# Patient Record
Sex: MALE | Race: WHITE | NOT HISPANIC OR LATINO | Employment: OTHER | URBAN - METROPOLITAN AREA
[De-identification: names, ages, dates, MRNs, and addresses within clinical notes are randomized per-mention and may not be internally consistent; named-entity substitution may affect disease eponyms.]

---

## 2017-01-09 ENCOUNTER — ALLSCRIPTS OFFICE VISIT (OUTPATIENT)
Dept: OTHER | Facility: OTHER | Age: 67
End: 2017-01-09

## 2017-01-12 DIAGNOSIS — J18.9 PNEUMONIA: ICD-10-CM

## 2017-01-12 DIAGNOSIS — R05.9 COUGH: ICD-10-CM

## 2017-01-25 ENCOUNTER — ALLSCRIPTS OFFICE VISIT (OUTPATIENT)
Dept: OTHER | Facility: OTHER | Age: 67
End: 2017-01-25

## 2017-02-07 ENCOUNTER — HOSPITAL ENCOUNTER (OUTPATIENT)
Dept: RADIOLOGY | Facility: HOSPITAL | Age: 67
Discharge: HOME | End: 2017-02-07
Attending: INTERNAL MEDICINE
Payer: MEDICARE

## 2017-02-07 ENCOUNTER — TRANSCRIBE ORDERS (OUTPATIENT)
Dept: ADMINISTRATIVE | Facility: HOSPITAL | Age: 67
End: 2017-02-07

## 2017-02-07 DIAGNOSIS — R05.9 COUGH: ICD-10-CM

## 2017-02-07 PROCEDURE — 71020 HB CHEST X-RAY 2VW FRONTAL&LATL: CPT

## 2017-02-13 ENCOUNTER — ALLSCRIPTS OFFICE VISIT (OUTPATIENT)
Dept: OTHER | Facility: OTHER | Age: 67
End: 2017-02-13

## 2017-02-14 ENCOUNTER — TRANSCRIBE ORDERS (OUTPATIENT)
Dept: ADMINISTRATIVE | Facility: HOSPITAL | Age: 67
End: 2017-02-14

## 2017-02-14 DIAGNOSIS — R05.9 COUGH: Primary | ICD-10-CM

## 2017-03-30 ENCOUNTER — ALLSCRIPTS OFFICE VISIT (OUTPATIENT)
Dept: OTHER | Facility: OTHER | Age: 67
End: 2017-03-30

## 2017-03-31 ENCOUNTER — GENERIC CONVERSION - ENCOUNTER (OUTPATIENT)
Dept: OTHER | Facility: OTHER | Age: 67
End: 2017-03-31

## 2017-05-29 ENCOUNTER — GENERIC CONVERSION - ENCOUNTER (OUTPATIENT)
Dept: OTHER | Facility: OTHER | Age: 67
End: 2017-05-29

## 2017-05-31 LAB
ALBUMIN SERPL BCP-MCNC: 4.2 G/DL
ALP SERPL-CCNC: 62 IU/L
ALT SERPL W P-5'-P-CCNC: 21 IU/L
AST SERPL W P-5'-P-CCNC: 23 IU/L (ref 0–40)
BASOPHILS # BLD AUTO: 0 %
BASOPHILS # BLD AUTO: 0 X10E3/UL
BILIRUB SERPL-MCNC: 0.8 MG/DL (ref 0–1.2)
BILIRUBIN DIRECT (HISTORICAL): 0.17 MG/DL (ref 0–0.4)
BUN SERPL-MCNC: 20 MG/DL
BUN/CREA RATIO (HISTORICAL): 18
CALCIUM SERPL-MCNC: 9.4 MG/DL
CHLORIDE SERPL-SCNC: 103 MMOL/L (ref 96–106)
CHOLEST SERPL-MCNC: 153 MG/DL
CO2 SERPL-SCNC: 23 MMOL/L (ref 18–29)
COMMENT (HISTORICAL): NORMAL
CREAT SERPL-MCNC: 1.1 MG/DL
DEPRECATED RDW RBC AUTO: 13.3 %
EGFR AFRICAN AMERICAN (HISTORICAL): ABNORMAL ML/MIN/1.73
EGFR-AMERICAN CALC (HISTORICAL): ABNORMAL ML/MIN/1.73
EOSINOPHIL # BLD AUTO: 0.1 X10E3/UL
EOSINOPHIL # BLD AUTO: 1 %
GLUCOSE SERPL-MCNC: 111 MG/DL (ref 65–99)
HBA1C MFR BLD HPLC: 5.6 % (ref 4.8–5.6)
HCT VFR BLD AUTO: 45 %
HDLC SERPL-MCNC: 44 MG/DL
HGB BLD-MCNC: 15.9 G/DL
IMM.GRANULOCYTES (CD4/8) (HISTORICAL): 0 %
IMM.GRANULOCYTES (CD4/8) (HISTORICAL): 0 X10E3/UL
LDLC SERPL CALC-MCNC: 88 MG/DL
LYMPHOCYTES # BLD AUTO: 1.6 X10E3/UL
LYMPHOCYTES # BLD AUTO: 25 %
MCH RBC QN AUTO: 30.8 PG
MCHC RBC AUTO-ENTMCNC: 35.3 G/DL
MCV RBC AUTO: 87 FL
MONOCYTES # BLD AUTO: 0.5 X10E3/UL
MONOCYTES (HISTORICAL): 8 %
NEUTROPHILS # BLD AUTO: 4.2 X10E3/UL
NEUTROPHILS # BLD AUTO: 66 %
PLATELET # BLD AUTO: 168 X10E3/UL (ref 150–379)
POTASSIUM SERPL-SCNC: 4.3 MMOL/L (ref 3.5–5.2)
PROSTATE SPECIFIC ANTIGEN (HISTORICAL): 0.3 NG/ML
RBC (HISTORICAL): 5.16 X10E6/UL
SODIUM SERPL-SCNC: 144 MMOL/L (ref 134–144)
TESTOSTERONE TOTAL (HISTORICAL): 255 NG/DL
TOTAL PROTEIN (HISTORICAL): 6.3 G/DL (ref 6–8.5)
TRIGL SERPL-MCNC: 106 MG/DL
VLDLC SERPL CALC-MCNC: 21 MG/DL (ref 5–40)
WBC # BLD AUTO: 6.3 X10E3/UL (ref 3.4–10.8)

## 2017-06-02 ENCOUNTER — ALLSCRIPTS OFFICE VISIT (OUTPATIENT)
Dept: OTHER | Facility: OTHER | Age: 67
End: 2017-06-02

## 2017-12-11 DIAGNOSIS — E78.5 HYPERLIPIDEMIA: ICD-10-CM

## 2017-12-11 DIAGNOSIS — I10 ESSENTIAL (PRIMARY) HYPERTENSION: ICD-10-CM

## 2017-12-11 DIAGNOSIS — R73.01 IMPAIRED FASTING GLUCOSE: ICD-10-CM

## 2018-01-03 ENCOUNTER — GENERIC CONVERSION - ENCOUNTER (OUTPATIENT)
Dept: OTHER | Facility: OTHER | Age: 68
End: 2018-01-03

## 2018-01-14 VITALS
OXYGEN SATURATION: 99 % | HEIGHT: 70 IN | HEART RATE: 66 BPM | SYSTOLIC BLOOD PRESSURE: 122 MMHG | DIASTOLIC BLOOD PRESSURE: 80 MMHG | BODY MASS INDEX: 30.35 KG/M2 | WEIGHT: 212 LBS

## 2018-01-14 VITALS
HEART RATE: 72 BPM | HEIGHT: 70 IN | SYSTOLIC BLOOD PRESSURE: 98 MMHG | RESPIRATION RATE: 18 BRPM | DIASTOLIC BLOOD PRESSURE: 82 MMHG | WEIGHT: 209 LBS | BODY MASS INDEX: 29.92 KG/M2 | OXYGEN SATURATION: 98 %

## 2018-01-14 VITALS
HEART RATE: 64 BPM | HEIGHT: 70 IN | SYSTOLIC BLOOD PRESSURE: 102 MMHG | RESPIRATION RATE: 16 BRPM | DIASTOLIC BLOOD PRESSURE: 82 MMHG | BODY MASS INDEX: 31.07 KG/M2 | OXYGEN SATURATION: 98 % | WEIGHT: 217.03 LBS

## 2018-01-15 VITALS
HEART RATE: 73 BPM | DIASTOLIC BLOOD PRESSURE: 70 MMHG | HEIGHT: 70 IN | BODY MASS INDEX: 30.64 KG/M2 | OXYGEN SATURATION: 98 % | WEIGHT: 214 LBS | SYSTOLIC BLOOD PRESSURE: 112 MMHG | TEMPERATURE: 98.1 F

## 2018-01-24 VITALS
DIASTOLIC BLOOD PRESSURE: 72 MMHG | SYSTOLIC BLOOD PRESSURE: 120 MMHG | OXYGEN SATURATION: 100 % | HEART RATE: 65 BPM | HEIGHT: 70 IN | WEIGHT: 206.13 LBS | BODY MASS INDEX: 29.51 KG/M2

## 2018-02-23 ENCOUNTER — TELEPHONE (OUTPATIENT)
Dept: INTERNAL MEDICINE CLINIC | Facility: CLINIC | Age: 68
End: 2018-02-23

## 2018-02-23 NOTE — TELEPHONE ENCOUNTER
Pt  Leaving a msg for Daria Li asking if she can re-write Viagra Rx for Revatio instead? It's less expensive than Viagra  Also, pls print the Rx instead of Escripting it to the Pharm  He'd like to shop around for the lowest cost  Pls call him when ready for p/u at [phone]

## 2018-04-04 DIAGNOSIS — I10 ESSENTIAL HYPERTENSION: Primary | ICD-10-CM

## 2018-04-04 DIAGNOSIS — E78.49 OTHER HYPERLIPIDEMIA: ICD-10-CM

## 2018-04-05 RX ORDER — LOVASTATIN 40 MG/1
TABLET ORAL
Qty: 30 TABLET | Refills: 3 | Status: SHIPPED | OUTPATIENT
Start: 2018-04-05 | End: 2018-08-06 | Stop reason: SDUPTHER

## 2018-04-05 RX ORDER — HYDROCHLOROTHIAZIDE 25 MG/1
TABLET ORAL
Qty: 30 TABLET | Refills: 4 | Status: SHIPPED | OUTPATIENT
Start: 2018-04-05 | End: 2018-09-08 | Stop reason: SDUPTHER

## 2018-06-03 DIAGNOSIS — I10 ESSENTIAL HYPERTENSION: Primary | ICD-10-CM

## 2018-06-03 RX ORDER — ATENOLOL 50 MG/1
TABLET ORAL
Qty: 30 TABLET | Refills: 4 | Status: SHIPPED | OUTPATIENT
Start: 2018-06-03 | End: 2018-11-11 | Stop reason: SDUPTHER

## 2018-06-12 ENCOUNTER — APPOINTMENT (OUTPATIENT)
Dept: LAB | Facility: CLINIC | Age: 68
End: 2018-06-12
Payer: MEDICARE

## 2018-06-12 DIAGNOSIS — E78.5 HYPERLIPIDEMIA: ICD-10-CM

## 2018-06-12 DIAGNOSIS — I10 ESSENTIAL (PRIMARY) HYPERTENSION: ICD-10-CM

## 2018-06-12 DIAGNOSIS — R73.01 IMPAIRED FASTING GLUCOSE: ICD-10-CM

## 2018-06-12 LAB
ALBUMIN SERPL BCP-MCNC: 3.8 G/DL (ref 3.5–5)
ALP SERPL-CCNC: 70 U/L (ref 46–116)
ALT SERPL W P-5'-P-CCNC: 29 U/L (ref 12–78)
ANION GAP SERPL CALCULATED.3IONS-SCNC: 8 MMOL/L (ref 4–13)
AST SERPL W P-5'-P-CCNC: 20 U/L (ref 5–45)
BILIRUB SERPL-MCNC: 0.93 MG/DL (ref 0.2–1)
BUN SERPL-MCNC: 19 MG/DL (ref 5–25)
CALCIUM SERPL-MCNC: 9.1 MG/DL (ref 8.3–10.1)
CHLORIDE SERPL-SCNC: 104 MMOL/L (ref 100–108)
CHOLEST SERPL-MCNC: 128 MG/DL (ref 50–200)
CO2 SERPL-SCNC: 29 MMOL/L (ref 21–32)
CREAT SERPL-MCNC: 1 MG/DL (ref 0.6–1.3)
ERYTHROCYTE [DISTWIDTH] IN BLOOD BY AUTOMATED COUNT: 12.7 % (ref 11.6–15.1)
EST. AVERAGE GLUCOSE BLD GHB EST-MCNC: 108 MG/DL
GFR SERPL CREATININE-BSD FRML MDRD: 78 ML/MIN/1.73SQ M
GLUCOSE P FAST SERPL-MCNC: 102 MG/DL (ref 65–99)
HBA1C MFR BLD: 5.4 % (ref 4.2–6.3)
HCT VFR BLD AUTO: 47.4 % (ref 36.5–49.3)
HDLC SERPL-MCNC: 40 MG/DL (ref 40–60)
HGB BLD-MCNC: 15.8 G/DL (ref 12–17)
LDLC SERPL CALC-MCNC: 70 MG/DL (ref 0–100)
MCH RBC QN AUTO: 30.4 PG (ref 26.8–34.3)
MCHC RBC AUTO-ENTMCNC: 33.3 G/DL (ref 31.4–37.4)
MCV RBC AUTO: 91 FL (ref 82–98)
NONHDLC SERPL-MCNC: 88 MG/DL
PLATELET # BLD AUTO: 199 THOUSANDS/UL (ref 149–390)
PMV BLD AUTO: 11.1 FL (ref 8.9–12.7)
POTASSIUM SERPL-SCNC: 3.9 MMOL/L (ref 3.5–5.3)
PROT SERPL-MCNC: 7.2 G/DL (ref 6.4–8.2)
RBC # BLD AUTO: 5.19 MILLION/UL (ref 3.88–5.62)
SODIUM SERPL-SCNC: 141 MMOL/L (ref 136–145)
TRIGL SERPL-MCNC: 89 MG/DL
TSH SERPL DL<=0.05 MIU/L-ACNC: 0.71 UIU/ML (ref 0.36–3.74)
WBC # BLD AUTO: 6.83 THOUSAND/UL (ref 4.31–10.16)

## 2018-06-12 PROCEDURE — 80053 COMPREHEN METABOLIC PANEL: CPT

## 2018-06-12 PROCEDURE — 83036 HEMOGLOBIN GLYCOSYLATED A1C: CPT

## 2018-06-12 PROCEDURE — 85027 COMPLETE CBC AUTOMATED: CPT

## 2018-06-12 PROCEDURE — 80061 LIPID PANEL: CPT

## 2018-06-12 PROCEDURE — 84443 ASSAY THYROID STIM HORMONE: CPT

## 2018-06-12 PROCEDURE — 36415 COLL VENOUS BLD VENIPUNCTURE: CPT

## 2018-06-14 RX ORDER — TERBINAFINE HYDROCHLORIDE 250 MG/1
1 TABLET ORAL DAILY
COMMUNITY
Start: 2015-04-24 | End: 2018-06-15

## 2018-06-14 RX ORDER — DIPHENOXYLATE HYDROCHLORIDE AND ATROPINE SULFATE 2.5; .025 MG/1; MG/1
1 TABLET ORAL
COMMUNITY

## 2018-06-14 RX ORDER — SILDENAFIL 100 MG/1
TABLET, FILM COATED ORAL
COMMUNITY
Start: 2017-06-27 | End: 2019-03-12 | Stop reason: SDUPTHER

## 2018-06-14 RX ORDER — CLOBETASOL PROPIONATE 0.5 MG/G
CREAM TOPICAL
COMMUNITY
Start: 2015-04-24 | End: 2018-06-15

## 2018-06-15 ENCOUNTER — TELEPHONE (OUTPATIENT)
Dept: INTERNAL MEDICINE CLINIC | Facility: CLINIC | Age: 68
End: 2018-06-15

## 2018-06-15 ENCOUNTER — OFFICE VISIT (OUTPATIENT)
Dept: INTERNAL MEDICINE CLINIC | Facility: CLINIC | Age: 68
End: 2018-06-15
Payer: MEDICARE

## 2018-06-15 VITALS
WEIGHT: 216 LBS | HEART RATE: 80 BPM | OXYGEN SATURATION: 97 % | HEIGHT: 70 IN | SYSTOLIC BLOOD PRESSURE: 116 MMHG | BODY MASS INDEX: 30.92 KG/M2 | DIASTOLIC BLOOD PRESSURE: 80 MMHG

## 2018-06-15 DIAGNOSIS — K21.9 GERD WITHOUT ESOPHAGITIS: Primary | ICD-10-CM

## 2018-06-15 DIAGNOSIS — Z12.5 SCREENING FOR PROSTATE CANCER: ICD-10-CM

## 2018-06-15 DIAGNOSIS — R06.02 SHORTNESS OF BREATH: ICD-10-CM

## 2018-06-15 DIAGNOSIS — R73.01 IMPAIRED FASTING GLUCOSE: ICD-10-CM

## 2018-06-15 DIAGNOSIS — E78.49 OTHER HYPERLIPIDEMIA: ICD-10-CM

## 2018-06-15 DIAGNOSIS — Z11.59 NEED FOR HEPATITIS C SCREENING TEST: ICD-10-CM

## 2018-06-15 DIAGNOSIS — I10 BENIGN ESSENTIAL HYPERTENSION: ICD-10-CM

## 2018-06-15 PROBLEM — L57.0 ACTINIC KERATOSIS: Status: ACTIVE | Noted: 2017-03-30

## 2018-06-15 PROBLEM — N52.9 ERECTILE DYSFUNCTION: Status: ACTIVE | Noted: 2017-06-27

## 2018-06-15 PROCEDURE — 99214 OFFICE O/P EST MOD 30 MIN: CPT | Performed by: NURSE PRACTITIONER

## 2018-06-15 PROCEDURE — 93000 ELECTROCARDIOGRAM COMPLETE: CPT | Performed by: NURSE PRACTITIONER

## 2018-06-15 NOTE — PATIENT INSTRUCTIONS
Hypertension- stable on current medication    Hyperlipidemia- stable on statin did recommend a routine exercise regimen    Health maintenance- colonscopy up to date, psa w/ next set of labs, hep c screening w/ next set of labs  Pneumonia vaccine at next visit    ED- stable with PRN viagra    Impaired fasting glucose- no evidence of diabetes- again exercise stressed    Shortness of breath with exertion- check EKG stable, but in light of comorbidies will proceed with Echo and stress   I did recommend hold off on viagra until workup is complete

## 2018-06-15 NOTE — PROGRESS NOTES
Assessment/Plan:    Patient Instructions   Hypertension- stable on current medication    Hyperlipidemia- stable on statin did recommend a routine exercise regimen    Health maintenance- colonscopy up to date, psa w/ next set of labs, hep c screening w/ next set of labs  Pneumonia vaccine at next visit    ED- stable with PRN viagra    Impaired fasting glucose- no evidence of diabetes- again exercise stressed    Shortness of breath with exertion- check EKG stable, but in light of comorbidies will proceed with Echo and stress  I did recommend hold off on viagra until workup is complete         Diagnoses and all orders for this visit:    GERD without esophagitis    Impaired fasting glucose  -     Hemoglobin A1C; Future    Benign essential hypertension  -     CBC and differential; Future  -     Comprehensive metabolic panel; Future    Other hyperlipidemia  -     Lipid panel; Future  -     TSH, 3rd generation; Future    Shortness of breath  -     POCT ECG  -     Echo complete with contrast if indicated; Future  -     Echo stress test w contrast if indicated; Future    Need for hepatitis C screening test  -     Hepatitis C antibody; Future    Screening for prostate cancer  -     PSA, Total Screen; Future    Other orders  -     aspirin 81 MG tablet; Take by mouth  -     Discontinue: clobetasol (TEMOVATE) 0 05 % cream; Apply topically  -     multivitamin (THERAGRAN) TABS; Take by mouth  -     Discontinue: terbinafine (LamISIL) 250 mg tablet; Take 1 tablet by mouth daily  -     sildenafil (VIAGRA) 100 mg tablet; Take by mouth         Subjective:      Patient ID: Aisha Brito is a 79 y o  male    Active no formal exercise  + sob w/ exertion w/out cp or cough   No leg swelling  Rare palpitations  No home bps  Tolerating chol meds  Taking viagra as  needed          Current Outpatient Prescriptions:     aspirin 81 MG tablet, Take by mouth, Disp: , Rfl:     atenolol (TENORMIN) 50 mg tablet, TAKE 1 TABLET DAILY, Disp: 30 tablet, Rfl: 4    hydrochlorothiazide (HYDRODIURIL) 25 mg tablet, TAKE 1 TABLET DAILY  , Disp: 30 tablet, Rfl: 4    lovastatin (MEVACOR) 40 MG tablet, TAKE 1 TABLET AT BEDTIME, Disp: 30 tablet, Rfl: 3    multivitamin (THERAGRAN) TABS, Take by mouth, Disp: , Rfl:     sildenafil (VIAGRA) 100 mg tablet, Take by mouth, Disp: , Rfl:     Recent Results (from the past 1008 hour(s))   CBC    Collection Time: 06/12/18 10:09 AM   Result Value Ref Range    WBC 6 83 4 31 - 10 16 Thousand/uL    RBC 5 19 3 88 - 5 62 Million/uL    Hemoglobin 15 8 12 0 - 17 0 g/dL    Hematocrit 47 4 36 5 - 49 3 %    MCV 91 82 - 98 fL    MCH 30 4 26 8 - 34 3 pg    MCHC 33 3 31 4 - 37 4 g/dL    RDW 12 7 11 6 - 15 1 %    Platelets 805 579 - 639 Thousands/uL    MPV 11 1 8 9 - 12 7 fL   Comprehensive metabolic panel    Collection Time: 06/12/18 10:09 AM   Result Value Ref Range    Sodium 141 136 - 145 mmol/L    Potassium 3 9 3 5 - 5 3 mmol/L    Chloride 104 100 - 108 mmol/L    CO2 29 21 - 32 mmol/L    Anion Gap 8 4 - 13 mmol/L    BUN 19 5 - 25 mg/dL    Creatinine 1 00 0 60 - 1 30 mg/dL    Glucose, Fasting 102 (H) 65 - 99 mg/dL    Calcium 9 1 8 3 - 10 1 mg/dL    AST 20 5 - 45 U/L    ALT 29 12 - 78 U/L    Alkaline Phosphatase 70 46 - 116 U/L    Total Protein 7 2 6 4 - 8 2 g/dL    Albumin 3 8 3 5 - 5 0 g/dL    Total Bilirubin 0 93 0 20 - 1 00 mg/dL    eGFR 78 ml/min/1 73sq m   Hemoglobin A1c    Collection Time: 06/12/18 10:09 AM   Result Value Ref Range    Hemoglobin A1C 5 4 4 2 - 6 3 %     mg/dl   Lipid panel    Collection Time: 06/12/18 10:09 AM   Result Value Ref Range    Cholesterol 128 50 - 200 mg/dL    Triglycerides 89 <=150 mg/dL    HDL, Direct 40 40 - 60 mg/dL    LDL Calculated 70 0 - 100 mg/dL    Non-HDL-Chol (CHOL-HDL) 88 mg/dl   TSH, 3rd generation    Collection Time: 06/12/18 10:09 AM   Result Value Ref Range    TSH 3RD GENERATON 0 714 0 358 - 3 740 uIU/mL       The following portions of the patient's history were reviewed and updated as appropriate: allergies, current medications, past family history, past medical history, past social history, past surgical history and problem list      Review of Systems   Constitutional: Negative for appetite change, chills, diaphoresis, fatigue, fever and unexpected weight change  HENT: Negative for postnasal drip and sneezing  Eyes: Negative for visual disturbance  Respiratory: Positive for shortness of breath  Negative for chest tightness  Cardiovascular: Negative for chest pain, palpitations and leg swelling  Gastrointestinal: Negative for abdominal pain and blood in stool  Endocrine: Negative for cold intolerance, heat intolerance, polydipsia, polyphagia and polyuria  Genitourinary: Negative for difficulty urinating, dysuria, frequency and urgency  Musculoskeletal: Negative for arthralgias and myalgias  Skin: Negative for rash and wound  Neurological: Negative for dizziness, weakness, light-headedness and headaches  Hematological: Negative for adenopathy  Psychiatric/Behavioral: Negative for confusion, dysphoric mood and sleep disturbance  The patient is not nervous/anxious  Objective:      Vitals:    06/15/18 0955   BP: 116/80   Pulse: 80   SpO2: 97%          Physical Exam   Constitutional: He is oriented to person, place, and time  He appears well-developed  No distress  HENT:   Head: Normocephalic and atraumatic  Nose: Nose normal    Mouth/Throat: Oropharynx is clear and moist    Eyes: Conjunctivae and EOM are normal  Pupils are equal, round, and reactive to light  Neck: Normal range of motion  Neck supple  No JVD present  No tracheal deviation present  No thyromegaly present  Cardiovascular: Normal rate, regular rhythm, normal heart sounds and intact distal pulses  Exam reveals no gallop and no friction rub  No murmur heard  Pulmonary/Chest: Effort normal and breath sounds normal  No respiratory distress  He has no wheezes  He has no rales     Abdominal: Soft  Bowel sounds are normal  He exhibits no distension  There is no tenderness  Musculoskeletal: Normal range of motion  He exhibits no edema  Lymphadenopathy:     He has no cervical adenopathy  Neurological: He is alert and oriented to person, place, and time  No cranial nerve deficit  Skin: Skin is warm and dry  No rash noted  He is not diaphoretic  Psychiatric: He has a normal mood and affect   His behavior is normal  Judgment and thought content normal

## 2018-08-06 DIAGNOSIS — E78.49 OTHER HYPERLIPIDEMIA: ICD-10-CM

## 2018-08-06 RX ORDER — LOVASTATIN 40 MG/1
TABLET ORAL
Qty: 30 TABLET | Refills: 3 | Status: SHIPPED | OUTPATIENT
Start: 2018-08-06 | End: 2019-01-14 | Stop reason: SDUPTHER

## 2018-08-14 ENCOUNTER — HOSPITAL ENCOUNTER (OUTPATIENT)
Dept: NON INVASIVE DIAGNOSTICS | Facility: CLINIC | Age: 68
Discharge: HOME | End: 2018-08-14
Payer: MEDICARE

## 2018-08-14 ENCOUNTER — HOSPITAL ENCOUNTER (OUTPATIENT)
Dept: NON INVASIVE DIAGNOSTICS | Facility: CLINIC | Age: 68
End: 2018-08-14
Payer: MEDICARE

## 2018-08-14 DIAGNOSIS — R06.02 SHORTNESS OF BREATH: ICD-10-CM

## 2018-08-14 PROCEDURE — 93306 TTE W/DOPPLER COMPLETE: CPT

## 2018-08-14 PROCEDURE — 93306 TTE W/DOPPLER COMPLETE: CPT | Performed by: INTERNAL MEDICINE

## 2018-08-15 ENCOUNTER — TELEPHONE (OUTPATIENT)
Dept: INTERNAL MEDICINE CLINIC | Facility: CLINIC | Age: 68
End: 2018-08-15

## 2018-08-15 NOTE — PROGRESS NOTES
His echo showed a little leaking around the valves this is nothing to worry about but the lower chamber of his heart has a hard time relaxing- this could put him at risk in the future for fluid overload   After his stress test have him make a follow up to discuss

## 2018-08-15 NOTE — TELEPHONE ENCOUNTER
----- Message from Nery Granger, [address]  sent at 8/15/2018 12:52 PM EDT -----  His echo showed a little leaking around the valves this is nothing to worry about but the lower chamber of his heart has a hard time relaxing- this could put him at risk in the future for fluid overload   After his stress test have him make a follow u  p to discuss

## 2018-08-15 NOTE — TELEPHONE ENCOUNTER
Pt called back  Couldn't reach a nurse    He said he is home for the day if anyone wants to call him back    [phone]

## 2018-08-30 ENCOUNTER — HOSPITAL ENCOUNTER (OUTPATIENT)
Dept: NON INVASIVE DIAGNOSTICS | Facility: CLINIC | Age: 68
Discharge: HOME | End: 2018-08-30
Payer: MEDICARE

## 2018-08-30 DIAGNOSIS — R06.02 SHORTNESS OF BREATH: ICD-10-CM

## 2018-08-30 PROCEDURE — 93351 STRESS TTE COMPLETE: CPT | Performed by: INTERNAL MEDICINE

## 2018-08-30 PROCEDURE — 93350 STRESS TTE ONLY: CPT

## 2018-08-31 ENCOUNTER — TELEPHONE (OUTPATIENT)
Dept: INTERNAL MEDICINE CLINIC | Facility: CLINIC | Age: 68
End: 2018-08-31

## 2018-08-31 LAB
ARRHY DURING EX: NORMAL
CHEST PAIN STATEMENT: NORMAL
MAX DIASTOLIC BP: 88 MMHG
MAX HEART RATE: 134 BPM
MAX PREDICTED HEART RATE: 154 BPM
MAX. SYSTOLIC BP: 184 MMHG
PROTOCOL NAME: NORMAL
REASON FOR TERMINATION: NORMAL
TARGET HR FORMULA: NORMAL
TEST INDICATION: NORMAL
TIME IN EXERCISE PHASE: NORMAL

## 2018-09-08 DIAGNOSIS — I10 ESSENTIAL HYPERTENSION: ICD-10-CM

## 2018-09-10 RX ORDER — HYDROCHLOROTHIAZIDE 25 MG/1
TABLET ORAL
Qty: 30 TABLET | Refills: 4 | Status: SHIPPED | OUTPATIENT
Start: 2018-09-10 | End: 2019-02-25 | Stop reason: SDUPTHER

## 2018-11-09 ENCOUNTER — TRANSCRIBE ORDERS (OUTPATIENT)
Dept: ADMINISTRATIVE | Facility: HOSPITAL | Age: 68
End: 2018-11-09

## 2018-11-11 DIAGNOSIS — I10 ESSENTIAL HYPERTENSION: ICD-10-CM

## 2018-11-11 RX ORDER — ATENOLOL 50 MG/1
TABLET ORAL
Qty: 30 TABLET | Refills: 4 | Status: SHIPPED | OUTPATIENT
Start: 2018-11-11 | End: 2019-04-22 | Stop reason: SDUPTHER

## 2018-11-15 ENCOUNTER — OFFICE VISIT (OUTPATIENT)
Dept: SURGICAL ONCOLOGY | Facility: CLINIC | Age: 68
End: 2018-11-15
Payer: MEDICARE

## 2018-11-15 VITALS
DIASTOLIC BLOOD PRESSURE: 82 MMHG | BODY MASS INDEX: 30.28 KG/M2 | SYSTOLIC BLOOD PRESSURE: 130 MMHG | WEIGHT: 211.5 LBS | HEART RATE: 60 BPM | RESPIRATION RATE: 18 BRPM | TEMPERATURE: 97.6 F | HEIGHT: 70 IN

## 2018-11-15 DIAGNOSIS — C43.59 MELANOMA OF ABDOMEN (HCC): Primary | ICD-10-CM

## 2018-11-15 PROCEDURE — 99205 OFFICE O/P NEW HI 60 MIN: CPT | Performed by: SURGERY

## 2018-11-15 NOTE — PROGRESS NOTES
Surgical Oncology Consult       [facility]  [facility] [place]  [address]  1950  [id number]  [address]  [facility] [place] [place]  [facility] [place]  [address] [address]    Diagnoses and all orders for this visit:    Melanoma of abdomen ([address] )  -     Case request operating room: EXCISION WIDE LESION MEDIAL UPPER ABDOMEN, BIOPSY LYMPH NODE SENTINEL; Standing  -     Comprehensive metabolic panel  -     CBC and differential  -     APTT  -     Protime-INR  -     HEMOGLOBIN A1C W/ EAG ESTIMATION  -     XR chest pa & lateral; Future  -     NM lymphatic melanoma; Future  -     Case request operating room: EXCISION WIDE LESION MEDIAL UPPER ABDOMEN, BIOPSY LYMPH NODE SENTINEL    Other orders  -     Incentive spirometry; Standing  -     Insert and maintain IV line; Standing  -     Void On-Call to O R ; Standing  -     Place sequential compression device; Standing  -     ceFAZolin (ANCEF) 2,000 mg in dextrose 5 % 100 mL IVPB; Infuse 2,000 mg into a venous catheter once         Chief Complaint   Patient presents with    Skin Cancer     ref Dr Tiffany Holguin re melanoma abd poss SLN BX       No Follow-up on file  No history exists  History of Present Illness:   61-year-old male who had a mole on his upper mid abdomen for many years  Over the last 6 months he noticed this started to change in size and color  He brought this to the attention of his dermatologist   Biopsy was performed  This revealed a 1 1 mm melanoma  There was no ulceration  He comes in now to discuss further therapy  He denies any new abdominal pain, nausea, vomiting, back pain, bone pain, headaches, or cough  No history of blistering sunburns  No family history of melanoma      Review of Systems  Complete ROS Surg Onc:   Constitutional: The patient denies new or recent history of general fatigue, no recent weight loss, no change in appetite  Eyes: No complaints of visual problems, no scleral icterus  ENT: no complaints of ear pain, no hoarseness, no difficulty swallowing,  no tinnitus and no new masses in head, oral cavity, or neck  Cardiovascular: No complaints of chest pain, no palpitations, no ankle edema  Respiratory: No complaints of shortness of breath, no cough  Gastrointestinal: No complaints of jaundice, no bloody stools, no pale stools  Genitourinary: No complaints of dysuria, no hematuria, no nocturia, no frequent urination, no urethral discharge  Musculoskeletal: No complaints of weakness, paralysis, joint stiffness or arthralgias  Integumentary: No complaints of rash, no new lesions  Neurological: No complaints of convulsions, no seizures, no dizziness  Hematologic/Lymphatic: No complaints of easy bruising  Endocrine:  No hot or cold intolerance  No polydipsia, polyphagia, or polyuria  Allergy/immunology:  No environmental allergies  No food allergies  Not immunocompromised  Skin:  No pallor or rash  No wound            Patient Active Problem List   Diagnosis    Actinic keratosis    Benign essential hypertension    BPH (benign prostatic hyperplasia)    Erectile dysfunction    GERD without esophagitis    Hyperlipidemia    Impaired fasting glucose    Melanoma of abdomen ([address] )     Past Medical History:   Diagnosis Date    Hyperlipidemia     Last Assessed:10/20/14    Hypertension     Last Assessed:10/20/14     Past Surgical History:   Procedure Laterality Date    HERNIA REPAIR      Last Assessed:10/20/14     Family History   Problem Relation Age of Onset    Hypertension Mother     Hypothyroidism Mother     Hypertension Father     Skin cancer Father     Colon cancer Maternal Aunt     Colon cancer Paternal Aunt         62s    Skin cancer Paternal Uncle      Social History     Social History    Marital status: /Civil Union     Spouse name: N/A    Number of children: N/A    Years of education: N/A     Occupational History    Not on file  Social History Main Topics    Smoking status: Never Smoker    Smokeless tobacco: Never Used    Alcohol use 1 2 oz/week     1 Glasses of wine, 1 Cans of beer per week      Comment: rarely    Drug use: No    Sexual activity: Yes     Partners: Female     Other Topics Concern    Not on file     Social History Narrative    No advance directives           Current Outpatient Prescriptions:     aspirin 81 MG tablet, Take by mouth, Disp: , Rfl:     atenolol (TENORMIN) 50 mg tablet, TAKE 1 TABLET DAILY, Disp: 30 tablet, Rfl: 4    hydrochlorothiazide (HYDRODIURIL) 25 mg tablet, TAKE 1 TABLET DAILY  , Disp: 30 tablet, Rfl: 4    lovastatin (MEVACOR) 40 MG tablet, TAKE 1 TABLET AT BEDTIME, Disp: 30 tablet, Rfl: 3    multivitamin (THERAGRAN) TABS, Take by mouth, Disp: , Rfl:     sildenafil (VIAGRA) 100 mg tablet, Take by mouth, Disp: , Rfl:   No Known Allergies  Vitals:    11/15/18 1240   BP: 130/82   Pulse: 60   Resp: 18   Temp: 97 6 °F (36 4 °C)       Physical Exam   Constitutional: General appearance: The Patient is well-developed and well-nourished who appears the stated age in no acute distress  Patient is pleasant and talkative  HEENT:  Normocephalic  Sclerae are anicteric  Mucous membranes are moist  Neck is supple without adenopathy  No JVD  Chest: The lungs are clear to auscultation  Cardiac: Heart is regular rate  Abdomen: Abdomen is soft, non-tender, non-distended and without masses  Extremities: There is no clubbing or cyanosis  There is no edema  Symmetric  Neuro: Grossly nonfocal  Gait is normal      Lymphatic: No evidence of cervical adenopathy bilaterally  No evidence of axillary adenopathy bilaterally  No evidence of inguinal adenopathy bilaterally  Skin: Warm, anicteric    excision site on mid upper abdomen is healing well  No satellite lesions    Psych:  Patient is pleasant and talkative  Breasts:      Pathology:  Malignant melanoma:  1 1 mm  No ulceration    Labs:      Imaging  No results found  I reviewed the above laboratory and imaging data  Discussion/Summary:   74-year-old male with a clinical [id number] melanoma  He gives no signs or symptoms of metastasis  He will be staged with a chest x-ray liver function studies  I have recommended that he undergo wide excision of his abdominal melanoma  I also discussed the reasoning and rationale behind sentinel lymph node biopsy  The risks were explained including bleeding, infection, recurrence, need for further surgery, wound complications, the 2% false-positive, false negative, and inability to find the sentinel lymph node rates as well as neurovascular damage  Informed consent was obtained  We will schedule this at our earliest mutual convenience  He is agreeable to this  All his questions were answered

## 2018-11-15 NOTE — LETTER
November 15, 2018     Denette Severe, [address] [place] [id number]    Patient: Rin Tompkins   YOB: 1950   Date of Visit: 11/15/2018       Dear Dr Brock Jose:    Thank you for referring Rin Tompkins to me for evaluation  Below are my notes for this consultation  If you have questions, please do not hesitate to call me  I look forward to following your patient along with you  Sincerely,        Brayan Alcala MD        CC: MD Brayan Becker MD  11/15/2018  1:19 PM  Sign at close encounter               [address]  [facility] [address]  1950  [id number]  [facility]  [facility] [address]    Diagnoses and all orders for this visit:    Melanoma of abdomen ([address] )  -     Case request operating room: EXCISION WIDE LESION MEDIAL UPPER ABDOMEN, BIOPSY LYMPH NODE SENTINEL; Standing  -     Comprehensive metabolic panel  -     CBC and differential  -     APTT  -     Protime-INR  -     HEMOGLOBIN A1C W/ EAG ESTIMATION  -     XR chest pa & lateral; Future  -     NM lymphatic melanoma; Future  -     Case request operating room: EXCISION WIDE LESION MEDIAL UPPER ABDOMEN, BIOPSY LYMPH NODE SENTINEL    Other orders  -     Incentive spirometry; Standing  -     Insert and maintain IV line; Standing  -     Void On-Call to O R ; Standing  -     Place sequential compression device; Standing  -     ceFAZolin (ANCEF) 2,000 mg in dextrose 5 % 100 mL IVPB; Infuse 2,000 mg into a venous catheter once         Chief Complaint   Patient presents with    Skin Cancer     ref Dr Facundo Ferro re melanoma abd poss SLN BX       No Follow-up on file  No history exists  History of Present Illness:   53-year-old male who had a mole on his upper mid abdomen for many years    Over the last 6 months he noticed this started to change in size and color  He brought this to the attention of his dermatologist   Biopsy was performed  This revealed a 1 1 mm melanoma  There was no ulceration  He comes in now to discuss further therapy  He denies any new abdominal pain, nausea, vomiting, back pain, bone pain, headaches, or cough  No history of blistering sunburns  No family history of melanoma  Review of Systems  Complete ROS Surg Onc:   Constitutional: The patient denies new or recent history of general fatigue, no recent weight loss, no change in appetite  Eyes: No complaints of visual problems, no scleral icterus  ENT: no complaints of ear pain, no hoarseness, no difficulty swallowing,  no tinnitus and no new masses in head, oral cavity, or neck  Cardiovascular: No complaints of chest pain, no palpitations, no ankle edema  Respiratory: No complaints of shortness of breath, no cough  Gastrointestinal: No complaints of jaundice, no bloody stools, no pale stools  Genitourinary: No complaints of dysuria, no hematuria, no nocturia, no frequent urination, no urethral discharge  Musculoskeletal: No complaints of weakness, paralysis, joint stiffness or arthralgias  Integumentary: No complaints of rash, no new lesions  Neurological: No complaints of convulsions, no seizures, no dizziness  Hematologic/Lymphatic: No complaints of easy bruising  Endocrine:  No hot or cold intolerance  No polydipsia, polyphagia, or polyuria  Allergy/immunology:  No environmental allergies  No food allergies  Not immunocompromised  Skin:  No pallor or rash  No wound            Patient Active Problem List   Diagnosis    Actinic keratosis    Benign essential hypertension    BPH (benign prostatic hyperplasia)    Erectile dysfunction    GERD without esophagitis    Hyperlipidemia    Impaired fasting glucose    [address] [address]     Past Medical History:   Diagnosis Date    Hyperlipidemia     Last Assessed:10/20/14    Hypertension     Last Assessed:10/20/14     Past Surgical History:   Procedure Laterality Date    HERNIA REPAIR      Last Assessed:10/20/14     Family History   Problem Relation Age of Onset    Hypertension Mother     Hypothyroidism Mother     Hypertension Father     Skin cancer Father     Colon cancer Maternal Aunt     Colon cancer Paternal Aunt         62s    Skin cancer Paternal Uncle      Social History     Social History    Marital status: /Civil Union     Spouse name: N/A    Number of children: N/A    Years of education: N/A     Occupational History    Not on file  Social History Main Topics    Smoking status: Never Smoker    Smokeless tobacco: Never Used    Alcohol use 1 2 oz/week     1 Glasses of wine, 1 Cans of beer per week      Comment: rarely    Drug use: No    Sexual activity: Yes     Partners: Female     Other Topics Concern    Not on file     Social History Narrative    No advance directives           Current Outpatient Prescriptions:     aspirin 81 MG tablet, Take by mouth, Disp: , Rfl:     atenolol (TENORMIN) 50 mg tablet, TAKE 1 TABLET DAILY, Disp: 30 tablet, Rfl: 4    hydrochlorothiazide (HYDRODIURIL) 25 mg tablet, TAKE 1 TABLET DAILY  , Disp: 30 tablet, Rfl: 4    lovastatin (MEVACOR) 40 MG tablet, TAKE 1 TABLET AT BEDTIME, Disp: 30 tablet, Rfl: 3    multivitamin (THERAGRAN) TABS, Take by mouth, Disp: , Rfl:     sildenafil (VIAGRA) 100 mg tablet, Take by mouth, Disp: , Rfl:   No Known Allergies  Vitals:    11/15/18 1240   BP: 130/82   Pulse: 60   Resp: 18   Temp: 97 6 °F (36 4 °C)       Physical Exam   Constitutional: General appearance: The Patient is well-developed and well-nourished who appears the stated age in no acute distress  Patient is pleasant and talkative  HEENT:  Normocephalic  Sclerae are anicteric  Mucous membranes are moist  Neck is supple without adenopathy  No JVD       Chest: The lungs are clear to auscultation  Cardiac: Heart is regular rate  Abdomen: Abdomen is soft, non-tender, non-distended and without masses  Extremities: There is no clubbing or cyanosis  There is no edema  Symmetric  Neuro: Grossly nonfocal  Gait is normal      Lymphatic: No evidence of cervical adenopathy bilaterally  No evidence of axillary adenopathy bilaterally  No evidence of inguinal adenopathy bilaterally  Skin: Warm, anicteric     excision site on mid upper abdomen is healing well  No satellite lesions  Psych:  Patient is pleasant and talkative  Breasts:      Pathology:  Malignant melanoma:  1 1 mm  No ulceration    Labs:      Imaging  No results found  I reviewed the above laboratory and imaging data  Discussion/Summary:   24-year-old male with a clinical [id number] melanoma  He gives no signs or symptoms of metastasis  He will be staged with a chest x-ray liver function studies  I have recommended that he undergo wide excision of his abdominal melanoma  I also discussed the reasoning and rationale behind sentinel lymph node biopsy  The risks were explained including bleeding, infection, recurrence, need for further surgery, wound complications, the 2% false-positive, false negative, and inability to find the sentinel lymph node rates as well as neurovascular damage  Informed consent was obtained  We will schedule this at our earliest mutual convenience  He is agreeable to this  All his questions were answered

## 2018-11-30 ENCOUNTER — HOSPITAL ENCOUNTER (OUTPATIENT)
Dept: RADIOLOGY | Facility: HOSPITAL | Age: 68
Discharge: HOME | End: 2018-11-30
Attending: SURGERY
Payer: MEDICARE

## 2018-11-30 ENCOUNTER — APPOINTMENT (OUTPATIENT)
Dept: LAB | Facility: HOSPITAL | Age: 68
End: 2018-11-30
Payer: MEDICARE

## 2018-11-30 DIAGNOSIS — Z11.59 NEED FOR HEPATITIS C SCREENING TEST: ICD-10-CM

## 2018-11-30 DIAGNOSIS — C43.59 MELANOMA OF ABDOMEN (HCC): ICD-10-CM

## 2018-11-30 LAB
ALBUMIN SERPL BCP-MCNC: 3.9 G/DL (ref 3.5–5)
ALP SERPL-CCNC: 86 U/L (ref 46–116)
ALT SERPL W P-5'-P-CCNC: 31 U/L (ref 12–78)
ANION GAP SERPL CALCULATED.3IONS-SCNC: 8 MMOL/L (ref 4–13)
APTT PPP: 30 SECONDS (ref 26–38)
AST SERPL W P-5'-P-CCNC: 17 U/L (ref 5–45)
BASOPHILS # BLD AUTO: 0.06 THOUSANDS/ΜL (ref 0–0.1)
BASOPHILS NFR BLD AUTO: 1 % (ref 0–1)
BILIRUB SERPL-MCNC: 0.5 MG/DL (ref 0.2–1)
BUN SERPL-MCNC: 24 MG/DL (ref 5–25)
CALCIUM SERPL-MCNC: 9.3 MG/DL (ref 8.3–10.1)
CHLORIDE SERPL-SCNC: 105 MMOL/L (ref 100–108)
CO2 SERPL-SCNC: 30 MMOL/L (ref 21–32)
CREAT SERPL-MCNC: 1.09 MG/DL (ref 0.6–1.3)
EOSINOPHIL # BLD AUTO: 0.08 THOUSAND/ΜL (ref 0–0.61)
EOSINOPHIL NFR BLD AUTO: 1 % (ref 0–6)
ERYTHROCYTE [DISTWIDTH] IN BLOOD BY AUTOMATED COUNT: 12.7 % (ref 11.6–15.1)
EST. AVERAGE GLUCOSE BLD GHB EST-MCNC: 114 MG/DL
GFR SERPL CREATININE-BSD FRML MDRD: 70 ML/MIN/1.73SQ M
GLUCOSE SERPL-MCNC: 88 MG/DL (ref 65–140)
HBA1C MFR BLD: 5.6 % (ref 4.2–6.3)
HCT VFR BLD AUTO: 48.2 % (ref 36.5–49.3)
HGB BLD-MCNC: 16.3 G/DL (ref 12–17)
IMM GRANULOCYTES # BLD AUTO: 0.03 THOUSAND/UL (ref 0–0.2)
IMM GRANULOCYTES NFR BLD AUTO: 0 % (ref 0–2)
INR PPP: 1 (ref 0.86–1.17)
LYMPHOCYTES # BLD AUTO: 1.9 THOUSANDS/ΜL (ref 0.6–4.47)
LYMPHOCYTES NFR BLD AUTO: 26 % (ref 14–44)
MCH RBC QN AUTO: 30.2 PG (ref 26.8–34.3)
MCHC RBC AUTO-ENTMCNC: 33.8 G/DL (ref 31.4–37.4)
MCV RBC AUTO: 89 FL (ref 82–98)
MONOCYTES # BLD AUTO: 0.66 THOUSAND/ΜL (ref 0.17–1.22)
MONOCYTES NFR BLD AUTO: 9 % (ref 4–12)
NEUTROPHILS # BLD AUTO: 4.49 THOUSANDS/ΜL (ref 1.85–7.62)
NEUTS SEG NFR BLD AUTO: 63 % (ref 43–75)
NRBC BLD AUTO-RTO: 0 /100 WBCS
PLATELET # BLD AUTO: 195 THOUSANDS/UL (ref 149–390)
PMV BLD AUTO: 10.8 FL (ref 8.9–12.7)
POTASSIUM SERPL-SCNC: 3.9 MMOL/L (ref 3.5–5.3)
PROT SERPL-MCNC: 7.4 G/DL (ref 6.4–8.2)
PROTHROMBIN TIME: 13.1 SECONDS (ref 11.8–14.2)
RBC # BLD AUTO: 5.39 MILLION/UL (ref 3.88–5.62)
SODIUM SERPL-SCNC: 143 MMOL/L (ref 136–145)
WBC # BLD AUTO: 7.22 THOUSAND/UL (ref 4.31–10.16)

## 2018-11-30 PROCEDURE — 85025 COMPLETE CBC W/AUTO DIFF WBC: CPT | Performed by: SURGERY

## 2018-11-30 PROCEDURE — 36415 COLL VENOUS BLD VENIPUNCTURE: CPT | Performed by: SURGERY

## 2018-11-30 PROCEDURE — 85610 PROTHROMBIN TIME: CPT | Performed by: SURGERY

## 2018-11-30 PROCEDURE — 83036 HEMOGLOBIN GLYCOSYLATED A1C: CPT | Performed by: SURGERY

## 2018-11-30 PROCEDURE — 80053 COMPREHEN METABOLIC PANEL: CPT | Performed by: SURGERY

## 2018-11-30 PROCEDURE — 71046 X-RAY EXAM CHEST 2 VIEWS: CPT

## 2018-11-30 PROCEDURE — 85730 THROMBOPLASTIN TIME PARTIAL: CPT | Performed by: SURGERY

## 2018-12-11 NOTE — PRE-PROCEDURE INSTRUCTIONS
Pre-Surgery Instructions:   Medication Instructions    aspirin 81 MG tablet Patient was instructed by Physician and understands   atenolol (TENORMIN) 50 mg tablet Instructed patient per Anesthesia Guidelines   hydrochlorothiazide (HYDRODIURIL) 25 mg tablet Instructed patient per Anesthesia Guidelines   lovastatin (MEVACOR) 40 MG tablet Instructed patient per Anesthesia Guidelines   multivitamin [facility]) TABS Patient was instructed by Physician and understands   sildenafil (VIAGRA) 100 mg tablet Instructed patient per Anesthesia Guidelines  Pre op and bathing instructions reviewed   Pt will get hibiclens

## 2018-12-14 ENCOUNTER — HOSPITAL ENCOUNTER (OUTPATIENT)
Dept: NUCLEAR MEDICINE | Facility: HOSPITAL | Age: 68
Discharge: HOME | End: 2018-12-14
Attending: SURGERY
Payer: MEDICARE

## 2018-12-14 ENCOUNTER — ANESTHESIA (OUTPATIENT)
Dept: PERIOP | Facility: HOSPITAL | Age: 68
End: 2018-12-14
Payer: MEDICARE

## 2018-12-14 ENCOUNTER — ANESTHESIA EVENT (OUTPATIENT)
Dept: PERIOP | Facility: HOSPITAL | Age: 68
End: 2018-12-14
Payer: MEDICARE

## 2018-12-14 ENCOUNTER — HOSPITAL ENCOUNTER (OUTPATIENT)
Facility: HOSPITAL | Age: 68
Setting detail: OUTPATIENT SURGERY
Discharge: HOME | End: 2018-12-14
Attending: SURGERY | Admitting: SURGERY
Payer: MEDICARE

## 2018-12-14 VITALS
WEIGHT: 209 LBS | SYSTOLIC BLOOD PRESSURE: 104 MMHG | TEMPERATURE: 97.4 F | DIASTOLIC BLOOD PRESSURE: 64 MMHG | OXYGEN SATURATION: 98 % | BODY MASS INDEX: 29.26 KG/M2 | HEART RATE: 64 BPM | RESPIRATION RATE: 18 BRPM | HEIGHT: 71 IN

## 2018-12-14 DIAGNOSIS — C43.59 MELANOMA OF ABDOMEN (HCC): ICD-10-CM

## 2018-12-14 PROCEDURE — 38525 BIOPSY/REMOVAL LYMPH NODES: CPT | Performed by: PHYSICIAN ASSISTANT

## 2018-12-14 PROCEDURE — A9541 TC99M SULFUR COLLOID: HCPCS

## 2018-12-14 PROCEDURE — 88307 TISSUE EXAM BY PATHOLOGIST: CPT | Performed by: PATHOLOGY

## 2018-12-14 PROCEDURE — 38525 BIOPSY/REMOVAL LYMPH NODES: CPT | Performed by: SURGERY

## 2018-12-14 PROCEDURE — 88305 TISSUE EXAM BY PATHOLOGIST: CPT | Performed by: PATHOLOGY

## 2018-12-14 PROCEDURE — 88342 IMHCHEM/IMCYTCHM 1ST ANTB: CPT | Performed by: PATHOLOGY

## 2018-12-14 PROCEDURE — 88341 IMHCHEM/IMCYTCHM EA ADD ANTB: CPT | Performed by: PATHOLOGY

## 2018-12-14 RX ORDER — PROPOFOL 10 MG/ML
INJECTION, EMULSION INTRAVENOUS AS NEEDED
Status: DISCONTINUED | OUTPATIENT
Start: 2018-12-14 | End: 2018-12-14 | Stop reason: SURG

## 2018-12-14 RX ORDER — SODIUM CHLORIDE 9 MG/ML
INJECTION, SOLUTION INTRAVENOUS CONTINUOUS PRN
Status: DISCONTINUED | OUTPATIENT
Start: 2018-12-14 | End: 2018-12-14 | Stop reason: SURG

## 2018-12-14 RX ORDER — OXYCODONE HYDROCHLORIDE 5 MG/1
5 TABLET ORAL EVERY 4 HOURS PRN
Status: DISCONTINUED | OUTPATIENT
Start: 2018-12-14 | End: 2018-12-14 | Stop reason: HOSPADM

## 2018-12-14 RX ORDER — MIDAZOLAM HYDROCHLORIDE 1 MG/ML
INJECTION INTRAMUSCULAR; INTRAVENOUS AS NEEDED
Status: DISCONTINUED | OUTPATIENT
Start: 2018-12-14 | End: 2018-12-14 | Stop reason: SURG

## 2018-12-14 RX ORDER — ONDANSETRON 2 MG/ML
INJECTION INTRAMUSCULAR; INTRAVENOUS AS NEEDED
Status: DISCONTINUED | OUTPATIENT
Start: 2018-12-14 | End: 2018-12-14 | Stop reason: SURG

## 2018-12-14 RX ORDER — ROCURONIUM BROMIDE 10 MG/ML
INJECTION, SOLUTION INTRAVENOUS AS NEEDED
Status: DISCONTINUED | OUTPATIENT
Start: 2018-12-14 | End: 2018-12-14 | Stop reason: SURG

## 2018-12-14 RX ORDER — BUPIVACAINE HYDROCHLORIDE 2.5 MG/ML
INJECTION, SOLUTION INFILTRATION; PERINEURAL AS NEEDED
Status: DISCONTINUED | OUTPATIENT
Start: 2018-12-14 | End: 2018-12-14 | Stop reason: HOSPADM

## 2018-12-14 RX ORDER — ONDANSETRON 2 MG/ML
4 INJECTION INTRAMUSCULAR; INTRAVENOUS ONCE AS NEEDED
Status: DISCONTINUED | OUTPATIENT
Start: 2018-12-14 | End: 2018-12-14 | Stop reason: HOSPADM

## 2018-12-14 RX ORDER — CEFAZOLIN SODIUM 2 G/50ML
2000 SOLUTION INTRAVENOUS ONCE
Status: COMPLETED | OUTPATIENT
Start: 2018-12-14 | End: 2018-12-14

## 2018-12-14 RX ORDER — SCOLOPAMINE TRANSDERMAL SYSTEM 1 MG/1
1 PATCH, EXTENDED RELEASE TRANSDERMAL
Status: DISCONTINUED | OUTPATIENT
Start: 2018-12-14 | End: 2018-12-14 | Stop reason: HOSPADM

## 2018-12-14 RX ORDER — MAGNESIUM HYDROXIDE 1200 MG/15ML
LIQUID ORAL AS NEEDED
Status: DISCONTINUED | OUTPATIENT
Start: 2018-12-14 | End: 2018-12-14 | Stop reason: HOSPADM

## 2018-12-14 RX ORDER — GLYCOPYRROLATE 0.2 MG/ML
INJECTION INTRAMUSCULAR; INTRAVENOUS AS NEEDED
Status: DISCONTINUED | OUTPATIENT
Start: 2018-12-14 | End: 2018-12-14 | Stop reason: SURG

## 2018-12-14 RX ORDER — CEFAZOLIN SODIUM 2 G/50ML
SOLUTION INTRAVENOUS AS NEEDED
Status: DISCONTINUED | OUTPATIENT
Start: 2018-12-14 | End: 2018-12-14 | Stop reason: SURG

## 2018-12-14 RX ORDER — FENTANYL CITRATE/PF 50 MCG/ML
25 SYRINGE (ML) INJECTION
Status: DISCONTINUED | OUTPATIENT
Start: 2018-12-14 | End: 2018-12-14 | Stop reason: HOSPADM

## 2018-12-14 RX ORDER — FENTANYL CITRATE 50 UG/ML
INJECTION, SOLUTION INTRAMUSCULAR; INTRAVENOUS AS NEEDED
Status: DISCONTINUED | OUTPATIENT
Start: 2018-12-14 | End: 2018-12-14 | Stop reason: SURG

## 2018-12-14 RX ORDER — ISOSULFAN BLUE 50 MG/5ML
INJECTION, SOLUTION SUBCUTANEOUS AS NEEDED
Status: DISCONTINUED | OUTPATIENT
Start: 2018-12-14 | End: 2018-12-14 | Stop reason: HOSPADM

## 2018-12-14 RX ORDER — EPHEDRINE SULFATE 50 MG/ML
INJECTION, SOLUTION INTRAVENOUS AS NEEDED
Status: DISCONTINUED | OUTPATIENT
Start: 2018-12-14 | End: 2018-12-14 | Stop reason: SURG

## 2018-12-14 RX ORDER — LIDOCAINE HYDROCHLORIDE 10 MG/ML
INJECTION, SOLUTION INFILTRATION; PERINEURAL AS NEEDED
Status: DISCONTINUED | OUTPATIENT
Start: 2018-12-14 | End: 2018-12-14 | Stop reason: SURG

## 2018-12-14 RX ORDER — NEOSTIGMINE METHYLSULFATE 1 MG/ML
INJECTION INTRAVENOUS AS NEEDED
Status: DISCONTINUED | OUTPATIENT
Start: 2018-12-14 | End: 2018-12-14 | Stop reason: SURG

## 2018-12-14 RX ORDER — MORPHINE SULFATE 10 MG/ML
2 INJECTION, SOLUTION INTRAMUSCULAR; INTRAVENOUS
Status: DISCONTINUED | OUTPATIENT
Start: 2018-12-14 | End: 2018-12-14 | Stop reason: HOSPADM

## 2018-12-14 RX ORDER — ACETAMINOPHEN 325 MG/1
650 TABLET ORAL EVERY 4 HOURS PRN
Status: DISCONTINUED | OUTPATIENT
Start: 2018-12-14 | End: 2018-12-14 | Stop reason: HOSPADM

## 2018-12-14 RX ORDER — OXYCODONE HYDROCHLORIDE 5 MG/1
10 TABLET ORAL EVERY 4 HOURS PRN
Status: DISCONTINUED | OUTPATIENT
Start: 2018-12-14 | End: 2018-12-14 | Stop reason: HOSPADM

## 2018-12-14 RX ORDER — HYDROMORPHONE HCL/PF 1 MG/ML
SYRINGE (ML) INJECTION AS NEEDED
Status: DISCONTINUED | OUTPATIENT
Start: 2018-12-14 | End: 2018-12-14 | Stop reason: SURG

## 2018-12-14 RX ADMIN — FENTANYL CITRATE 100 MCG: 50 INJECTION INTRAMUSCULAR; INTRAVENOUS at 13:05

## 2018-12-14 RX ADMIN — EPHEDRINE SULFATE 10 MG: 50 INJECTION, SOLUTION INTRAMUSCULAR; INTRAVENOUS; SUBCUTANEOUS at 13:22

## 2018-12-14 RX ADMIN — ROCURONIUM BROMIDE 40 MG: 10 INJECTION INTRAVENOUS at 13:05

## 2018-12-14 RX ADMIN — PROPOFOL 50 MG: 10 INJECTION, EMULSION INTRAVENOUS at 14:00

## 2018-12-14 RX ADMIN — SODIUM CHLORIDE: 0.9 INJECTION, SOLUTION INTRAVENOUS at 14:25

## 2018-12-14 RX ADMIN — PROPOFOL 200 MG: 10 INJECTION, EMULSION INTRAVENOUS at 13:05

## 2018-12-14 RX ADMIN — ONDANSETRON 4 MG: 2 INJECTION INTRAMUSCULAR; INTRAVENOUS at 13:18

## 2018-12-14 RX ADMIN — GLYCOPYRROLATE 0.2 MG: 0.2 INJECTION, SOLUTION INTRAMUSCULAR; INTRAVENOUS at 13:23

## 2018-12-14 RX ADMIN — DEXAMETHASONE SODIUM PHOSPHATE 8 MG: 10 INJECTION INTRAMUSCULAR; INTRAVENOUS at 13:11

## 2018-12-14 RX ADMIN — SCOPALAMINE 1 PATCH: 1 PATCH, EXTENDED RELEASE TRANSDERMAL at 12:40

## 2018-12-14 RX ADMIN — NEOSTIGMINE METHYLSULFATE 3 MG: 1 INJECTION INTRAVENOUS at 14:10

## 2018-12-14 RX ADMIN — HYDROMORPHONE HYDROCHLORIDE 0.5 MG: 1 INJECTION, SOLUTION INTRAMUSCULAR; INTRAVENOUS; SUBCUTANEOUS at 14:00

## 2018-12-14 RX ADMIN — OXYCODONE HYDROCHLORIDE 10 MG: 5 TABLET ORAL at 15:13

## 2018-12-14 RX ADMIN — LIDOCAINE HYDROCHLORIDE ANHYDROUS 50 MG: 10 INJECTION, SOLUTION INFILTRATION at 13:05

## 2018-12-14 RX ADMIN — CEFAZOLIN SODIUM 2000 MG: 2 SOLUTION INTRAVENOUS at 12:59

## 2018-12-14 RX ADMIN — MIDAZOLAM HYDROCHLORIDE 2 MG: 1 INJECTION, SOLUTION INTRAMUSCULAR; INTRAVENOUS at 12:59

## 2018-12-14 RX ADMIN — SODIUM CHLORIDE: 0.9 INJECTION, SOLUTION INTRAVENOUS at 10:31

## 2018-12-14 RX ADMIN — CEFAZOLIN SODIUM 2000 MG: 2 SOLUTION INTRAVENOUS at 12:44

## 2018-12-14 RX ADMIN — GLYCOPYRROLATE 0.4 MG: 0.2 INJECTION, SOLUTION INTRAMUSCULAR; INTRAVENOUS at 14:10

## 2018-12-14 NOTE — OP NOTE
OPERATIVE REPORT  PATIENT NAME: John Wang    :  1950  MRN: [id number]  Pt Location: AN OR ROOM 02    SURGERY DATE: 2018    Surgeon(s) and Role:     * Hayley Saini MD - Primary     * Tahmina Young PA-C - Assisting    Preop Diagnosis:  Melanoma of abdomen ([address] ) [C43 59]    Post-Op Diagnosis Codes:     * Melanoma of abdomen ([address] ) [C43 59]    Procedure(s) (LRB):  MEDIAL UPPER ABDOMEN MELANOMA WIDE EXCISION; BILATERAL AXILLA SLN (N/A)  SENTINEL LYMPH NODE BIOPSY; LYMPHOSCINTIGRAPHY; INTRAOPERATIVE LYMPHATIC MAPPING (INJECT AT 1100) (N/A)    Specimen(s):  ID Type Source Tests Collected by Time Destination   1 : left axillary lymph node #1 Tissue Lymph Node, Maceo TISSUE EXAM Hayley Saini MD 2018 1306    2 : left axillary lymph node #2 Tissue Lymph Node, Maceo TISSUE EXAM Hayley Saini MD 2018 1329    3 : right axillary lymph node #1 Tissue Lymph Node, Maceo TISSUE EXAM Hayley Saini MD 2018 1334    4 : right axillary lymph node #2 Tissue Lymph Node, Maceo TISSUE EXAM Hayley Saini MD 2018 1345    5 : wide excision melanoma abdomen, stitch 12 oclock Tissue Soft Tissue, Other TISSUE EXAM Hayley Saini MD 2018 1404        Estimated Blood Loss:   Minimal    Drains:       Anesthesia Type:   General    Operative Indications:  Melanoma of abdomen ([facility] [address] ) []  A 80-year-old male with recently diagnosed clinical 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  Was recommended that he undergo wide excision with sentinel lymph node biopsy  Risks and benefits were explained  Informed consent was obtained  Patient was brought to the operating room  Operative Findings:  Lesion measured 0 1 x 1 4 cm  Wide excision measured 2 1 x 6 cm  Ex Vivo count on sentinel lymph node 1  In the left axilla was 483  Ex Vivo count on sentinel lymph node 2  In the left axilla was 160  Ex Vivo count on sentinel lymph node 1  In the right axilla was 0  Ex Vivo count on sentinel lymph node 2   In the right axilla was 828    Complications:   None    Procedure and Technique:  The procedure started in nuclear Medicine where Radiology injected technetium sulfur colloid around the melanoma  Lymphoscintigraphy revealed uptake to both the axilla  Patient was then brought to the operating room and identified  General anesthesia was achieved  1 cc of Lymphazurin was injected in 4 aliquots around the melanoma  Patient was prepped and draped in the usual sterile fashion  A time-out was performed  We started in the left axilla  An incision was made and this was taken through the clavipectoral fascia  A blue lymphatic was traced going to a hot, blue lymph node  This lymph node was encircled with 2 0 Vicryl suture  Lymphatics were clamped, divided and ligated with 2 0 Vicryl suture  Just deep to this was a no other blue radioactive lymph node  This was encircled with 2 0 Vicryl suture and the lymphatics were clamped, divided and ligated with 2 0 Vicryl suture  The ex Vivo count on both these lymph nodes was 483 and 160  At this point the axilla was probed  There was no evidence of any palpable adenopathy, no blue nodes and no radio activity greater than 10% of the hottest ex Vivo node  Wound was copiously irrigated  There was excellent hemostasis  Incision was approximated with interrupted 2 0 Vicryl suture subcutaneously  Local anesthesia was infiltrated into the wound  Skin was approximated with a running 4 Monocryl suture in a subcuticular fashion  Histocryl was used on the skin  Turned our attention to the right axilla  An incision was made  Using sharp dissection this was taken through the clavipectoral fascia  A palpable lymph node was identified the lymphatics were clamped, divided and ligated 2 0 Vicryl suture  The ex Vivo count on this lymph node was 0  Deeper in the axilla a blue lymphatic was now identified and traced going to a single, hot, blue lymph node    This lymph node was encircled with 2 0 Vicryl suture  The lymphatics were clamped, divided and ligated with 2 0 Vicryl suture  The ex Vivo count on this lymph node was 446  Wound was now copiously irrigated  There was excellent hemostasis  Local anesthesia was infiltrated into the wound  Incision was approximated with interrupted 2 0 Vicryl suture subcutaneously and a running 4 Monocryl suture in a subcuticular fashion  Histocryl was used on the skin  We now turned our attention to the wide excision  1 cm margins were mapped out circumferentially around the lesion  An elliptical incision measuring 2 1 x 6 cm was made  Using sharp dissection this was taken to the skin down to the underlying abdominal fascia  This was excised off the fascia  Specimen was oriented and handed off the table  Wound was copiously irrigated  Superior and inferior skin flaps were now raised with the Bovie electrocautery and there was excellent hemostasis  Incision was approximated with interrupted 2 0 Vicryl suture subcutaneously and staples for the skin  Sterile dressings were applied  Patient was extubated having tolerated the procedure well and taken to the recovery room in stable condition  I was present and participated in all aspects of this procedure  The PA assisted in exposure, retraction, tissue and lung and to minimize bleeding         I was present for the entire procedure, A qualified resident physician was not available and A physician assistant was required during the procedure for retraction tissue handling,dissection and suturing    Patient Disposition:  PACU     SIGNATURE: Hayley Saini MD  DATE: December 14, 2018  TIME: 2:18 PM

## 2018-12-14 NOTE — DISCHARGE INSTRUCTIONS
POST-OPERATIVE WOUND CARE INSTRUCTIONS    Your wound is closed with:   Dissolvable sutures/glue in both the axilla   Staples on the abdomen-these will need to be removed in the office in 2 weeks    Wound care: You may remove your dressing after 24 hours   You may shower using soap and water to clean your wound  Gently pat it dry  You may redress your wound for comfort as needed  Activity:   Did not perform any heavy lifting or strenuous physical activity for 7 days  Your activity restrictions will be reevaluated at your postoperative visit  Medications: You may resume all your preoperative medications and diet  Pain medication as directed on the prescription given in the office  Other:   May use ice on the wound for 24-48 hours as needed for comfort  May place warm compresses to the axilla after 48 hours for comfort  Call the office at [phone] if you have any of the followin  Redness, swelling, heat, unusual drainage or heavy bleeding from the wound     2  Fever greater than 101°F    3  Pain not relieved by the prescribed pain medication

## 2018-12-14 NOTE — ANESTHESIA PREPROCEDURE EVALUATION
Review of Systems/Medical History  Patient summary reviewed  Chart reviewed  History of anesthetic complications     Cardiovascular  EKG reviewed, Exercise tolerance (METS): >4,  Hyperlipidemia, Hypertension ,    Pulmonary  Negative pulmonary ROS        GI/Hepatic    GERD poorly controlled,        Negative  ROS        Endo/Other  Negative endo/other ROS      GYN       Hematology  Negative hematology ROS      Musculoskeletal  Negative musculoskeletal ROS        Neurology  Negative neurology ROS      Psychology   Negative psychology ROS              Physical Exam    Airway    Mallampati score: II  TM Distance: >3 FB  Neck ROM: full     Dental       Cardiovascular  Cardiovascular exam normal    Pulmonary  Pulmonary exam normal     Other Findings      Lab Results   Component Value Date    WBC 7 22 11/30/2018    HGB 16 3 11/30/2018    HCT 48 2 11/30/2018    MCV 89 11/30/2018     11/30/2018     Lab Results   Component Value Date     05/29/2017    K 3 9 11/30/2018    CO2 30 11/30/2018     11/30/2018    BUN 24 11/30/2018    CREATININE 1 09 11/30/2018     Lab Results   Component Value Date    INR 1 00 11/30/2018    PROTIME 13 1 11/30/2018     Lab Results   Component Value Date    PTT 30 11/30/2018       Lab Results   Component Value Date    GLUCOSE 111 (H) 05/29/2017    GLUCOSE 109 (H) 11/11/2016    GLUCOSE 112 (H) 04/28/2016       Lab Results   Component Value Date    HGBA1C 5 6 11/30/2018       Anesthesia Plan  ASA Score- 2     Anesthesia Type- general with ASA Monitors  Additional Monitors:   Airway Plan: ETT  Plan Factors-    Induction- intravenous  Postoperative Plan- Plan for postoperative opioid use  Informed Consent- Anesthetic plan and risks discussed with patient and spouse  I personally reviewed this patient with the CRNA  Discussed and agreed on the Anesthesia Plan with the CRNA  [place] Wylie

## 2018-12-14 NOTE — H&P (VIEW-ONLY)
Surgical Oncology Consult       [facility]  [facility] [place]  [address]  1950 [id number]  [address]  [facility]  [facility] [place]  [address] [address]    Diagnoses and all orders for this visit:    Melanoma of abdomen ([address] )  -     Case request operating room: EXCISION WIDE LESION MEDIAL UPPER ABDOMEN, BIOPSY LYMPH NODE SENTINEL; Standing  -     Comprehensive metabolic panel  -     CBC and differential  -     APTT  -     Protime-INR  -     HEMOGLOBIN A1C W/ EAG ESTIMATION  -     XR chest pa & lateral; Future  -     NM lymphatic melanoma; Future  -     Case request operating room: EXCISION WIDE LESION MEDIAL UPPER ABDOMEN, BIOPSY LYMPH NODE SENTINEL    Other orders  -     Incentive spirometry; Standing  -     Insert and maintain IV line; Standing  -     Void On-Call to O R ; Standing  -     Place sequential compression device; Standing  -     ceFAZolin (ANCEF) 2,000 mg in dextrose 5 % 100 mL IVPB; Infuse 2,000 mg into a venous catheter once         Chief Complaint   Patient presents with    Skin Cancer     ref Dr Aakash Khalil re melanoma abd poss SLN BX       No Follow-up on file  No history exists  History of Present Illness:   80-year-old male who had a mole on his upper mid abdomen for many years  Over the last 6 months he noticed this started to change in size and color  He brought this to the attention of his dermatologist   Biopsy was performed  This revealed a 1 1 mm melanoma  There was no ulceration  He comes in now to discuss further therapy  He denies any new abdominal pain, nausea, vomiting, back pain, bone pain, headaches, or cough  No history of blistering sunburns  No family history of melanoma      Review of Systems  Complete ROS Surg Onc:   Constitutional: The patient denies new or recent history of general fatigue, no recent weight loss, no change in appetite  Eyes: No complaints of visual problems, no scleral icterus  ENT: no complaints of ear pain, no hoarseness, no difficulty swallowing,  no tinnitus and no new masses in head, oral cavity, or neck  Cardiovascular: No complaints of chest pain, no palpitations, no ankle edema  Respiratory: No complaints of shortness of breath, no cough  Gastrointestinal: No complaints of jaundice, no bloody stools, no pale stools  Genitourinary: No complaints of dysuria, no hematuria, no nocturia, no frequent urination, no urethral discharge  Musculoskeletal: No complaints of weakness, paralysis, joint stiffness or arthralgias  Integumentary: No complaints of rash, no new lesions  Neurological: No complaints of convulsions, no seizures, no dizziness  Hematologic/Lymphatic: No complaints of easy bruising  Endocrine:  No hot or cold intolerance  No polydipsia, polyphagia, or polyuria  Allergy/immunology:  No environmental allergies  No food allergies  Not immunocompromised  Skin:  No pallor or rash  No wound            Patient Active Problem List   Diagnosis    Actinic keratosis    Benign essential hypertension    BPH (benign prostatic hyperplasia)    Erectile dysfunction    GERD without esophagitis    Hyperlipidemia    Impaired fasting glucose    Melanoma of abdomen ([address] )     Past Medical History:   Diagnosis Date    Hyperlipidemia     Last Assessed:10/20/14    Hypertension     Last Assessed:10/20/14     Past Surgical History:   Procedure Laterality Date    HERNIA REPAIR      Last Assessed:10/20/14     Family History   Problem Relation Age of Onset    Hypertension Mother     Hypothyroidism Mother     Hypertension Father     Skin cancer Father     Colon cancer Maternal Aunt     Colon cancer Paternal Aunt         62s    Skin cancer Paternal Uncle      Social History     Social History    Marital status: /Civil Union     Spouse name: N/A    Number of children: N/A    Years of education: N/A     Occupational History    Not on file  Social History Main Topics    Smoking status: Never Smoker    Smokeless tobacco: Never Used    Alcohol use 1 2 oz/week     1 Glasses of wine, 1 Cans of beer per week      Comment: rarely    Drug use: No    Sexual activity: Yes     Partners: Female     Other Topics Concern    Not on file     Social History Narrative    No advance directives           Current Outpatient Prescriptions:     aspirin 81 MG tablet, Take by mouth, Disp: , Rfl:     atenolol (TENORMIN) 50 mg tablet, TAKE 1 TABLET DAILY, Disp: 30 tablet, Rfl: 4    hydrochlorothiazide (HYDRODIURIL) 25 mg tablet, TAKE 1 TABLET DAILY  , Disp: 30 tablet, Rfl: 4    lovastatin (MEVACOR) 40 MG tablet, TAKE 1 TABLET AT BEDTIME, Disp: 30 tablet, Rfl: 3    multivitamin (THERAGRAN) TABS, Take by mouth, Disp: , Rfl:     sildenafil (VIAGRA) 100 mg tablet, Take by mouth, Disp: , Rfl:   No Known Allergies  Vitals:    11/15/18 1240   BP: 130/82   Pulse: 60   Resp: 18   Temp: 97 6 °F (36 4 °C)       Physical Exam   Constitutional: General appearance: The Patient is well-developed and well-nourished who appears the stated age in no acute distress  Patient is pleasant and talkative  HEENT:  Normocephalic  Sclerae are anicteric  Mucous membranes are moist  Neck is supple without adenopathy  No JVD  Chest: The lungs are clear to auscultation  Cardiac: Heart is regular rate  Abdomen: Abdomen is soft, non-tender, non-distended and without masses  Extremities: There is no clubbing or cyanosis  There is no edema  Symmetric  Neuro: Grossly nonfocal  Gait is normal      Lymphatic: No evidence of cervical adenopathy bilaterally  No evidence of axillary adenopathy bilaterally  No evidence of inguinal adenopathy bilaterally  Skin: Warm, anicteric    excision site on mid upper abdomen is healing well  No satellite lesions    Psych:  Patient is pleasant and talkative  Breasts:      Pathology:  Malignant melanoma:  1 1 mm  No ulceration    Labs:      Imaging  No results found  I reviewed the above laboratory and imaging data  Discussion/Summary:   61-year-old male with a clinical [id number] melanoma  He gives no signs or symptoms of metastasis  He will be staged with a chest x-ray liver function studies  I have recommended that he undergo wide excision of his abdominal melanoma  I also discussed the reasoning and rationale behind sentinel lymph node biopsy  The risks were explained including bleeding, infection, recurrence, need for further surgery, wound complications, the 2% false-positive, false negative, and inability to find the sentinel lymph node rates as well as neurovascular damage  Informed consent was obtained  We will schedule this at our earliest mutual convenience  He is agreeable to this  All his questions were answered

## 2018-12-20 ENCOUNTER — APPOINTMENT (OUTPATIENT)
Dept: LAB | Facility: CLINIC | Age: 68
End: 2018-12-20
Payer: MEDICARE

## 2018-12-20 DIAGNOSIS — Z12.5 SCREENING FOR PROSTATE CANCER: ICD-10-CM

## 2018-12-20 DIAGNOSIS — E78.49 OTHER HYPERLIPIDEMIA: ICD-10-CM

## 2018-12-20 DIAGNOSIS — I10 BENIGN ESSENTIAL HYPERTENSION: ICD-10-CM

## 2018-12-20 DIAGNOSIS — R73.01 IMPAIRED FASTING GLUCOSE: ICD-10-CM

## 2018-12-20 LAB
ALBUMIN SERPL BCP-MCNC: 3.7 G/DL (ref 3.5–5)
ALP SERPL-CCNC: 69 U/L (ref 46–116)
ALT SERPL W P-5'-P-CCNC: 24 U/L (ref 12–78)
ANION GAP SERPL CALCULATED.3IONS-SCNC: 6 MMOL/L (ref 4–13)
AST SERPL W P-5'-P-CCNC: 12 U/L (ref 5–45)
BASOPHILS # BLD AUTO: 0.05 THOUSANDS/ΜL (ref 0–0.1)
BASOPHILS NFR BLD AUTO: 1 % (ref 0–1)
BILIRUB SERPL-MCNC: 0.77 MG/DL (ref 0.2–1)
BUN SERPL-MCNC: 22 MG/DL (ref 5–25)
CALCIUM SERPL-MCNC: 9.1 MG/DL (ref 8.3–10.1)
CHLORIDE SERPL-SCNC: 100 MMOL/L (ref 100–108)
CHOLEST SERPL-MCNC: 156 MG/DL (ref 50–200)
CO2 SERPL-SCNC: 32 MMOL/L (ref 21–32)
CREAT SERPL-MCNC: 1.22 MG/DL (ref 0.6–1.3)
EOSINOPHIL # BLD AUTO: 0.12 THOUSAND/ΜL (ref 0–0.61)
EOSINOPHIL NFR BLD AUTO: 2 % (ref 0–6)
ERYTHROCYTE [DISTWIDTH] IN BLOOD BY AUTOMATED COUNT: 12.6 % (ref 11.6–15.1)
EST. AVERAGE GLUCOSE BLD GHB EST-MCNC: 123 MG/DL
GFR SERPL CREATININE-BSD FRML MDRD: 61 ML/MIN/1.73SQ M
GLUCOSE P FAST SERPL-MCNC: 104 MG/DL (ref 65–99)
HBA1C MFR BLD: 5.9 % (ref 4.2–6.3)
HCT VFR BLD AUTO: 50.2 % (ref 36.5–49.3)
HCV AB SER QL: ABNORMAL
HDLC SERPL-MCNC: 37 MG/DL (ref 40–60)
HGB BLD-MCNC: 17 G/DL (ref 12–17)
IMM GRANULOCYTES # BLD AUTO: 0.03 THOUSAND/UL (ref 0–0.2)
IMM GRANULOCYTES NFR BLD AUTO: 0 % (ref 0–2)
LDLC SERPL CALC-MCNC: 94 MG/DL (ref 0–100)
LYMPHOCYTES # BLD AUTO: 1.9 THOUSANDS/ΜL (ref 0.6–4.47)
LYMPHOCYTES NFR BLD AUTO: 23 % (ref 14–44)
MCH RBC QN AUTO: 30.7 PG (ref 26.8–34.3)
MCHC RBC AUTO-ENTMCNC: 33.9 G/DL (ref 31.4–37.4)
MCV RBC AUTO: 91 FL (ref 82–98)
MONOCYTES # BLD AUTO: 0.75 THOUSAND/ΜL (ref 0.17–1.22)
MONOCYTES NFR BLD AUTO: 9 % (ref 4–12)
NEUTROPHILS # BLD AUTO: 5.31 THOUSANDS/ΜL (ref 1.85–7.62)
NEUTS SEG NFR BLD AUTO: 65 % (ref 43–75)
NONHDLC SERPL-MCNC: 119 MG/DL
NRBC BLD AUTO-RTO: 0 /100 WBCS
PLATELET # BLD AUTO: 201 THOUSANDS/UL (ref 149–390)
PMV BLD AUTO: 10.7 FL (ref 8.9–12.7)
POTASSIUM SERPL-SCNC: 4.2 MMOL/L (ref 3.5–5.3)
PROT SERPL-MCNC: 7.2 G/DL (ref 6.4–8.2)
PSA SERPL-MCNC: 0.4 NG/ML (ref 0–4)
RBC # BLD AUTO: 5.54 MILLION/UL (ref 3.88–5.62)
SODIUM SERPL-SCNC: 138 MMOL/L (ref 136–145)
TRIGL SERPL-MCNC: 127 MG/DL
TSH SERPL DL<=0.05 MIU/L-ACNC: 1.21 UIU/ML (ref 0.36–3.74)
WBC # BLD AUTO: 8.16 THOUSAND/UL (ref 4.31–10.16)

## 2018-12-20 PROCEDURE — 36415 COLL VENOUS BLD VENIPUNCTURE: CPT

## 2018-12-20 PROCEDURE — 80061 LIPID PANEL: CPT

## 2018-12-20 PROCEDURE — G0103 PSA SCREENING: HCPCS

## 2018-12-20 PROCEDURE — 85025 COMPLETE CBC W/AUTO DIFF WBC: CPT

## 2018-12-20 PROCEDURE — 86803 HEPATITIS C AB TEST: CPT

## 2018-12-20 PROCEDURE — 83036 HEMOGLOBIN GLYCOSYLATED A1C: CPT

## 2018-12-20 PROCEDURE — 84443 ASSAY THYROID STIM HORMONE: CPT

## 2018-12-20 PROCEDURE — 80053 COMPREHEN METABOLIC PANEL: CPT

## 2018-12-21 ENCOUNTER — LAB (OUTPATIENT)
Dept: LAB | Facility: CLINIC | Age: 68
End: 2018-12-21
Payer: MEDICARE

## 2018-12-21 ENCOUNTER — TRANSCRIBE ORDERS (OUTPATIENT)
Dept: LAB | Facility: CLINIC | Age: 68
End: 2018-12-21

## 2018-12-21 ENCOUNTER — OFFICE VISIT (OUTPATIENT)
Dept: INTERNAL MEDICINE CLINIC | Facility: CLINIC | Age: 68
End: 2018-12-21
Payer: MEDICARE

## 2018-12-21 VITALS
BODY MASS INDEX: 29.79 KG/M2 | RESPIRATION RATE: 18 BRPM | DIASTOLIC BLOOD PRESSURE: 80 MMHG | SYSTOLIC BLOOD PRESSURE: 102 MMHG | WEIGHT: 212.8 LBS | HEIGHT: 71 IN | HEART RATE: 64 BPM

## 2018-12-21 DIAGNOSIS — R76.8 POSITIVE HEPATITIS C ANTIBODY TEST: ICD-10-CM

## 2018-12-21 DIAGNOSIS — E78.49 OTHER HYPERLIPIDEMIA: ICD-10-CM

## 2018-12-21 DIAGNOSIS — Z23 ENCOUNTER FOR IMMUNIZATION: ICD-10-CM

## 2018-12-21 DIAGNOSIS — I10 BENIGN ESSENTIAL HYPERTENSION: ICD-10-CM

## 2018-12-21 DIAGNOSIS — R76.8 POSITIVE HEPATITIS C ANTIBODY TEST: Primary | ICD-10-CM

## 2018-12-21 DIAGNOSIS — K21.9 GERD WITHOUT ESOPHAGITIS: ICD-10-CM

## 2018-12-21 DIAGNOSIS — R73.01 IMPAIRED FASTING GLUCOSE: ICD-10-CM

## 2018-12-21 DIAGNOSIS — C43.59 MELANOMA OF ABDOMEN (HCC): ICD-10-CM

## 2018-12-21 PROCEDURE — 87522 HEPATITIS C REVRS TRNSCRPJ: CPT

## 2018-12-21 PROCEDURE — 99214 OFFICE O/P EST MOD 30 MIN: CPT

## 2018-12-21 PROCEDURE — G0008 ADMIN INFLUENZA VIRUS VAC: HCPCS

## 2018-12-21 PROCEDURE — 87902 NFCT AGT GNTYP ALYS HEP C: CPT

## 2018-12-21 PROCEDURE — 90662 IIV NO PRSV INCREASED AG IM: CPT

## 2018-12-21 PROCEDURE — 36415 COLL VENOUS BLD VENIPUNCTURE: CPT

## 2018-12-21 NOTE — PATIENT INSTRUCTIONS
Hypertension stable on current medication    Hyperlipidemia LDL at goal on statin    Impaired fasting glucose he is in the prediabetic category discussed dietary modifications including cutting back on starches increasing exercise    Melanoma to abdomen sentinel nodes were biopsy results are pending    Weekly positive hep C antibodies check viral load and genotype    GERD discussed anti GERD diet including no caffeine prior to bed, it not lying down within 2 hours of eating    I recommend Prilosec over-the-counter daily for the next 2 weeks if not improving contact us    Health maintenance up-to-date    Follow back 6 months sooner if needed

## 2018-12-27 DIAGNOSIS — C77.3 MALIGNANT NEOPLASM METASTATIC TO LYMPH NODE OF AXILLA (HCC): ICD-10-CM

## 2018-12-27 DIAGNOSIS — C43.59 MELANOMA OF ABDOMEN (HCC): Primary | ICD-10-CM

## 2018-12-27 LAB
HCV GENTYP SERPL NAA+PROBE: NORMAL
HCV PLEASE NOTE: NORMAL

## 2018-12-28 ENCOUNTER — TELEPHONE (OUTPATIENT)
Dept: SURGICAL ONCOLOGY | Facility: CLINIC | Age: 68
End: 2018-12-28

## 2018-12-31 PROBLEM — C43.59 MALIGNANT MELANOMA OF SKIN OF ABDOMEN (HCC): Status: ACTIVE | Noted: 2018-10-16

## 2019-01-02 ENCOUNTER — HOSPITAL ENCOUNTER (OUTPATIENT)
Dept: RADIOLOGY | Facility: HOSPITAL | Age: 69
Discharge: HOME | End: 2019-01-02
Attending: SURGERY
Payer: MEDICARE

## 2019-01-02 DIAGNOSIS — C77.3 MALIGNANT NEOPLASM METASTATIC TO LYMPH NODE OF AXILLA (HCC): ICD-10-CM

## 2019-01-02 DIAGNOSIS — C43.59 MELANOMA OF ABDOMEN (HCC): ICD-10-CM

## 2019-01-02 PROCEDURE — 71260 CT THORAX DX C+: CPT

## 2019-01-02 PROCEDURE — 76882 US LMTD JT/FCL EVL NVASC XTR: CPT

## 2019-01-02 PROCEDURE — 74177 CT ABD & PELVIS W/CONTRAST: CPT

## 2019-01-02 RX ADMIN — IOHEXOL 100 ML: 350 INJECTION, SOLUTION INTRAVENOUS at 14:40

## 2019-01-03 ENCOUNTER — OFFICE VISIT (OUTPATIENT)
Dept: SURGICAL ONCOLOGY | Facility: CLINIC | Age: 69
End: 2019-01-03
Payer: MEDICARE

## 2019-01-03 ENCOUNTER — TELEPHONE (OUTPATIENT)
Dept: INTERNAL MEDICINE CLINIC | Facility: CLINIC | Age: 69
End: 2019-01-03

## 2019-01-03 ENCOUNTER — APPOINTMENT (OUTPATIENT)
Dept: LAB | Facility: HOSPITAL | Age: 69
End: 2019-01-03
Attending: SURGERY
Payer: MEDICARE

## 2019-01-03 VITALS
WEIGHT: 216 LBS | DIASTOLIC BLOOD PRESSURE: 84 MMHG | SYSTOLIC BLOOD PRESSURE: 122 MMHG | HEIGHT: 71 IN | BODY MASS INDEX: 30.24 KG/M2 | HEART RATE: 60 BPM | TEMPERATURE: 98.3 F | RESPIRATION RATE: 18 BRPM

## 2019-01-03 DIAGNOSIS — C77.3 MALIGNANT NEOPLASM METASTATIC TO LYMPH NODE OF AXILLA (HCC): ICD-10-CM

## 2019-01-03 DIAGNOSIS — R16.0 LIVER MASS: ICD-10-CM

## 2019-01-03 DIAGNOSIS — C43.59 MALIGNANT MELANOMA OF SKIN OF ABDOMEN (HCC): ICD-10-CM

## 2019-01-03 DIAGNOSIS — C43.59 MALIGNANT MELANOMA OF SKIN OF ABDOMEN (HCC): Primary | ICD-10-CM

## 2019-01-03 LAB
BUN SERPL-MCNC: 17 MG/DL (ref 5–25)
CREAT SERPL-MCNC: 1.14 MG/DL (ref 0.6–1.3)
GFR SERPL CREATININE-BSD FRML MDRD: 67 ML/MIN/1.73SQ M
HCV RNA SERPL NAA+PROBE-ACNC: NORMAL IU/ML
TEST INFORMATION: NORMAL

## 2019-01-03 PROCEDURE — 82565 ASSAY OF CREATININE: CPT

## 2019-01-03 PROCEDURE — 36415 COLL VENOUS BLD VENIPUNCTURE: CPT

## 2019-01-03 PROCEDURE — 84520 ASSAY OF UREA NITROGEN: CPT

## 2019-01-03 PROCEDURE — 99213 OFFICE O/P EST LOW 20 MIN: CPT | Performed by: SURGERY

## 2019-01-03 NOTE — TELEPHONE ENCOUNTER
----- Message from JAMES Chu sent at 1/3/2019  4:27 PM EST -----  There is NO evidence of hepatitis c !!!!

## 2019-01-03 NOTE — PROGRESS NOTES
Surgical Oncology Follow Up       [facility]  [facility] [place] [address]  1950  [id number]  [facility]  [facility] [place]  [address] [address]    Diagnoses and all orders for this visit:    Malignant melanoma of skin of abdomen ([facility] [address] )    Malignant neoplasm metastatic to lymph node of axilla [facility])    Liver mass        Chief Complaint   Patient presents with    Post-op     Melamona abdomen         No Follow-up on file  Malignant melanoma of skin of abdomen ([facility] [address] )    10/16/2018 Initial Diagnosis     Malignant melanoma of skin of abdomen ([facility] [address] )       12/14/2018 Surgery     Wide excision of abdomen with sentinel lymph node biopsies (right and left axillae)            Staging: [id number] melanoma of the abdomen  December 2018  Positive sentinel node in the right axilla  Treatment history: Wide excision with sentinel lymph node biopsy December 2018  Current treatment:   Immunotherapy pending  Disease status:   Possible liver lesion    History of Present Illness:   Patient returns in follow-up of his melanoma  He is doing well at this time with no complaints  No fevers or chills  His final pathology did reveal a positive sentinel node in the right axilla  This deposit was approximately 2 mm in size  Follow-up imaging was performed  Axillary ultrasound revealed a 1 4 x 0 7 x 1 4 cm right axillary lymph node  There was normal morphology  Follow-up CT revealed incidental thyroid nodules as well as 3 small liver lesions  The liver lesion of most concern was an 8 mm arterially enhancing lesion in the right lobe  MRI was recommended  I personally reviewed the films  Denies any dysphagia or hoarseness  No history of radiation to his head or neck    No family history of thyroid cancer although there are thyroid problems in the family  Review of Systems  Complete ROS Surg Onc:   Complete ROS Surg Onc:   Constitutional: The patient denies new or recent history of general fatigue, no recent weight loss, no change in appetite  Eyes: No complaints of visual problems, no scleral icterus  ENT: no complaints of ear pain, no hoarseness, no difficulty swallowing,  no tinnitus and no new masses in head, oral cavity, or neck  Cardiovascular: No complaints of chest pain, no palpitations, no ankle edema  Respiratory: No complaints of shortness of breath, no cough  Gastrointestinal: No complaints of jaundice, no bloody stools, no pale stools  Genitourinary: No complaints of dysuria, no hematuria, no nocturia, no frequent urination, no urethral discharge  Musculoskeletal: No complaints of weakness, paralysis, joint stiffness or arthralgias  Integumentary: No complaints of rash, no new lesions  Neurological: No complaints of convulsions, no seizures, no dizziness  Hematologic/Lymphatic: No complaints of easy bruising  Endocrine:  No hot or cold intolerance  No polydipsia, polyphagia, or polyuria  Allergy/immunology:  No environmental allergies  No food allergies  Not immunocompromised  Skin:  No pallor or rash  Surgical wound          Patient Active Problem List   Diagnosis    Actinic keratosis    Benign essential hypertension    BPH (benign prostatic hyperplasia)    Erectile dysfunction    GERD without esophagitis    Hyperlipidemia    Impaired fasting glucose    Malignant melanoma of skin of abdomen ([address] )    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)    Liver mass     Past Medical History:   Diagnosis Date    Arthritis     Hyperlipidemia     Last Assessed:10/20/14    PONV (postoperative nausea and vomiting)      Past Surgical History:   Procedure Laterality Date    COLONOSCOPY      HERNIA REPAIR Right     Last Assessed:10/20/14 inguinal     LYMPH NODE BIOPSY N/A 12/14/2018    Procedure: SENTINEL LYMPH NODE BIOPSY; LYMPHOSCINTIGRAPHY; INTRAOPERATIVE LYMPHATIC MAPPING (INJECT AT 1100); Surgeon: Sueellen Habermann, MD;  Location: [facility];  Service: Surgical Oncology    SKIN LESION EXCISION N/A 12/14/2018    Procedure: MEDIAL UPPER ABDOMEN MELANOMA WIDE EXCISION; BILATERAL AXILLA SLN;  Surgeon: Sueellen Habermann, MD;  Location: [facility];  Service: Surgical Oncology    TONSILLECTOMY      WISDOM TOOTH EXTRACTION       Family History   Problem Relation Age of Onset    Hypertension Mother     Hypothyroidism Mother     Hypertension Father     Skin cancer Father     Colon cancer Maternal Aunt     Colon cancer Paternal Aunt         62s    Skin cancer Paternal Uncle      Social History     Social History    Marital status: /Civil Union     Spouse name: N/A    Number of children: N/A    Years of education: N/A     Occupational History    Not on file  Social History Main Topics    Smoking status: Never Smoker    Smokeless tobacco: Never Used    Alcohol use 1 2 oz/week     1 Glasses of wine, 1 Cans of beer per week      Comment: rarely    Drug use: No    Sexual activity: Yes     Partners: Female     Other Topics Concern    Not on file     Social History Narrative    No advance directives           Current Outpatient Prescriptions:     aspirin 81 MG tablet, Take by mouth, Disp: , Rfl:     atenolol (TENORMIN) 50 mg tablet, TAKE 1 TABLET DAILY, Disp: 30 tablet, Rfl: 4    hydrochlorothiazide (HYDRODIURIL) 25 mg tablet, TAKE 1 TABLET DAILY  , Disp: 30 tablet, Rfl: 4    lovastatin (MEVACOR) 40 MG tablet, TAKE 1 TABLET AT BEDTIME, Disp: 30 tablet, Rfl: 3    multivitamin (THERAGRAN) TABS, Take by mouth, Disp: , Rfl:     sildenafil (VIAGRA) 100 mg tablet, Take by mouth, Disp: , Rfl:   No current facility-administered medications for this visit     No Known Allergies  Vitals:    01/03/19 0945   BP: 122/84   Pulse: 60   Resp: 18   Temp: 98 3 °F (36 8 °C)       Physical Exam  Constitutional: General appearance: The Patient is well-developed and well-nourished who appears the stated age in no acute distress  Patient is pleasant and talkative  HEENT:  Normocephalic  Sclerae are anicteric  Mucous membranes are moist      Abdomen: Abdomen is soft, non-tender, non-distended and without masses  Extremities: There is no clubbing or cyanosis  There is no edema  Symmetric  Neuro: Grossly nonfocal  Gait is normal      Lymphatic: No evidence of cervical adenopathy bilaterally  No evidence of axillary adenopathy bilaterally  Skin: Warm, anicteric  incision is C/D/I  No seromas  Psych:  Patient is pleasant and talkative  Breasts:        Pathology:  Final Diagnosis   A  [place], left axillary lymph node #1, biopsy:  - No tumor seen in one lymph node (0/1); see note      Note:  Immunostains for S100, HMB45 and Melan A do not reveal metastatic melanoma       B  [place], left axillary lymph node #2, biopsy:  - No tumor seen in one lymph node (0/1); see note      Note:  Immunostains for S100, HMB45 and Melan A do not reveal metastatic melanoma       C  [place], right axillary lymph node #1, biopsy:  - No tumor seen in one lymph node (0/1); see note      Note:  Immunostains for S100, HMB45 and Melan A do not reveal metastatic melanoma       D  [place], right axillary lymph node #2, biopsy:  - Metastatic melanoma in one of one lymph node (1/1, seen as single and small cluster of tumor cells in subcapsular mya parenchyma, two foci, larger involved area approximately 2 mm), no extranodal extension seen      Note:  The tumor cells are positive for S100 and HMB45, faintly stained for Mart-1      E  Skin, wide excision melanoma abdomen:  - Prior procedure site reaction, no residual melanoma seen    - Inked margins with no tumor seen       In conjunction with [id number], the tumor is [id number]         Interpretation performed at [facility], [address], [place], [address] [place] [address]          Electronically signed by Hayley Douglas MD on 12/21/2018 at  9:49 AM         Labs:      Imaging  Ct Chest Abdomen Pelvis W Contrast    Result Date: 1/3/2019  Narrative: CT CHEST, ABDOMEN AND PELVIS WITH IV CONTRAST INDICATION:   C43 59: Malignant melanoma of other part of trunk C77 3: Secondary and unspecified malignant neoplasm of axilla and upper limb lymph nodes  Dr Nataliia Chaudhry op note from 12/14/2018 was reviewed, which states patient had a medial upper abdomen melanoma treated with wide excision and bilateral axillary sentinel lymph node biopsy  Review of pathology demonstrated no tumor in left axillary sentinel lymph nodes, but metastatic melanoma in one of the right axillary sentinel lymph nodes  COMPARISON:  None  TECHNIQUE: CT examination of the chest, abdomen and pelvis was performed  Scanning through the abdomen was performed in arterial, venous and delayed phases according a protocol spefically designed to evaluate upper abdominal viscera  Axial, sagittal, and coronal 2D reformatted images were created from the source data and submitted for interpretation  Radiation dose length product (DLP) for this visit:  2414 mGy-cm   This examination, like all CT scans performed in the [facility], was performed utilizing techniques to minimize radiation dose exposure, including the use of iterative reconstruction and automated exposure control  IV Contrast:  100 mL of iohexol (OMNIPAQUE) Enteric Contrast: Enteric contrast was administered  FINDINGS: CHEST LUNGS:  Lungs are clear  There is no tracheal or endobronchial lesion  PLEURA:  Unremarkable  HEART/GREAT VESSELS:  Unremarkable for patient's age  MEDIASTINUM AND JG:  Unremarkable   CHEST WALL AND LOWER NECK: There is a 2 6 cm diameter thyroid isthmic nodule, and another 2 9 cm diameter left thyroid nodule (series 2 image 123 )  Incidental discovery of one or more thyroid nodule(s) measuring more than 1 5 cm and without suspicious features is noted in this patient who is above 28years old; according to guidelines published in the February 2015 white paper on incidental thyroid nodules in the Journal of the [employer] of Radiology [facility]), further characterization with thyroid ultrasound is recommended  Postop changes are seen in both axilla  There are no pathologic enlarged axillary lymph nodes  ABDOMEN LIVER/BILIARY TREE:  There is a 0 8 x 0 8 cm arterial phase hyperenhancing lesion in the right hepatic lobe segment 8 (series 2 image 23 )  On the portal venous phase, this becomes inconspicuous relative to liver parenchyma, which is not typical of hemangiomas  There is a too small to characterize but probably benign hypodensity in the left hepatic lobe segment 4 (series 2 image 24 ) There is a too small to characterize probably benign hypodensity in the left hepatic lobe segment 2/3 (series 3 image 32 )  GALLBLADDER:  No calcified gallstones  No pericholecystic inflammatory change  SPLEEN:  Unremarkable  PANCREAS:  Unremarkable  ADRENAL GLANDS:  Unremarkable  KIDNEYS/URETERS:  Unremarkable  No hydronephrosis  STOMACH AND BOWEL:  Unremarkable  APPENDIX:  No findings to suggest appendicitis  ABDOMINOPELVIC CAVITY:  No ascites or free intraperitoneal air  No lymphadenopathy  VESSELS:  Unremarkable for patient's age  PELVIS REPRODUCTIVE ORGANS:  Unremarkable for patient's age  URINARY BLADDER:  Unremarkable  ABDOMINAL WALL/INGUINAL REGIONS:  Small umbilical hernia containing fat only  There are skin staples and subcutaneous edema in the upper midline anterior abdominal wall in keeping with patient's history of recent wide excision for melanoma (series 2 image 174 ) OSSEOUS STRUCTURES:  No acute fracture or destructive osseous lesion  There is a small bone island in the right ischium       Impression: There are 3 small liver lesions as above, of most concern the 0 8 x 0 8 cm arterial phase hyperenhancing lesion in the right hepatic lobe segment 8 (series 2 image 23 )  It does not have typical delayed imaging characteristics of hemangioma, and is potentially a melanoma metastasis  MRI of the liver with and without Gadavist IV contrast will be helpful to further characterize  Otherwise no other potential metastatic lesions in the chest, abdomen, pelvis  There is a 2 6 cm diameter thyroid isthmic nodule, and another 2 9 cm diameter left thyroid nodule (series 2 image 123  )Nonemergent thyroid ultrasound is recommended  The study was marked in EPIC for significant notification  Workstation performed: [id number]     Nm Lymphatic Melanoma    Result Date: 12/14/2018  Narrative: SENTINEL NODE LYMPHOSCINTIGRAPHY INDICATION:   Malignant melanoma of the trunk  Localize sentinel node FINDINGS: 0 48 mCi Tc-99m sulfur colloid (0 6 cc volume) was administered intradermally in divided doses by myself intradermally surrounding the patient's anterior chest wall melanoma site  Scintigraphic images were obtained over the trunk in multiple projections  Bilateral axillary sentinel nodes were identified  Using scintigraphic guidance, the corresponding skin sites were marked with an indelible marker  The patient was transferred to the operating room in satisfactory condition  Impression: [place] lymph nodes localized to both axilla  Workstation performed: [id number]     Us Extremity Soft Tissue    Result Date: 1/2/2019  Narrative: EXTREMITY SOFT TISSUE ULTRASOUND INDICATION:   C43 59: Malignant melanoma of other part of trunk C77 3: Secondary and unspecified malignant neoplasm of axilla and upper limb lymph nodes  MSLT-2 protocol COMPARISON:  None TECHNIQUE:   Real-time ultrasound of the right axilla was performed with a linear transducer with both volumetric sweeps and still imaging techniques   FINDINGS:  A solitary right axillary lymph node was identified approximately 4 cm superior to the incision site and approximately 1 5 cm deep to the skin surface measuring 1 4 x 0 7 x 1 4 cm  Morphologic appearance suggests a benign lymph node  No suspicious solid lesions are seen  There are no cystic fluid collections        Impression: Solitary right axillary lymph node measuring 1 4 x 0 7 x 1 4 cm in size 4 cm superior to the incision site with benign-appearing morphologic characteristics Workstation performed: [id number]     I reviewed the above laboratory and imaging data  Discussion/Summary:   72-year-old male status post wide excision bilateral axillary sentinel lymph node biopsies for a [id number] melanoma  His right axillary lymph node was positive  Follow-up staging was negative except for incidental thyroid nodules for which we will obtain a thyroid ultrasound  The liver lesion, which I suspect is benign we will obtain a follow-up MRI as suggested by radiology  I will call him with those results  I did discuss that these may require biopsy based on the this imaging  I also discussed with him that he should consider adjuvant immunotherapy  I will set him up to see Dr Darryl Johnson to discuss that  I have not recommended any further axillary surgery based on the results of MSLT-2  I will repeat his right axillary ultrasound in 3 months and see him again at that time for another clinical exam   I will call him with the results of the MRI and thyroid ultrasound  He is agreeable to this plan  All his questions were answered  This office visit took 15 min of face-to-face time with the patient greater than 50% the time was spent counseling and coordinating care

## 2019-01-03 NOTE — LETTER
January 3, 2019     Meredith Christopher MD  [address], [address] [address]    Patient: Hari Farris   YOB: 1950   Date of Visit: 1/3/2019       Dear Dr Nghia Guerra: Thank you for referring Hari Farris to me for evaluation  Below are my notes for this consultation  If you have questions, please do not hesitate to call me  I look forward to following your patient along with you  Sincerely,        Mickey Nunez MD        CC: MD Valencia Gallagher MD Bonna Lango, MD  1/3/2019 10:10 AM  Sign at close encounter               Surgical Oncology Follow Up       [place] [address]  1950  [id number]  [facility]  [facility] [place]  [address] [address]    Diagnoses and all orders for this visit:    Malignant melanoma of skin of abdomen ([facility] )    Malignant neoplasm metastatic to lymph node of axilla [facility])    Liver mass        Chief Complaint   Patient presents with    Post-op     Melamona abdomen         No Follow-up on file  Malignant melanoma of skin of abdomen ([facility] [address] )    10/16/2018 Initial Diagnosis     Malignant melanoma of skin of abdomen ([facility] [address] [facility] )       12/14/2018 Surgery     Wide excision of abdomen with sentinel lymph node biopsies (right and left axillae)            Staging: [id number] melanoma of the abdomen  December 2018  Positive sentinel node in the right axilla  Treatment history: Wide excision with sentinel lymph node biopsy December 2018  Current treatment:   Immunotherapy pending  Disease status:   Possible liver lesion    History of Present Illness:   Patient returns in follow-up of his melanoma  He is doing well at this time with no complaints  No fevers or chills  His final pathology did reveal a positive sentinel node in the right axilla  This deposit was approximately 2 mm in size  Follow-up imaging was performed  Axillary ultrasound revealed a 1 4 x 0 7 x 1 4 cm right axillary lymph node  There was normal morphology  Follow-up CT revealed incidental thyroid nodules as well as 3 small liver lesions  The liver lesion of most concern was an 8 mm arterially enhancing lesion in the right lobe  MRI was recommended  I personally reviewed the films  Denies any dysphagia or hoarseness  No history of radiation to his head or neck  No family history of thyroid cancer although there are thyroid problems in the family  Review of Systems  Complete ROS Surg Onc:   Complete ROS Surg Onc:   Constitutional: The patient denies new or recent history of general fatigue, no recent weight loss, no change in appetite  Eyes: No complaints of visual problems, no scleral icterus  ENT: no complaints of ear pain, no hoarseness, no difficulty swallowing,  no tinnitus and no new masses in head, oral cavity, or neck  Cardiovascular: No complaints of chest pain, no palpitations, no ankle edema  Respiratory: No complaints of shortness of breath, no cough  Gastrointestinal: No complaints of jaundice, no bloody stools, no pale stools  Genitourinary: No complaints of dysuria, no hematuria, no nocturia, no frequent urination, no urethral discharge  Musculoskeletal: No complaints of weakness, paralysis, joint stiffness or arthralgias  Integumentary: No complaints of rash, no new lesions  Neurological: No complaints of convulsions, no seizures, no dizziness  Hematologic/Lymphatic: No complaints of easy bruising  Endocrine:  No hot or cold intolerance  No polydipsia, polyphagia, or polyuria  Allergy/immunology:  No environmental allergies  No food allergies  Not immunocompromised  Skin:  No pallor or rash  Surgical wound          Patient Active Problem List   Diagnosis    Actinic keratosis    Benign essential hypertension    BPH (benign prostatic hyperplasia)    Erectile dysfunction    GERD without esophagitis    Hyperlipidemia    Impaired fasting glucose    Malignant melanoma of skin of abdomen (HCC)    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)    Liver mass     Past Medical History:   Diagnosis Date    Arthritis     Hyperlipidemia     Last Assessed:10/20/14    PONV (postoperative nausea and vomiting)      Past Surgical History:   Procedure Laterality Date    COLONOSCOPY      HERNIA REPAIR Right     Last Assessed:10/20/14 inguinal     LYMPH NODE BIOPSY N/A 12/14/2018    Procedure: SENTINEL LYMPH NODE BIOPSY; LYMPHOSCINTIGRAPHY; INTRAOPERATIVE LYMPHATIC MAPPING (INJECT AT 1100); Surgeon: Oscar Barakat MD;  [facility];  Service: Surgical Oncology    SKIN LESION EXCISION N/A 12/14/2018    Procedure: MEDIAL UPPER ABDOMEN MELANOMA WIDE EXCISION; BILATERAL AXILLA SLN;  Surgeon: Oscar Barakat MD;  [facility];  Service: Surgical Oncology    TONSILLECTOMY      WISDOM TOOTH EXTRACTION       Family History   Problem Relation Age of Onset    Hypertension Mother     Hypothyroidism Mother     Hypertension Father     Skin cancer Father     Colon cancer Maternal Aunt     Colon cancer Paternal Aunt         62s    Skin cancer Paternal Uncle      Social History     Social History    Marital status: /Civil Union     Spouse name: N/A    Number of children: N/A    Years of education: N/A     Occupational History    Not on file       Social History Main Topics    Smoking status: Never Smoker    Smokeless tobacco: Never Used    Alcohol use 1 2 oz/week     1 Glasses of wine, 1 Cans of beer per week      Comment: rarely    Drug use: No    Sexual activity: Yes     Partners: Female     Other Topics Concern    Not on file     Social History Narrative    No advance directives           Current Outpatient Prescriptions:     aspirin 81 MG tablet, Take by mouth, Disp: , Rfl:     atenolol (TENORMIN) 50 mg tablet, TAKE 1 TABLET DAILY, Disp: 30 tablet, Rfl: 4    hydrochlorothiazide (HYDRODIURIL) 25 mg tablet, TAKE 1 TABLET DAILY  , Disp: 30 tablet, Rfl: 4    lovastatin (MEVACOR) 40 MG tablet, TAKE 1 TABLET AT BEDTIME, Disp: 30 tablet, Rfl: 3    multivitamin (THERAGRAN) TABS, Take by mouth, Disp: , Rfl:     sildenafil (VIAGRA) 100 mg tablet, Take by mouth, Disp: , Rfl:   No current facility-administered medications for this visit  No Known Allergies  Vitals:    01/03/19 0945   BP: 122/84   Pulse: 60   Resp: 18   Temp: 98 3 °F (36 8 °C)       Physical Exam  Constitutional: General appearance: The Patient is well-developed and well-nourished who appears the stated age in no acute distress  Patient is pleasant and talkative  HEENT:  Normocephalic  Sclerae are anicteric  Mucous membranes are moist      Abdomen: Abdomen is soft, non-tender, non-distended and without masses  Extremities: There is no clubbing or cyanosis  There is no edema  Symmetric  Neuro: Grossly nonfocal  Gait is normal      Lymphatic: No evidence of cervical adenopathy bilaterally  No evidence of axillary adenopathy bilaterally  Skin: Warm, anicteric  incision is C/D/I  No seromas  Psych:  Patient is pleasant and talkative  Breasts:        Pathology:  Final Diagnosis   A  [place], left axillary lymph node #1, biopsy:  - No tumor seen in one lymph node (0/1); see note      Note:  Immunostains for S100, HMB45 and Melan A do not reveal metastatic melanoma       B  [place], left axillary lymph node #2, biopsy:  - No tumor seen in one lymph node (0/1); see note      Note:  Immunostains for S100, HMB45 and Melan A do not reveal metastatic melanoma       C  [place], right axillary lymph node #1, biopsy:  - No tumor seen in one lymph node (0/1); see note      Note:  Immunostains for S100, HMB45 and Melan A do not reveal metastatic melanoma       D   [place], right axillary lymph node #2, biopsy:  - Metastatic melanoma in one of one lymph node (1/1, seen as single and small cluster of tumor cells in subcapsular mya parenchyma, two foci, larger involved area approximately 2 mm), no extranodal extension seen      Note:  The tumor cells are positive for S100 and HMB45, faintly stained for Mart-1      E  S[place], wide excision melanoma abdomen:  - Prior procedure site reaction, no residual melanoma seen  - Inked margins with no tumor seen       In conjunction with [id number], the tumor is [id number]         Interpretation performed at [facility], [facility], [address] [place] [address], [address]            Electronically signed by Rico Goldberg MD on 12/21/2018 at  9:49 AM         Labs:      Imaging  Ct Chest Abdomen Pelvis W Contrast    Result Date: 1/3/2019  Narrative: CT CHEST, ABDOMEN AND PELVIS WITH IV CONTRAST INDICATION:   C43 59: Malignant melanoma of other part of trunk C77 3: Secondary and unspecified malignant neoplasm of axilla and upper limb lymph nodes  Dr Meme Andres op note from 12/14/2018 was reviewed, which states patient had a medial upper abdomen melanoma treated with wide excision and bilateral axillary sentinel lymph node biopsy  Review of pathology demonstrated no tumor in left axillary sentinel lymph nodes, but metastatic melanoma in one of the right axillary sentinel lymph nodes  COMPARISON:  None  TECHNIQUE: CT examination of the chest, abdomen and pelvis was performed  Scanning through the abdomen was performed in arterial, venous and delayed phases according a protocol spefically designed to evaluate upper abdominal viscera  Axial, sagittal, and coronal 2D reformatted images were created from the source data and submitted for interpretation  Radiation dose length product (DLP) for this visit:  2414 mGy-cm     This examination, like all CT scans performed in the [facility], was performed utilizing techniques to minimize radiation dose exposure, including the use of iterative reconstruction and automated exposure control  IV Contrast:  100 mL of iohexol (OMNIPAQUE) Enteric Contrast: Enteric contrast was administered  FINDINGS: CHEST LUNGS:  Lungs are clear  There is no tracheal or endobronchial lesion  PLEURA:  Unremarkable  HEART/GREAT VESSELS:  Unremarkable for patient's age  MEDIASTINUM AND JG:  Unremarkable  CHEST WALL AND LOWER NECK: There is a 2 6 cm diameter thyroid isthmic nodule, and another 2 9 cm diameter left thyroid nodule (series 2 image 123 )  Incidental discovery of one or more thyroid nodule(s) measuring more than 1 5 cm and without suspicious features is noted in this patient who is above 28years old; according to guidelines published in the February 2015 white paper on incidental thyroid nodules in the Journal of the [employer] of Radiology [facility]), further characterization with thyroid ultrasound is recommended  Postop changes are seen in both axilla  There are no pathologic enlarged axillary lymph nodes  ABDOMEN LIVER/BILIARY TREE:  There is a 0 8 x 0 8 cm arterial phase hyperenhancing lesion in the right hepatic lobe segment 8 (series 2 image 23 )  On the portal venous phase, this becomes inconspicuous relative to liver parenchyma, which is not typical of hemangiomas  There is a too small to characterize but probably benign hypodensity in the left hepatic lobe segment 4 (series 2 image 24 ) There is a too small to characterize probably benign hypodensity in the left hepatic lobe segment 2/3 (series 3 image 32 )  GALLBLADDER:  No calcified gallstones  No pericholecystic inflammatory change  SPLEEN:  Unremarkable  PANCREAS:  Unremarkable  ADRENAL GLANDS:  Unremarkable  KIDNEYS/URETERS:  Unremarkable  No hydronephrosis  STOMACH AND BOWEL:  Unremarkable  APPENDIX:  No findings to suggest appendicitis  ABDOMINOPELVIC CAVITY:  No ascites or free intraperitoneal air  No lymphadenopathy  VESSELS:  Unremarkable for patient's age  PELVIS REPRODUCTIVE ORGANS:  Unremarkable for patient's age  URINARY BLADDER:  Unremarkable  ABDOMINAL WALL/INGUINAL REGIONS:  Small umbilical hernia containing fat only  There are skin staples and subcutaneous edema in the upper midline anterior abdominal wall in keeping with patient's history of recent wide excision for melanoma (series 2 image 174 ) OSSEOUS STRUCTURES:  No acute fracture or destructive osseous lesion  There is a small bone island in the right ischium  Impression: There are 3 small liver lesions as above, of most concern the 0 8 x 0 8 cm arterial phase hyperenhancing lesion in the right hepatic lobe segment 8 (series 2 image 23 )  It does not have typical delayed imaging characteristics of hemangioma, and is potentially a melanoma metastasis  MRI of the liver with and without Gadavist IV contrast will be helpful to further characterize  Otherwise no other potential metastatic lesions in the chest, abdomen, pelvis  There is a 2 6 cm diameter thyroid isthmic nodule, and another 2 9 cm diameter left thyroid nodule (series 2 image 123  )Nonemergent thyroid ultrasound is recommended  The study was marked in EPIC for significant notification  Workstation performed: [id number]     Nm Lymphatic Melanoma    Result Date: 12/14/2018  Narrative: SENTINEL NODE LYMPHOSCINTIGRAPHY INDICATION:   Malignant melanoma of the trunk  Localize sentinel node FINDINGS: 0 48 mCi Tc-99m sulfur colloid (0 6 cc volume) was administered intradermally in divided doses by myself intradermally surrounding the patient's anterior chest wall melanoma site  Scintigraphic images were obtained over the trunk in multiple projections  Bilateral axillary sentinel nodes were identified  Using scintigraphic guidance, the corresponding skin sites were marked with an indelible marker  The patient was transferred to the operating room in satisfactory condition  Impression: [place] lymph nodes localized to both axilla   Workstation performed: [id number]     Us Extremity Soft Tissue    Result Date: 1/2/2019  Narrative: EXTREMITY SOFT TISSUE ULTRASOUND INDICATION:   C43 59: Malignant melanoma of other part of trunk C77 3: Secondary and unspecified malignant neoplasm of axilla and upper limb lymph nodes  MSLT-2 protocol COMPARISON:  None TECHNIQUE:   Real-time ultrasound of the right axilla was performed with a linear transducer with both volumetric sweeps and still imaging techniques  FINDINGS:  A solitary right axillary lymph node was identified approximately 4 cm superior to the incision site and approximately 1 5 cm deep to the skin surface measuring 1 4 x 0 7 x 1 4 cm  Morphologic appearance suggests a benign lymph node  No suspicious solid lesions are seen  There are no cystic fluid collections        Impression: Solitary right axillary lymph node measuring 1 4 x 0 7 x 1 4 cm in size 4 cm superior to the incision site with benign-appearing morphologic characteristics Workstation performed: [id number]     I reviewed the above laboratory and imaging data  Discussion/Summary:   51-year-old male status post wide excision bilateral axillary sentinel lymph node biopsies for a [id number] melanoma  His right axillary lymph node was positive  Follow-up staging was negative except for incidental thyroid nodules for which we will obtain a thyroid ultrasound  The liver lesion, which I suspect is benign we will obtain a follow-up MRI as suggested by radiology  I will call him with those results  I did discuss that these may require biopsy based on the this imaging  I also discussed with him that he should consider adjuvant immunotherapy  I will set him up to see Dr April Rodas to discuss that  I have not recommended any further axillary surgery based on the results of MSLT-2  I will repeat his right axillary ultrasound in 3 months and see him again at that time for another clinical exam   I will call him with the results of the MRI and thyroid ultrasound    He is agreeable to this plan   All his questions were answered  This office visit took 15 min of face-to-face time with the patient greater than 50% the time was spent counseling and coordinating care

## 2019-01-07 NOTE — PROGRESS NOTES
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  The patient noticed a lesion on his abdomen  He presented to his dermatologist who performed at biopsy on 10/16/18, the pathology was consistent with malignant melanoma, it was 1 1 mm, was not ulcerated, and had a mitotic rate of 1/mm2  He was referred to Dr Cherelle Cabrera and he had a wide excision and SLNB on 12/14/18, he did not have any residual melanoma but did have 1/4 lymph nodes positive with 2 mm of focal invasion into the lymph node, there was not any extranodal extension seen  He has a stage IIIA, [id number] melanoma of the abdominal wall  He did have a CT scan of the chest, abdomen, and pelvis on 1/2/19, which did show 3 small liver lesions, the concerning lesion measured 0 8 x 0 8 cm  The radiologist has recommended an MRI of the abdomen, which I will order today  I will order BRAF mutational analysis on his tissue  At this time I recommend we wait for the MRI results as he may have metastatic disease to the liver, which has been scheduled for 1/23/19  I plan to bring him back to the clinic within 2-3 weeks after his MRI  WE will then decide what treatment options would be the best for him  Generally, with a stage IIIA melanoma, with one lymph node positive I would generally not recommend adjuvant immunotherapy  CC:  Consultation regarding abdominal wall melanoma      HPI:  Sherman Bolivar is a 79 y o  seen for initial consultation 1/8/2019 at the referral of Fernando Garcia MD regarding a newly diagnosed malignant melanoma of the abdominal wall  The patient noticed a lesion on his abdomen  He presented to his dermatologist who performed at biopsy on 10/16/18, the pathology was consistent with malignant melanoma, it was 1 1 mm, was not ulcerated, and had a mitotic rate of 1/mm2   He was referred to Dr Meredith Cramer and he had a wide excision and SLNB on 12/14/18, he did not have any residual melanoma but did have 1/4 lymph nodes positive with 2 mm of focal invasion into the lymph node, there was not any extranodal extension seen  He has a stage IIIA, [id number] melanoma of the abdominal wall  He did have a CT scan of the chest, abdomen, and pelvis on 1/2/19, which did show 3 small liver lesions, the concerning lesion measured 0 8 x 0 8 cm  The radiologist has recommended an MRI of the abdomen, which has been scheduled  Overall the patient is doing well  He has chronic fatigue, which has been stable  He has an ECOG performance status of one  He has a good appetite and his weight has been stable  He denies fevers, chills, CP, SOB, N/V, diarrhea, all other ROS are unremarkable  PFSH was reviewed  He has had some sunburns as a young kid, however no blistering burns that he can remember  His fathers brother had melanoma but is 80 and doing well  Test Results:      Labs:   Lab Results   Component Value Date    HGB 17 0 12/20/2018    HCT 50 2 (H) 12/20/2018    MCV 91 12/20/2018     12/20/2018    WBC 8 16 12/20/2018    NRBC 0 12/20/2018     Lab Results   Component Value Date     05/29/2017    K 4 2 12/20/2018     12/20/2018    CO2 32 12/20/2018    BUN 17 01/03/2019    CREATININE 1 14 01/03/2019    GLUCOSE 111 (H) 05/29/2017    GLUF 104 (H) 12/20/2018    CALCIUM 9 1 12/20/2018    AST 12 12/20/2018    ALT 24 12/20/2018    ALKPHOS 69 12/20/2018    PROT 6 3 05/29/2017    BILITOT 0 8 05/29/2017    EGFR 67 01/03/2019           Imaging:   Ct Chest Abdomen Pelvis W Contrast    Result Date: 1/3/2019  Narrative: CT CHEST, ABDOMEN AND PELVIS WITH IV CONTRAST INDICATION:   C43 59: Malignant melanoma of other part of trunk C77 3: Secondary and unspecified malignant neoplasm of axilla and upper limb lymph nodes    Dr Dumas Economy op note from 12/14/2018 was reviewed, which states patient had a medial upper abdomen melanoma treated with wide excision and bilateral axillary sentinel lymph node biopsy  Review of pathology demonstrated no tumor in left axillary sentinel lymph nodes, but metastatic melanoma in one of the right axillary sentinel lymph nodes  COMPARISON:  None  TECHNIQUE: CT examination of the chest, abdomen and pelvis was performed  Scanning through the abdomen was performed in arterial, venous and delayed phases according a protocol spefically designed to evaluate upper abdominal viscera  Axial, sagittal, and coronal 2D reformatted images were created from the source data and submitted for interpretation  Radiation dose length product (DLP) for this visit:  2414 mGy-cm   This examination, like all CT scans performed in the [facility], was performed utilizing techniques to minimize radiation dose exposure, including the use of iterative reconstruction and automated exposure control  IV Contrast:  100 mL of iohexol (OMNIPAQUE) Enteric Contrast: Enteric contrast was administered  FINDINGS: CHEST LUNGS:  Lungs are clear  There is no tracheal or endobronchial lesion  PLEURA:  Unremarkable  HEART/GREAT VESSELS:  Unremarkable for patient's age  MEDIASTINUM AND JG:  Unremarkable  CHEST WALL AND LOWER NECK: There is a 2 6 cm diameter thyroid isthmic nodule, and another 2 9 cm diameter left thyroid nodule (series 2 image 123 )  Incidental discovery of one or more thyroid nodule(s) measuring more than 1 5 cm and without suspicious features is noted in this patient who is above 28years old; according to guidelines published in the February 2015 white paper on incidental thyroid nodules in the Journal of the [employer] [facility]), further characterization with thyroid ultrasound is recommended  Postop changes are seen in both axilla  There are no pathologic enlarged axillary lymph nodes   ABDOMEN LIVER/BILIARY TREE:  There is a 0 8 x 0 8 cm arterial phase hyperenhancing lesion in the right hepatic lobe segment 8 (series 2 image 23 )  On the portal venous phase, this becomes inconspicuous relative to liver parenchyma, which is not typical of hemangiomas  There is a too small to characterize but probably benign hypodensity in the left hepatic lobe segment 4 (series 2 image 24 ) There is a too small to characterize probably benign hypodensity in the left hepatic lobe segment 2/3 (series 3 image 32 )  GALLBLADDER:  No calcified gallstones  No pericholecystic inflammatory change  SPLEEN:  Unremarkable  PANCREAS:  Unremarkable  ADRENAL GLANDS:  Unremarkable  KIDNEYS/URETERS:  Unremarkable  No hydronephrosis  STOMACH AND BOWEL:  Unremarkable  APPENDIX:  No findings to suggest appendicitis  ABDOMINOPELVIC CAVITY:  No ascites or free intraperitoneal air  No lymphadenopathy  VESSELS:  Unremarkable for patient's age  PELVIS REPRODUCTIVE ORGANS:  Unremarkable for patient's age  URINARY BLADDER:  Unremarkable  ABDOMINAL WALL/INGUINAL REGIONS:  Small umbilical hernia containing fat only  There are skin staples and subcutaneous edema in the upper midline anterior abdominal wall in keeping with patient's history of recent wide excision for melanoma (series 2 image 174 ) OSSEOUS STRUCTURES:  No acute fracture or destructive osseous lesion  There is a small bone island in the right ischium  Impression: There are 3 small liver lesions as above, of most concern the 0 8 x 0 8 cm arterial phase hyperenhancing lesion in the right hepatic lobe segment 8 (series 2 image 23 )  It does not have typical delayed imaging characteristics of hemangioma, and is potentially a melanoma metastasis  MRI of the liver with and without Gadavist IV contrast will be helpful to further characterize  Otherwise no other potential metastatic lesions in the chest, abdomen, pelvis  There is a 2 6 cm diameter thyroid isthmic nodule, and another 2 9 cm diameter left thyroid nodule (series 2 image 123  )Nonemergent thyroid ultrasound is recommended  The study was marked in EPIC for significant notification  Workstation performed: [id number]     Nm Lymphatic Melanoma    Result Date: 12/14/2018  Narrative: SENTINEL NODE LYMPHOSCINTIGRAPHY INDICATION:   Malignant melanoma of the trunk  Localize sentinel node FINDINGS: 0 48 mCi Tc-99m sulfur colloid (0 6 cc volume) was administered intradermally in divided doses by myself intradermally surrounding the patient's anterior chest wall melanoma site  Scintigraphic images were obtained over the trunk in multiple projections  Bilateral axillary sentinel nodes were identified  Using scintigraphic guidance, the corresponding skin sites were marked with an indelible marker  The patient was transferred to the operating room in satisfactory condition  Impression: Moro lymph nodes localized to both axilla  Workstation performed: [id number]     Us Extremity Soft Tissue    Result Date: 1/2/2019  Narrative: EXTREMITY SOFT TISSUE ULTRASOUND INDICATION:   C43 59: Malignant melanoma of other part of trunk C77 3: Secondary and unspecified malignant neoplasm of axilla and upper limb lymph nodes  MSLT-2 protocol COMPARISON:  None TECHNIQUE:   Real-time ultrasound of the right axilla was performed with a linear transducer with both volumetric sweeps and still imaging techniques  FINDINGS:  A solitary right axillary lymph node was identified approximately 4 cm superior to the incision site and approximately 1 5 cm deep to the skin surface measuring 1 4 x 0 7 x 1 4 cm  Morphologic appearance suggests a benign lymph node  No suspicious solid lesions are seen  There are no cystic fluid collections        Impression: Solitary right axillary lymph node measuring 1 4 x 0 7 x 1 4 cm in size 4 cm superior to the incision site with benign-appearing morphologic characteristics Workstation performed: [id number]           ROS: As mentioned in HPI & Interval History otherwise 14 point ROS negative        Active Problems: Patient Active Problem List   Diagnosis    Actinic keratosis    Benign essential hypertension    BPH (benign prostatic hyperplasia)    Erectile dysfunction    GERD without esophagitis    Hyperlipidemia    Impaired fasting glucose    Malignant melanoma of skin of abdomen ([address] )    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)    Liver mass       Past Medical History:   Past Medical History:   Diagnosis Date    Arthritis     Hyperlipidemia     Last Assessed:10/20/14    PONV (postoperative nausea and vomiting)        Surgical History:   Past Surgical History:   Procedure Laterality Date    COLONOSCOPY      HERNIA REPAIR Right     Last Assessed:10/20/14 inguinal     LYMPH NODE BIOPSY N/A 12/14/2018    Procedure: SENTINEL LYMPH NODE BIOPSY; LYMPHOSCINTIGRAPHY; INTRAOPERATIVE LYMPHATIC MAPPING (INJECT AT 1100); Surgeon: Siria Coleman MD;  [facility];  Service: Surgical Oncology    SKIN LESION EXCISION N/A 12/14/2018    Procedure: MEDIAL UPPER ABDOMEN MELANOMA WIDE EXCISION; BILATERAL AXILLA SLN;  Surgeon: Siria Coleman MD;  [facility];  Service: Surgical Oncology    TONSILLECTOMY      WISDOM TOOTH EXTRACTION         Family History:    Family History   Problem Relation Age of Onset   Yoo Hypertension Mother     Hypothyroidism Mother     Hypertension Father     Skin cancer Father     Colon cancer Maternal Aunt     Colon cancer Paternal Aunt         62s    Skin cancer Paternal Uncle        Cancer-related family history includes Colon cancer in his maternal aunt and paternal aunt; Skin cancer in his father and paternal uncle  Social History:   Social History     Social History    Marital status: /Civil Union     Spouse name: N/A    Number of children: N/A    Years of education: N/A     Occupational History    Not on file       Social History Main Topics    Smoking status: Never Smoker    Smokeless tobacco: Never Used    Alcohol use 1 2 oz/week     1 Glasses of wine, 1 Cans of beer per week      Comment: rarely    Drug use: No    Sexual activity: Yes     Partners: Female     Other Topics Concern    Not on file     Social History Narrative    No advance directives           Current Medications:   Current Outpatient Prescriptions   Medication Sig Dispense Refill    aspirin 81 MG tablet Take by mouth      atenolol (TENORMIN) 50 mg tablet TAKE 1 TABLET DAILY 30 tablet 4    hydrochlorothiazide (HYDRODIURIL) 25 mg tablet TAKE 1 TABLET DAILY  30 tablet 4    lovastatin (MEVACOR) 40 MG tablet TAKE 1 TABLET AT BEDTIME 30 tablet 3    multivitamin (THERAGRAN) TABS Take by mouth      sildenafil (VIAGRA) 100 mg tablet Take by mouth       No current facility-administered medications for this visit  Allergies: No Known Allergies      Physical Exam:    There is no height or weight on file to calculate BSA  Ht Readings from Last 3 Encounters:   01/03/19 5' 11" (1 803 m)   12/21/18 5' 11" (1 803 m)   12/14/18 5' 11" (1 803 m)       Wt Readings from Last 3 Encounters:   01/03/19 98 kg (216 lb)   12/21/18 96 5 kg (212 lb 12 8 oz)   12/14/18 94 8 kg (209 lb)        Temp Readings from Last 3 Encounters:   01/03/19 98 3 °F (36 8 °C)   12/14/18 (!) 97 4 °F (36 3 °C)   11/15/18 97 6 °F (36 4 °C)        BP Readings from Last 3 Encounters:   01/03/19 122/84   12/21/18 102/80   12/14/18 104/64         Pulse Readings from Last 3 Encounters:   01/03/19 60   12/21/18 64   12/14/18 64         Physical Exam    Physical Exam   Constitutional: He is oriented to person, place, and time  He appears well-developed and well-nourished  HENT:   Head: Normocephalic and atraumatic  Mouth/Throat: Oropharynx is clear and moist    Eyes: Pupils are equal, round, and reactive to light  Conjunctivae and EOM are normal    Neck: Normal range of motion  Neck supple  No thyromegaly present  Cardiovascular: Normal rate, regular rhythm and normal heart sounds      Pulmonary/Chest: Effort normal and breath sounds normal    Abdominal: Soft  Bowel sounds are normal  He exhibits no distension and no mass  There is no tenderness  Musculoskeletal: Normal range of motion  He exhibits no edema  Lymphadenopathy:     He has no cervical adenopathy  Neurological: He is alert and oriented to person, place, and time  He has normal reflexes  Skin: Skin is warm and dry  No erythema  Psychiatric: He has a normal mood and affect  Vitals reviewed            Emergency Contacts:    Steffanie Barbour [phone],

## 2019-01-08 ENCOUNTER — CONSULT (OUTPATIENT)
Dept: HEMATOLOGY ONCOLOGY | Facility: CLINIC | Age: 69
End: 2019-01-08
Payer: MEDICARE

## 2019-01-08 VITALS
RESPIRATION RATE: 14 BRPM | TEMPERATURE: 98.7 F | HEART RATE: 64 BPM | OXYGEN SATURATION: 98 % | DIASTOLIC BLOOD PRESSURE: 80 MMHG | HEIGHT: 71 IN | SYSTOLIC BLOOD PRESSURE: 118 MMHG | WEIGHT: 214 LBS | BODY MASS INDEX: 29.96 KG/M2

## 2019-01-08 DIAGNOSIS — C43.59 MALIGNANT MELANOMA OF SKIN OF ABDOMEN (HCC): Primary | ICD-10-CM

## 2019-01-08 DIAGNOSIS — C77.3 MALIGNANT NEOPLASM METASTATIC TO LYMPH NODE OF AXILLA (HCC): ICD-10-CM

## 2019-01-08 PROCEDURE — 99205 OFFICE O/P NEW HI 60 MIN: CPT | Performed by: SPECIALIST

## 2019-01-08 NOTE — LETTER
January 8, 2019     MD Rowena Gilbert [id number] [facility]    Patient: Kamari Ruiz   YOB: 1950   Date of Visit: 1/8/2019       Dear Dr Hannah Lr:    Thank you for referring Kamari Ruiz to me for evaluation  Below are my notes for this consultation  If you have questions, please do not hesitate to call me  I look forward to following your patient along with you  Sincerely,        Pura Hernandez MD        CC: MD Shameka Lewis MD Norah Repine, MD  1/8/2019 10:25 AM  Sign at close encounter     Oncology Outpatient Consult Note  Kamari Ruiz 79 y o  male MRN: @ Encounter: [id number]        Date:  1/8/2019      Assessment/ Plan:    1  Stage IIIA, 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  The patient noticed a lesion on his abdomen  He presented to his dermatologist who performed at biopsy on 10/16/18, the pathology was consistent with malignant melanoma, it was 1 1 mm, was not ulcerated, and had a mitotic rate of 1/mm2  He was referred to Dr Hannah Lr and he had a wide excision and SLNB on 12/14/18, he did not have any residual melanoma but did have 1/4 lymph nodes positive with 2 mm of focal invasion into the lymph node, there was not any extranodal extension seen  He has a stage IIIA, [id number] melanoma of the abdominal wall  He did have a CT scan of the chest, abdomen, and pelvis on 1/2/19, which did show 3 small liver lesions, the concerning lesion measured 0 8 x 0 8 cm  The radiologist has recommended an MRI of the abdomen, which I will order today  I will order BRAF mutational analysis on his tissue  At this time I recommend we wait for the MRI results as he may have metastatic disease to the liver, which has been scheduled for 1/23/19  I plan to bring him back to the clinic within 2-3 weeks after his MRI  WE will then decide what treatment options would be the best for him    Generally, with a stage IIIA melanoma, with one lymph node positive I would generally not recommend adjuvant immunotherapy  CC:  Consultation regarding abdominal wall melanoma      HPI:  Kal Junior is a 79 y o  seen for initial consultation 1/8/2019 at the referral of Akash Lozano MD regarding a newly diagnosed malignant melanoma of the abdominal wall  The patient noticed a lesion on his abdomen  He presented to his dermatologist who performed at biopsy on 10/16/18, the pathology was consistent with malignant melanoma, it was 1 1 mm, was not ulcerated, and had a mitotic rate of 1/mm2  He was referred to Dr Adama Sterling and he had a wide excision and SLNB on 12/14/18, he did not have any residual melanoma but did have 1/4 lymph nodes positive with 2 mm of focal invasion into the lymph node, there was not any extranodal extension seen  He has a stage IIIA, [id number] melanoma of the abdominal wall  He did have a CT scan of the chest, abdomen, and pelvis on 1/2/19, which did show 3 small liver lesions, the concerning lesion measured 0 8 x 0 8 cm  The radiologist has recommended an MRI of the abdomen, which has been scheduled  Overall the patient is doing well  He has chronic fatigue, which has been stable  He has an ECOG performance status of one  He has a good appetite and his weight has been stable  He denies fevers, chills, CP, SOB, N/V, diarrhea, all other ROS are unremarkable  PFSH was reviewed  He has had some sunburns as a young kid, however no blistering burns that he can remember  His fathers brother had melanoma but is 80 and doing well      Test Results:      Labs:   Lab Results   Component Value Date    HGB 17 0 12/20/2018    HCT 50 2 (H) 12/20/2018    MCV 91 12/20/2018     12/20/2018    WBC 8 16 12/20/2018    NRBC 0 12/20/2018     Lab Results   Component Value Date     05/29/2017    K 4 2 12/20/2018     12/20/2018    CO2 32 12/20/2018    BUN 17 01/03/2019    CREATININE 1 14 01/03/2019    GLUCOSE 111 (H) 05/29/2017    GLUF 104 (H) 12/20/2018    CALCIUM 9 1 12/20/2018    AST 12 12/20/2018    ALT 24 12/20/2018    ALKPHOS 69 12/20/2018    PROT 6 3 05/29/2017    BILITOT 0 8 05/29/2017    EGFR 67 01/03/2019           Imaging:   Ct Chest Abdomen Pelvis W Contrast    Result Date: 1/3/2019  Narrative: CT CHEST, ABDOMEN AND PELVIS WITH IV CONTRAST INDICATION:   C43 59: Malignant melanoma of other part of trunk C77 3: Secondary and unspecified malignant neoplasm of axilla and upper limb lymph nodes  Dr Stacia Mcfarland op note from 12/14/2018 was reviewed, which states patient had a medial upper abdomen melanoma treated with wide excision and bilateral axillary sentinel lymph node biopsy  Review of pathology demonstrated no tumor in left axillary sentinel lymph nodes, but metastatic melanoma in one of the right axillary sentinel lymph nodes  COMPARISON:  None  TECHNIQUE: CT examination of the chest, abdomen and pelvis was performed  Scanning through the abdomen was performed in arterial, venous and delayed phases according a protocol spefically designed to evaluate upper abdominal viscera  Axial, sagittal, and coronal 2D reformatted images were created from the source data and submitted for interpretation  Radiation dose length product (DLP) for this visit:  2414 mGy-cm   This examination, like all CT scans performed in the [facility], was performed utilizing techniques to minimize radiation dose exposure, including the use of iterative reconstruction and automated exposure control  IV Contrast:  100 mL of iohexol (OMNIPAQUE) Enteric Contrast: Enteric contrast was administered  FINDINGS: CHEST LUNGS:  Lungs are clear  There is no tracheal or endobronchial lesion  PLEURA:  Unremarkable  HEART/GREAT VESSELS:  Unremarkable for patient's age  MEDIASTINUM AND JG:  Unremarkable   CHEST WALL AND LOWER NECK: There is a 2 6 cm diameter thyroid isthmic nodule, and another 2 9 cm diameter left thyroid nodule (series 2 image 123 )  Incidental discovery of one or more thyroid nodule(s) measuring more than 1 5 cm and without suspicious features is noted in this patient who is above 28years old; according to guidelines published in the February 2015 white paper on incidental thyroid nodules in the Journal of the [employer] [place]), further characterization with thyroid ultrasound is recommended  Postop changes are seen in both axilla  There are no pathologic enlarged axillary lymph nodes  ABDOMEN LIVER/BILIARY TREE:  There is a 0 8 x 0 8 cm arterial phase hyperenhancing lesion in the right hepatic lobe segment 8 (series 2 image 23 )  On the portal venous phase, this becomes inconspicuous relative to liver parenchyma, which is not typical of hemangiomas  There is a too small to characterize but probably benign hypodensity in the left hepatic lobe segment 4 (series 2 image 24 ) There is a too small to characterize probably benign hypodensity in the left hepatic lobe segment 2/3 (series 3 image 32 )  GALLBLADDER:  No calcified gallstones  No pericholecystic inflammatory change  SPLEEN:  Unremarkable  PANCREAS:  Unremarkable  ADRENAL GLANDS:  Unremarkable  KIDNEYS/URETERS:  Unremarkable  No hydronephrosis  STOMACH AND BOWEL:  Unremarkable  APPENDIX:  No findings to suggest appendicitis  ABDOMINOPELVIC CAVITY:  No ascites or free intraperitoneal air  No lymphadenopathy  VESSELS:  Unremarkable for patient's age  PELVIS REPRODUCTIVE ORGANS:  Unremarkable for patient's age  URINARY BLADDER:  Unremarkable  ABDOMINAL WALL/INGUINAL REGIONS:  Small umbilical hernia containing fat only  There are skin staples and subcutaneous edema in the upper midline anterior abdominal wall in keeping with patient's history of recent wide excision for melanoma (series 2 image 174 ) OSSEOUS STRUCTURES:  No acute fracture or destructive osseous lesion  There is a small bone island in the right ischium       Impression: There are 3 small liver lesions as above, of most concern the 0 8 x 0 8 cm arterial phase hyperenhancing lesion in the right hepatic lobe segment 8 (series 2 image 23 )  It does not have typical delayed imaging characteristics of hemangioma, and is potentially a melanoma metastasis  MRI of the liver with and without Gadavist IV contrast will be helpful to further characterize  Otherwise no other potential metastatic lesions in the chest, abdomen, pelvis  There is a 2 6 cm diameter thyroid isthmic nodule, and another 2 9 cm diameter left thyroid nodule (series 2 image 123  )Nonemergent thyroid ultrasound is recommended  The study was marked in EPIC for significant notification  Workstation performed: [id number]     Nm Lymphatic Melanoma    Result Date: 12/14/2018  Narrative: SENTINEL NODE LYMPHOSCINTIGRAPHY INDICATION:   Malignant melanoma of the trunk  Localize sentinel node FINDINGS: 0 48 mCi Tc-99m sulfur colloid (0 6 cc volume) was administered intradermally in divided doses by myself intradermally surrounding the patient's anterior chest wall melanoma site  Scintigraphic images were obtained over the trunk in multiple projections  Bilateral axillary sentinel nodes were identified  Using scintigraphic guidance, the corresponding skin sites were marked with an indelible marker  The patient was transferred to the operating room in satisfactory condition  Impression: [place] lymph nodes localized to both axilla  Workstation performed: [id number]      Extremity Soft Tissue    Result Date: 1/2/2019  Narrative: EXTREMITY SOFT TISSUE ULTRASOUND INDICATION:   C43 59: Malignant melanoma of other part of trunk C77 3: Secondary and unspecified malignant neoplasm of axilla and upper limb lymph nodes  MSLT-2 protocol COMPARISON:  None TECHNIQUE:   Real-time ultrasound of the right axilla was performed with a linear transducer with both volumetric sweeps and still imaging techniques   FINDINGS:  A solitary right axillary lymph node was identified approximately 4 cm superior to the incision site and approximately 1 5 cm deep to the skin surface measuring 1 4 x 0 7 x 1 4 cm  Morphologic appearance suggests a benign lymph node  No suspicious solid lesions are seen  There are no cystic fluid collections        Impression: Solitary right axillary lymph node measuring 1 4 x 0 7 x 1 4 cm in size 4 cm superior to the incision site with benign-appearing morphologic characteristics Workstation performed: [id number]           ROS: As mentioned in HPI & Interval History otherwise 14 point ROS negative  Active Problems:   Patient Active Problem List   Diagnosis    Actinic keratosis    Benign essential hypertension    BPH (benign prostatic hyperplasia)    Erectile dysfunction    GERD without esophagitis    Hyperlipidemia    Impaired fasting glucose    Malignant melanoma of skin of abdomen (HCC)    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)    Liver mass       Past Medical History:   Past Medical History:   Diagnosis Date    Arthritis     Hyperlipidemia     Last Assessed:10/20/14    PONV (postoperative nausea and vomiting)        Surgical History:   Past Surgical History:   Procedure Laterality Date    COLONOSCOPY      HERNIA REPAIR Right     Last Assessed:10/20/14 inguinal     LYMPH NODE BIOPSY N/A 12/14/2018    Procedure: SENTINEL LYMPH NODE BIOPSY; LYMPHOSCINTIGRAPHY; INTRAOPERATIVE LYMPHATIC MAPPING (INJECT AT 1100);   Surgeon: Daryl Harding MD;  [facility]: [facility];  Service: Surgical Oncology    SKIN LESION EXCISION N/A 12/14/2018    Procedure: MEDIAL UPPER ABDOMEN MELANOMA WIDE EXCISION; BILATERAL AXILLA SLN;  Surgeon: Daryl Harding MD;  [facility];  Service: Surgical Oncology    TONSILLECTOMY      WISDOM TOOTH EXTRACTION         Family History:    Family History   Problem Relation Age of Onset    Hypertension Mother     Hypothyroidism Mother     Hypertension Father     Skin cancer Father     Colon cancer Maternal Aunt     Colon cancer Paternal Aunt         62s    Skin cancer Paternal Uncle        Cancer-related family history includes Colon cancer in his maternal aunt and paternal aunt; Skin cancer in his father and paternal uncle  Social History:   Social History     Social History    Marital status: /Civil Union     Spouse name: N/A    Number of children: N/A    Years of education: N/A     Occupational History    Not on file  Social History Main Topics    Smoking status: Never Smoker    Smokeless tobacco: Never Used    Alcohol use 1 2 oz/week     1 Glasses of wine, 1 Cans of beer per week      Comment: rarely    Drug use: No    Sexual activity: Yes     Partners: Female     Other Topics Concern    Not on file     Social History Narrative    No advance directives           Current Medications:   Current Outpatient Prescriptions   Medication Sig Dispense Refill    aspirin 81 MG tablet Take by mouth      atenolol (TENORMIN) 50 mg tablet TAKE 1 TABLET DAILY 30 tablet 4    hydrochlorothiazide (HYDRODIURIL) 25 mg tablet TAKE 1 TABLET DAILY  30 tablet 4    lovastatin (MEVACOR) 40 MG tablet TAKE 1 TABLET AT BEDTIME 30 tablet 3    multivitamin (THERAGRAN) TABS Take by mouth      sildenafil (VIAGRA) 100 mg tablet Take by mouth       No current facility-administered medications for this visit  Allergies: No Known Allergies      Physical Exam:    There is no height or weight on file to calculate BSA     Ht Readings from Last 3 Encounters:   01/03/19 5' 11" (1 803 m)   12/21/18 5' 11" (1 803 m)   12/14/18 5' 11" (1 803 m)       Wt Readings from Last 3 Encounters:   01/03/19 98 kg (216 lb)   12/21/18 96 5 kg (212 lb 12 8 oz)   12/14/18 94 8 kg (209 lb)        Temp Readings from Last 3 Encounters:   01/03/19 98 3 °F (36 8 °C)   12/14/18 (!) 97 4 °F (36 3 °C)   11/15/18 97 6 °F (36 4 °C)        BP Readings from Last 3 Encounters:   01/03/19 122/84   12/21/18 102/80   12/14/18 104/64 Pulse Readings from Last 3 Encounters:   01/03/19 60   12/21/18 64   12/14/18 64         Physical Exam    Physical Exam   Constitutional: He is oriented to person, place, and time  He appears well-developed and well-nourished  HENT:   Head: Normocephalic and atraumatic  Mouth/Throat: Oropharynx is clear and moist    Eyes: Pupils are equal, round, and reactive to light  Conjunctivae and EOM are normal    Neck: Normal range of motion  Neck supple  No thyromegaly present  Cardiovascular: Normal rate, regular rhythm and normal heart sounds  Pulmonary/Chest: Effort normal and breath sounds normal    Abdominal: Soft  Bowel sounds are normal  He exhibits no distension and no mass  There is no tenderness  Musculoskeletal: Normal range of motion  He exhibits no edema  Lymphadenopathy:     He has no cervical adenopathy  Neurological: He is alert and oriented to person, place, and time  He has normal reflexes  Skin: Skin is warm and dry  No erythema  Psychiatric: He has a normal mood and affect  Vitals reviewed            Emergency Contacts:    Lester Garcia [phone],

## 2019-01-14 DIAGNOSIS — E78.49 OTHER HYPERLIPIDEMIA: ICD-10-CM

## 2019-01-14 RX ORDER — LOVASTATIN 40 MG/1
TABLET ORAL
Qty: 30 TABLET | Refills: 3 | Status: SHIPPED | OUTPATIENT
Start: 2019-01-14 | End: 2019-05-09 | Stop reason: SDUPTHER

## 2019-01-23 ENCOUNTER — HOSPITAL ENCOUNTER (OUTPATIENT)
Dept: ULTRASOUND IMAGING | Facility: HOSPITAL | Age: 69
Discharge: HOME | End: 2019-01-23
Attending: SURGERY
Payer: MEDICARE

## 2019-01-23 ENCOUNTER — HOSPITAL ENCOUNTER (OUTPATIENT)
Dept: MRI IMAGING | Facility: HOSPITAL | Age: 69
Discharge: HOME | End: 2019-01-23
Attending: SURGERY
Payer: MEDICARE

## 2019-01-23 DIAGNOSIS — C43.59 MALIGNANT MELANOMA OF SKIN OF ABDOMEN (HCC): ICD-10-CM

## 2019-01-23 DIAGNOSIS — R16.0 LIVER MASS: ICD-10-CM

## 2019-01-23 PROCEDURE — A9585 GADOBUTROL INJECTION: HCPCS | Performed by: SURGERY

## 2019-01-23 PROCEDURE — 74183 MRI ABD W/O CNTR FLWD CNTR: CPT

## 2019-01-23 PROCEDURE — 76536 US EXAM OF HEAD AND NECK: CPT

## 2019-01-23 RX ADMIN — GADOBUTROL 9 ML: 604.72 INJECTION INTRAVENOUS at 09:42

## 2019-01-25 ENCOUNTER — TELEPHONE (OUTPATIENT)
Dept: SURGICAL ONCOLOGY | Facility: CLINIC | Age: 69
End: 2019-01-25

## 2019-01-25 DIAGNOSIS — E04.2 MULTIPLE THYROID NODULES: Primary | ICD-10-CM

## 2019-01-25 NOTE — TELEPHONE ENCOUNTER
Discussed MRI and thyroid US results with pt  No further f/u is needed with regard to liver imaging  Pt informed that we will order biopsy of 2 of 4 thyroid nodules, and will f/u with pt after we have those results

## 2019-02-07 ENCOUNTER — HOSPITAL ENCOUNTER (OUTPATIENT)
Dept: ULTRASOUND IMAGING | Facility: HOSPITAL | Age: 69
Discharge: HOME | End: 2019-02-07
Attending: SURGERY
Payer: MEDICARE

## 2019-02-07 DIAGNOSIS — E04.2 MULTIPLE THYROID NODULES: ICD-10-CM

## 2019-02-07 PROCEDURE — 88173 CYTOPATH EVAL FNA REPORT: CPT | Performed by: PATHOLOGY

## 2019-02-07 PROCEDURE — 88172 CYTP DX EVAL FNA 1ST EA SITE: CPT | Performed by: PATHOLOGY

## 2019-02-07 PROCEDURE — 10005 FNA BX W/US GDN 1ST LES: CPT

## 2019-02-07 PROCEDURE — 10006 FNA BX W/US GDN EA ADDL: CPT

## 2019-02-07 RX ORDER — LIDOCAINE HYDROCHLORIDE 10 MG/ML
4 INJECTION, SOLUTION EPIDURAL; INFILTRATION; INTRACAUDAL; PERINEURAL ONCE
Status: DISCONTINUED | OUTPATIENT
Start: 2019-02-07 | End: 2019-02-11 | Stop reason: HOSPADM

## 2019-02-07 NOTE — PROGRESS NOTES
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  The patient noticed a lesion on his abdomen  Please see the HPI for the full history  On staging imaging he had a questionable liver lesion  An MRI of the abdomen was obtained on 1/23, no evidence of metastatic disease was noted on this scan  The patient is BRAF  Given the fact that he has a stage IIIA melanoma, we would not recommend adjuvant therapy as his changes of being cured are quite high  He is agreeable with this plan  I recommend that he comes back for a follow up visit in three months with a CT of the chest, abdomen, and pelvis along with a CBC, CMP, and LDH  He will continue to follow up with Dr Salinas Major and Dr Macario Pino  Subjective:   CC: follow up regarding malignant melanoma of the abdomen      HPI:    Mr Usman Hawk noticed a lesion on his abdomen  He presented to his dermatologist who performed at biopsy on 10/16/18, the pathology was consistent with malignant melanoma, it was 1 1 mm, was not ulcerated, and had a mitotic rate of 1/mm2  He was referred to Dr Salinas Major and he had a wide excision and SLNB on 12/14/18, he did not have any residual melanoma but did have 1/4 lymph nodes positive with 2 mm of focal invasion into the lymph node, there was not any extranodal extension seen  He has a stage IIIA, [id number] melanoma of the abdominal wall  He did have a CT scan of the chest, abdomen, and pelvis on 1/2/19, which did show 3 small liver lesions, the concerning lesion measured 0 8 x 0 8 cm  The radiologist has recommended an MRI of the abdomen, which he had on 1/23/19, the MRI was unremarkable for evidence of metastatic disease to the liver  Interval History:    Overall the patient is doing well  He has a good energy level with an ECOg performance status of zero   He has a good appetite and his weight has been stable  He has no complaints today  He denies fevers, chills, CP, SOB, N/V, diarrhea, all other ROS are unremarkable  PFSH was reviewed  Cancer Staging:    Malignant melanoma of skin of abdomen ([facility] )  Staging form: Melanoma of the Skin, AJCC 8th Edition  - Pathologic stage from 12/14/2018: Stage IIIA (pT2a, pN1a, cM0) - Unsigned      BRAF: pending    Previous Hematologic/ Oncologic History:       Malign[facility] )    10/16/2018 Initial Diagnosis     Malignant melanoma of skin of abdomen ([facility] )       12/14/2018 Surgery     Wide excision of abdomen with sentinel lymph node biopsies (right and left axillae)            Current Hematologic/ Oncologic Treatment:    observation          Test Results:    Imaging: Mri Abdomen W Wo Contrast    Result Date: 1/24/2019  Narrative: MRI - ABDOMEN - WITH AND WITHOUT CONTRAST INDICATION:  42-year-old male with history of melanoma of the medial upper abdomen, status post wide excision and bilateral axillary sentinel lymph node biopsy  Pathology revealed metastatic melanoma in 1 of the right axillary lymph nodes  Prior CT chest abdomen and pelvis revealed indeterminate hepatic lesions  Follow-up for further characterization  COMPARISON: CT chest abdomen and pelvis dated January 2, 2019  TECHNIQUE:  The following pulse sequences were obtained on a 1 5 T scanner prior to and following the administration of intravenous contrast:     Coronal and axial T2 with TE of 90 and 180 respectively, axial T2 with fat saturation, axial FIESTA fat-sat,  axial T1-weighted in-and-out-of phase, axial DWI/ADC, precontrast axial T1 with fat saturation, post-contrast dynamic axial T1 with fat saturation at 20, 70, and 180 seconds, coronal T1  with fat saturation and 7 minute delayed axial T1 with fat saturation  IV Contrast:  9 mL of gadobutrol injection (MULTI-DOSE) FINDINGS: LOWER THORAX: Unremarkable   LIVER: Mild diffuse hepatic steatosis with geographic areas of sparing  No suspicious mass with signal or enhancement characteristics of melanoma metastasis  No corresponding signal abnormality nor abnormal enhancement is seen to correspond with the apparent 8 mm focus of arterial phase hyperenhancement seen on the prior CT study  As such, this likely represented a transient vascular phenomenon  There is a 1 7 x 1 7 cm lesion in the posterior aspect of left hepatic lobe near the intrahepatic IVC (series 14 image 36) which demonstrates early, peripheral, nodular and discontinuous enhancement with centripetal fill in, in keeping with a benign hemangioma  There are 3 subcentimeter simple cysts, some of which correspond to the hypoattenuating lesion seen on the prior CT study  The hepatic veins and portal veins are patent  BILE DUCTS: No intrahepatic or extrahepatic bile duct dilation  GALLBLADDER: Normal  PANCREAS: Normal  No main pancreatic duct dilation  SPLEEN: Normal  ADRENAL GLANDS: Normal  KIDNEYS/PROXIMAL URETERS: No hydroureteronephrosis  No suspicious renal mass  There is a 9 mm nonenhancing lesion in the right interpolar region which demonstrates intrinsic T1 shortening (series 9 image 94), in keeping with a hemorrhagic/proteinaceous cyst   Subcentimeter simple cysts in the right mid kidney  VISUALIZED BOWEL: Nondilated  PERITONEUM/RETROPERITONEUM: No ascites  LYMPH NODES: No abdominal lymphadenopathy  VESSELS: No abdominal aortic aneurysm  ABDOMINAL WALL: Small fat-containing epigastric and supraumbilical ventral abdominal wall hernias  BONES: No suspicious osseous lesion  Small hemangioma in the spine  Impression: 1  No findings of metastatic melanoma in the abdomen  2  No corresponding signal abnormality nor abnormal enhancement is seen to correspond to the apparent 8 mm focus of arterial phase hyperenhancement described on the prior CT study  As such, this likely represented a transient vascular phenomenon   3   1 7 cm benign hemangioma in the left hepatic lobe near the intrahepatic IVC  There are few additional subcentimeter simple liver cysts, some of which correspond to the hypoattenuating lesions seen on the prior CT study  4   9 mm proteinaceous/hemorrhagic cyst in the right mid kidney  Workstation performed: [id number]     Us Thyroid    Result Date: 1/25/2019  Narrative: THYROID ULTRASOUND INDICATION:    C43 59: Malignant melanoma of other part of trunk  Thyroid nodule seen on CT   COMPARISON:  1/2/2019 CT TECHNIQUE:   Ultrasound of the thyroid was performed with a high frequency linear transducer in transverse and sagittal planes including volumetric imaging sweeps as well as traditional still imaging technique  FINDINGS:  Normal homogeneous smooth echotexture  Right lobe:  3 0 x 1 4 x 1 7 cm  Left lobe:  6 2 x 3 0 x 3 5 cm  Isthmus:  0 2 cm  Nodule #1  Image [id number]  RIGHT upper pole nodule measuring 1 1 x 0 7 x 1 0 cm  COMPOSITION:  0 points, spongiform  ECHOGENICITY:  Not applicable when spongiform composition  SHAPE:  Not applicable when spongiform composition  MARGIN: Not applicable when spongiform composition  ECHOGENIC FOCI:  Not applicable when spongiform composition  TI-RADS Score: TR 1 (0 points), Benign  No FNA  Nodule #2  Image [id number]  LEFT upper pole nodule measuring 1 8 x 1 7 x 1 6 cm  COMPOSITION:  2 points, solid or almost completely solid   ECHOGENICITY:  1 point, hyperechoic or isoechoic  SHAPE:  0 points, wider-than-tall  MARGIN: 0 points, smooth  ECHOGENIC FOCI:  0 points, none or large comet-tail artifacts  TI-RADS Classification: TR 3 (3 points), Mildly suspicious  FNA if >2 5 cm  Follow if >1 5 cm  Nodule #3  Image 11,008  LEFT lower pole nodule measuring 2 9 x 2 9 x 2 8 cm  COMPOSITION:  2 points, solid or almost completely solid   ECHOGENICITY:  2 points, hypoechoic  SHAPE:  0 points, wider-than-tall  MARGIN: 0 points, smooth  ECHOGENIC FOCI:  1 point, macrocalcifications   TI-RADS Classification: TR 4 (4-6 points), Moderately suspicious  FNA if > 1 5 cm  Follow if > 1cm  Nodule #4  Image 12,544  Left isthmic nodule measuring 2 6 x 2 1 x 2 5 cm  COMPOSITION:  2 points, solid or almost completely solid   ECHOGENICITY:  1 point, hyperechoic or isoechoic  SHAPE:  0 points, wider-than-tall  MARGIN: 0 points, smooth  ECHOGENIC FOCI:  3 points, punctate echogenic foci  TI-RADS Classification: TR 4 (4-6 points), Moderately suspicious  FNA if > 1 5 cm  Follow if > 1cm  Impression: The following meet current ACR criteria for recommending ultrasound guided biopsy: Nodules #3 and 4  Meet Ti-RAD criteria for ultrasound-guided thyroid biopsy  Reference: ACR Thyroid Imaging, Reporting and Data System (TI-RADS): White Paper of the [employer]  J AM Nikki Radiol 5311;42:[id number]  (additional recommendations based on American Thyroid Association 2015 guidelines ) Workstation performed: [id number]     Us Guided Thyroid Biopsy With Afirma    Result Date: 2/7/2019  Narrative: ULTRASOUND-GUIDED THYROID BIOPSY HISTORY: 81year-old with history of recent ultrasound demonstrating thyroid nodules  Patient presents with prescription for biopsy of left isthmus thyroid nodule and left lower pole thyroid nodule with Afrima testing  COMPARISON: Ultrasound thyroid 1/23/2019  FINDINGS: Prior ultrasound images were reviewed  The left isthmus and left lower pole thyroid nodules were targeted for today's biopsy  The procedure was explained to the patient, including risks of hemorrhage, infection and local injury  Informed consent was freely obtained  Final standard "time-out" procedure performed  PROCEDURE: The neck was prepped and draped in normal sterile fashion  Under real-time ultrasound guidance and local anesthesia 5 passes with a 25 gauge needle were made through the left isthmus nodule measuring today 2 5 x 1 7 x 2 6 cm  Cytopathology was present and deemed the specimens adequate for evaluation  Under real-time ultrasound guidance and local anesthesia 5 passes with a 25 gauge needle were made through the left lower pole thyroid nodule measuring today 3 2 x 2 5 x 2 6 cm  Cytopathology was present and deemed the specimens adequate for evaluation  Of note during the 2nd pass of the left isthmus nodule biopsy a sliver hematoma was noted  The biopsy was completed and direct pressure was placed for approximately 5 minutes  Patient denied pain  Post interval imaging did not reveal expansion of this  hematoma  Patient was educated of the hematoma and given strict return precautions  Patient admitted to understanding and agreement  The patient tolerated the procedure well  Postprocedure instructions were provided for the patient  Impression: Status post successful ultrasound-guided thyroid biopsy  Procedure was performed by BETTY Cam PA-C under the direct supervision of Dr Dustin Farrar  Workstation performed: [id number]       Labs:   Lab Results   Component Value Date    WBC 8 16 12/20/2018    HGB 17 0 12/20/2018    HCT 50 2 (H) 12/20/2018    MCV 91 12/20/2018     12/20/2018     Lab Results   Component Value Date     05/29/2017    K 4 2 12/20/2018     12/20/2018    CO2 32 12/20/2018    BUN 17 01/03/2019    CREATININE 1 14 01/03/2019    GLUCOSE 111 (H) 05/29/2017    GLUF 104 (H) 12/20/2018    CALCIUM 9 1 12/20/2018    AST 12 12/20/2018    ALT 24 12/20/2018    ALKPHOS 69 12/20/2018    PROT 6 3 05/29/2017    BILITOT 0 8 05/29/2017    EGFR 67 01/03/2019         No results found for: SPEP, UPEP    Lab Results   Component Value Date    PSA 0 4 12/20/2018    PSA 0 3 05/29/2017       No results found for: CEA    No results found for:     No results found for: AFP    No results found for: IRON, TIBC, FERRITIN    Lab Results   Component Value Date    DIDURNXO90 252 11/11/2016         ROS: Review of Systems   Constitutional: Negative  HENT: Negative  Eyes: Negative  Respiratory: Negative  Cardiovascular: Negative  Gastrointestinal: Negative  Endocrine: Negative  Genitourinary: Negative  Musculoskeletal: Negative  Skin: Negative  Allergic/Immunologic: Negative  Neurological: Negative  Hematological: Negative  Psychiatric/Behavioral: Negative  Current Medications: Reviewed  Allergies: Reviewed  PMH/FH/SH:  Reviewed      Physical Exam:    There is no height or weight on file to calculate BSA  Wt Readings from Last 3 Encounters:   01/08/19 97 1 kg (214 lb)   01/23/19 94 8 kg (209 lb)   01/03/19 98 kg (216 lb)        Temp Readings from Last 3 Encounters:   01/08/19 98 7 °F (37 1 °C)   01/03/19 98 3 °F (36 8 °C)   12/14/18 (!) 97 4 °F (36 3 °C)        BP Readings from Last 3 Encounters:   01/08/19 118/80   01/03/19 122/84   12/21/18 102/80         Pulse Readings from Last 3 Encounters:   01/08/19 64   01/03/19 60   12/21/18 64     @LASTSAO2(3)@      Physical Exam   Constitutional: He is oriented to person, place, and time  He appears well-developed and well-nourished  HENT:   Head: Normocephalic and atraumatic  Nose: Nose normal    Mouth/Throat: Oropharynx is clear and moist    Eyes: Pupils are equal, round, and reactive to light  Conjunctivae and EOM are normal    Neck: Normal range of motion  Neck supple  Cardiovascular: Normal rate, regular rhythm and normal heart sounds  Pulmonary/Chest: Effort normal and breath sounds normal    Abdominal: Soft  Bowel sounds are normal    Musculoskeletal: Normal range of motion  Neurological: He is alert and oriented to person, place, and time  He has normal reflexes  Skin: Skin is warm and dry  Sun related skin changes   Psychiatric: He has a normal mood and affect  Judgment normal    Vitals reviewed  Goals and Barriers:  Current Goal: Prolong Survival from Cancer  Barriers: None  Patient's Capacity to Self Care:  Patient fully able to self care      Code Status: [unfilled]  Advance Directive and Living Will:      Power of :

## 2019-02-08 ENCOUNTER — OFFICE VISIT (OUTPATIENT)
Dept: HEMATOLOGY ONCOLOGY | Facility: CLINIC | Age: 69
End: 2019-02-08
Payer: MEDICARE

## 2019-02-08 VITALS
HEART RATE: 67 BPM | SYSTOLIC BLOOD PRESSURE: 116 MMHG | HEIGHT: 71 IN | OXYGEN SATURATION: 98 % | TEMPERATURE: 98 F | BODY MASS INDEX: 30.38 KG/M2 | RESPIRATION RATE: 16 BRPM | DIASTOLIC BLOOD PRESSURE: 82 MMHG | WEIGHT: 217 LBS

## 2019-02-08 DIAGNOSIS — C77.3 MALIGNANT NEOPLASM METASTATIC TO LYMPH NODE OF AXILLA (HCC): ICD-10-CM

## 2019-02-08 DIAGNOSIS — C43.59 MALIGNANT MELANOMA OF SKIN OF ABDOMEN (HCC): Primary | ICD-10-CM

## 2019-02-08 PROCEDURE — 99214 OFFICE O/P EST MOD 30 MIN: CPT | Performed by: PHYSICIAN ASSISTANT

## 2019-02-08 NOTE — LETTER
February 8, 2019     Naldo Cárdenas MD  [address], [address] [place] [address]    Patient: Michael Hubbard   YOB: 1950   Date of Visit: 2/8/2019       Dear Dr Paul Vásquez: Thank you for referring Michael Hubbard to me for evaluation  Below are my notes for this consultation  If you have questions, please do not hesitate to call me  I look forward to following your patient along with you  Sincerely,        Wojciech Pack MD        CC: MD Radha Da Silva MD Amy Schippers, PA-C  2/8/2019 10:27 AM  Sign at close encounter  Hematology/Oncology Outpatient Follow- up Note  Michael Hubbard 81 y o  male MRN: @ Encounter: [id number]        Date:  2/7/2019        Assessment / Plan:    1  Stage IIIA, 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  The patient noticed a lesion on his abdomen  Please see the HPI for the full history  On staging imaging he had a questionable liver lesion  An MRI of the abdomen was obtained on 1/23, no evidence of metastatic disease was noted on this scan  The patient is BRAF  Given the fact that he has a stage IIIA melanoma, we would not recommend adjuvant therapy as his changes of being cured are quite high  He is agreeable with this plan  I recommend that he comes back for a follow up visit in three months with a CT of the chest, abdomen, and pelvis along with a CBC, CMP, and LDH  He will continue to follow up with Dr Estuardo Mirza and Dr Mortimer Pines  Subjective:   CC: follow up regarding malignant melanoma of the abdomen      HPI:    Mr Velma Leiva noticed a lesion on his abdomen  He presented to his dermatologist who performed at biopsy on 10/16/18, the pathology was consistent with malignant melanoma, it was 1 1 mm, was not ulcerated, and had a mitotic rate of 1/mm2   He was referred to Dr Estuardo Mirza and he had a wide excision and SLNB on 12/14/18, he did not have any residual melanoma but did have 1/4 lymph nodes positive with 2 mm of focal invasion into the lymph node, there was not any extranodal extension seen  He has a stage IIIA, [id number] melanoma of the abdominal wall  He did have a CT scan of the chest, abdomen, and pelvis on 1/2/19, which did show 3 small liver lesions, the concerning lesion measured 0 8 x 0 8 cm  The radiologist has recommended an MRI of the abdomen, which he had on 1/23/19, the MRI was unremarkable for evidence of metastatic disease to the liver  Interval History:    Overall the patient is doing well  He has a good energy level with an ECOg performance status of zero  He has a good appetite and his weight has been stable  He has no complaints today  He denies fevers, chills, CP, SOB, N/V, diarrhea, all other ROS are unremarkable  PFSH was reviewed  Cancer Staging:    Malignant melanoma of skin of abdomen ([facility] [address] )  Staging form: Melanoma of the Skin, AJCC 8th Edition  - Pathologic stage from 12/14/2018: Stage IIIA (pT2a, pN1a, cM0) - Unsigned      BRAF: pending    Previous Hematologic/ Oncologic History:       Malignant melanoma of skin of abdomen ([facility] [address] )    10/16/2018 Initial Diagnosis     Malignant melanoma of skin of abdomen ([facility] [address] )       12/14/2018 Surgery     Wide excision of abdomen with sentinel lymph node biopsies (right and left axillae)            Current Hematologic/ Oncologic Treatment:    observation          Test Results:    Imaging: Mri Abdomen W Wo Contrast    Result Date: 1/24/2019  Narrative: MRI - ABDOMEN - WITH AND WITHOUT CONTRAST INDICATION:  71-year-old male with history of melanoma of the medial upper abdomen, status post wide excision and bilateral axillary sentinel lymph node biopsy  Pathology revealed metastatic melanoma in 1 of the right axillary lymph nodes  Prior CT chest abdomen and pelvis revealed indeterminate hepatic lesions  Follow-up for further characterization  COMPARISON: CT chest abdomen and pelvis dated January 2, 2019   TECHNIQUE:  The following pulse sequences were obtained on a 1 5 T scanner prior to and following the administration of intravenous contrast:     Coronal and axial T2 with TE of 90 and 180 respectively, axial T2 with fat saturation, axial FIESTA fat-sat,  axial T1-weighted in-and-out-of phase, axial DWI/ADC, precontrast axial T1 with fat saturation, post-contrast dynamic axial T1 with fat saturation at 20, 70, and 180 seconds, coronal T1  with fat saturation and 7 minute delayed axial T1 with fat saturation  IV Contrast:  9 mL of gadobutrol injection (MULTI-DOSE) FINDINGS: LOWER THORAX: Unremarkable  LIVER: Mild diffuse hepatic steatosis with geographic areas of sparing  No suspicious mass with signal or enhancement characteristics of melanoma metastasis  No corresponding signal abnormality nor abnormal enhancement is seen to correspond with the apparent 8 mm focus of arterial phase hyperenhancement seen on the prior CT study  As such, this likely represented a transient vascular phenomenon  There is a 1 7 x 1 7 cm lesion in the posterior aspect of left hepatic lobe near the intrahepatic IVC (series 14 image 36) which demonstrates early, peripheral, nodular and discontinuous enhancement with centripetal fill in, in keeping with a benign hemangioma  There are 3 subcentimeter simple cysts, some of which correspond to the hypoattenuating lesion seen on the prior CT study  The hepatic veins and portal veins are patent  BILE DUCTS: No intrahepatic or extrahepatic bile duct dilation  GALLBLADDER: Normal  PANCREAS: Normal  No main pancreatic duct dilation  SPLEEN: Normal  ADRENAL GLANDS: Normal  KIDNEYS/PROXIMAL URETERS: No hydroureteronephrosis  No suspicious renal mass  There is a 9 mm nonenhancing lesion in the right interpolar region which demonstrates intrinsic T1 shortening (series 9 image 94), in keeping with a hemorrhagic/proteinaceous cyst   Subcentimeter simple cysts in the right mid kidney  VISUALIZED BOWEL: Nondilated  PERITONEUM/RETROPERITONEUM: No ascites  LYMPH NODES: No abdominal lymphadenopathy  VESSELS: No abdominal aortic aneurysm  ABDOMINAL WALL: Small fat-containing epigastric and supraumbilical ventral abdominal wall hernias  BONES: No suspicious osseous lesion  Small hemangioma in the spine  Impression: 1  No findings of metastatic melanoma in the abdomen  2  No corresponding signal abnormality nor abnormal enhancement is seen to correspond to the apparent 8 mm focus of arterial phase hyperenhancement described on the prior CT study  As such, this likely represented a transient vascular phenomenon  3   1 7 cm benign hemangioma in the left hepatic lobe near the intrahepatic IVC  There are few additional subcentimeter simple liver cysts, some of which correspond to the hypoattenuating lesions seen on the prior CT study  4   9 mm proteinaceous/hemorrhagic cyst in the right mid kidney  Workstation performed: [id number]     Us Thyroid    Result Date: 1/25/2019  Narrative: THYROID ULTRASOUND INDICATION:    C43 59: Malignant melanoma of other part of trunk  Thyroid nodule seen on CT   COMPARISON:  1/2/2019 CT TECHNIQUE:   Ultrasound of the thyroid was performed with a high frequency linear transducer in transverse and sagittal planes including volumetric imaging sweeps as well as traditional still imaging technique  FINDINGS:  Normal homogeneous smooth echotexture  Right lobe:  3 0 x 1 4 x 1 7 cm  Left lobe:  6 2 x 3 0 x 3 5 cm  Isthmus:  0 2 cm  Nodule #1  Image [id number]  RIGHT upper pole nodule measuring 1 1 x 0 7 x 1 0 cm  COMPOSITION:  0 points, spongiform  ECHOGENICITY:  Not applicable when spongiform composition  SHAPE:  Not applicable when spongiform composition  MARGIN: Not applicable when spongiform composition  ECHOGENIC FOCI:  Not applicable when spongiform composition  TI-RADS Score: TR 1 (0 points), Benign  No FNA  Nodule #2  Image [id number]   LEFT upper pole nodule measuring 1 8 x 1 7 x 1 6 cm  COMPOSITION: 2 points, solid or almost completely solid   ECHOGENICITY:  1 point, hyperechoic or isoechoic  SHAPE:  0 points, wider-than-tall  MARGIN: 0 points, smooth  ECHOGENIC FOCI:  0 points, none or large comet-tail artifacts  TI-RADS Classification: TR 3 (3 points), Mildly suspicious  FNA if >2 5 cm  Follow if >1 5 cm  Nodule #3  Image 11,008  LEFT lower pole nodule measuring 2 9 x 2 9 x 2 8 cm  COMPOSITION:  2 points, solid or almost completely solid   ECHOGENICITY:  2 points, hypoechoic  SHAPE:  0 points, wider-than-tall  MARGIN: 0 points, smooth  ECHOGENIC FOCI:  1 point, macrocalcifications  TI-RADS Classification: TR 4 (4-6 points), Moderately suspicious  FNA if > 1 5 cm  Follow if > 1cm  Nodule #4  Image 12,544  Left isthmic nodule measuring 2 6 x 2 1 x 2 5 cm  COMPOSITION:  2 points, solid or almost completely solid   ECHOGENICITY:  1 point, hyperechoic or isoechoic  SHAPE:  0 points, wider-than-tall  MARGIN: 0 points, smooth  ECHOGENIC FOCI:  3 points, punctate echogenic foci  TI-RADS Classification: TR 4 (4-6 points), Moderately suspicious  FNA if > 1 5 cm  Follow if > 1cm  Impression: The following meet current ACR criteria for recommending ultrasound guided biopsy: Nodules #3 and 4  Meet Ti-RAD criteria for ultrasound-guided thyroid biopsy  Reference: ACR Thyroid Imaging, Reporting and Data System (TI-RADS): White Paper of the [place]  J AM Nikki Radiol 6184;[id number]  (additional recommendations based on American Thyroid Association 2015 guidelines ) Workstation performed: [id number]     Us Guided Thyroid Biopsy With Afirma    Result Date: 2/7/2019  Narrative: ULTRASOUND-GUIDED THYROID BIOPSY HISTORY: 81year-old with history of recent ultrasound demonstrating thyroid nodules  Patient presents with prescription for biopsy of left isthmus thyroid nodule and left lower pole thyroid nodule with Afrima testing  COMPARISON: Ultrasound thyroid 1/23/2019   FINDINGS: Prior ultrasound images were reviewed  The left isthmus and left lower pole thyroid nodules were targeted for today's biopsy  The procedure was explained to the patient, including risks of hemorrhage, infection and local injury  Informed consent was freely obtained  Final standard "time-out" procedure performed  PROCEDURE: The neck was prepped and draped in normal sterile fashion  Under real-time ultrasound guidance and local anesthesia 5 passes with a 25 gauge needle were made through the left isthmus nodule measuring today 2 5 x 1 7 x 2 6 cm  Cytopathology was present and deemed the specimens adequate for evaluation  Under real-time ultrasound guidance and local anesthesia 5 passes with a 25 gauge needle were made through the left lower pole thyroid nodule measuring today 3 2 x 2 5 x 2 6 cm  Cytopathology was present and deemed the specimens adequate for evaluation  Of note during the 2nd pass of the left isthmus nodule biopsy a sliver hematoma was noted  The biopsy was completed and direct pressure was placed for approximately 5 minutes  Patient denied pain  Post interval imaging did not reveal expansion of this  hematoma  Patient was educated of the hematoma and given strict return precautions  Patient admitted to understanding and agreement  The patient tolerated the procedure well  Postprocedure instructions were provided for the patient  Impression: Status post successful ultrasound-guided thyroid biopsy  Procedure was performed by BETTY Magaña PA-C under the direct supervision of Dr Donita Gambino   Workstation performed: [id number]       Labs:   Lab Results   Component Value Date    WBC 8 16 12/20/2018    HGB 17 0 12/20/2018    HCT 50 2 (H) 12/20/2018    MCV 91 12/20/2018     12/20/2018     Lab Results   Component Value Date     05/29/2017    K 4 2 12/20/2018     12/20/2018    CO2 32 12/20/2018    BUN 17 01/03/2019    CREATININE 1 14 01/03/2019    GLUCOSE 111 (H) 05/29/2017    GLUF 104 (H) 12/20/2018    CALCIUM 9 1 12/20/2018    AST 12 12/20/2018    ALT 24 12/20/2018    ALKPHOS 69 12/20/2018    PROT 6 3 05/29/2017    BILITOT 0 8 05/29/2017    EGFR 67 01/03/2019         No results found for: SPEP, UPEP    Lab Results   Component Value Date    PSA 0 4 12/20/2018    PSA 0 3 05/29/2017       No results found for: CEA    No results found for:     No results found for: AFP    No results found for: IRON, TIBC, FERRITIN    Lab Results   Component Value Date    VVVCLQVN25 252 11/11/2016         ROS: Review of Systems   Constitutional: Negative  HENT: Negative  Eyes: Negative  Respiratory: Negative  Cardiovascular: Negative  Gastrointestinal: Negative  Endocrine: Negative  Genitourinary: Negative  Musculoskeletal: Negative  Skin: Negative  Allergic/Immunologic: Negative  Neurological: Negative  Hematological: Negative  Psychiatric/Behavioral: Negative  Current Medications: Reviewed  Allergies: Reviewed  PMH/FH/SH:  Reviewed      Physical Exam:    There is no height or weight on file to calculate BSA  Wt Readings from Last 3 Encounters:   01/08/19 97 1 kg (214 lb)   01/23/19 94 8 kg (209 lb)   01/03/19 98 kg (216 lb)        Temp Readings from Last 3 Encounters:   01/08/19 98 7 °F (37 1 °C)   01/03/19 98 3 °F (36 8 °C)   12/14/18 (!) 97 4 °F (36 3 °C)        BP Readings from Last 3 Encounters:   01/08/19 118/80   01/03/19 122/84   12/21/18 102/80         Pulse Readings from Last 3 Encounters:   01/08/19 64   01/03/19 60   12/21/18 64     @LASTSAO2(3)@      Physical Exam   Constitutional: He is oriented to person, place, and time  He appears well-developed and well-nourished  HENT:   Head: Normocephalic and atraumatic  Nose: Nose normal    Mouth/Throat: Oropharynx is clear and moist    Eyes: Pupils are equal, round, and reactive to light  Conjunctivae and EOM are normal    Neck: Normal range of motion  Neck supple     Cardiovascular: Normal rate, regular rhythm and normal heart sounds  Pulmonary/Chest: Effort normal and breath sounds normal    Abdominal: Soft  Bowel sounds are normal    Musculoskeletal: Normal range of motion  Neurological: He is alert and oriented to person, place, and time  He has normal reflexes  Skin: Skin is warm and dry  Sun related skin changes   Psychiatric: He has a normal mood and affect  Judgment normal    Vitals reviewed  Goals and Barriers:  Current Goal: Prolong Survival from Cancer  Barriers: None  Patient's Capacity to Self Care:  Patient fully able to self care      Code Status: [unfilled]  Advance Directive and Living Will:      Power of :

## 2019-02-22 PROBLEM — M79.89 OTHER SPECIFIED SOFT TISSUE DISORDERS: Status: ACTIVE | Noted: 2019-02-22

## 2019-02-25 DIAGNOSIS — I10 ESSENTIAL HYPERTENSION: ICD-10-CM

## 2019-02-25 RX ORDER — HYDROCHLOROTHIAZIDE 25 MG/1
TABLET ORAL
Qty: 30 TABLET | Refills: 4 | Status: SHIPPED | OUTPATIENT
Start: 2019-02-25 | End: 2019-05-29 | Stop reason: SDUPTHER

## 2019-03-06 PROBLEM — E04.2 MULTIPLE THYROID NODULES: Status: ACTIVE | Noted: 2019-03-06

## 2019-03-07 ENCOUNTER — OFFICE VISIT (OUTPATIENT)
Dept: SURGICAL ONCOLOGY | Facility: CLINIC | Age: 69
End: 2019-03-07
Payer: MEDICARE

## 2019-03-07 ENCOUNTER — OFFICE VISIT (OUTPATIENT)
Dept: LAB | Facility: HOSPITAL | Age: 69
End: 2019-03-07
Attending: SURGERY
Payer: MEDICARE

## 2019-03-07 ENCOUNTER — APPOINTMENT (OUTPATIENT)
Dept: LAB | Facility: HOSPITAL | Age: 69
End: 2019-03-07
Attending: SURGERY
Payer: MEDICARE

## 2019-03-07 VITALS
HEART RATE: 60 BPM | HEIGHT: 71 IN | WEIGHT: 215 LBS | SYSTOLIC BLOOD PRESSURE: 110 MMHG | RESPIRATION RATE: 18 BRPM | BODY MASS INDEX: 30.1 KG/M2 | DIASTOLIC BLOOD PRESSURE: 90 MMHG

## 2019-03-07 DIAGNOSIS — E04.2 MULTIPLE THYROID NODULES: Primary | ICD-10-CM

## 2019-03-07 DIAGNOSIS — E04.2 MULTIPLE THYROID NODULES: ICD-10-CM

## 2019-03-07 LAB
ALBUMIN SERPL BCP-MCNC: 3.9 G/DL (ref 3.5–5)
ALP SERPL-CCNC: 73 U/L (ref 46–116)
ALT SERPL W P-5'-P-CCNC: 30 U/L (ref 12–78)
ANION GAP SERPL CALCULATED.3IONS-SCNC: 7 MMOL/L (ref 4–13)
AST SERPL W P-5'-P-CCNC: 18 U/L (ref 5–45)
ATRIAL RATE: 0 BPM
ATRIAL RATE: 0 BPM
ATRIAL RATE: 61 BPM
BASOPHILS # BLD AUTO: 0.05 THOUSANDS/ΜL (ref 0–0.1)
BASOPHILS NFR BLD AUTO: 1 % (ref 0–1)
BILIRUB SERPL-MCNC: 0.8 MG/DL (ref 0.2–1)
BUN SERPL-MCNC: 17 MG/DL (ref 5–25)
CALCIUM SERPL-MCNC: 9.4 MG/DL (ref 8.3–10.1)
CHLORIDE SERPL-SCNC: 102 MMOL/L (ref 100–108)
CO2 SERPL-SCNC: 33 MMOL/L (ref 21–32)
CREAT SERPL-MCNC: 1.12 MG/DL (ref 0.6–1.3)
EOSINOPHIL # BLD AUTO: 0.1 THOUSAND/ΜL (ref 0–0.61)
EOSINOPHIL NFR BLD AUTO: 1 % (ref 0–6)
ERYTHROCYTE [DISTWIDTH] IN BLOOD BY AUTOMATED COUNT: 12.9 % (ref 11.6–15.1)
GFR SERPL CREATININE-BSD FRML MDRD: 68 ML/MIN/1.73SQ M
GLUCOSE SERPL-MCNC: 76 MG/DL (ref 65–140)
HCT VFR BLD AUTO: 50 % (ref 36.5–49.3)
HGB BLD-MCNC: 16.9 G/DL (ref 12–17)
IMM GRANULOCYTES # BLD AUTO: 0.03 THOUSAND/UL (ref 0–0.2)
IMM GRANULOCYTES NFR BLD AUTO: 0 % (ref 0–2)
LYMPHOCYTES # BLD AUTO: 2.16 THOUSANDS/ΜL (ref 0.6–4.47)
LYMPHOCYTES NFR BLD AUTO: 28 % (ref 14–44)
MCH RBC QN AUTO: 30.1 PG (ref 26.8–34.3)
MCHC RBC AUTO-ENTMCNC: 33.8 G/DL (ref 31.4–37.4)
MCV RBC AUTO: 89 FL (ref 82–98)
MONOCYTES # BLD AUTO: 0.87 THOUSAND/ΜL (ref 0.17–1.22)
MONOCYTES NFR BLD AUTO: 11 % (ref 4–12)
NEUTROPHILS # BLD AUTO: 4.64 THOUSANDS/ΜL (ref 1.85–7.62)
NEUTS SEG NFR BLD AUTO: 59 % (ref 43–75)
NRBC BLD AUTO-RTO: 0 /100 WBCS
P AXIS: 68 DEGREES
PLATELET # BLD AUTO: 209 THOUSANDS/UL (ref 149–390)
PMV BLD AUTO: 11 FL (ref 8.9–12.7)
POTASSIUM SERPL-SCNC: 4 MMOL/L (ref 3.5–5.3)
PR INTERVAL: 158 MS
PROT SERPL-MCNC: 7.6 G/DL (ref 6.4–8.2)
QRS AXIS: 0 DEGREES
QRS AXIS: 0 DEGREES
QRS AXIS: 3 DEGREES
QRSD INTERVAL: 0 MS
QRSD INTERVAL: 0 MS
QRSD INTERVAL: 88 MS
QT INTERVAL: 0 MS
QT INTERVAL: 0 MS
QT INTERVAL: 432 MS
QTC INTERVAL: 0 MS
QTC INTERVAL: 0 MS
QTC INTERVAL: 434 MS
RBC # BLD AUTO: 5.62 MILLION/UL (ref 3.88–5.62)
SODIUM SERPL-SCNC: 142 MMOL/L (ref 136–145)
T WAVE AXIS: 0 DEGREES
T WAVE AXIS: 0 DEGREES
T WAVE AXIS: 15 DEGREES
VENTRICULAR RATE: 0 BPM
VENTRICULAR RATE: 0 BPM
VENTRICULAR RATE: 61 BPM
WBC # BLD AUTO: 7.85 THOUSAND/UL (ref 4.31–10.16)

## 2019-03-07 PROCEDURE — 93010 ELECTROCARDIOGRAM REPORT: CPT | Performed by: INTERNAL MEDICINE

## 2019-03-07 PROCEDURE — 99214 OFFICE O/P EST MOD 30 MIN: CPT | Performed by: SURGERY

## 2019-03-07 PROCEDURE — 93005 ELECTROCARDIOGRAM TRACING: CPT

## 2019-03-07 PROCEDURE — 80053 COMPREHEN METABOLIC PANEL: CPT | Performed by: SURGERY

## 2019-03-07 PROCEDURE — 85025 COMPLETE CBC W/AUTO DIFF WBC: CPT | Performed by: SURGERY

## 2019-03-07 PROCEDURE — 36415 COLL VENOUS BLD VENIPUNCTURE: CPT | Performed by: SURGERY

## 2019-03-07 NOTE — PROGRESS NOTES
Surgical Oncology Follow Up       [facility] SURGICAL ONCOLOGY [facility] [address] 35  1950  [id number]  [facility]  CANCER [facility] SURGICAL ONCOLOGY [facility] [address]    Diagnoses and all orders for this visit:    Multiple thyroid nodules        Chief Complaint   Patient presents with    Thyroid Cancer     Discuss surgery        No follow-ups on file  Malignant melanoma of skin of abdomen ([facility] [address] )    10/16/2018 Initial Diagnosis     Malignant melanoma of skin of abdomen ([facility] [address] )         12/14/2018 Surgery     Wide excision of abdomen with sentinel lymph node biopsies (right and left axillae)            Staging: [id number] melanoma of the abdomen  December 2018  Positive sentinel node in the right axilla  Treatment history: Wide excision with sentinel lymph node biopsy December 2018  Current treatment:   Immunotherapy pending  Disease status:   Possible liver lesion      History of Present Illness:  Patient returns in follow-up his left thyroid nodule biopsy  The regional pathology was atypia  Affirma was sent and this was suspicious  He comes in now to discuss further therapy  No history of hypothyroidism  No history of radiation to his head or neck  No family history of thyroid cancer  Review of Systems  Complete ROS Surg Onc:   Complete ROS Surg Onc:   Constitutional: The patient denies new or recent history of general fatigue, no recent weight loss, no change in appetite  Eyes: No complaints of visual problems, no scleral icterus  ENT: no complaints of ear pain, no hoarseness, no difficulty swallowing,  no tinnitus and no new masses in head, oral cavity, or neck  Cardiovascular: No complaints of chest pain, no palpitations, no ankle edema  Respiratory: No complaints of shortness of breath, no cough     Gastrointestinal: No complaints of jaundice, no bloody stools, no pale stools  Genitourinary: No complaints of dysuria, no hematuria, no nocturia, no frequent urination, no urethral discharge  Musculoskeletal: No complaints of weakness, paralysis, joint stiffness or arthralgias  Integumentary: No complaints of rash, no new lesions  Neurological: No complaints of convulsions, no seizures, no dizziness  Hematologic/Lymphatic: No complaints of easy bruising  Endocrine:  No hot or cold intolerance  No polydipsia, polyphagia, or polyuria  Allergy/immunology:  No environmental allergies  No food allergies  Not immunocompromised  Skin:  No pallor or rash  No wound  Patient Active Problem List   Diagnosis    Actinic keratosis    Benign essential hypertension    BPH (benign prostatic hyperplasia)    Erectile dysfunction    GERD without esophagitis    Hyperlipidemia    Impaired fasting glucose    Malignant melanoma of skin of abdomen (HCC)    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)    Liver mass    Other specified soft tissue disorders    Multiple thyroid nodules     Past Medical History:   Diagnosis Date    Arthritis     Hyperlipidemia     Last Assessed:10/20/14    PONV (postoperative nausea and vomiting)      Past Surgical History:   Procedure Laterality Date    COLONOSCOPY      HERNIA REPAIR Right     Last Assessed:10/20/14 inguinal     LYMPH NODE BIOPSY N/A 12/14/2018    Procedure: SENTINEL LYMPH NODE BIOPSY; LYMPHOSCINTIGRAPHY; INTRAOPERATIVE LYMPHATIC MAPPING (INJECT AT 1100);   Surgeon: Brit Owens MD;  [facility];  Service: Surgical Oncology    SKIN LESION EXCISION N/A 12/14/2018    Procedure: MEDIAL UPPER ABDOMEN MELANOMA WIDE EXCISION; BILATERAL AXILLA SLN;  Surgeon: Brit Owens MD;  [facility];  Service: Surgical Oncology    TONSILLECTOMY      US GUIDED THYROID BIOPSY  2/7/2019    WISDOM TOOTH EXTRACTION       Family History   Problem Relation Age of Onset    Hypertension Mother     Hypothyroidism Mother     Hypertension Father     Skin cancer Father     Colon cancer Maternal Aunt     Colon cancer Paternal Aunt         62s    Skin cancer Paternal Uncle      Social History     Socioeconomic History    Marital status: /Civil Union     Spouse name: Not on file    Number of children: Not on file    Years of education: Not on file    Highest education level: Not on file   Occupational History    Not on file   Social Needs    Financial resource strain: Not on file    Food insecurity:     Worry: Not on file     Inability: Not on file    Transportation needs:     Medical: Not on file     Non-medical: Not on file   Tobacco Use    Smoking status: Never Smoker    Smokeless tobacco: Never Used   Substance and Sexual Activity    Alcohol use: Yes     Alcohol/week: 1 2 oz     Types: 1 Glasses of wine, 1 Cans of beer per week     Comment: rarely    Drug use: No    Sexual activity: Yes     Partners: Female   Lifestyle    Physical activity:     Days per week: Not on file     Minutes per session: Not on file    Stress: Not on file   Relationships    Social connections:     Talks on phone: Not on file     Gets together: Not on file     Attends Rastafarian service: Not on file     Active member of club or organization: Not on file     Attends meetings of clubs or organizations: Not on file     Relationship status: Not on file    Intimate partner violence:     Fear of current or ex partner: Not on file     Emotionally abused: Not on file     Physically abused: Not on file     Forced sexual activity: Not on file   Other Topics Concern    Not on file   Social History Narrative    No advance directives       Current Outpatient Medications:     aspirin 81 MG tablet, Take by mouth, Disp: , Rfl:     atenolol (TENORMIN) 50 mg tablet, TAKE 1 TABLET DAILY, Disp: 30 tablet, Rfl: 4    hydrochlorothiazide (HYDRODIURIL) 25 mg tablet, TAKE 1 TABLET DAILY  , Disp: 30 tablet, Rfl: 4    lovastatin (MEVACOR) 40 MG tablet, TAKE 1 TABLET AT BEDTIME, Disp: 30 tablet, Rfl: 3    multivitamin (THERAGRAN) TABS, Take by mouth, Disp: , Rfl:     sildenafil (VIAGRA) 100 mg tablet, Take by mouth, Disp: , Rfl:   No Known Allergies  Vitals:    03/07/19 1219   BP: 110/90   Pulse: 60   Resp: 18       Physical Exam  Constitutional: General appearance: The Patient is well-developed and well-nourished who appears the stated age in no acute distress  Patient is pleasant and talkative  HEENT:  Normocephalic  Sclerae are anicteric  Mucous membranes are moist  Neck is supple without adenopathy  No JVD  did not appreciate any obvious thyroid nodules  Chest: The lungs are clear to auscultation  Cardiac: Heart is regular rate  Abdomen: Abdomen is soft, non-tender, non-distended and without masses  Extremities: There is no clubbing or cyanosis  There is no edema  Symmetric  Neuro: Grossly nonfocal  Gait is normal      Lymphatic: No evidence of cervical adenopathy bilaterally  Skin: Warm, anicteric  Psych:  Patient is pleasant and talkative  Breasts:        Pathology:  [unfilled]    Labs:      Imaging  Us Guided Thyroid Biopsy With Afirma    Result Date: 2/7/2019  Narrative: ULTRASOUND-GUIDED THYROID BIOPSY HISTORY: 81year-old with history of recent ultrasound demonstrating thyroid nodules  Patient presents with prescription for biopsy of left isthmus thyroid nodule and left lower pole thyroid nodule with Afrima testing  COMPARISON: Ultrasound thyroid 1/23/2019  FINDINGS: Prior ultrasound images were reviewed  The left isthmus and left lower pole thyroid nodules were targeted for today's biopsy  The procedure was explained to the patient, including risks of hemorrhage, infection and local injury  Informed consent was freely obtained  Final standard "time-out" procedure performed  PROCEDURE: The neck was prepped and draped in normal sterile fashion   Under real-time ultrasound guidance and local anesthesia 5 passes with a 25 gauge needle were made through the left isthmus nodule measuring today 2 5 x 1 7 x 2 6 cm  Cytopathology was present and deemed the specimens adequate for evaluation  Under real-time ultrasound guidance and local anesthesia 5 passes with a 25 gauge needle were made through the left lower pole thyroid nodule measuring today 3 2 x 2 5 x 2 6 cm  Cytopathology was present and deemed the specimens adequate for evaluation  Of note during the 2nd pass of the left isthmus nodule biopsy a sliver hematoma was noted  The biopsy was completed and direct pressure was placed for approximately 5 minutes  Patient denied pain  Post interval imaging did not reveal expansion of this  hematoma  Patient was educated of the hematoma and given strict return precautions  Patient admitted to understanding and agreement  The patient tolerated the procedure well  Postprocedure instructions were provided for the patient  Impression: Status post successful ultrasound-guided thyroid biopsy  Procedure was performed by BETTY Higgins PA-C under the direct supervision of Dr Peg Reed  Workstation performed: [id number]     I reviewed the above laboratory and imaging data  Discussion/Summary: 16-year-old male status post wide excision bilateral axillary sentinel lymph node biopsies for a [id number] melanoma  His right axillary lymph node was positive  Follow-up staging was negative except for incidental thyroid nodules for which we will obtain a thyroid ultrasound  The liver lesion was not seen by MRI  He continues to follow with Medical Oncology  The thyroid ultrasound revealed a suspicious nodule and was suspicious by Affirma  I recommended that he undergo left thyroid lobectomy  He is euthyroid  Risks hemithyroidectomy versus total thyroidectomy were explained and included a 2nd surgery if this was malignant and hemithyroidectomy was performed    There is a 25-30% risk of needing thyroid hormone in his lifetime if hemithyroidectomy is performed  If total thyroidectomy is performed and this is benign, lifelong thyroid hormone replacement would be required  We have elected on left hemithyroidectomy  Risks were explained including bleeding, infection, need for further surgery, wound complications, hypocalcemia and recurrent laryngeal nerve injury  Informed consent was obtained  We will schedule this at our earliest mutual convenience once he returns from vacation in April  All of his questions were answered

## 2019-03-07 NOTE — LETTER
March 7, 2019     Taryn Albrecht MD  [address], [address] [address]    Patient: Aurelia Cuello   YOB: 1950   Date of Visit: 3/7/2019       Dear Dr Juvenal Vazquez: Thank you for referring Aurelia Cuello to me for evaluation  Below are my notes for this consultation  If you have questions, please do not hesitate to call me  I look forward to following your patient along with you  Sincerely,        Yasmin Hernandez MD        CC: No Recipients  Yasmin Hernandez MD  3/7/2019  1:01 PM  Sign at close encounter               Surgical Oncology Follow Up       [facility] [address]  1950  [id number]  [facility]  CANCER [facility] SURGICAL ONCOLOGY [facility] [address]    Diagnoses and all orders for this visit:    Multiple thyroid nodules        Chief Complaint   Patient presents with    Thyroid Cancer     Discuss surgery        No follow-ups on file  Malignant melanoma of skin of abdomen ([facility] [address] )    10/16/2018 Initial Diagnosis     Malignant melanoma of skin of abdomen ([facility] [address] )         12/14/2018 Surgery     Wide excision of abdomen with sentinel lymph node biopsies (right and left axillae)            Staging: [id number] melanoma of the abdomen  December 2018  Positive sentinel node in the right axilla  Treatment history: Wide excision with sentinel lymph node biopsy December 2018  Current treatment:   Immunotherapy pending  Disease status:   Possible liver lesion      History of Present Illness:  Patient returns in follow-up his left thyroid nodule biopsy  The regional pathology was atypia  Affirma was sent and this was suspicious  He comes in now to discuss further therapy  No history of hypothyroidism  No history of radiation to his head or neck  No family history of thyroid cancer      Review of Systems  Complete ROS Surg Onc:   Complete ROS Surg Onc:   Constitutional: The patient denies new or recent history of general fatigue, no recent weight loss, no change in appetite  Eyes: No complaints of visual problems, no scleral icterus  ENT: no complaints of ear pain, no hoarseness, no difficulty swallowing,  no tinnitus and no new masses in head, oral cavity, or neck  Cardiovascular: No complaints of chest pain, no palpitations, no ankle edema  Respiratory: No complaints of shortness of breath, no cough  Gastrointestinal: No complaints of jaundice, no bloody stools, no pale stools  Genitourinary: No complaints of dysuria, no hematuria, no nocturia, no frequent urination, no urethral discharge  Musculoskeletal: No complaints of weakness, paralysis, joint stiffness or arthralgias  Integumentary: No complaints of rash, no new lesions  Neurological: No complaints of convulsions, no seizures, no dizziness  Hematologic/Lymphatic: No complaints of easy bruising  Endocrine:  No hot or cold intolerance  No polydipsia, polyphagia, or polyuria  Allergy/immunology:  No environmental allergies  No food allergies  Not immunocompromised  Skin:  No pallor or rash  No wound          Patient Active Problem List   Diagnosis    Actinic keratosis    Benign essential hypertension    BPH (benign prostatic hyperplasia)    Erectile dysfunction    GERD without esophagitis    Hyperlipidemia    Impaired fasting glucose    Malignant melanoma of skin of abdomen ([address] )    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)    Liver mass    Other specified soft tissue disorders    Multiple thyroid nodules     Past Medical History:   Diagnosis Date    Arthritis     Hyperlipidemia     Last Assessed:10/20/14    PONV (postoperative nausea and vomiting)      Past Surgical History:   Procedure Laterality Date    COLONOSCOPY      HERNIA REPAIR Right     Last Assessed:10/20/14 inguinal     LYMPH NODE BIOPSY N/A 12/14/2018    Procedure: SENTINEL LYMPH NODE BIOPSY; LYMPHOSCINTIGRAPHY; INTRAOPERATIVE LYMPHATIC MAPPING (INJECT AT 1100); Surgeon: Sudheer Goldberg MD;  [facility];  Service: Surgical Oncology    SKIN LESION EXCISION N/A 12/14/2018    Procedure: MEDIAL UPPER ABDOMEN MELANOMA WIDE EXCISION; BILATERAL AXILLA SLN;  Surgeon: Sudheer Goldberg MD;  [facility];  Service: Surgical Oncology    TONSILLECTOMY      US GUIDED THYROID BIOPSY  2/7/2019    WISDOM TOOTH EXTRACTION       Family History   Problem Relation Age of Onset    Hypertension Mother     Hypothyroidism Mother     Hypertension Father     Skin cancer Father     Colon cancer Maternal Aunt     Colon cancer Paternal Aunt         62s    Skin cancer Paternal Uncle      Social History     Socioeconomic History    Marital status: /Civil Union     Spouse name: Not on file    Number of children: Not on file    Years of education: Not on file    Highest education level: Not on file   Occupational History    Not on file   Social Needs    Financial resource strain: Not on file    Food insecurity:     Worry: Not on file     Inability: Not on file    Transportation needs:     Medical: Not on file     Non-medical: Not on file   Tobacco Use    Smoking status: Never Smoker    Smokeless tobacco: Never Used   Substance and Sexual Activity    Alcohol use:  Yes     Alcohol/week: 1 2 oz     Types: 1 Glasses of wine, 1 Cans of beer per week     Comment: rarely    Drug use: No    Sexual activity: Yes     Partners: Female   Lifestyle    Physical activity:     Days per week: Not on file     Minutes per session: Not on file    Stress: Not on file   Relationships    Social connections:     Talks on phone: Not on file     Gets together: Not on file     Attends Church service: Not on file     Active member of club or organization: Not on file     Attends meetings of clubs or organizations: Not on file     Relationship status: Not on file    Intimate partner violence:     Fear of current or ex partner: Not on file     Emotionally abused: Not on file     Physically abused: Not on file     Forced sexual activity: Not on file   Other Topics Concern    Not on file   Social History Narrative    No advance directives       Current Outpatient Medications:     aspirin 81 MG tablet, Take by mouth, Disp: , Rfl:     atenolol (TENORMIN) 50 mg tablet, TAKE 1 TABLET DAILY, Disp: 30 tablet, Rfl: 4    hydrochlorothiazide (HYDRODIURIL) 25 mg tablet, TAKE 1 TABLET DAILY  , Disp: 30 tablet, Rfl: 4    lovastatin (MEVACOR) 40 MG tablet, TAKE 1 TABLET AT BEDTIME, Disp: 30 tablet, Rfl: 3    multivitamin (THERAGRAN) TABS, Take by mouth, Disp: , Rfl:     sildenafil (VIAGRA) 100 mg tablet, Take by mouth, Disp: , Rfl:   No Known Allergies  Vitals:    03/07/19 1219   BP: 110/90   Pulse: 60   Resp: 18       Physical Exam  Constitutional: General appearance: The Patient is well-developed and well-nourished who appears the stated age in no acute distress  Patient is pleasant and talkative  HEENT:  Normocephalic  Sclerae are anicteric  Mucous membranes are moist  Neck is supple without adenopathy  No JVD  did not appreciate any obvious thyroid nodules  Chest: The lungs are clear to auscultation  Cardiac: Heart is regular rate  Abdomen: Abdomen is soft, non-tender, non-distended and without masses  Extremities: There is no clubbing or cyanosis  There is no edema  Symmetric  Neuro: Grossly nonfocal  Gait is normal      Lymphatic: No evidence of cervical adenopathy bilaterally  Skin: Warm, anicteric  Psych:  Patient is pleasant and talkative  Breasts:        Pathology:  [unfilled]    Labs:      Imaging  Us Guided Thyroid Biopsy With Afirma    Result Date: 2/7/2019  Narrative: ULTRASOUND-GUIDED THYROID BIOPSY HISTORY: 81year-old with history of recent ultrasound demonstrating thyroid nodules    Patient presents with prescription for biopsy of left isthmus thyroid nodule and left lower pole thyroid nodule with Afrima testing  COMPARISON: Ultrasound thyroid 1/23/2019  FINDINGS: Prior ultrasound images were reviewed  The left isthmus and left lower pole thyroid nodules were targeted for today's biopsy  The procedure was explained to the patient, including risks of hemorrhage, infection and local injury  Informed consent was freely obtained  Final standard "time-out" procedure performed  PROCEDURE: The neck was prepped and draped in normal sterile fashion  Under real-time ultrasound guidance and local anesthesia 5 passes with a 25 gauge needle were made through the left isthmus nodule measuring today 2 5 x 1 7 x 2 6 cm  Cytopathology was present and deemed the specimens adequate for evaluation  Under real-time ultrasound guidance and local anesthesia 5 passes with a 25 gauge needle were made through the left lower pole thyroid nodule measuring today 3 2 x 2 5 x 2 6 cm  Cytopathology was present and deemed the specimens adequate for evaluation  Of note during the 2nd pass of the left isthmus nodule biopsy a sliver hematoma was noted  The biopsy was completed and direct pressure was placed for approximately 5 minutes  Patient denied pain  Post interval imaging did not reveal expansion of this  hematoma  Patient was educated of the hematoma and given strict return precautions  Patient admitted to understanding and agreement  The patient tolerated the procedure well  Postprocedure instructions were provided for the patient  Impression: Status post successful ultrasound-guided thyroid biopsy  Procedure was performed by BETTY Parra PA-C under the direct supervision of Dr Rebeca Cross  Workstation performed: [id number]     I reviewed the above laboratory and imaging data  Discussion/Summary: 72-year-old male status post wide excision bilateral axillary sentinel lymph node biopsies for a [id number] melanoma    His right axillary lymph node was positive  Follow-up staging was negative except for incidental thyroid nodules for which we will obtain a thyroid ultrasound  The liver lesion was not seen by MRI  He continues to follow with Medical Oncology  The thyroid ultrasound revealed a suspicious nodule and was suspicious by Affirma  I recommended that he undergo left thyroid lobectomy  He is euthyroid  Risks hemithyroidectomy versus total thyroidectomy were explained and included a 2nd surgery if this was malignant and hemithyroidectomy was performed  There is a 25-30% risk of needing thyroid hormone in his lifetime if hemithyroidectomy is performed  If total thyroidectomy is performed and this is benign, lifelong thyroid hormone replacement would be required  We have elected on left hemithyroidectomy  Risks were explained including bleeding, infection, need for further surgery, wound complications, hypocalcemia and recurrent laryngeal nerve injury  Informed consent was obtained  We will schedule this at our earliest mutual convenience once he returns from vacation in April  All of his questions were answered

## 2019-03-08 ENCOUNTER — TELEPHONE (OUTPATIENT)
Dept: INTERNAL MEDICINE CLINIC | Facility: CLINIC | Age: 69
End: 2019-03-08

## 2019-03-08 DIAGNOSIS — N52.9 ERECTILE DYSFUNCTION, UNSPECIFIED ERECTILE DYSFUNCTION TYPE: Primary | ICD-10-CM

## 2019-03-08 PROCEDURE — 1124F ACP DISCUSS-NO DSCNMKR DOCD: CPT | Performed by: PATHOLOGY

## 2019-03-08 RX ORDER — SILDENAFIL 100 MG/1
TABLET, FILM COATED ORAL
Qty: 10 TABLET | Status: CANCELLED | OUTPATIENT
Start: 2019-03-08

## 2019-03-08 NOTE — TELEPHONE ENCOUNTER
NEEDS  SILDENAFIL 50MG  #30  PATIENT WANTS TO  THE SCRIPT HERE   PLEASE CALL WHEN READY  HIS #  [phone]

## 2019-03-08 NOTE — TELEPHONE ENCOUNTER
So I thought he was getting the generic viagra from [facility]  Christine Simons He can get 30 pills for $119     I thought that is what we did before   If im wrong I will send a script for viagra 50mg into the pharm for him

## 2019-03-08 NOTE — TELEPHONE ENCOUNTER
PATIENT CALLED AND SAID THE MEDICATION IS SUPPOSE TO BE 50MG TABLETS  PLEASE CALL HIM AT [address]   [phone]

## 2019-03-11 ENCOUNTER — TELEPHONE (OUTPATIENT)
Dept: INTERNAL MEDICINE CLINIC | Facility: CLINIC | Age: 69
End: 2019-03-11

## 2019-03-11 NOTE — TELEPHONE ENCOUNTER
PATIENT CALLED AND WANTS TO KNOW WHAT HAPPENED TO THE SILDENAFIL TABLETS THAT YOU WERE TO CALL INTO PHARMACY  [facility]    [phone]

## 2019-03-11 NOTE — TELEPHONE ENCOUNTER
Please speak to the pt  We give generic viagra from [facility]    That dose is 30mg   Brady Manifold The computer does not let us enter that dose therefore we just have to enter viagra- we likely just put in the dose of 100mg  To my knowledge I never gave him that dose  Please call him and find out where he got his last script from   Again I belive it was the generic from [facility]

## 2019-03-12 RX ORDER — SILDENAFIL 100 MG/1
50 TABLET, FILM COATED ORAL AS NEEDED
Qty: 10 TABLET | Refills: 3 | Status: SHIPPED | OUTPATIENT
Start: 2019-03-12 | End: 2019-03-14 | Stop reason: SDUPTHER

## 2019-03-13 ENCOUNTER — TELEPHONE (OUTPATIENT)
Dept: INTERNAL MEDICINE CLINIC | Facility: CLINIC | Age: 69
End: 2019-03-13

## 2019-03-13 NOTE — TELEPHONE ENCOUNTER
[facility] CALLED AND SAID PATIENT WANTS THE SILDENAFIL 50MG INSTEAD OF THE 100MG  [facility]   [phone]

## 2019-03-13 NOTE — TELEPHONE ENCOUNTER
Please advise, pharmacy needs a new script or we can call in a verbal to change to the 50mg tablets since pharmacy said they are not easy to cut in half, thank you

## 2019-03-14 DIAGNOSIS — N52.9 ERECTILE DYSFUNCTION, UNSPECIFIED ERECTILE DYSFUNCTION TYPE: ICD-10-CM

## 2019-03-14 RX ORDER — SILDENAFIL 50 MG/1
50 TABLET, FILM COATED ORAL AS NEEDED
Qty: 10 TABLET | Refills: 1 | Status: SHIPPED | OUTPATIENT
Start: 2019-03-14 | End: 2019-03-21

## 2019-03-21 ENCOUNTER — TELEPHONE (OUTPATIENT)
Dept: INTERNAL MEDICINE CLINIC | Facility: CLINIC | Age: 69
End: 2019-03-21

## 2019-03-21 DIAGNOSIS — N52.9 ERECTILE DYSFUNCTION, UNSPECIFIED ERECTILE DYSFUNCTION TYPE: Primary | ICD-10-CM

## 2019-03-21 RX ORDER — SILDENAFIL 50 MG/1
50 TABLET, FILM COATED ORAL DAILY PRN
Qty: 30 TABLET | Refills: 3 | Status: SHIPPED | OUTPATIENT
Start: 2019-03-21 | End: 2020-09-04 | Stop reason: SDUPTHER

## 2019-03-21 NOTE — TELEPHONE ENCOUNTER
Patient is asking to speak with Smiley Hampton Re the RX for sildenafil  Simone Hampton He said he's been going back and forth to get this right    But still is a problem  Simone Hampton He just wants to speak w/Swati, said it will only take a minute

## 2019-04-16 ENCOUNTER — TRANSCRIBE ORDERS (OUTPATIENT)
Dept: RADIOLOGY | Facility: HOSPITAL | Age: 69
End: 2019-04-16

## 2019-04-16 ENCOUNTER — HOSPITAL ENCOUNTER (OUTPATIENT)
Dept: RADIOLOGY | Facility: HOSPITAL | Age: 69
Discharge: HOME | End: 2019-04-16
Attending: SURGERY
Payer: MEDICARE

## 2019-04-16 DIAGNOSIS — C43.59 MALIGNANT MELANOMA OF SKIN OF ABDOMEN (HCC): ICD-10-CM

## 2019-04-16 PROCEDURE — 76882 US LMTD JT/FCL EVL NVASC XTR: CPT

## 2019-04-22 DIAGNOSIS — I10 ESSENTIAL HYPERTENSION: ICD-10-CM

## 2019-04-22 RX ORDER — ATENOLOL 50 MG/1
TABLET ORAL
Qty: 30 TABLET | Refills: 4 | Status: SHIPPED | OUTPATIENT
Start: 2019-04-22 | End: 2019-08-21 | Stop reason: SDUPTHER

## 2019-04-23 ENCOUNTER — ANESTHESIA EVENT (OUTPATIENT)
Dept: PERIOP | Facility: HOSPITAL | Age: 69
End: 2019-04-23
Payer: MEDICARE

## 2019-04-24 ENCOUNTER — ANESTHESIA (OUTPATIENT)
Dept: PERIOP | Facility: HOSPITAL | Age: 69
End: 2019-04-24
Payer: MEDICARE

## 2019-04-24 ENCOUNTER — HOSPITAL ENCOUNTER (OUTPATIENT)
Facility: HOSPITAL | Age: 69
Setting detail: OUTPATIENT SURGERY
Discharge: HOME | End: 2019-04-24
Attending: SURGERY | Admitting: SURGERY
Payer: MEDICARE

## 2019-04-24 VITALS
RESPIRATION RATE: 18 BRPM | DIASTOLIC BLOOD PRESSURE: 64 MMHG | HEART RATE: 74 BPM | SYSTOLIC BLOOD PRESSURE: 122 MMHG | WEIGHT: 215 LBS | BODY MASS INDEX: 30.1 KG/M2 | TEMPERATURE: 97.2 F | HEIGHT: 71 IN | OXYGEN SATURATION: 98 %

## 2019-04-24 DIAGNOSIS — E04.2 MULTIPLE THYROID NODULES: ICD-10-CM

## 2019-04-24 PROCEDURE — 88341 IMHCHEM/IMCYTCHM EA ADD ANTB: CPT | Performed by: PATHOLOGY

## 2019-04-24 PROCEDURE — 88307 TISSUE EXAM BY PATHOLOGIST: CPT | Performed by: PATHOLOGY

## 2019-04-24 PROCEDURE — 60220 PARTIAL REMOVAL OF THYROID: CPT | Performed by: SURGERY

## 2019-04-24 PROCEDURE — 88342 IMHCHEM/IMCYTCHM 1ST ANTB: CPT | Performed by: PATHOLOGY

## 2019-04-24 RX ORDER — SUCCINYLCHOLINE/SOD CL,ISO/PF 100 MG/5ML
SYRINGE (ML) INTRAVENOUS AS NEEDED
Status: DISCONTINUED | OUTPATIENT
Start: 2019-04-24 | End: 2019-04-24 | Stop reason: SURG

## 2019-04-24 RX ORDER — HYDROMORPHONE HCL/PF 1 MG/ML
0.5 SYRINGE (ML) INJECTION
Status: DISCONTINUED | OUTPATIENT
Start: 2019-04-24 | End: 2019-04-24 | Stop reason: HOSPADM

## 2019-04-24 RX ORDER — ONDANSETRON 2 MG/ML
4 INJECTION INTRAMUSCULAR; INTRAVENOUS ONCE AS NEEDED
Status: DISCONTINUED | OUTPATIENT
Start: 2019-04-24 | End: 2019-04-24 | Stop reason: HOSPADM

## 2019-04-24 RX ORDER — ROCURONIUM BROMIDE 10 MG/ML
INJECTION, SOLUTION INTRAVENOUS AS NEEDED
Status: DISCONTINUED | OUTPATIENT
Start: 2019-04-24 | End: 2019-04-24 | Stop reason: SURG

## 2019-04-24 RX ORDER — FENTANYL CITRATE 50 UG/ML
INJECTION, SOLUTION INTRAMUSCULAR; INTRAVENOUS AS NEEDED
Status: DISCONTINUED | OUTPATIENT
Start: 2019-04-24 | End: 2019-04-24 | Stop reason: SURG

## 2019-04-24 RX ORDER — METOCLOPRAMIDE HYDROCHLORIDE 5 MG/ML
INJECTION INTRAMUSCULAR; INTRAVENOUS AS NEEDED
Status: DISCONTINUED | OUTPATIENT
Start: 2019-04-24 | End: 2019-04-24 | Stop reason: SURG

## 2019-04-24 RX ORDER — PROMETHAZINE HYDROCHLORIDE 25 MG/ML
6.25 INJECTION, SOLUTION INTRAMUSCULAR; INTRAVENOUS ONCE AS NEEDED
Status: DISCONTINUED | OUTPATIENT
Start: 2019-04-24 | End: 2019-04-24 | Stop reason: HOSPADM

## 2019-04-24 RX ORDER — SODIUM CHLORIDE 9 MG/ML
125 INJECTION, SOLUTION INTRAVENOUS CONTINUOUS
Status: DISCONTINUED | OUTPATIENT
Start: 2019-04-24 | End: 2019-04-24 | Stop reason: HOSPADM

## 2019-04-24 RX ORDER — MAGNESIUM HYDROXIDE 1200 MG/15ML
LIQUID ORAL AS NEEDED
Status: DISCONTINUED | OUTPATIENT
Start: 2019-04-24 | End: 2019-04-24 | Stop reason: HOSPADM

## 2019-04-24 RX ORDER — EPHEDRINE SULFATE 50 MG/ML
INJECTION INTRAVENOUS AS NEEDED
Status: DISCONTINUED | OUTPATIENT
Start: 2019-04-24 | End: 2019-04-24 | Stop reason: SURG

## 2019-04-24 RX ORDER — OXYCODONE HYDROCHLORIDE 5 MG/1
5 TABLET ORAL EVERY 4 HOURS PRN
Status: DISCONTINUED | OUTPATIENT
Start: 2019-04-24 | End: 2019-04-24 | Stop reason: HOSPADM

## 2019-04-24 RX ORDER — ACETAMINOPHEN 325 MG/1
650 TABLET ORAL EVERY 6 HOURS PRN
Status: DISCONTINUED | OUTPATIENT
Start: 2019-04-24 | End: 2019-04-24 | Stop reason: HOSPADM

## 2019-04-24 RX ORDER — ONDANSETRON 2 MG/ML
4 INJECTION INTRAMUSCULAR; INTRAVENOUS EVERY 4 HOURS PRN
Status: DISCONTINUED | OUTPATIENT
Start: 2019-04-24 | End: 2019-04-24 | Stop reason: HOSPADM

## 2019-04-24 RX ORDER — MIDAZOLAM HYDROCHLORIDE 1 MG/ML
INJECTION INTRAMUSCULAR; INTRAVENOUS AS NEEDED
Status: DISCONTINUED | OUTPATIENT
Start: 2019-04-24 | End: 2019-04-24 | Stop reason: SURG

## 2019-04-24 RX ORDER — DEXAMETHASONE SODIUM PHOSPHATE 10 MG/ML
INJECTION, SOLUTION INTRAMUSCULAR; INTRAVENOUS AS NEEDED
Status: DISCONTINUED | OUTPATIENT
Start: 2019-04-24 | End: 2019-04-24 | Stop reason: SURG

## 2019-04-24 RX ORDER — GLYCOPYRROLATE 0.2 MG/ML
INJECTION INTRAMUSCULAR; INTRAVENOUS AS NEEDED
Status: DISCONTINUED | OUTPATIENT
Start: 2019-04-24 | End: 2019-04-24 | Stop reason: SURG

## 2019-04-24 RX ORDER — FENTANYL CITRATE/PF 50 MCG/ML
25 SYRINGE (ML) INJECTION
Status: DISCONTINUED | OUTPATIENT
Start: 2019-04-24 | End: 2019-04-24 | Stop reason: HOSPADM

## 2019-04-24 RX ORDER — ONDANSETRON 2 MG/ML
INJECTION INTRAMUSCULAR; INTRAVENOUS AS NEEDED
Status: DISCONTINUED | OUTPATIENT
Start: 2019-04-24 | End: 2019-04-24 | Stop reason: SURG

## 2019-04-24 RX ORDER — SCOLOPAMINE TRANSDERMAL SYSTEM 1 MG/1
1 PATCH, EXTENDED RELEASE TRANSDERMAL
Status: DISCONTINUED | OUTPATIENT
Start: 2019-04-24 | End: 2019-04-24 | Stop reason: HOSPADM

## 2019-04-24 RX ORDER — LIDOCAINE HYDROCHLORIDE 10 MG/ML
INJECTION, SOLUTION INFILTRATION; PERINEURAL AS NEEDED
Status: DISCONTINUED | OUTPATIENT
Start: 2019-04-24 | End: 2019-04-24 | Stop reason: SURG

## 2019-04-24 RX ORDER — SODIUM CHLORIDE 9 MG/ML
INJECTION, SOLUTION INTRAVENOUS CONTINUOUS PRN
Status: DISCONTINUED | OUTPATIENT
Start: 2019-04-24 | End: 2019-04-24 | Stop reason: SURG

## 2019-04-24 RX ORDER — LIDOCAINE HYDROCHLORIDE 40 MG/ML
SOLUTION TOPICAL AS NEEDED
Status: DISCONTINUED | OUTPATIENT
Start: 2019-04-24 | End: 2019-04-24 | Stop reason: SURG

## 2019-04-24 RX ORDER — BUPIVACAINE HYDROCHLORIDE 2.5 MG/ML
INJECTION, SOLUTION INFILTRATION; PERINEURAL AS NEEDED
Status: DISCONTINUED | OUTPATIENT
Start: 2019-04-24 | End: 2019-04-24 | Stop reason: HOSPADM

## 2019-04-24 RX ORDER — CEFAZOLIN SODIUM 2 G/50ML
2000 SOLUTION INTRAVENOUS ONCE
Status: COMPLETED | OUTPATIENT
Start: 2019-04-24 | End: 2019-04-24

## 2019-04-24 RX ORDER — OXYCODONE HYDROCHLORIDE 5 MG/1
10 TABLET ORAL EVERY 4 HOURS PRN
Status: DISCONTINUED | OUTPATIENT
Start: 2019-04-24 | End: 2019-04-24 | Stop reason: HOSPADM

## 2019-04-24 RX ORDER — NEOSTIGMINE METHYLSULFATE 1 MG/ML
INJECTION INTRAVENOUS AS NEEDED
Status: DISCONTINUED | OUTPATIENT
Start: 2019-04-24 | End: 2019-04-24 | Stop reason: SURG

## 2019-04-24 RX ORDER — PROPOFOL 10 MG/ML
INJECTION, EMULSION INTRAVENOUS AS NEEDED
Status: DISCONTINUED | OUTPATIENT
Start: 2019-04-24 | End: 2019-04-24 | Stop reason: SURG

## 2019-04-24 RX ADMIN — DEXAMETHASONE SODIUM PHOSPHATE 8 MG: 10 INJECTION, SOLUTION INTRAMUSCULAR; INTRAVENOUS at 10:08

## 2019-04-24 RX ADMIN — ROCURONIUM BROMIDE 5 MG: 10 INJECTION, SOLUTION INTRAVENOUS at 09:56

## 2019-04-24 RX ADMIN — FENTANYL CITRATE 100 MCG: 50 INJECTION INTRAMUSCULAR; INTRAVENOUS at 09:56

## 2019-04-24 RX ADMIN — Medication 100 MG: at 09:56

## 2019-04-24 RX ADMIN — EPHEDRINE SULFATE 5 MG: 50 INJECTION, SOLUTION INTRAVENOUS at 10:53

## 2019-04-24 RX ADMIN — LIDOCAINE HYDROCHLORIDE ANHYDROUS 50 MG: 10 INJECTION, SOLUTION INFILTRATION at 09:56

## 2019-04-24 RX ADMIN — METOCLOPRAMIDE HYDROCHLORIDE 10 MG: 5 INJECTION INTRAMUSCULAR; INTRAVENOUS at 10:12

## 2019-04-24 RX ADMIN — CEFAZOLIN SODIUM 2000 MG: 2 SOLUTION INTRAVENOUS at 09:47

## 2019-04-24 RX ADMIN — PROPOFOL 200 MG: 10 INJECTION, EMULSION INTRAVENOUS at 09:56

## 2019-04-24 RX ADMIN — EPHEDRINE SULFATE 5 MG: 50 INJECTION, SOLUTION INTRAVENOUS at 10:17

## 2019-04-24 RX ADMIN — EPHEDRINE SULFATE 10 MG: 50 INJECTION, SOLUTION INTRAVENOUS at 10:57

## 2019-04-24 RX ADMIN — OXYCODONE HYDROCHLORIDE 10 MG: 5 TABLET ORAL at 14:06

## 2019-04-24 RX ADMIN — PROPOFOL 50 MG: 10 INJECTION, EMULSION INTRAVENOUS at 10:09

## 2019-04-24 RX ADMIN — EPHEDRINE SULFATE 5 MG: 50 INJECTION, SOLUTION INTRAVENOUS at 10:20

## 2019-04-24 RX ADMIN — MIDAZOLAM HYDROCHLORIDE 2 MG: 1 INJECTION, SOLUTION INTRAMUSCULAR; INTRAVENOUS at 09:47

## 2019-04-24 RX ADMIN — FENTANYL CITRATE 50 MCG: 50 INJECTION INTRAMUSCULAR; INTRAVENOUS at 10:26

## 2019-04-24 RX ADMIN — NEOSTIGMINE METHYLSULFATE 2 MG: 1 INJECTION INTRAVENOUS at 10:47

## 2019-04-24 RX ADMIN — LIDOCAINE HYDROCHLORIDE 1 APPLICATION: 40 SOLUTION TOPICAL at 09:58

## 2019-04-24 RX ADMIN — FENTANYL CITRATE 50 MCG: 50 INJECTION INTRAMUSCULAR; INTRAVENOUS at 10:10

## 2019-04-24 RX ADMIN — ROCURONIUM BROMIDE 20 MG: 10 INJECTION, SOLUTION INTRAVENOUS at 10:26

## 2019-04-24 RX ADMIN — GLYCOPYRROLATE 0.4 MG: 0.2 INJECTION, SOLUTION INTRAMUSCULAR; INTRAVENOUS at 10:47

## 2019-04-24 RX ADMIN — ONDANSETRON 4 MG: 2 INJECTION INTRAMUSCULAR; INTRAVENOUS at 11:00

## 2019-04-24 RX ADMIN — GLYCOPYRROLATE 0.2 MG: 0.2 INJECTION, SOLUTION INTRAMUSCULAR; INTRAVENOUS at 11:07

## 2019-04-24 RX ADMIN — SODIUM CHLORIDE: 0.9 INJECTION, SOLUTION INTRAVENOUS at 09:47

## 2019-05-01 ENCOUNTER — TELEPHONE (OUTPATIENT)
Dept: SURGICAL ONCOLOGY | Facility: CLINIC | Age: 69
End: 2019-05-01

## 2019-05-07 PROBLEM — Z08 ENCOUNTER FOR FOLLOW-UP EXAMINATION AFTER COMPLETED TREATMENT FOR MALIGNANT NEOPLASM: Status: ACTIVE | Noted: 2019-05-07

## 2019-05-07 PROBLEM — C80.1 CANCER (HCC): Status: RESOLVED | Noted: 2019-05-07 | Resolved: 2019-05-07

## 2019-05-07 PROBLEM — C73 THYROID CANCER (HCC): Status: ACTIVE | Noted: 2019-05-07

## 2019-05-07 PROBLEM — Z85.820 PERSONAL HISTORY OF MALIGNANT MELANOMA: Status: ACTIVE | Noted: 2018-10-16

## 2019-05-07 PROBLEM — C77.3 MALIGNANT NEOPLASM METASTATIC TO LYMPH NODE OF AXILLA (HCC): Status: RESOLVED | Noted: 2018-12-27 | Resolved: 2019-05-07

## 2019-05-09 ENCOUNTER — HOSPITAL ENCOUNTER (OUTPATIENT)
Dept: CT IMAGING | Facility: HOSPITAL | Age: 69
Discharge: HOME | End: 2019-05-09
Payer: MEDICARE

## 2019-05-09 DIAGNOSIS — C43.59 MALIGNANT MELANOMA OF SKIN OF ABDOMEN (HCC): ICD-10-CM

## 2019-05-09 DIAGNOSIS — E78.49 OTHER HYPERLIPIDEMIA: ICD-10-CM

## 2019-05-09 PROCEDURE — 74177 CT ABD & PELVIS W/CONTRAST: CPT

## 2019-05-09 PROCEDURE — 71260 CT THORAX DX C+: CPT

## 2019-05-09 RX ORDER — LOVASTATIN 40 MG/1
TABLET ORAL
Qty: 30 TABLET | Refills: 2 | Status: SHIPPED | OUTPATIENT
Start: 2019-05-09 | End: 2019-06-12 | Stop reason: SDUPTHER

## 2019-05-09 RX ADMIN — IOHEXOL 100 ML: 350 INJECTION, SOLUTION INTRAVENOUS at 09:44

## 2019-05-10 ENCOUNTER — OFFICE VISIT (OUTPATIENT)
Dept: SURGICAL ONCOLOGY | Facility: CLINIC | Age: 69
End: 2019-05-10
Payer: MEDICARE

## 2019-05-10 VITALS
HEART RATE: 73 BPM | SYSTOLIC BLOOD PRESSURE: 122 MMHG | BODY MASS INDEX: 29.82 KG/M2 | HEIGHT: 71 IN | TEMPERATURE: 97.9 F | RESPIRATION RATE: 15 BRPM | WEIGHT: 213 LBS | DIASTOLIC BLOOD PRESSURE: 80 MMHG

## 2019-05-10 DIAGNOSIS — C73 THYROID CANCER (HCC): Primary | ICD-10-CM

## 2019-05-10 DIAGNOSIS — Z85.820 PERSONAL HISTORY OF MALIGNANT MELANOMA: ICD-10-CM

## 2019-05-10 DIAGNOSIS — Z08 ENCOUNTER FOR FOLLOW-UP EXAMINATION AFTER COMPLETED TREATMENT FOR MALIGNANT NEOPLASM: ICD-10-CM

## 2019-05-10 DIAGNOSIS — C77.3 MALIGNANT NEOPLASM METASTATIC TO LYMPH NODE OF AXILLA (HCC): ICD-10-CM

## 2019-05-10 PROCEDURE — 99024 POSTOP FOLLOW-UP VISIT: CPT | Performed by: SURGERY

## 2019-05-15 ENCOUNTER — OFFICE VISIT (OUTPATIENT)
Dept: HEMATOLOGY ONCOLOGY | Facility: CLINIC | Age: 69
End: 2019-05-15
Payer: MEDICARE

## 2019-05-15 VITALS
DIASTOLIC BLOOD PRESSURE: 80 MMHG | HEIGHT: 71 IN | HEART RATE: 74 BPM | BODY MASS INDEX: 30.8 KG/M2 | SYSTOLIC BLOOD PRESSURE: 124 MMHG | WEIGHT: 220 LBS | RESPIRATION RATE: 14 BRPM | TEMPERATURE: 98.7 F

## 2019-05-15 DIAGNOSIS — Z85.820 PERSONAL HISTORY OF MALIGNANT MELANOMA: Primary | ICD-10-CM

## 2019-05-15 PROCEDURE — 99214 OFFICE O/P EST MOD 30 MIN: CPT | Performed by: SPECIALIST

## 2019-05-20 PROBLEM — R49.0 HOARSENESS OF VOICE: Status: ACTIVE | Noted: 2019-05-20

## 2019-05-21 ENCOUNTER — HOSPITAL ENCOUNTER (OUTPATIENT)
Dept: ULTRASOUND IMAGING | Facility: HOSPITAL | Age: 69
Discharge: HOME | End: 2019-05-21
Attending: SURGERY
Payer: MEDICARE

## 2019-05-21 DIAGNOSIS — C73 THYROID CANCER (HCC): ICD-10-CM

## 2019-05-21 PROCEDURE — 76536 US EXAM OF HEAD AND NECK: CPT

## 2019-05-22 ENCOUNTER — PROCEDURE VISIT (OUTPATIENT)
Dept: SURGICAL ONCOLOGY | Facility: CLINIC | Age: 69
End: 2019-05-22

## 2019-05-22 VITALS
WEIGHT: 223 LBS | TEMPERATURE: 98.8 F | HEART RATE: 54 BPM | SYSTOLIC BLOOD PRESSURE: 110 MMHG | HEIGHT: 71 IN | RESPIRATION RATE: 16 BRPM | DIASTOLIC BLOOD PRESSURE: 60 MMHG | BODY MASS INDEX: 31.22 KG/M2

## 2019-05-22 DIAGNOSIS — R49.0 HOARSENESS OF VOICE: ICD-10-CM

## 2019-05-22 DIAGNOSIS — C73 THYROID CANCER (HCC): Primary | ICD-10-CM

## 2019-05-23 DIAGNOSIS — J38.3 VOCAL CORD DYSFUNCTION: Primary | ICD-10-CM

## 2019-05-24 ENCOUNTER — TELEPHONE (OUTPATIENT)
Dept: SURGICAL ONCOLOGY | Facility: CLINIC | Age: 69
End: 2019-05-24

## 2019-05-28 ENCOUNTER — TELEPHONE (OUTPATIENT)
Dept: SURGICAL ONCOLOGY | Facility: CLINIC | Age: 69
End: 2019-05-28

## 2019-05-29 DIAGNOSIS — I10 ESSENTIAL HYPERTENSION: ICD-10-CM

## 2019-05-30 RX ORDER — HYDROCHLOROTHIAZIDE 25 MG/1
TABLET ORAL
Qty: 30 TABLET | Refills: 4 | Status: SHIPPED | OUTPATIENT
Start: 2019-05-30 | End: 2019-11-19 | Stop reason: SDUPTHER

## 2019-06-07 ENCOUNTER — CONSULT (OUTPATIENT)
Dept: ENDOCRINOLOGY | Facility: CLINIC | Age: 69
End: 2019-06-07
Payer: MEDICARE

## 2019-06-07 VITALS
DIASTOLIC BLOOD PRESSURE: 70 MMHG | BODY MASS INDEX: 30.44 KG/M2 | WEIGHT: 217.4 LBS | HEART RATE: 53 BPM | SYSTOLIC BLOOD PRESSURE: 110 MMHG | HEIGHT: 71 IN

## 2019-06-07 DIAGNOSIS — C73 THYROID CANCER (HCC): ICD-10-CM

## 2019-06-07 DIAGNOSIS — E04.2 MULTIPLE THYROID NODULES: ICD-10-CM

## 2019-06-07 DIAGNOSIS — E78.2 MIXED HYPERLIPIDEMIA: ICD-10-CM

## 2019-06-07 DIAGNOSIS — I10 BENIGN ESSENTIAL HYPERTENSION: ICD-10-CM

## 2019-06-07 DIAGNOSIS — R73.01 IMPAIRED FASTING GLUCOSE: Primary | ICD-10-CM

## 2019-06-07 PROCEDURE — 99205 OFFICE O/P NEW HI 60 MIN: CPT | Performed by: INTERNAL MEDICINE

## 2019-06-10 PROBLEM — J38.00 VOCAL CORD PARALYSIS: Status: ACTIVE | Noted: 2019-06-10

## 2019-06-10 PROBLEM — Z85.820 ENCOUNTER FOR FOLLOW-UP SURVEILLANCE OF MELANOMA: Status: ACTIVE | Noted: 2019-05-07

## 2019-06-11 ENCOUNTER — OFFICE VISIT (OUTPATIENT)
Dept: SURGICAL ONCOLOGY | Facility: CLINIC | Age: 69
End: 2019-06-11
Payer: MEDICARE

## 2019-06-11 VITALS
BODY MASS INDEX: 30.38 KG/M2 | SYSTOLIC BLOOD PRESSURE: 118 MMHG | RESPIRATION RATE: 16 BRPM | DIASTOLIC BLOOD PRESSURE: 84 MMHG | HEART RATE: 62 BPM | HEIGHT: 71 IN | WEIGHT: 217 LBS | TEMPERATURE: 97.8 F

## 2019-06-11 DIAGNOSIS — Z85.820 ENCOUNTER FOR FOLLOW-UP SURVEILLANCE OF MELANOMA: ICD-10-CM

## 2019-06-11 DIAGNOSIS — C73 THYROID CANCER (HCC): Primary | ICD-10-CM

## 2019-06-11 DIAGNOSIS — Z85.820 PERSONAL HISTORY OF MALIGNANT MELANOMA: ICD-10-CM

## 2019-06-11 DIAGNOSIS — Z08 ENCOUNTER FOR FOLLOW-UP SURVEILLANCE OF MELANOMA: ICD-10-CM

## 2019-06-11 PROCEDURE — 99024 POSTOP FOLLOW-UP VISIT: CPT | Performed by: SURGERY

## 2019-06-12 DIAGNOSIS — E78.49 OTHER HYPERLIPIDEMIA: ICD-10-CM

## 2019-06-13 ENCOUNTER — APPOINTMENT (OUTPATIENT)
Dept: LAB | Facility: HOSPITAL | Age: 69
End: 2019-06-13
Payer: MEDICARE

## 2019-06-13 DIAGNOSIS — E78.49 OTHER HYPERLIPIDEMIA: ICD-10-CM

## 2019-06-13 DIAGNOSIS — R73.01 IMPAIRED FASTING GLUCOSE: ICD-10-CM

## 2019-06-13 DIAGNOSIS — C73 THYROID CANCER (HCC): ICD-10-CM

## 2019-06-13 LAB
ALBUMIN SERPL BCP-MCNC: 3.9 G/DL (ref 3.5–5)
ALP SERPL-CCNC: 75 U/L (ref 46–116)
ALT SERPL W P-5'-P-CCNC: 33 U/L (ref 12–78)
ANION GAP SERPL CALCULATED.3IONS-SCNC: 5 MMOL/L (ref 4–13)
AST SERPL W P-5'-P-CCNC: 19 U/L (ref 5–45)
BASOPHILS # BLD AUTO: 0.05 THOUSANDS/ΜL (ref 0–0.1)
BASOPHILS NFR BLD AUTO: 1 % (ref 0–1)
BILIRUB SERPL-MCNC: 0.7 MG/DL (ref 0.2–1)
BUN SERPL-MCNC: 22 MG/DL (ref 5–25)
CALCIUM SERPL-MCNC: 9.6 MG/DL (ref 8.3–10.1)
CHLORIDE SERPL-SCNC: 104 MMOL/L (ref 100–108)
CHOLEST SERPL-MCNC: 173 MG/DL (ref 50–200)
CO2 SERPL-SCNC: 33 MMOL/L (ref 21–32)
CREAT SERPL-MCNC: 1.15 MG/DL (ref 0.6–1.3)
EOSINOPHIL # BLD AUTO: 0.06 THOUSAND/ΜL (ref 0–0.61)
EOSINOPHIL NFR BLD AUTO: 1 % (ref 0–6)
ERYTHROCYTE [DISTWIDTH] IN BLOOD BY AUTOMATED COUNT: 12.8 % (ref 11.6–15.1)
EST. AVERAGE GLUCOSE BLD GHB EST-MCNC: 105 MG/DL
GFR SERPL CREATININE-BSD FRML MDRD: 65 ML/MIN/1.73SQ M
GLUCOSE P FAST SERPL-MCNC: 100 MG/DL (ref 65–99)
HBA1C MFR BLD: 5.3 % (ref 4.2–6.3)
HCT VFR BLD AUTO: 49.4 % (ref 36.5–49.3)
HDLC SERPL-MCNC: 43 MG/DL (ref 40–60)
HGB BLD-MCNC: 16.7 G/DL (ref 12–17)
IMM GRANULOCYTES # BLD AUTO: 0.01 THOUSAND/UL (ref 0–0.2)
IMM GRANULOCYTES NFR BLD AUTO: 0 % (ref 0–2)
LDLC SERPL CALC-MCNC: 114 MG/DL (ref 0–100)
LYMPHOCYTES # BLD AUTO: 1.47 THOUSANDS/ΜL (ref 0.6–4.47)
LYMPHOCYTES NFR BLD AUTO: 24 % (ref 14–44)
MCH RBC QN AUTO: 30.5 PG (ref 26.8–34.3)
MCHC RBC AUTO-ENTMCNC: 33.8 G/DL (ref 31.4–37.4)
MCV RBC AUTO: 90 FL (ref 82–98)
MONOCYTES # BLD AUTO: 0.57 THOUSAND/ΜL (ref 0.17–1.22)
MONOCYTES NFR BLD AUTO: 10 % (ref 4–12)
NEUTROPHILS # BLD AUTO: 3.87 THOUSANDS/ΜL (ref 1.85–7.62)
NEUTS SEG NFR BLD AUTO: 64 % (ref 43–75)
NONHDLC SERPL-MCNC: 130 MG/DL
NRBC BLD AUTO-RTO: 0 /100 WBCS
PLATELET # BLD AUTO: 183 THOUSANDS/UL (ref 149–390)
PMV BLD AUTO: 10.9 FL (ref 8.9–12.7)
POTASSIUM SERPL-SCNC: 3.9 MMOL/L (ref 3.5–5.3)
PROT SERPL-MCNC: 7.5 G/DL (ref 6.4–8.2)
RBC # BLD AUTO: 5.47 MILLION/UL (ref 3.88–5.62)
SODIUM SERPL-SCNC: 142 MMOL/L (ref 136–145)
T4 FREE SERPL-MCNC: 0.75 NG/DL (ref 0.76–1.46)
TRIGL SERPL-MCNC: 79 MG/DL
TSH SERPL DL<=0.05 MIU/L-ACNC: 5.45 UIU/ML (ref 0.36–3.74)
WBC # BLD AUTO: 6.03 THOUSAND/UL (ref 4.31–10.16)

## 2019-06-13 PROCEDURE — 86800 THYROGLOBULIN ANTIBODY: CPT

## 2019-06-13 PROCEDURE — 80061 LIPID PANEL: CPT

## 2019-06-13 PROCEDURE — 36415 COLL VENOUS BLD VENIPUNCTURE: CPT

## 2019-06-13 PROCEDURE — 80053 COMPREHEN METABOLIC PANEL: CPT

## 2019-06-13 PROCEDURE — 83036 HEMOGLOBIN GLYCOSYLATED A1C: CPT

## 2019-06-13 PROCEDURE — 85025 COMPLETE CBC W/AUTO DIFF WBC: CPT

## 2019-06-13 PROCEDURE — 84432 ASSAY OF THYROGLOBULIN: CPT

## 2019-06-13 PROCEDURE — 84443 ASSAY THYROID STIM HORMONE: CPT

## 2019-06-13 PROCEDURE — 84439 ASSAY OF FREE THYROXINE: CPT

## 2019-06-13 RX ORDER — LOVASTATIN 40 MG/1
TABLET ORAL
Qty: 30 TABLET | Refills: 0 | Status: SHIPPED | OUTPATIENT
Start: 2019-06-13 | End: 2019-07-09 | Stop reason: SDUPTHER

## 2019-06-14 LAB
THYROGLOB AB SERPL-ACNC: <1 IU/ML (ref 0–0.9)
THYROGLOB SERPL-MCNC: 6.7 NG/ML (ref 1.4–29.2)

## 2019-06-17 ENCOUNTER — TELEPHONE (OUTPATIENT)
Dept: ENDOCRINOLOGY | Facility: CLINIC | Age: 69
End: 2019-06-17

## 2019-06-17 DIAGNOSIS — E04.2 MULTIPLE THYROID NODULES: ICD-10-CM

## 2019-06-17 DIAGNOSIS — E04.2 MULTIPLE THYROID NODULES: Primary | ICD-10-CM

## 2019-06-17 DIAGNOSIS — C73 THYROID CANCER (HCC): Primary | ICD-10-CM

## 2019-06-17 DIAGNOSIS — C73 THYROID CANCER (HCC): ICD-10-CM

## 2019-06-17 RX ORDER — LEVOTHYROXINE SODIUM 0.05 MG/1
50 TABLET ORAL DAILY
Qty: 30 TABLET | Refills: 3 | Status: SHIPPED | OUTPATIENT
Start: 2019-06-17 | End: 2019-09-09

## 2019-06-19 LAB — MISCELLANEOUS LAB TEST RESULT: NORMAL

## 2019-06-24 ENCOUNTER — OFFICE VISIT (OUTPATIENT)
Dept: INTERNAL MEDICINE CLINIC | Facility: CLINIC | Age: 69
End: 2019-06-24
Payer: MEDICARE

## 2019-06-24 VITALS
DIASTOLIC BLOOD PRESSURE: 80 MMHG | HEIGHT: 71 IN | SYSTOLIC BLOOD PRESSURE: 116 MMHG | BODY MASS INDEX: 30.72 KG/M2 | RESPIRATION RATE: 18 BRPM | WEIGHT: 219.4 LBS | HEART RATE: 68 BPM

## 2019-06-24 DIAGNOSIS — Z12.5 SCREENING FOR PROSTATE CANCER: ICD-10-CM

## 2019-06-24 DIAGNOSIS — Z23 ENCOUNTER FOR IMMUNIZATION: ICD-10-CM

## 2019-06-24 DIAGNOSIS — R73.01 IMPAIRED FASTING GLUCOSE: Primary | ICD-10-CM

## 2019-06-24 DIAGNOSIS — N52.9 ERECTILE DYSFUNCTION, UNSPECIFIED ERECTILE DYSFUNCTION TYPE: ICD-10-CM

## 2019-06-24 DIAGNOSIS — N40.0 BENIGN PROSTATIC HYPERPLASIA WITHOUT LOWER URINARY TRACT SYMPTOMS: ICD-10-CM

## 2019-06-24 DIAGNOSIS — C73 THYROID CANCER (HCC): ICD-10-CM

## 2019-06-24 DIAGNOSIS — Z85.820 PERSONAL HISTORY OF MALIGNANT MELANOMA: ICD-10-CM

## 2019-06-24 DIAGNOSIS — I10 BENIGN ESSENTIAL HYPERTENSION: ICD-10-CM

## 2019-06-24 DIAGNOSIS — E78.2 MIXED HYPERLIPIDEMIA: ICD-10-CM

## 2019-06-24 DIAGNOSIS — J38.00 VOCAL CORD PARALYSIS: ICD-10-CM

## 2019-06-24 DIAGNOSIS — M25.519 SHOULDER PAIN, UNSPECIFIED CHRONICITY, UNSPECIFIED LATERALITY: ICD-10-CM

## 2019-06-24 PROCEDURE — G0438 PPPS, INITIAL VISIT: HCPCS | Performed by: NURSE PRACTITIONER

## 2019-06-24 PROCEDURE — G0009 ADMIN PNEUMOCOCCAL VACCINE: HCPCS | Performed by: NURSE PRACTITIONER

## 2019-06-24 PROCEDURE — 99214 OFFICE O/P EST MOD 30 MIN: CPT | Performed by: NURSE PRACTITIONER

## 2019-06-24 PROCEDURE — 90732 PPSV23 VACC 2 YRS+ SUBQ/IM: CPT | Performed by: NURSE PRACTITIONER

## 2019-07-09 DIAGNOSIS — E78.49 OTHER HYPERLIPIDEMIA: ICD-10-CM

## 2019-07-09 RX ORDER — LOVASTATIN 40 MG/1
40 TABLET ORAL
Qty: 90 TABLET | Refills: 2 | Status: SHIPPED | OUTPATIENT
Start: 2019-07-09 | End: 2020-02-11 | Stop reason: SDUPTHER

## 2019-08-21 DIAGNOSIS — I10 ESSENTIAL HYPERTENSION: ICD-10-CM

## 2019-08-21 RX ORDER — ATENOLOL 50 MG/1
TABLET ORAL
Qty: 90 TABLET | Refills: 1 | Status: SHIPPED | OUTPATIENT
Start: 2019-08-21 | End: 2019-11-22 | Stop reason: SDUPTHER

## 2019-09-05 ENCOUNTER — TELEPHONE (OUTPATIENT)
Dept: ENDOCRINOLOGY | Facility: CLINIC | Age: 69
End: 2019-09-05

## 2019-09-05 NOTE — TELEPHONE ENCOUNTER
Patient called, he has an appointment on Monday the 9th  Wants to know what blood work if any he should complete

## 2019-09-06 ENCOUNTER — APPOINTMENT (OUTPATIENT)
Dept: LAB | Facility: HOSPITAL | Age: 69
End: 2019-09-06
Attending: INTERNAL MEDICINE
Payer: MEDICARE

## 2019-09-06 DIAGNOSIS — E04.2 MULTIPLE THYROID NODULES: ICD-10-CM

## 2019-09-06 DIAGNOSIS — C73 THYROID CANCER (HCC): ICD-10-CM

## 2019-09-06 LAB
T4 FREE SERPL-MCNC: 0.89 NG/DL (ref 0.76–1.46)
TSH SERPL DL<=0.05 MIU/L-ACNC: 5.39 UIU/ML (ref 0.36–3.74)

## 2019-09-06 PROCEDURE — 84443 ASSAY THYROID STIM HORMONE: CPT

## 2019-09-06 PROCEDURE — 84439 ASSAY OF FREE THYROXINE: CPT

## 2019-09-06 PROCEDURE — 36415 COLL VENOUS BLD VENIPUNCTURE: CPT

## 2019-09-09 ENCOUNTER — OFFICE VISIT (OUTPATIENT)
Dept: ENDOCRINOLOGY | Facility: CLINIC | Age: 69
End: 2019-09-09
Payer: MEDICARE

## 2019-09-09 VITALS
HEART RATE: 62 BPM | BODY MASS INDEX: 30.76 KG/M2 | WEIGHT: 219.7 LBS | SYSTOLIC BLOOD PRESSURE: 118 MMHG | HEIGHT: 71 IN | DIASTOLIC BLOOD PRESSURE: 78 MMHG

## 2019-09-09 DIAGNOSIS — C73 PAPILLARY THYROID CARCINOMA (HCC): ICD-10-CM

## 2019-09-09 DIAGNOSIS — C73 THYROID CANCER (HCC): Primary | ICD-10-CM

## 2019-09-09 DIAGNOSIS — C73 FOLLICULAR THYROID CANCER (HCC): ICD-10-CM

## 2019-09-09 DIAGNOSIS — R53.83 FATIGUE, UNSPECIFIED TYPE: ICD-10-CM

## 2019-09-09 DIAGNOSIS — E55.9 VITAMIN D DEFICIENCY: ICD-10-CM

## 2019-09-09 DIAGNOSIS — E04.2 MULTIPLE THYROID NODULES: ICD-10-CM

## 2019-09-09 DIAGNOSIS — E89.0 POSTOPERATIVE HYPOTHYROIDISM: ICD-10-CM

## 2019-09-09 PROCEDURE — 99215 OFFICE O/P EST HI 40 MIN: CPT | Performed by: INTERNAL MEDICINE

## 2019-09-09 PROCEDURE — 1124F ACP DISCUSS-NO DSCNMKR DOCD: CPT | Performed by: INTERNAL MEDICINE

## 2019-09-09 RX ORDER — LEVOTHYROXINE SODIUM 0.1 MG/1
100 TABLET ORAL DAILY
Qty: 980 TABLET | Refills: 3 | Status: SHIPPED | OUTPATIENT
Start: 2019-09-09 | End: 2019-09-10 | Stop reason: SDUPTHER

## 2019-09-09 NOTE — PROGRESS NOTES
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, infiltrative, 4 2 cm follicular carcinoma    Retrospectively, given size of follicular carcinoma, final pathology the recommendation would be for total thyroidectomy followed by radioactive iodine    Completion thyroidectomy was held off given complications of vocal cord paralysis and concerns of the risk of contralateral nerve being damaged if he were to go for total thyroidectomy  Since the vocal cord paralysis appears to have resolved, in the future patient may consider undergoing total thyroidectomy however he would like to hold off on it for now  TG 6 7, thyroglobulin antibodies negative  Repeat TG levels once TSH is suppressed  Plan for surveillance ultrasound in 1 year    2  Postoperative hypothyroidism  TSH above goal of <0 1  Increase levothyroxine to 100 mcg orally once a day  Repeat TSH, free T4 in 6-8 weeks      3  Thyroid nodules  Needs follow-up of right-sided thyroid nodules  Ultrasound thyroid in 04/2020    4  Malignant melanoma-following up with Oncology    5  Fatigue-management of hypothyroidism as above  History of vitamin-D deficiency-check vitamin-D  If patient is symptoms do not improve once euthyroid, will check a m  Testosterone levels  (discussed with patient)     CC: Thyroid cancer    History of Present Illness     HPI:  Kaela Pearson is a 81 y o  male who is here for a follow-up of thyroid carcinoma  From my prior note dated 6/7/2019  " Patient was noted to incidentally have thyroid nodules during workup and management of malignant melanoma      FNA left thyroid nodule- AUS, affirma suspicious  BRAF, V600E - negative; RET/PTC - not detected     Patient's is now status post left hemithyroidectomy on 04/24/2019     Final pathology  1)1 8 cm papillary carcinoma, follicular variant, infiltrative  2) 4 2 cm follicular carcinoma, minimally invasive  No lymph nodes submitted or found  Multinodular hyperplasia  Immuno stains positive for HBME-1 and CK19"     Currently on levothyroxine 50 mcg orally, empty stomach     voice is much better - did not need to have vocal cord injection   C/o feeling tired   Gained 6-7 lbs in the last week - was vacation  Occasional "hot flashes" though rare  No shakes/ tremor/ palpitations  No diarrhea constipation  Sleeping well  No mood changes    Takes multivitamin  No calcium supplementation    Has a family history of hypothyroidism    All other systems were reviewed and are negative  Review of Systems    Historical Information   Past Medical History:   Diagnosis Date    Arthritis     Cancer ([facility] [address] )     melanoma abdomen    GERD (gastroesophageal reflux disease)     diet controlled    Hyperlipidemia     Last Assessed:10/20/14    Hypertension     PONV (postoperative nausea and vomiting)      Past Surgical History:   Procedure Laterality Date    COLONOSCOPY      HERNIA REPAIR Right     Last Assessed:10/20/14 inguinal     LYMPH NODE BIOPSY N/A 12/14/2018    Procedure: SENTINEL LYMPH NODE BIOPSY; LYMPHOSCINTIGRAPHY; INTRAOPERATIVE LYMPHATIC MAPPING (INJECT AT 1100);   Surgeon: Olamide Desai MD;  [facility];  Service: Surgical Oncology    SKIN LESION EXCISION N/A 12/14/2018    Procedure: MEDIAL UPPER ABDOMEN MELANOMA WIDE EXCISION; BILATERAL AXILLA SLN;  Surgeon: Olamide Desai MD;  [facility];  Service: Surgical Oncology    THYROID LOBECTOMY Left 4/24/2019    Procedure: HEMITHYROIDECTOMY ISTHMUSECTOMY;  Surgeon: Olamide Desai MD;  [facility];  Service: Surgical Oncology    TONSILLECTOMY      US GUIDED THYROID BIOPSY  2/7/2019    WISDOM TOOTH EXTRACTION       Social History Social History     Substance and Sexual Activity   Alcohol Use Yes    Alcohol/week: 2 0 standard drinks    Types: 1 Glasses of wine, 1 Cans of beer per week    Frequency: Monthly or less    Drinks per session: 1 or 2    Binge frequency: Never    Comment: rarely     Social History     Substance and Sexual Activity   Drug Use No     Social History     Tobacco Use   Smoking Status Never Smoker   Smokeless Tobacco Never Used     Family History:   Family History   Problem Relation Age of Onset    Hypertension Mother     Hypothyroidism Mother     Hypertension Father     Skin cancer Father     Colon cancer Maternal Aunt     Colon cancer Paternal Aunt         62s    Skin cancer Paternal Uncle        Meds/Allergies   Current Outpatient Medications   Medication Sig Dispense Refill    aspirin 81 MG tablet Take by mouth      atenolol (TENORMIN) 50 mg tablet TAKE 1 TABLET DAILY 90 tablet 1    hydrochlorothiazide (HYDRODIURIL) 25 mg tablet TAKE 1 TABLET DAILY  30 tablet 4    levothyroxine 50 mcg tablet Take 1 tablet (50 mcg total) by mouth daily 30 tablet 3    lovastatin (MEVACOR) 40 MG tablet Take 1 tablet (40 mg total) by mouth daily at bedtime 90 tablet 2    multivitamin (THERAGRAN) TABS Take 1 tablet by mouth daily       sildenafil (VIAGRA) 50 MG tablet Take 1 tablet (50 mg total) by mouth daily as needed for erectile dysfunction 30 tablet 3     No current facility-administered medications for this visit  No Known Allergies    Objective   Vitals: Blood pressure 118/78, pulse 62, height 5' 11" (1 803 m), weight 99 7 kg (219 lb 11 2 oz)  Physical Exam   Constitutional: He is oriented to person, place, and time  He appears well-developed and well-nourished  No distress  HENT:   Head: Normocephalic and atraumatic  Eyes: Pupils are equal, round, and reactive to light  Conjunctivae are normal    Neck: Normal range of motion  Neck supple     Well-healed anterior scar  Nontender  No thyromegaly/lymphadenopathy   Cardiovascular: Normal rate, regular rhythm and normal heart sounds  No murmur heard  Pulmonary/Chest: Effort normal and breath sounds normal  No respiratory distress  He has no wheezes  Abdominal: Soft  He exhibits no distension  There is no tenderness  There is no guarding  Musculoskeletal: He exhibits no edema  Neurological: He is alert and oriented to person, place, and time  No tremors on the outstretched arms   Skin: Skin is warm and dry  No rash noted  He is not diaphoretic  No erythema  Psychiatric: He has a normal mood and affect  His behavior is normal  Thought content normal    Vitals reviewed  The history was obtained from the review of the chart, patient  Lab Results:   Lab Results   Component Value Date/Time    TSH 3RD GENERATON 5 393 (H) 09/06/2019 09:56 AM    TSH 3RD GENERATON 5 449 (H) 06/13/2019 10:29 AM    TSH 3RD GENERATON 1 210 12/20/2018 08:49 AM    Free T4 0 89 09/06/2019 09:56 AM    Free T4 0 75 (L) 06/13/2019 10:29 AM     Lab Results   Component Value Date    WBC 6 03 06/13/2019    HGB 16 7 06/13/2019    HCT 49 4 (H) 06/13/2019    MCV 90 06/13/2019     06/13/2019     Lab Results   Component Value Date    CREATININE 1 15 06/13/2019    BUN 22 06/13/2019     05/29/2017    K 3 9 06/13/2019     06/13/2019    CO2 33 (H) 06/13/2019     Lab Results   Component Value Date    HGBA1C 5 3 06/13/2019         Imaging Studies:   Results for orders placed during the hospital encounter of 01/23/19   US thyroid    Impression The following meet current ACR criteria for recommending ultrasound guided biopsy:     Nodules #3 and 4  Meet Ti-RAD criteria for ultrasound-guided thyroid biopsy  Reference: ACR Thyroid Imaging, Reporting and Data System (TI-RADS): White Paper of the [place] [employer]   J AM Nikki Radiol 4420;[id number]  (additional recommendations based on American Thyroid Association 2015 guidelines )      Workstation performed: [id number]       Component      Latest Ref Rng & Units 6/12/2018 12/20/2018 6/13/2019 9/6/2019   TSH 3RD GENERATON      0 358 - 3 740 uIU/mL 0 714 1 210 5 449 (H) 5 393 (H)   Free T4      0 76 - 1 46 ng/dL   0 75 (L) 0 89   THYROGLOBULIN AB      0 0 - 0 9 IU/mL   <1 0    Thyroglobulin-BURAK      1 4 - 29 2 ng/mL   6 7        I have personally reviewed pertinent reports  Portions of the record may have been created with voice recognition software  Occasional wrong word or "sound a like" substitutions may have occurred due to the inherent limitations of voice recognition software  Read the chart carefully and recognize, using context, where substitutions have occurred

## 2019-09-09 NOTE — PATIENT INSTRUCTIONS
Increase levothyroxine to 100 mcg orally once a day   Repeat labs in 6-8 weeks    Follow up in 3 months

## 2019-09-10 DIAGNOSIS — E04.2 MULTIPLE THYROID NODULES: ICD-10-CM

## 2019-09-10 DIAGNOSIS — C73 THYROID CANCER (HCC): ICD-10-CM

## 2019-09-10 RX ORDER — LEVOTHYROXINE SODIUM 0.1 MG/1
100 TABLET ORAL DAILY
Qty: 90 TABLET | Refills: 3 | Status: SHIPPED | OUTPATIENT
Start: 2019-09-10 | End: 2019-10-08

## 2019-10-02 ENCOUNTER — APPOINTMENT (OUTPATIENT)
Dept: LAB | Facility: HOSPITAL | Age: 69
End: 2019-10-02
Attending: INTERNAL MEDICINE
Payer: MEDICARE

## 2019-10-02 DIAGNOSIS — E04.2 MULTIPLE THYROID NODULES: ICD-10-CM

## 2019-10-02 DIAGNOSIS — E55.9 VITAMIN D DEFICIENCY: ICD-10-CM

## 2019-10-02 DIAGNOSIS — R53.83 FATIGUE, UNSPECIFIED TYPE: ICD-10-CM

## 2019-10-02 DIAGNOSIS — C73 THYROID CANCER (HCC): ICD-10-CM

## 2019-10-02 LAB
25(OH)D3 SERPL-MCNC: 20 NG/ML (ref 30–100)
T4 FREE SERPL-MCNC: 1.09 NG/DL (ref 0.76–1.46)
TSH SERPL DL<=0.05 MIU/L-ACNC: 1.75 UIU/ML (ref 0.36–3.74)

## 2019-10-02 PROCEDURE — 84439 ASSAY OF FREE THYROXINE: CPT

## 2019-10-02 PROCEDURE — 36415 COLL VENOUS BLD VENIPUNCTURE: CPT

## 2019-10-02 PROCEDURE — 82306 VITAMIN D 25 HYDROXY: CPT

## 2019-10-02 PROCEDURE — 84443 ASSAY THYROID STIM HORMONE: CPT

## 2019-10-07 ENCOUNTER — APPOINTMENT (OUTPATIENT)
Dept: RADIOLOGY | Facility: CLINIC | Age: 69
End: 2019-10-07
Payer: MEDICARE

## 2019-10-07 ENCOUNTER — CONSULT (OUTPATIENT)
Dept: OBGYN CLINIC | Facility: CLINIC | Age: 69
End: 2019-10-07
Payer: MEDICARE

## 2019-10-07 VITALS
HEART RATE: 65 BPM | BODY MASS INDEX: 30.66 KG/M2 | SYSTOLIC BLOOD PRESSURE: 108 MMHG | DIASTOLIC BLOOD PRESSURE: 74 MMHG | WEIGHT: 219 LBS | HEIGHT: 71 IN

## 2019-10-07 DIAGNOSIS — M25.512 CHRONIC LEFT SHOULDER PAIN: ICD-10-CM

## 2019-10-07 DIAGNOSIS — M25.519 SHOULDER PAIN, UNSPECIFIED CHRONICITY, UNSPECIFIED LATERALITY: ICD-10-CM

## 2019-10-07 DIAGNOSIS — M25.512 CHRONIC LEFT SHOULDER PAIN: Primary | ICD-10-CM

## 2019-10-07 DIAGNOSIS — G89.29 CHRONIC LEFT SHOULDER PAIN: ICD-10-CM

## 2019-10-07 DIAGNOSIS — G89.29 CHRONIC LEFT SHOULDER PAIN: Primary | ICD-10-CM

## 2019-10-07 PROCEDURE — 73030 X-RAY EXAM OF SHOULDER: CPT

## 2019-10-07 PROCEDURE — 99203 OFFICE O/P NEW LOW 30 MIN: CPT | Performed by: FAMILY MEDICINE

## 2019-10-07 RX ORDER — NAPROXEN 500 MG/1
500 TABLET ORAL 2 TIMES DAILY WITH MEALS
Qty: 30 TABLET | Refills: 1 | Status: SHIPPED | OUTPATIENT
Start: 2019-10-07 | End: 2021-02-08 | Stop reason: CLARIF

## 2019-10-07 NOTE — LETTER
October 7, 2019     [facility], [address] [place] [address]    Patient: Kell Beaulieu   YOB: 1950   Date of Visit: 10/7/2019       Dear Dr Aruna Zuleta: Thank you for referring Kell Beaulieu to me for evaluation  Below are my notes for this consultation  If you have questions, please do not hesitate to call me  I look forward to following your patient along with you  Sincerely,        [employer], DO        CC: No Recipients  [employer], DO  10/7/2019  5:28 PM  Sign at close encounter  Assessment/Plan:  Assessment/Plan   Diagnoses and all orders for this visit:    Chronic left shoulder pain  -     Ambulatory referral to Sports Medicine  -     XR shoulder 2+ vw left; Future  -     naproxen (NAPROSYN) 500 mg tablet; Take 1 tablet (500 mg total) by mouth 2 (two) times a day with meals  -     MRI shoulder left wo contrast; Future      82-year-old right-hand-dominant male with left shoulder pain more than 6 months duration  Discussed with patient physical exam, radiographs, impression, and plan  X-rays of the left shoulder are unremarkable for gross osseous abnormality or appreciation of calcification  Physical exam noted for tenderness of coracoid process and lateral subacromial aspect of the shoulder  He has range of motion limited to forward flexion of 160°, abduction 90°, and internal rotation to the left buttock  He has 4+/5 strength external rotation and supraspinatus and there is positive Rojas maneuver  Clinical impression is that he is symptomatic from soft tissue abnormality such as rotator cuff tear, and also from impingement  At this time I will refer him for MRI of the shoulder to evaluate for occult osseous and soft tissue abnormality, as more invasive management may be warranted  He will follow up with me after getting MRI done  Subjective:   Patient ID: Kell Beaulieu is a 81 y o  male    Chief Complaint   Patient presents with    Left Shoulder - Pain       29-year-old right-hand-dominant male presents for evaluation of left shoulder pain more than 6 months duration  He reports onset of pain when attempting to reach behind his back when showering  He had pain that was described as sudden onset, localized to the anterior lateral aspect shoulder, sharp, severe in intensity, radiating distally along the lateral aspect the arm, worse with twisting the arm and elevating the arm, associated with limited range of motion, and improve with return to neutral position  Pain resolves after less than 1 min of resting  He has been modifying his daily activities with use of his right upper extremity to do tasks he would normally use his left upper extremity  He is now having difficulty with tasks such as getting dressed due to the pain  Shoulder Pain   This is a chronic problem  The current episode started more than 1 month ago  The problem occurs intermittently  The problem has been unchanged  Associated symptoms include arthralgias  Pertinent negatives include no abdominal pain, chest pain, chills, fever, joint swelling, numbness, rash, sore throat or weakness  Exacerbated by: Arm use  He has tried rest for the symptoms  The treatment provided mild relief  The following portions of the patient's history were reviewed and updated as appropriate: He  has a past medical history of Arthritis, Cancer ([address] ), GERD (gastroesophageal reflux disease), Hyperlipidemia, Hypertension, and PONV (postoperative nausea and vomiting)  He  has a past surgical history that includes Colonoscopy; Hernia repair (Right); Tonsillectomy; [place] tooth extraction; Skin lesion excision (N/A, 12/14/2018); Lymph node biopsy (N/A, 12/14/2018); US guided thyroid biopsy (2/7/2019); and Thyroid lobectomy (Left, 4/24/2019)  His family history includes Colon cancer in his maternal aunt and paternal aunt;  Hypertension in his father and mother; Hypothyroidism in his mother; Skin cancer in his father and paternal uncle  He  reports that he has never smoked  He has never used smokeless tobacco  He reports that he drinks about 2 0 standard drinks of alcohol per week  He reports that he does not use drugs  He has No Known Allergies       Review of Systems   Constitutional: Negative for chills and fever  HENT: Negative for sore throat  Eyes: Negative for visual disturbance  Respiratory: Negative for shortness of breath  Cardiovascular: Negative for chest pain  Gastrointestinal: Negative for abdominal pain  Genitourinary: Negative for flank pain  Musculoskeletal: Positive for arthralgias  Negative for joint swelling  Skin: Negative for rash and wound  Neurological: Negative for weakness and numbness  Hematological: Does not bruise/bleed easily  Psychiatric/Behavioral: Negative for self-injury  Objective:  Vitals:    10/07/19 1537   BP: 108/74   Pulse: 65   Weight: 99 3 kg (219 lb)   Height: 5' 11" (1 803 m)     Back Exam     Comments:      Cervical spine  -no tenderness  -limited range of motion with left and right side bending      Right Hand Exam     Muscle Strength   The patient has normal right wrist strength  Other   Sensation: normal  Pulse: present      Left Hand Exam     Muscle Strength   The patient has normal left wrist strength  Other   Sensation: normal  Pulse: present      Right Elbow Exam     Other   Sensation: normal    Comments:  5/5 strength flexion and extension      Left Elbow Exam     Other   Sensation: normal    Comments:  5/5 strength flexion and extension      Right Shoulder Exam     Muscle Strength   Abduction: 5/5       Left Shoulder Exam     Tenderness   Left shoulder tenderness location: Trapezius, lateral subacromial, coracoid process  Range of Motion   Active abduction: 90   Forward flexion: 160   Left shoulder internal rotation 0 degrees: Left buttock       Muscle Strength   Abduction: 5/5 Internal rotation: 5/5   Subscapularis: 5/5   Biceps: 5/5     Tests   Rojas test: positive    Comments:  4+/5 strength external rotation and supraspinatus  Negative belly press          Strength/Myotome Testing     Left Wrist/Hand   Normal wrist strength    Right Wrist/Hand   Normal wrist strength      Physical Exam   Constitutional: He is oriented to person, place, and time  He appears well-developed  No distress  HENT:   Head: Normocephalic and atraumatic  Eyes: Conjunctivae are normal    Neck: No tracheal deviation present  Cardiovascular: Normal rate  Pulmonary/Chest: Effort normal  No respiratory distress  Abdominal: He exhibits no distension  Neurological: He is alert and oriented to person, place, and time  Skin: Skin is warm and dry  Psychiatric: He has a normal mood and affect  His behavior is normal    Nursing note and vitals reviewed  I have personally reviewed pertinent films in PACS and my interpretation is No osseous abnormality of the left shoulder

## 2019-10-07 NOTE — PROGRESS NOTES
Assessment/Plan:  Assessment/Plan   Diagnoses and all orders for this visit:    Chronic left shoulder pain  -     Ambulatory referral to Sports Medicine  -     XR shoulder 2+ vw left; Future  -     naproxen (NAPROSYN) 500 mg tablet; Take 1 tablet (500 mg total) by mouth 2 (two) times a day with meals  -     MRI shoulder left wo contrast; Future      73-year-old right-hand-dominant male with left shoulder pain more than 6 months duration  Discussed with patient physical exam, radiographs, impression, and plan  X-rays of the left shoulder are unremarkable for gross osseous abnormality or appreciation of calcification  Physical exam noted for tenderness of coracoid process and lateral subacromial aspect of the shoulder  He has range of motion limited to forward flexion of 160°, abduction 90°, and internal rotation to the left buttock  He has 4+/5 strength external rotation and supraspinatus and there is positive Rojas maneuver  Clinical impression is that he is symptomatic from soft tissue abnormality such as rotator cuff tear, and also from impingement  At this time I will refer him for MRI of the shoulder to evaluate for occult osseous and soft tissue abnormality, as more invasive management may be warranted  He will follow up with me after getting MRI done  Subjective:   Patient ID: Matias Raymundo is a 81 y o  male  Chief Complaint   Patient presents with    Left Shoulder - Pain       73-year-old right-hand-dominant male presents for evaluation of left shoulder pain more than 6 months duration  He reports onset of pain when attempting to reach behind his back when showering  He had pain that was described as sudden onset, localized to the anterior lateral aspect shoulder, sharp, severe in intensity, radiating distally along the lateral aspect the arm, worse with twisting the arm and elevating the arm, associated with limited range of motion, and improve with return to neutral position    Pain resolves after less than 1 min of resting  He has been modifying his daily activities with use of his right upper extremity to do tasks he would normally use his left upper extremity  He is now having difficulty with tasks such as getting dressed due to the pain  Shoulder Pain   This is a chronic problem  The current episode started more than 1 month ago  The problem occurs intermittently  The problem has been unchanged  Associated symptoms include arthralgias  Pertinent negatives include no abdominal pain, chest pain, chills, fever, joint swelling, numbness, rash, sore throat or weakness  Exacerbated by: Arm use  He has tried rest for the symptoms  The treatment provided mild relief  The following portions of the patient's history were reviewed and updated as appropriate: He  has a past medical history of Arthritis, Cancer ([facility] [address] ), GERD (gastroesophageal reflux disease), Hyperlipidemia, Hypertension, and PONV (postoperative nausea and vomiting)  He  has a past surgical history that includes Colonoscopy; Hernia repair (Right); Tonsillectomy; [place] tooth extraction; Skin lesion excision (N/A, 12/14/2018); Lymph node biopsy (N/A, 12/14/2018); US guided thyroid biopsy (2/7/2019); and Thyroid lobectomy (Left, 4/24/2019)  His family history includes Colon cancer in his maternal aunt and paternal aunt; Hypertension in his father and mother; Hypothyroidism in his mother; Skin cancer in his father and paternal uncle  He  reports that he has never smoked  He has never used smokeless tobacco  He reports that he drinks about 2 0 standard drinks of alcohol per week  He reports that he does not use drugs  He has No Known Allergies       Review of Systems   Constitutional: Negative for chills and fever  HENT: Negative for sore throat  Eyes: Negative for visual disturbance  Respiratory: Negative for shortness of breath  Cardiovascular: Negative for chest pain     Gastrointestinal: Negative for abdominal pain    Genitourinary: Negative for flank pain  Musculoskeletal: Positive for arthralgias  Negative for joint swelling  Skin: Negative for rash and wound  Neurological: Negative for weakness and numbness  Hematological: Does not bruise/bleed easily  Psychiatric/Behavioral: Negative for self-injury  Objective:  Vitals:    10/07/19 1537   BP: 108/74   Pulse: 65   Weight: 99 3 kg (219 lb)   Height: 5' 11" (1 803 m)     Back Exam     Comments:      Cervical spine  -no tenderness  -limited range of motion with left and right side bending      Right Hand Exam     Muscle Strength   The patient has normal right wrist strength  Other   Sensation: normal  Pulse: present      Left Hand Exam     Muscle Strength   The patient has normal left wrist strength  Other   Sensation: normal  Pulse: present      Right Elbow Exam     Other   Sensation: normal    Comments:  5/5 strength flexion and extension      Left Elbow Exam     Other   Sensation: normal    Comments:  5/5 strength flexion and extension      Right Shoulder Exam     Muscle Strength   Abduction: 5/5       Left Shoulder Exam     Tenderness   Left shoulder tenderness location: Trapezius, lateral subacromial, coracoid process  Range of Motion   Active abduction: 90   Forward flexion: 160   Left shoulder internal rotation 0 degrees: Left buttock  Muscle Strength   Abduction: 5/5   Internal rotation: 5/5   Subscapularis: 5/5   Biceps: 5/5     Tests   Rojas test: positive    Comments:  4+/5 strength external rotation and supraspinatus  Negative belly press          Strength/Myotome Testing     Left Wrist/Hand   Normal wrist strength    Right Wrist/Hand   Normal wrist strength      Physical Exam   Constitutional: He is oriented to person, place, and time  He appears well-developed  No distress  HENT:   Head: Normocephalic and atraumatic  Eyes: Conjunctivae are normal    Neck: No tracheal deviation present  Cardiovascular: Normal rate  Pulmonary/Chest: Effort normal  No respiratory distress  Abdominal: He exhibits no distension  Neurological: He is alert and oriented to person, place, and time  Skin: Skin is warm and dry  Psychiatric: He has a normal mood and affect  His behavior is normal    Nursing note and vitals reviewed  I have personally reviewed pertinent films in PACS and my interpretation is No osseous abnormality of the left shoulder

## 2019-10-08 ENCOUNTER — TELEPHONE (OUTPATIENT)
Dept: OBGYN CLINIC | Facility: CLINIC | Age: 69
End: 2019-10-08

## 2019-10-08 ENCOUNTER — OFFICE VISIT (OUTPATIENT)
Dept: ENDOCRINOLOGY | Facility: CLINIC | Age: 69
End: 2019-10-08
Payer: MEDICARE

## 2019-10-08 VITALS
HEIGHT: 71 IN | HEART RATE: 52 BPM | BODY MASS INDEX: 30.77 KG/M2 | SYSTOLIC BLOOD PRESSURE: 120 MMHG | DIASTOLIC BLOOD PRESSURE: 60 MMHG | WEIGHT: 219.8 LBS

## 2019-10-08 DIAGNOSIS — C73 THYROID CANCER (HCC): Primary | ICD-10-CM

## 2019-10-08 DIAGNOSIS — C73 PAPILLARY THYROID CARCINOMA (HCC): ICD-10-CM

## 2019-10-08 DIAGNOSIS — R53.83 FATIGUE, UNSPECIFIED TYPE: ICD-10-CM

## 2019-10-08 DIAGNOSIS — E55.9 VITAMIN D DEFICIENCY: ICD-10-CM

## 2019-10-08 DIAGNOSIS — E89.0 POSTOPERATIVE HYPOTHYROIDISM: ICD-10-CM

## 2019-10-08 PROCEDURE — 99215 OFFICE O/P EST HI 40 MIN: CPT | Performed by: INTERNAL MEDICINE

## 2019-10-08 RX ORDER — LEVOTHYROXINE SODIUM 175 UG/1
175 TABLET ORAL DAILY
Qty: 90 TABLET | Refills: 3 | Status: SHIPPED | OUTPATIENT
Start: 2019-10-08 | End: 2019-12-31 | Stop reason: SDUPTHER

## 2019-10-08 NOTE — PATIENT INSTRUCTIONS
increase levothyroxine to 175 mcg orally daily  Repeat thyroid function tests in 6-8 weeks   Start vitamin-D 3 supplementation 2000 IU orally daily -you can get this over-the-counter  Labs done as ordered for testosterone- to be done in the morning before 10:00 a m  Instructions on taking levothyroxine (thyroid medication)     Please take the medication on an empty stomach, at least 30 min before breakfast  If you are taking it at night, please take it a minimum of 4 hr after your last meal    Separate your thyroid medication form any calcium or iron products by at least 4 hours      Certain medications can interfere with the absorption and metabolism of thyroid hormone, this can alter your thyroid levels, make sure you check with your pharmacy about any drug interactions,  before starting any new medications       If you miss a dose of thyroid hormone therapy, take it immediately upon noticing or take 2 pills the next day to make up the missed dose

## 2019-10-08 NOTE — TELEPHONE ENCOUNTER
----- Message from [employer], DO sent at 10/7/2019  5:29 PM EDT -----  Ciera Corporal,    Order for the MRI was able to be placed (finally)  Please reach out to patient to assist with scheduling for MRI      Thanks

## 2019-10-08 NOTE — PROGRESS NOTES
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, infiltrative, 4 2 cm follicular carcinoma  Retrospectively, given size of follicular carcinoma, final pathology, the recommendation would be for total thyroidectomy followed by radioactive iodine  Thyroid function tests have improved however TSH is still above goal of <0 1    increase levothyroxine to 175 mcg orally daily; advised to take on an empty stomach, not with other pills and separate multivitamin by at least 4 hours  Repeat thyroid function tests in 6-8 weeks   Repeat TG levels once TSH is suppressed and at goal  Surveillance ultrasound in 1 year and to follow-up thyroid nodules    For vitamin-D deficiency-Start vitamin-D 3 supplementation 2000 IU orally daily  Repeat vitamin-D level in 3 months    For fatigue in addition to the above, will check a m  Total, free testosterone    Follow-up in 2 months      CC: thyroid cancer    History of Present Illness      HPI:  Emile Boyer is a 17-year-old male who is here for follow-up of thyroid carcinoma    From my prior notes  " Incidentally found thyroid nodules during workup and management of malignant melanoma     FNA left thyroid nodule- AUS, affirma suspicious  BRAF, V600E - negative; RET/PTC - not detected  S/p left hemithyroidectomy on 04/24/2019    Final pathology  1)1 8 cm papillary carcinoma, follicular variant, infiltrative  0) 6 6 cm follicular carcinoma, minimally invasive  No lymph nodes submitted or found   Multinodular hyperplasia    Immuno stains positive for HBME-1 and CK19    Completion thyroidectomy was held off given complications of vocal cord paralysis and concerns of the risk of contralateral nerve being damaged if he were to go for total thyroidectomy"     Patient is currently on levothyroxine 100 mcg orally daily  B/w 9/10-9/23 - accidentally took 200 mcg orally daily and when he realized that he was taking a higher dose went back to using the lower dose   Compliant, takes on an empty stomach however with all his other medications including multivitamin        Voice has improved further  Energy levels are slightly better but still fatigued; much better when he was taking 200 mcg   Weight stable   No heat/ cold intolerance   No shakes/ tremors/ palpitations   Occasional loose stools  Sleeping ok   Occasional hot flashes  shoulder pain, met with ortho yesterday - starting naproxen     All other systems were reviewed and are negative  Review of Systems    Historical Information   Past Medical History:   Diagnosis Date    Arthritis     Cancer ([address] )     melanoma abdomen    GERD (gastroesophageal reflux disease)     diet controlled    Hyperlipidemia     Last Assessed:10/20/14    Hypertension     PONV (postoperative nausea and vomiting)      Past Surgical History:   Procedure Laterality Date    COLONOSCOPY      HERNIA REPAIR Right     Last Assessed:10/20/14 inguinal     LYMPH NODE BIOPSY N/A 12/14/2018    Procedure: SENTINEL LYMPH NODE BIOPSY; LYMPHOSCINTIGRAPHY; INTRAOPERATIVE LYMPHATIC MAPPING (INJECT AT 1100);   Surgeon: Patricia Bill MD;  [facility];  Service: Surgical Oncology    SKIN LESION EXCISION N/A 12/14/2018    Procedure: MEDIAL UPPER ABDOMEN MELANOMA WIDE EXCISION; BILATERAL AXILLA SLN;  Surgeon: Patricia Bill MD;  [facility];  Service: Surgical Oncology    THYROID LOBECTOMY Left 4/24/2019    Procedure: HEMITHYROIDECTOMY ISTHMUSECTOMY;  Surgeon: Patricia Bill MD;  [facility];  Service: Surgical Oncology    TONSILLECTOMY      US GUIDED THYROID BIOPSY  2/7/2019    WISDOM TOOTH EXTRACTION       Social History   Social History     Substance and Sexual Activity   Alcohol Use Yes    Alcohol/week: 2 0 standard drinks    Types: 1 Glasses of wine, 1 Cans of beer per week    Frequency: Monthly or less    Drinks per session: 1 or 2    Binge frequency: Never    Comment: rarely     Social History     Substance and Sexual Activity   Drug Use No     Social History     Tobacco Use   Smoking Status Never Smoker   Smokeless Tobacco Never Used     Family History:   Family History   Problem Relation Age of Onset    Hypertension Mother     Hypothyroidism Mother     Hypertension Father     Skin cancer Father     Colon cancer Maternal Aunt     Colon cancer Paternal Aunt         62s    Skin cancer Paternal Uncle        Meds/Allergies   Current Outpatient Medications   Medication Sig Dispense Refill    aspirin 81 MG tablet Take by mouth      atenolol (TENORMIN) 50 mg tablet TAKE 1 TABLET DAILY 90 tablet 1    hydrochlorothiazide (HYDRODIURIL) 25 mg tablet TAKE 1 TABLET DAILY  30 tablet 4    levothyroxine 100 mcg tablet Take 1 tablet (100 mcg total) by mouth daily 90 tablet 3    lovastatin (MEVACOR) 40 MG tablet Take 1 tablet (40 mg total) by mouth daily at bedtime 90 tablet 2    multivitamin (THERAGRAN) TABS Take 1 tablet by mouth daily       naproxen (NAPROSYN) 500 mg tablet Take 1 tablet (500 mg total) by mouth 2 (two) times a day with meals 30 tablet 1    sildenafil (VIAGRA) 50 MG tablet Take 1 tablet (50 mg total) by mouth daily as needed for erectile dysfunction 30 tablet 3     No current facility-administered medications for this visit  No Known Allergies    Objective   Vitals: Blood pressure 120/60, pulse (!) 52, height 5' 11" (1 803 m), weight 99 7 kg (219 lb 12 8 oz)  Physical Exam   Constitutional: He is oriented to person, place, and time  He appears well-developed and well-nourished  No distress  HENT:   Head: Normocephalic and atraumatic  Eyes: Pupils are equal, round, and reactive to light  Conjunctivae are normal    Neck: Normal range of motion  Neck supple  Well-healed anterior scar   Cardiovascular: Normal rate, regular rhythm and normal heart sounds  No murmur heard  Pulmonary/Chest: Effort normal and breath sounds normal  No respiratory distress  He has no wheezes  Abdominal: Soft  He exhibits no distension  There is no tenderness  There is no guarding  Musculoskeletal: He exhibits no edema  Neurological: He is alert and oriented to person, place, and time  Skin: Skin is warm and dry  No rash noted  He is not diaphoretic  No erythema  Psychiatric: He has a normal mood and affect  His behavior is normal  Thought content normal    Vitals reviewed  The history was obtained from the review of the chart, patient  Lab Results:   Lab Results   Component Value Date/Time    TSH 3RD TERESO 1 747 10/02/2019 03:05 PM    TSH 3RD Cassie Kub 5 393 (H) 09/06/2019 09:56 AM    TSH 3RD TERESO 5 449 (H) 06/13/2019 10:29 AM    Free T4 1 09 10/02/2019 03:05 PM    Free T4 0 89 09/06/2019 09:56 AM    Free T4 0 75 (L) 06/13/2019 10:29 AM    Thyroglobulin Ab <1 0 06/13/2019 10:29 AM     Imaging Studies:  Results for orders placed during the hospital encounter of 05/21/19   US head neck lymph node mapping    Impression No evidence of recurrent or metastatic disease  Workstation performed: [id number]            I have personally reviewed pertinent reports  Portions of the record may have been created with voice recognition software  Occasional wrong word or "sound a like" substitutions may have occurred due to the inherent limitations of voice recognition software  Read the chart carefully and recognize, using context, where substitutions have occurred

## 2019-10-08 NOTE — TELEPHONE ENCOUNTER
Called patient to set up mri and he stated that he was not in front of calendar at the moment and will call back later to schedule

## 2019-10-22 ENCOUNTER — HOSPITAL ENCOUNTER (OUTPATIENT)
Dept: MRI IMAGING | Facility: HOSPITAL | Age: 69
Discharge: HOME | End: 2019-10-22
Attending: FAMILY MEDICINE
Payer: MEDICARE

## 2019-10-22 DIAGNOSIS — M25.512 CHRONIC LEFT SHOULDER PAIN: ICD-10-CM

## 2019-10-22 DIAGNOSIS — G89.29 CHRONIC LEFT SHOULDER PAIN: ICD-10-CM

## 2019-10-22 PROCEDURE — 73221 MRI JOINT UPR EXTREM W/O DYE: CPT

## 2019-10-25 ENCOUNTER — OFFICE VISIT (OUTPATIENT)
Dept: OBGYN CLINIC | Facility: CLINIC | Age: 69
End: 2019-10-25
Payer: MEDICARE

## 2019-10-25 VITALS
DIASTOLIC BLOOD PRESSURE: 76 MMHG | SYSTOLIC BLOOD PRESSURE: 122 MMHG | RESPIRATION RATE: 18 BRPM | HEIGHT: 71 IN | BODY MASS INDEX: 30.1 KG/M2 | WEIGHT: 215 LBS | HEART RATE: 70 BPM

## 2019-10-25 DIAGNOSIS — M67.814 TENDINOSIS OF LEFT SHOULDER: ICD-10-CM

## 2019-10-25 DIAGNOSIS — M67.814 BICEPS TENDINOSIS OF LEFT SHOULDER: ICD-10-CM

## 2019-10-25 DIAGNOSIS — M75.42 SUBACROMIAL IMPINGEMENT OF LEFT SHOULDER: ICD-10-CM

## 2019-10-25 DIAGNOSIS — S46.012D ROTATOR CUFF STRAIN, LEFT, SUBSEQUENT ENCOUNTER: Primary | ICD-10-CM

## 2019-10-25 PROCEDURE — 99213 OFFICE O/P EST LOW 20 MIN: CPT | Performed by: FAMILY MEDICINE

## 2019-10-25 NOTE — PROGRESS NOTES
Assessment/Plan:  Assessment/Plan   Diagnoses and all orders for this visit:    Rotator cuff strain, left, subsequent encounter  -     Ambulatory referral to Physical Therapy; Future    Biceps tendinosis of left shoulder  -     Ambulatory referral to Physical Therapy; Future    Subacromial impingement of left shoulder  -     Ambulatory referral to Physical Therapy; Future    Tendinosis of left shoulder  -     Ambulatory referral to Physical Therapy; Future        43-year-old right-hand-dominant male with left shoulder pain more than 6 months duration  Discussed with patient MRI results, impression and plan  MRI of the left shoulder is noted for tendinosis supraspinatus with small low-grade partial articular surface tear, tendinosis intra-articular portion of long head biceps, and tendinosis subscapularis and chronic degenerative tearing of the superior labrum  He has range of motion limited to forward flexion 160°, abduction 90° and internal rotation to the lateral hip  Clinical impression that he is experiencing subacromial impingement  I discussed treatment regimen of physical therapy to which he agreed  He is to start physical therapy as soon as possible and do home exercises as directed  He may take ibuprofen as needed  He will follow up in 2 months at which point he will be re-evaluated  Subjective:   Patient ID: Emile Boyer is a 81 y o  male  Chief Complaint   Patient presents with    Left Shoulder - Pain, Follow-up       43-year-old right-hand-dominant male following up for left shoulder pain more than 6 months duration  He was last seen 3 weeks ago at which point he was referred for MRI of the left shoulder, and prescribed naproxen 500 mg twice daily  He reports mild improvement since his last visit with taking naproxen    He reports having pain described as localized to lateral aspect the shoulder, intermittent, achy and sometimes sharp, radiating distally along the lateral aspect the upper arm, worse with elevating the arm above shoulder level, and improved return to neutral position  He denies any pain while at rest     Shoulder Pain   This is a chronic problem  The current episode started more than 1 month ago  The problem occurs intermittently  The problem has been unchanged  Associated symptoms include arthralgias  Pertinent negatives include no joint swelling, numbness or weakness  Exacerbated by: Arm elevation  He has tried rest and NSAIDs for the symptoms  The treatment provided mild relief  Review of Systems   Musculoskeletal: Positive for arthralgias  Negative for joint swelling  Neurological: Negative for weakness and numbness  Objective:  Vitals:    10/25/19 1110   BP: 122/76   BP Location: Right arm   Patient Position: Sitting   Cuff Size: Large   Pulse: 70   Resp: 18   Weight: 97 5 kg (215 lb)   Height: 5' 11" (1 803 m)     Left Shoulder Exam     Range of Motion   Active abduction: 90   Forward flexion: 160   Left shoulder internal rotation 0 degrees: Lateral hip  Muscle Strength   Abduction: 5/5   Internal rotation: 5/5   Supraspinatus: 5/5     Comments:  4+/5 strength external rotation            Physical Exam   Constitutional: He is oriented to person, place, and time  He appears well-developed  No distress  HENT:   Head: Normocephalic and atraumatic  Eyes: Conjunctivae are normal    Neck: No tracheal deviation present  Cardiovascular: Normal rate  Pulmonary/Chest: Effort normal  No respiratory distress  Abdominal: He exhibits no distension  Neurological: He is alert and oriented to person, place, and time  Skin: Skin is warm and dry  Psychiatric: He has a normal mood and affect  His behavior is normal    Nursing note and vitals reviewed  I have personally reviewed pertinent films in PACS and my interpretation is Small partial supraspinatus tear

## 2019-11-07 ENCOUNTER — TELEPHONE (OUTPATIENT)
Dept: HEMATOLOGY ONCOLOGY | Facility: CLINIC | Age: 69
End: 2019-11-07

## 2019-11-07 NOTE — TELEPHONE ENCOUNTER
LM for [facility] to call our office to R/S his appt  He was scheduled with Dr Rama Dennis for Monday Novmeber 11

## 2019-11-11 ENCOUNTER — TELEPHONE (OUTPATIENT)
Dept: HEMATOLOGY ONCOLOGY | Facility: CLINIC | Age: 69
End: 2019-11-11

## 2019-11-11 DIAGNOSIS — Z85.820 PERSONAL HISTORY OF MALIGNANT MELANOMA: Primary | ICD-10-CM

## 2019-11-11 NOTE — TELEPHONE ENCOUNTER
Called stating he needs to call our office regarding his appt being canceled and needing to be reschedule

## 2019-11-12 ENCOUNTER — OFFICE VISIT (OUTPATIENT)
Dept: INTERNAL MEDICINE CLINIC | Facility: CLINIC | Age: 69
End: 2019-11-12
Payer: MEDICARE

## 2019-11-12 VITALS
SYSTOLIC BLOOD PRESSURE: 128 MMHG | OXYGEN SATURATION: 97 % | DIASTOLIC BLOOD PRESSURE: 82 MMHG | WEIGHT: 216.4 LBS | BODY MASS INDEX: 30.3 KG/M2 | HEIGHT: 71 IN | HEART RATE: 69 BPM

## 2019-11-12 DIAGNOSIS — R19.7 DIARRHEA, UNSPECIFIED TYPE: Primary | ICD-10-CM

## 2019-11-12 DIAGNOSIS — J02.9 SORE THROAT: ICD-10-CM

## 2019-11-12 PROCEDURE — 99214 OFFICE O/P EST MOD 30 MIN: CPT | Performed by: NURSE PRACTITIONER

## 2019-11-12 RX ORDER — AZITHROMYCIN 500 MG/1
TABLET, FILM COATED ORAL
Qty: 3 TABLET | Refills: 0 | Status: SHIPPED | OUTPATIENT
Start: 2019-11-12 | End: 2019-11-15

## 2019-11-12 RX ORDER — MELATONIN
1000 DAILY
COMMUNITY

## 2019-11-12 NOTE — PROGRESS NOTES
Assessment/Plan:    Patient Instructions     Diarrhea after returning home from [place] likely travels diarrhea it has been going on for over a week  Will treat with azithromycin 500 milligrams for 3 doses or until diarrhea resolves  Upper respiratory viral syndrome cannot say for certain if this is viral or allergy in nature monitor symptoms humidification in the home recommended         Diagnoses and all orders for this visit:    Diarrhea, unspecified type  -     azithromycin (ZITHROMAX) 500 MG tablet; Take 1 tablet daily x 3 days or until diarrhea resolves    Sore throat    Other orders  -     cholecalciferol (VITAMIN D3) 1,000 units tablet; Take 1,000 Units by mouth daily         Subjective:      Patient ID: Monica Shin is a 81 y o  male     Patient was in [place] for about a week, on his return trip he started with just a little bit of a dry throat  And once he returned he started with diarrhea no abdominal distress he would just wake up in the morning in every morning have some degree of diarrhea  Maybe once or twice a day  It has been going on for more than a week and now he is concerned  Appetite is poor and he is fatigued  Current Outpatient Medications:     aspirin 81 MG tablet, Take by mouth, Disp: , Rfl:     atenolol (TENORMIN) 50 mg tablet, TAKE 1 TABLET DAILY, Disp: 90 tablet, Rfl: 1    cholecalciferol (VITAMIN D3) 1,000 units tablet, Take 1,000 Units by mouth daily, Disp: , Rfl:     hydrochlorothiazide (HYDRODIURIL) 25 mg tablet, TAKE 1 TABLET DAILY  , Disp: 30 tablet, Rfl: 4    levothyroxine 175 mcg tablet, Take 1 tablet (175 mcg total) by mouth daily, Disp: 90 tablet, Rfl: 3    lovastatin (MEVACOR) 40 MG tablet, Take 1 tablet (40 mg total) by mouth daily at bedtime, Disp: 90 tablet, Rfl: 2    multivitamin (THERAGRAN) TABS, Take 1 tablet by mouth daily , Disp: , Rfl:     sildenafil (VIAGRA) 50 MG tablet, Take 1 tablet (50 mg total) by mouth daily as needed for erectile dysfunction, Disp: 30 tablet, Rfl: 3    azithromycin (ZITHROMAX) 500 MG tablet, Take 1 tablet daily x 3 days or until diarrhea resolves, Disp: 3 tablet, Rfl: 0    naproxen (NAPROSYN) 500 mg tablet, Take 1 tablet (500 mg total) by mouth 2 (two) times a day with meals (Patient not taking: Reported on 11/12/2019), Disp: 30 tablet, Rfl: 1    Recent Results (from the past 1008 hour(s))   T4, free    Collection Time: 10/02/19  3:05 PM   Result Value Ref Range    Free T4 1 09 0 76 - 1 46 ng/dL   TSH, 3rd generation    Collection Time: 10/02/19  3:05 PM   Result Value Ref Range    TSH 3RD GENERATON 1 747 0 358 - 3 740 uIU/mL   Vitamin D 25 hydroxy    Collection Time: 10/02/19  3:05 PM   Result Value Ref Range    Vit D, 25-Hydroxy 20 0 (L) 30 0 - 100 0 ng/mL       The following portions of the patient's history were reviewed and updated as appropriate: allergies, current medications, past family history, past medical history, past social history, past surgical history and problem list      Review of Systems   Constitutional: Negative for appetite change, chills, diaphoresis, fatigue, fever and unexpected weight change  HENT: Positive for sore throat  Negative for postnasal drip and sneezing  Eyes: Negative for visual disturbance  Respiratory: Negative for chest tightness and shortness of breath  Cardiovascular: Negative for chest pain, palpitations and leg swelling  Gastrointestinal: Positive for diarrhea  Negative for abdominal pain and blood in stool  Endocrine: Negative for cold intolerance, heat intolerance, polydipsia, polyphagia and polyuria  Genitourinary: Negative for difficulty urinating, dysuria, frequency and urgency  Musculoskeletal: Negative for arthralgias and myalgias  Skin: Negative for rash and wound  Neurological: Negative for dizziness, weakness, light-headedness and headaches  Hematological: Negative for adenopathy     Psychiatric/Behavioral: Negative for confusion, dysphoric mood and sleep disturbance  The patient is not nervous/anxious  Objective:      /82 (BP Location: Left arm, Patient Position: Sitting, Cuff Size: Standard)   Pulse 69   Ht 5' 11" (1 803 m)   Wt 98 2 kg (216 lb 6 4 oz)   SpO2 97%   BMI 30 18 kg/m²        Physical Exam   Constitutional: He is oriented to person, place, and time  He appears well-developed  No distress  HENT:   Head: Normocephalic and atraumatic  Nose: Nose normal    Mouth/Throat: Oropharynx is clear and moist    Eyes: Pupils are equal, round, and reactive to light  Conjunctivae and EOM are normal    Neck: Normal range of motion  Neck supple  No JVD present  No tracheal deviation present  No thyromegaly present  Cardiovascular: Normal rate, regular rhythm, normal heart sounds and intact distal pulses  Exam reveals no gallop and no friction rub  No murmur heard  Pulmonary/Chest: Effort normal and breath sounds normal  No respiratory distress  He has no wheezes  He has no rales  Abdominal: Soft  Bowel sounds are normal  He exhibits no distension  There is no tenderness  Musculoskeletal: Normal range of motion  He exhibits no edema  Lymphadenopathy:     He has no cervical adenopathy  Neurological: He is alert and oriented to person, place, and time  No cranial nerve deficit  Skin: Skin is warm and dry  No rash noted  He is not diaphoretic  Psychiatric: He has a normal mood and affect   His behavior is normal  Judgment and thought content normal

## 2019-11-12 NOTE — PATIENT INSTRUCTIONS
Diarrhea after returning home from [place] likely travels diarrhea it has been going on for over a week  Will treat with azithromycin 500 milligrams for 3 doses or until diarrhea resolves        Upper respiratory viral syndrome cannot say for certain if this is viral or allergy in nature monitor symptoms humidification in the home recommended

## 2019-11-13 ENCOUNTER — HOSPITAL ENCOUNTER (OUTPATIENT)
Dept: RADIOLOGY | Facility: HOSPITAL | Age: 69
Discharge: HOME | End: 2019-11-13
Attending: INTERNAL MEDICINE
Payer: MEDICARE

## 2019-11-13 ENCOUNTER — APPOINTMENT (OUTPATIENT)
Dept: LAB | Facility: HOSPITAL | Age: 69
End: 2019-11-13
Attending: INTERNAL MEDICINE
Payer: MEDICARE

## 2019-11-13 DIAGNOSIS — Z85.820 PERSONAL HISTORY OF MALIGNANT MELANOMA: ICD-10-CM

## 2019-11-13 DIAGNOSIS — C43.59 MALIGNANT MELANOMA OF SKIN OF ABDOMEN (HCC): ICD-10-CM

## 2019-11-13 LAB
ALBUMIN SERPL BCP-MCNC: 3.8 G/DL (ref 3.5–5)
ALP SERPL-CCNC: 95 U/L (ref 46–116)
ALT SERPL W P-5'-P-CCNC: 18 U/L (ref 12–78)
ANION GAP SERPL CALCULATED.3IONS-SCNC: 8 MMOL/L (ref 4–13)
AST SERPL W P-5'-P-CCNC: 11 U/L (ref 5–45)
BASOPHILS # BLD AUTO: 0.06 THOUSANDS/ΜL (ref 0–0.1)
BASOPHILS NFR BLD AUTO: 1 % (ref 0–1)
BILIRUB SERPL-MCNC: 0.6 MG/DL (ref 0.2–1)
BUN SERPL-MCNC: 20 MG/DL (ref 5–25)
CALCIUM SERPL-MCNC: 9.5 MG/DL (ref 8.3–10.1)
CHLORIDE SERPL-SCNC: 101 MMOL/L (ref 100–108)
CO2 SERPL-SCNC: 32 MMOL/L (ref 21–32)
CREAT SERPL-MCNC: 1.11 MG/DL (ref 0.6–1.3)
EOSINOPHIL # BLD AUTO: 0.16 THOUSAND/ΜL (ref 0–0.61)
EOSINOPHIL NFR BLD AUTO: 1 % (ref 0–6)
ERYTHROCYTE [DISTWIDTH] IN BLOOD BY AUTOMATED COUNT: 12.4 % (ref 11.6–15.1)
GFR SERPL CREATININE-BSD FRML MDRD: 68 ML/MIN/1.73SQ M
GLUCOSE SERPL-MCNC: 84 MG/DL (ref 65–140)
HCT VFR BLD AUTO: 48.1 % (ref 36.5–49.3)
HGB BLD-MCNC: 15.8 G/DL (ref 12–17)
IMM GRANULOCYTES # BLD AUTO: 0.05 THOUSAND/UL (ref 0–0.2)
IMM GRANULOCYTES NFR BLD AUTO: 0 % (ref 0–2)
LYMPHOCYTES # BLD AUTO: 2.52 THOUSANDS/ΜL (ref 0.6–4.47)
LYMPHOCYTES NFR BLD AUTO: 22 % (ref 14–44)
MCH RBC QN AUTO: 29.8 PG (ref 26.8–34.3)
MCHC RBC AUTO-ENTMCNC: 32.8 G/DL (ref 31.4–37.4)
MCV RBC AUTO: 91 FL (ref 82–98)
MONOCYTES # BLD AUTO: 1.33 THOUSAND/ΜL (ref 0.17–1.22)
MONOCYTES NFR BLD AUTO: 11 % (ref 4–12)
NEUTROPHILS # BLD AUTO: 7.56 THOUSANDS/ΜL (ref 1.85–7.62)
NEUTS SEG NFR BLD AUTO: 65 % (ref 43–75)
NRBC BLD AUTO-RTO: 0 /100 WBCS
PLATELET # BLD AUTO: 234 THOUSANDS/UL (ref 149–390)
PMV BLD AUTO: 10.7 FL (ref 8.9–12.7)
POTASSIUM SERPL-SCNC: 3.4 MMOL/L (ref 3.5–5.3)
PROT SERPL-MCNC: 7.6 G/DL (ref 6.4–8.2)
RBC # BLD AUTO: 5.31 MILLION/UL (ref 3.88–5.62)
SODIUM SERPL-SCNC: 141 MMOL/L (ref 136–145)
WBC # BLD AUTO: 11.68 THOUSAND/UL (ref 4.31–10.16)

## 2019-11-13 PROCEDURE — 85025 COMPLETE CBC W/AUTO DIFF WBC: CPT

## 2019-11-13 PROCEDURE — 71046 X-RAY EXAM CHEST 2 VIEWS: CPT

## 2019-11-13 PROCEDURE — 36415 COLL VENOUS BLD VENIPUNCTURE: CPT

## 2019-11-13 PROCEDURE — 80053 COMPREHEN METABOLIC PANEL: CPT

## 2019-11-15 ENCOUNTER — TELEPHONE (OUTPATIENT)
Dept: HEMATOLOGY ONCOLOGY | Facility: CLINIC | Age: 69
End: 2019-11-15

## 2019-11-15 NOTE — TELEPHONE ENCOUNTER
Called and left pt VM on getting the rest of labwork done before Monday appt 11/18 with Yolanda Mcgovern PA-C

## 2019-11-16 NOTE — PROGRESS NOTES
HEMATOLOGY / ONCOLOGY CLINIC NOTE    Primary Care Provider: Laurita Stein MD  Referring Provider:    MRN: [id number]  : 1950    DATE: 2019  Reason for Encounter:  Follow up Stage IIIA Malignant Melanoma of Abdominal Wall      Hematology / Oncology History:     [facility] is a 81 y o  male who came in for follow up  1   Stage IIIA ([id number]) Malignant Melanoma of Abdomen wall  -  Dermatologist - Biopsy on 10/16/18, Consistent with malignant melanoma, it was 1 1 mm, was not ulcerated, and had a mitotic rate of 1/mm2   -  Dr Joyce Landis - wide excision and SLNB on 18, no residual melanoma, did have 1/4 lymph nodes positive with 2 mm of focal invasion into the lymph node,                       there was not any extranodal extension seen  -  CT scan of the chest, abdomen, and pelvis on 19, with 3 small liver lesions    -  MRI of the abdomen on 19 was unremarkable for evidence of metastatic disease to the liver  -  Not recommend Adjuvant Treatment, as chance of cure high  2   S/P left hemithyroidectomy for thyroid cancer  - Papillary, Follicular variant  Previous Hematologic/ Oncologic History:    Cancer Staging  Personal history of malignant melanoma  Staging form: Melanoma of the Skin, AJCC 8th Edition  - Pathologic stage from 2018: Stage IIIA (pT2a, pN1a, cM0) - Unsigned     BRAF V600E (+)     Personal history of malignant melanoma    10/16/2018 Initial Diagnosis     Malignant melanoma of skin of abdomen ([facility] [address] )      2018 Surgery     Wide excision of abdomen with sentinel lymph node biopsies (right and left axillae)        Thyroid cancer ([address] )    2019 Initial Diagnosis     Thyroid cancer ([address] ) - isthmus/left hemithyroidectomy - invasive papillary and minimally invasive follicular carcinomas       Current Hematologic/Oncologic Treatment:    Observation    Interval History:   19:  Needs visit with Dermatology - Dr Greg Guerra  Run down  Tires easy  Still trying to get thyroid regulated, and has Vitamin D deficiency  Getting a little better  Was ill recently (WBC elevated)  LDH not obtained with recent lab work  Otherwise no lumps or bumps or skin abnormality  Assessment / Plan:   1  Malignant melanoma of skin of abdomen (HCC)  Stage IIIA (T2 a N1 M0) malignant melanoma of the abdominal wall  Patient had initial excision, followed by wide excision surgery by Dr Vaughn Pacheco in 12/18  He is BRAF 600E (+) mutation  He had CBC & CMP, which are stable  He will obtain LDH at this time, as it was obtained from lab with recent blood work  We will plan on follow-up visit with CBC, CMP, LDH, and chest x-ray  In the interim he will follow with Dr Herlene Hamman Dermatology for body check  He will make this appt at this time  He is aware to call with any concerns, or abnormal skin findings     - LD,Blood; Future  - CBC and differential; Future  - Comprehensive metabolic panel; Future  - LD,Blood; Future  - XR chest pa & lateral; Future    2  Malignant neoplasm metastatic to lymph node of axilla ([facility] [address] )  As above  3  Thyroid cancer ([address] )  Status post left hemithyroidectomy for papillary, follicular variant thyroid cancer  On levothyroxine  4  Fatigue, unspecified type  Fatigue attributable to vitamin D deficiency in addition to hypothyroidism  On levothyroxine 175 mcg daily  Also on vitamin D at this time        ECOG PS 0    Problem list:       Patient Active Problem List   Diagnosis    Actinic keratosis    Benign essential hypertension    BPH (benign prostatic hyperplasia)    Erectile dysfunction    GERD without esophagitis    Hyperlipidemia    Impaired fasting glucose    Personal history of malignant melanoma    Liver mass    Other specified soft tissue disorders    Multiple thyroid nodules    Thyroid cancer ([address] )    Encounter for follow-up surveillance of melanoma    Hoarseness of voice    Vocal cord paralysis    Fatigue    Vitamin D deficiency    Postoperative hypothyroidism    Papillary thyroid carcinoma (HCC)    Follicular thyroid cancer (HCC)     PHYSICIAL EXAMINATION:   Vital Signs:   /80 (BP Location: Right arm)   Pulse 87   Temp 98 7 °F (37 1 °C) (Oral)   Resp 16   Ht 5' 11" (1 803 m)   Wt 98 kg (216 lb)   SpO2 95%   BMI 30 13 kg/m²    Body mass index is 30 13 kg/m²  Body surface area is 2 18 meters squared  No significant change compared to previous visit  Stable  Physical Exam   Constitutional: He is oriented to person, place, and time  He appears well-developed and well-nourished  No distress  HENT:   Head: Normocephalic and atraumatic  Mouth/Throat: Oropharynx is clear and moist  No oropharyngeal exudate  Eyes: Pupils are equal, round, and reactive to light  Conjunctivae and EOM are normal  Right eye exhibits no discharge  Left eye exhibits no discharge  No scleral icterus  Neck: Normal range of motion  Neck supple  No tracheal deviation present  No thyromegaly present  Cardiovascular: Normal rate, regular rhythm and normal heart sounds  Exam reveals no gallop and no friction rub  No murmur heard  Pulmonary/Chest: Effort normal and breath sounds normal  No stridor  No respiratory distress  He has no wheezes  He has no rales  Abdominal: Soft  Bowel sounds are normal  He exhibits no distension and no mass  There is no tenderness  There is no guarding  A hernia (Superior to umbilicus, Protrusion noted, is reducible   ) is present  Genitourinary:   Genitourinary Comments: Deferred   Musculoskeletal: Normal range of motion  He exhibits no edema  Lymphadenopathy:     He has no cervical adenopathy  Neurological: He is alert and oriented to person, place, and time  No sensory deficit  Skin: Skin is warm and dry  No rash noted  He is not diaphoretic  No erythema  Positive scar, horizontal scar in midline upper abdomen, well healed  Psychiatric: He has a normal mood and affect   His behavior is normal      HISTORY/MEDS/ALLERGIES/ROS/Results   PAST MEDICAL HISTORY:   has a past medical history of Arthritis, Cancer ([address] ), GERD (gastroesophageal reflux disease), Hyperlipidemia, Hypertension, and PONV (postoperative nausea and vomiting)  PAST SURGICAL HISTORY:   has a past surgical history that includes Colonoscopy; Hernia repair (Right); Tonsillectomy; [place] tooth extraction; Skin lesion excision (N/A, 12/14/2018); Lymph node biopsy (N/A, 12/14/2018); US guided thyroid biopsy (2/7/2019); and Thyroid lobectomy (Left, 4/24/2019)  CURRENT MEDICATIONS:     Current Outpatient Medications   Medication Sig Dispense Refill    aspirin 81 MG tablet Take by mouth      atenolol (TENORMIN) 50 mg tablet TAKE 1 TABLET DAILY 90 tablet 1    cholecalciferol (VITAMIN D3) 1,000 units tablet Take 1,000 Units by mouth daily      hydrochlorothiazide (HYDRODIURIL) 25 mg tablet TAKE 1 TABLET DAILY  30 tablet 4    levothyroxine 175 mcg tablet Take 1 tablet (175 mcg total) by mouth daily 90 tablet 3    lovastatin (MEVACOR) 40 MG tablet Take 1 tablet (40 mg total) by mouth daily at bedtime 90 tablet 2    multivitamin (THERAGRAN) TABS Take 1 tablet by mouth daily       sildenafil (VIAGRA) 50 MG tablet Take 1 tablet (50 mg total) by mouth daily as needed for erectile dysfunction 30 tablet 3    naproxen (NAPROSYN) 500 mg tablet Take 1 tablet (500 mg total) by mouth 2 (two) times a day with meals (Patient not taking: Reported on 11/18/2019) 30 tablet 1     No current facility-administered medications for this visit  SOCIAL HISTORY:   reports that he has never smoked  He has never used smokeless tobacco  He reports that he drinks about 2 0 standard drinks of alcohol per week  He reports that he does not use drugs  FAMILY HISTORY:  family history includes Colon cancer in his maternal aunt and paternal aunt;  Hypertension in his father and mother; Hypothyroidism in his mother; Skin cancer in his father and paternal uncle      ALLERGIES:  has No Known Allergies  REVIEW OF SYSTEMS:  Review of Systems   Constitutional: Positive for fatigue  Negative for activity change, appetite change and fever  HENT: Negative for congestion, nosebleeds, sore throat and trouble swallowing  Eyes: Negative for visual disturbance  Respiratory: Negative for cough, chest tightness, shortness of breath and wheezing  Cardiovascular: Negative for chest pain, palpitations and leg swelling  Gastrointestinal: Negative for abdominal distention, abdominal pain, blood in stool, constipation, diarrhea, nausea and vomiting  Hernia above umbilicus  Genitourinary: Negative for difficulty urinating, dysuria, hematuria and urgency  Musculoskeletal: Negative for arthralgias, back pain and myalgias  Left shoulder recent MRI  Told maybe PT will be done, and occurs in certain positions  Skin: Negative for pallor and rash  No skin changes  Neurological: Negative for dizziness, weakness, numbness and headaches  Hematological: Negative for adenopathy  Does not bruise/bleed easily  Psychiatric/Behavioral: Negative for confusion and sleep disturbance  The patient is not nervous/anxious  Labs:   Lab Results   Component Value Date    WBC 11 68 (H) 11/13/2019    HGB 15 8 11/13/2019    HCT 48 1 11/13/2019    MCV 91 11/13/2019     11/13/2019   Mild increase in WBC count  Otherwise stable  Lab Results   Component Value Date     05/29/2017    K 3 4 (L) 11/13/2019     11/13/2019    CO2 32 11/13/2019    BUN 20 11/13/2019    CREATININE 1 11 11/13/2019    GLUCOSE 111 (H) 05/29/2017    GLUF 100 (H) 06/13/2019    CALCIUM 9 5 11/13/2019    AST 11 11/13/2019    ALT 18 11/13/2019    ALKPHOS 95 11/13/2019    PROT 6 3 05/29/2017    BILITOT 0 8 05/29/2017    EGFR 68 11/13/2019   Sodium 141, potassium slightly low at 3 4, albumin 3 8      Lab Results   Component Value Date    PSA 0 4 12/20/2018    PSA 0 3 05/29/2017 Stable    IMAGING:  Xr Chest Pa & Lateral  Result Date: 11/15/2019  Narrative: CHEST INDICATION:   Z85 820: Personal history of malignant melanoma of skin  COMPARISON:  Chest radiograph from 11/30/2018  Chest CT from 5/9/2019  EXAM PERFORMED/VIEWS:  XR CHEST PA & LATERAL  DUAL ENERGY SUBTRACTION TECHNIQUE FINDINGS: Cardiomediastinal silhouette appears unremarkable  The lungs are clear  No pneumothorax or pleural effusion  Mild degenerative disease in the spine  Impression: No acute cardiopulmonary disease  No metastases  Workstation performed: [id number]     Mri Shoulder Left Wo Contrast  Result Date: 10/23/2019  Narrative: MRI LEFT SHOULDER INDICATION:   M25 512: Pain in left shoulder G89 29: Other chronic pain  COMPARISON:  None  TECHNIQUE:   The following MR sequences were obtained of the left shoulder: Localizer, axial GRE/PD fat sat, oblique coronal T2 fat sat, oblique sagittal T1/T2 fat sat  Gadolinium was not used  FINDINGS: SUBCUTANEOUS TISSUES: Normal JOINT EFFUSION: None  ACROMION PROCESS: Type I acromion ROTATOR CUFF: Infraspinatus is intact  There is tendinosis of the supraspinatus with mild partial articular surface tear, measuring 2 mm Mild tendinosis of the subscapularis The long head of the biceps tendon appears unremarkable SUBACROMIAL/SUBDELTOID BURSA: Normal  LONG HEAD OF BICEPS TENDON: The long head of the biceps is intact  Tendinosis of the intra-articular portion of the long head of the biceps tendon seen GLENOID LABRUM: Chronic degenerative changes anteriorly noted within the superior labrum GLENOHUMERAL JOINT: No full-thickness articular cartilage tear ACROMIOCLAVICULAR JOINT:  Mild degenerative changes in the [facility] joint BONES: No bone contusion seen     Impression: No full-thickness rotator cuff tear seen Tendinosis supraspinatus with the small 2 mm low-grade partial articular surface tear  Tendinosis of the intra-articular portion of the long head of the biceps tendon   Tendinosis subscapularis Chronic degenerative tearing superior labrum Workstation performed: [id number]

## 2019-11-18 ENCOUNTER — OFFICE VISIT (OUTPATIENT)
Dept: HEMATOLOGY ONCOLOGY | Facility: CLINIC | Age: 69
End: 2019-11-18
Payer: MEDICARE

## 2019-11-18 ENCOUNTER — APPOINTMENT (OUTPATIENT)
Dept: LAB | Facility: HOSPITAL | Age: 69
End: 2019-11-18
Payer: MEDICARE

## 2019-11-18 VITALS
SYSTOLIC BLOOD PRESSURE: 110 MMHG | BODY MASS INDEX: 30.24 KG/M2 | HEART RATE: 87 BPM | OXYGEN SATURATION: 95 % | TEMPERATURE: 98.7 F | DIASTOLIC BLOOD PRESSURE: 80 MMHG | WEIGHT: 216 LBS | RESPIRATION RATE: 16 BRPM | HEIGHT: 71 IN

## 2019-11-18 DIAGNOSIS — Z85.820 PERSONAL HISTORY OF MALIGNANT MELANOMA: ICD-10-CM

## 2019-11-18 DIAGNOSIS — C73 THYROID CANCER (HCC): ICD-10-CM

## 2019-11-18 DIAGNOSIS — C43.59 MALIGNANT MELANOMA OF SKIN OF ABDOMEN (HCC): ICD-10-CM

## 2019-11-18 DIAGNOSIS — C77.3 MALIGNANT NEOPLASM METASTATIC TO LYMPH NODE OF AXILLA (HCC): ICD-10-CM

## 2019-11-18 DIAGNOSIS — C43.59 MALIGNANT MELANOMA OF SKIN OF ABDOMEN (HCC): Primary | ICD-10-CM

## 2019-11-18 DIAGNOSIS — R53.83 FATIGUE, UNSPECIFIED TYPE: ICD-10-CM

## 2019-11-18 LAB — LDH SERPL-CCNC: 139 U/L (ref 81–234)

## 2019-11-18 PROCEDURE — 36415 COLL VENOUS BLD VENIPUNCTURE: CPT

## 2019-11-18 PROCEDURE — 83615 LACTATE (LD) (LDH) ENZYME: CPT

## 2019-11-18 PROCEDURE — 99213 OFFICE O/P EST LOW 20 MIN: CPT | Performed by: PHYSICIAN ASSISTANT

## 2019-11-19 DIAGNOSIS — I10 ESSENTIAL HYPERTENSION: ICD-10-CM

## 2019-11-19 RX ORDER — HYDROCHLOROTHIAZIDE 25 MG/1
TABLET ORAL
Qty: 90 TABLET | Refills: 1 | Status: SHIPPED | OUTPATIENT
Start: 2019-11-19 | End: 2020-01-13 | Stop reason: SDUPTHER

## 2019-11-22 DIAGNOSIS — I10 ESSENTIAL HYPERTENSION: ICD-10-CM

## 2019-11-22 RX ORDER — ATENOLOL 50 MG/1
TABLET ORAL
Qty: 90 TABLET | Refills: 1 | Status: SHIPPED | OUTPATIENT
Start: 2019-11-22 | End: 2020-01-13 | Stop reason: SDUPTHER

## 2019-12-05 ENCOUNTER — APPOINTMENT (OUTPATIENT)
Dept: LAB | Facility: HOSPITAL | Age: 69
End: 2019-12-05
Attending: INTERNAL MEDICINE
Payer: MEDICARE

## 2019-12-05 DIAGNOSIS — C73 PAPILLARY THYROID CARCINOMA (HCC): ICD-10-CM

## 2019-12-05 DIAGNOSIS — E89.0 POSTOPERATIVE HYPOTHYROIDISM: ICD-10-CM

## 2019-12-05 DIAGNOSIS — C73 THYROID CANCER (HCC): Primary | ICD-10-CM

## 2019-12-05 DIAGNOSIS — R53.83 FATIGUE, UNSPECIFIED TYPE: ICD-10-CM

## 2019-12-06 ENCOUNTER — APPOINTMENT (OUTPATIENT)
Dept: LAB | Facility: HOSPITAL | Age: 69
End: 2019-12-06
Attending: INTERNAL MEDICINE
Payer: MEDICARE

## 2019-12-06 LAB
BASOPHILS # BLD AUTO: 0.04 THOUSANDS/ΜL (ref 0–0.1)
BASOPHILS NFR BLD AUTO: 1 % (ref 0–1)
EOSINOPHIL # BLD AUTO: 0.09 THOUSAND/ΜL (ref 0–0.61)
EOSINOPHIL NFR BLD AUTO: 1 % (ref 0–6)
ERYTHROCYTE [DISTWIDTH] IN BLOOD BY AUTOMATED COUNT: 12.4 % (ref 11.6–15.1)
HCT VFR BLD AUTO: 48 % (ref 36.5–49.3)
HGB BLD-MCNC: 16.1 G/DL (ref 12–17)
IMM GRANULOCYTES # BLD AUTO: 0.02 THOUSAND/UL (ref 0–0.2)
IMM GRANULOCYTES NFR BLD AUTO: 0 % (ref 0–2)
LYMPHOCYTES # BLD AUTO: 1.64 THOUSANDS/ΜL (ref 0.6–4.47)
LYMPHOCYTES NFR BLD AUTO: 24 % (ref 14–44)
MCH RBC QN AUTO: 30.1 PG (ref 26.8–34.3)
MCHC RBC AUTO-ENTMCNC: 33.5 G/DL (ref 31.4–37.4)
MCV RBC AUTO: 90 FL (ref 82–98)
MONOCYTES # BLD AUTO: 0.77 THOUSAND/ΜL (ref 0.17–1.22)
MONOCYTES NFR BLD AUTO: 11 % (ref 4–12)
NEUTROPHILS # BLD AUTO: 4.37 THOUSANDS/ΜL (ref 1.85–7.62)
NEUTS SEG NFR BLD AUTO: 63 % (ref 43–75)
NRBC BLD AUTO-RTO: 0 /100 WBCS
PLATELET # BLD AUTO: 184 THOUSANDS/UL (ref 149–390)
PMV BLD AUTO: 10.7 FL (ref 8.9–12.7)
RBC # BLD AUTO: 5.34 MILLION/UL (ref 3.88–5.62)
T4 FREE SERPL-MCNC: 1.65 NG/DL (ref 0.76–1.46)
TSH SERPL DL<=0.05 MIU/L-ACNC: 0.02 UIU/ML (ref 0.36–3.74)
WBC # BLD AUTO: 6.93 THOUSAND/UL (ref 4.31–10.16)

## 2019-12-06 PROCEDURE — 86800 THYROGLOBULIN ANTIBODY: CPT

## 2019-12-06 PROCEDURE — 84443 ASSAY THYROID STIM HORMONE: CPT

## 2019-12-06 PROCEDURE — 84402 ASSAY OF FREE TESTOSTERONE: CPT

## 2019-12-06 PROCEDURE — 84439 ASSAY OF FREE THYROXINE: CPT

## 2019-12-06 PROCEDURE — 84403 ASSAY OF TOTAL TESTOSTERONE: CPT

## 2019-12-06 PROCEDURE — 36415 COLL VENOUS BLD VENIPUNCTURE: CPT

## 2019-12-06 PROCEDURE — 85025 COMPLETE CBC W/AUTO DIFF WBC: CPT

## 2019-12-07 LAB
TESTOST FREE SERPL-MCNC: 10.6 PG/ML (ref 6.6–18.1)
TESTOST SERPL-MCNC: 378 NG/DL (ref 264–916)

## 2019-12-09 ENCOUNTER — OFFICE VISIT (OUTPATIENT)
Dept: ENDOCRINOLOGY | Facility: CLINIC | Age: 69
End: 2019-12-09
Payer: MEDICARE

## 2019-12-09 VITALS
HEIGHT: 71 IN | HEART RATE: 60 BPM | DIASTOLIC BLOOD PRESSURE: 82 MMHG | WEIGHT: 215.7 LBS | SYSTOLIC BLOOD PRESSURE: 112 MMHG | BODY MASS INDEX: 30.2 KG/M2

## 2019-12-09 DIAGNOSIS — E89.0 POSTOPERATIVE HYPOTHYROIDISM: ICD-10-CM

## 2019-12-09 DIAGNOSIS — R53.83 FATIGUE, UNSPECIFIED TYPE: ICD-10-CM

## 2019-12-09 DIAGNOSIS — C73 FOLLICULAR THYROID CANCER (HCC): ICD-10-CM

## 2019-12-09 DIAGNOSIS — E55.9 VITAMIN D DEFICIENCY: ICD-10-CM

## 2019-12-09 DIAGNOSIS — E04.2 MULTIPLE THYROID NODULES: ICD-10-CM

## 2019-12-09 DIAGNOSIS — C73 PAPILLARY THYROID CARCINOMA (HCC): Primary | ICD-10-CM

## 2019-12-09 PROCEDURE — 99213 OFFICE O/P EST LOW 20 MIN: CPT | Performed by: INTERNAL MEDICINE

## 2019-12-09 NOTE — PROGRESS NOTES
Carlos Rivas 81 y o  male MRN: [id number]    Encounter: [id number]      Assessment/Plan     Assessment: This is a 81y o -year-old male with history of malignant melanoma, thyroid carcinoma, hypertension, hyperlipidemia, GERD, vitamin-D deficiency, thyroid nodules, fatigue    Plan:  1  Thyroid carcinoma papillary, follicular, infiltrative status post left hemithyroidectomy  2  Postoperative hypothyroidism  3  Thyroid nodules  TSH now at goal of <0 1  - continue levothyroxine 175 mcg orally daily  -add on thyroglobulin, thyroglobulin antibodies  - repeat thyroid function tests in 6-8 weeks  -surveillance ultrasound, follow-up of thyroid nodules around 05/2020    4  History of vitamin-D deficiency  -continue supplementation  -repeat vitamin-D level in 1-2 months    5  Fatigue-improved  Total, free testosterone within normal limits    6  Malignant melanoma    CC:  Thyroid cancer    History of Present Illness     HPI:  Carlos Rivas is a 81 y o  male who is here for a follow-up of thyroid carcinoma     From my prior notes -   Incidentally found thyroid nodules during workup and management of malignant melanoma     FNA left thyroid nodule- AUS, affirma suspicious  BRAF, V600E - negative; RET/PTC - not detected  S/p left hemithyroidectomy on 04/24/2019     Final pathology  1)1 8 cm papillary carcinoma, follicular variant, infiltrative  3) 3 6 cm follicular carcinoma, minimally invasive  No lymph nodes submitted or found   Multinodular hyperplasia    Immuno stains positive for HBME-1 and CK19     Completion thyroidectomy was held off given complications of vocal cord paralysis and concerns of the risk of contralateral nerve being damaged if he were to go for total thyroidectomy"      currently on levothyroxine 175 mcg orally daily     Energy levels are better as compared to before but still less than what he would like   No other s/s of hypo or hyperthyroidism    Taking Vit D3 2000 IU daily   MVI and lovastatin at night   viagra as needed     following up with heme/once for melanoma      All other systems were reviewed and are negative  Review of Systems    Historical Information   Past Medical History:   Diagnosis Date    Arthritis     Cancer ([address] )     melanoma abdomen    GERD (gastroesophageal reflux disease)     diet controlled    Hyperlipidemia     Last Assessed:10/20/14    Hypertension     PONV (postoperative nausea and vomiting)      Past Surgical History:   Procedure Laterality Date    COLONOSCOPY      HERNIA REPAIR Right     Last Assessed:10/20/14 inguinal     LYMPH NODE BIOPSY N/A 12/14/2018    Procedure: SENTINEL LYMPH NODE BIOPSY; LYMPHOSCINTIGRAPHY; INTRAOPERATIVE LYMPHATIC MAPPING (INJECT AT 1100);   Surgeon: Raji Romano MD;  [facility];  Service: Surgical Oncology    SKIN LESION EXCISION N/A 12/14/2018    Procedure: MEDIAL UPPER ABDOMEN MELANOMA WIDE EXCISION; BILATERAL AXILLA SLN;  Surgeon: Raji Romano MD;  [facility];  Service: Surgical Oncology    THYROID LOBECTOMY Left 4/24/2019    Procedure: HEMITHYROIDECTOMY ISTHMUSECTOMY;  Surgeon: Raji Romano MD;  [facility];  Service: Surgical Oncology    TONSILLECTOMY      US GUIDED THYROID BIOPSY  2/7/2019    WISDOM TOOTH EXTRACTION       Social History   Social History     Substance and Sexual Activity   Alcohol Use Yes    Alcohol/week: 2 0 standard drinks    Types: 1 Glasses of wine, 1 Cans of beer per week    Frequency: Monthly or less    Drinks per session: 1 or 2    Binge frequency: Never    Comment: rarely     Social History     Substance and Sexual Activity   Drug Use No     Social History     Tobacco Use   Smoking Status Never Smoker   Smokeless Tobacco Never Used     Family History:   Family History   Problem Relation Age of Onset   Leelee Saleh Hypertension Mother     Hypothyroidism Mother     Hypertension Father     Skin cancer Father     Colon cancer Maternal Aunt     Colon cancer Paternal Aunt 62s    Skin cancer Paternal Uncle        Meds/Allergies   Current Outpatient Medications   Medication Sig Dispense Refill    aspirin 81 MG tablet Take by mouth      atenolol (TENORMIN) 50 mg tablet TAKE 1 TABLET DAILY 90 tablet 1    cholecalciferol (VITAMIN D3) 1,000 units tablet Take 1,000 Units by mouth daily      hydrochlorothiazide (HYDRODIURIL) 25 mg tablet TAKE 1 TABLET DAILY  90 tablet 1    levothyroxine 175 mcg tablet Take 1 tablet (175 mcg total) by mouth daily 90 tablet 3    lovastatin (MEVACOR) 40 MG tablet Take 1 tablet (40 mg total) by mouth daily at bedtime 90 tablet 2    multivitamin (THERAGRAN) TABS Take 1 tablet by mouth daily       sildenafil (VIAGRA) 50 MG tablet Take 1 tablet (50 mg total) by mouth daily as needed for erectile dysfunction 30 tablet 3    naproxen (NAPROSYN) 500 mg tablet Take 1 tablet (500 mg total) by mouth 2 (two) times a day with meals (Patient not taking: Reported on 11/18/2019) 30 tablet 1     No current facility-administered medications for this visit  No Known Allergies    Objective   Vitals: Blood pressure 112/82, pulse 60, height 5' 11" (1 803 m), weight 97 8 kg (215 lb 11 2 oz)  Physical Exam   Constitutional: He is oriented to person, place, and time  He appears well-developed and well-nourished  No distress  HENT:   Head: Normocephalic and atraumatic  Eyes: Pupils are equal, round, and reactive to light  Conjunctivae are normal    Neck: Normal range of motion  Neck supple  No thyromegaly present  Well-healed anterior scar   Cardiovascular: Normal rate, regular rhythm and normal heart sounds  No murmur heard  Pulmonary/Chest: Effort normal and breath sounds normal  No respiratory distress  He has no wheezes  Abdominal: Soft  He exhibits no distension  There is no tenderness  There is no guarding  Musculoskeletal: He exhibits no edema  Lymphadenopathy:     He has no cervical adenopathy     Neurological: He is alert and oriented to person, place, and time  He displays normal reflexes  No tremors on the outstretched arms   Skin: Skin is warm and dry  No rash noted  He is not diaphoretic  No erythema  Psychiatric: He has a normal mood and affect  His behavior is normal  Thought content normal    Vitals reviewed  The history was obtained from the review of the chart, patient  Lab Results:   Lab Results   Component Value Date/Time    TSH 3RD GENERATON 0 018 (L) 12/06/2019 08:11 AM    TSH 3RD GENERATON 1 747 10/02/2019 03:05 PM    TSH 3RD GENERATON 5 393 (H) 09/06/2019 09:56 AM    Free T4 1 65 (H) 12/06/2019 08:11 AM    Free T4 1 09 10/02/2019 03:05 PM    Free T4 0 89 09/06/2019 09:56 AM     Lab Results   Component Value Date    WBC 6 93 12/06/2019    HGB 16 1 12/06/2019    HCT 48 0 12/06/2019    MCV 90 12/06/2019     12/06/2019     Lab Results   Component Value Date    CREATININE 1 11 11/13/2019    BUN 20 11/13/2019     05/29/2017    K 3 4 (L) 11/13/2019     11/13/2019    CO2 32 11/13/2019     Lab Results   Component Value Date    HGBA1C 5 3 06/13/2019     Component      Latest Ref Rng & Units 6/13/2019   THYROGLOBULIN AB      0 0 - 0 9 IU/mL <1 0   Thyroglobulin-BURAK      1 4 - 29 2 ng/mL 6 7         Imaging Studies:   Results for orders placed during the hospital encounter of 01/23/19   US thyroid    Impression The following meet current ACR criteria for recommending ultrasound guided biopsy:     Nodules #3 and 4  Meet Ti-RAD criteria for ultrasound-guided thyroid biopsy  Reference: ACR Thyroid Imaging, Reporting and Data System (TI-RADS): White Paper of the [employer]  J AM Nikki Radiol 3159;69:298-872  (additional recommendations based on American Thyroid Association 2015 guidelines )      Workstation performed: [id number]         I have personally reviewed pertinent reports  Portions of the record may have been created with voice recognition software   Occasional wrong word or "sound a like" substitutions may have occurred due to the inherent limitations of voice recognition software  Read the chart carefully and recognize, using context, where substitutions have occurred

## 2019-12-09 NOTE — PATIENT INSTRUCTIONS
Continue current regimen   Repeat thyroid function tests in 3 months   Additional blood work ordered for when you have next set of labs for your primary care physician     Follow up in 3 months

## 2019-12-10 DIAGNOSIS — C73 PAPILLARY THYROID CARCINOMA (HCC): Primary | ICD-10-CM

## 2019-12-10 LAB
THYROGLOB AB SERPL-ACNC: <1 IU/ML (ref 0–0.9)
THYROGLOB AB SERPL-ACNC: NORMAL IU/ML

## 2019-12-16 ENCOUNTER — OFFICE VISIT (OUTPATIENT)
Dept: DERMATOLOGY | Facility: CLINIC | Age: 69
End: 2019-12-16
Payer: MEDICARE

## 2019-12-16 DIAGNOSIS — D22.9 NEVUS: Primary | ICD-10-CM

## 2019-12-16 DIAGNOSIS — L82.1 SEBORRHEIC KERATOSIS: ICD-10-CM

## 2019-12-16 DIAGNOSIS — Z85.820 HISTORY OF MELANOMA: ICD-10-CM

## 2019-12-16 DIAGNOSIS — Z13.89 SCREENING FOR SKIN CONDITION: ICD-10-CM

## 2019-12-16 PROCEDURE — 99213 OFFICE O/P EST LOW 20 MIN: CPT | Performed by: DERMATOLOGY

## 2019-12-16 NOTE — PATIENT INSTRUCTIONS
Nevi reviewed the concept of ABCDE and ugly duckling nothing markedly atypical patient reassured  Seborrheic keratosis patient reassured these are normal growths we acquire with age no treatment needed  History of melanoma recurrence nothing else atypical sunblock recommended follow-up in 6 months  Screening for dermatologic disorders nothing else of concern noted on complete exam follow-up in 6 months

## 2019-12-16 NOTE — PROGRESS NOTES
[facility] DERMATOLOGY  [address] [place] [id number]  [phone]  [phone]     MRN: [id number] : 1950  Encounter: [id number]  Patient Information: Pura Tamaoy  Chief complaint:  Skin cancer checkup history of melanoma    History of present illness:  45-year-old male who had not seen for almost 3 years presents for overall checkup in the subsequent time patient was noted to have a melanoma was seen by Dr Preeti Joyner who performed an excisional biopsy subsequently patient was seen by Dr Beatrice Zhu and was found to have a negative sentinel nodes wide excision performed however on upon workup was also noted to have thyroid cancer  No other concerns noted at this time patient notes no other issues no moles that have changed noted significant concerns  Past Medical History:   Diagnosis Date    Arthritis     Cancer ([address] )     melanoma abdomen    GERD (gastroesophageal reflux disease)     diet controlled    Hyperlipidemia     Last Assessed:10/20/14    Hypertension     PONV (postoperative nausea and vomiting)      Past Surgical History:   Procedure Laterality Date    COLONOSCOPY      HERNIA REPAIR Right     Last Assessed:10/20/14 inguinal     LYMPH NODE BIOPSY N/A 2018    Procedure: SENTINEL LYMPH NODE BIOPSY; LYMPHOSCINTIGRAPHY; INTRAOPERATIVE LYMPHATIC MAPPING (INJECT AT 1100);   Surgeon: Abeba Eldridge MD;  [facility];  Service: Surgical Oncology    SKIN LESION EXCISION N/A 2018    Procedure: MEDIAL UPPER ABDOMEN MELANOMA WIDE EXCISION; BILATERAL AXILLA SLN;  Surgeon: Abeba Eldridge MD;  [facility];  Service: Surgical Oncology    THYROID LOBECTOMY Left 2019    Procedure: HEMITHYROIDECTOMY ISTHMUSECTOMY;  Surgeon: Abeba Eldridge MD;  [facility];  Service: Surgical Oncology    TONSILLECTOMY      US GUIDED THYROID BIOPSY  2019    WISDOM TOOTH EXTRACTION       Social History   Social History     Substance and Sexual Activity   Alcohol Use Yes    Alcohol/week: 2 0 standard drinks    Types: 1 Glasses of wine, 1 Cans of beer per week    Frequency: Monthly or less    Drinks per session: 1 or 2    Binge frequency: Never    Comment: rarely     Social History     Substance and Sexual Activity   Drug Use No     Social History     Tobacco Use   Smoking Status Never Smoker   Smokeless Tobacco Never Used     Family History   Problem Relation Age of Onset    Hypertension Mother     Hypothyroidism Mother     Hypertension Father     Skin cancer Father     Colon cancer Maternal Aunt     Colon cancer Paternal Aunt         62s    Skin cancer Paternal Uncle      Meds/Allergies   No Known Allergies    Meds:  Prior to Admission medications    Medication Sig Start Date End Date Taking? Authorizing Provider   aspirin 81 MG tablet Take by mouth   Yes Historical Provider, MD   atenolol (TENORMIN) 50 mg tablet TAKE 1 TABLET DAILY 11/22/19  Yes Poppy Michaels MD   cholecalciferol (VITAMIN D3) 1,000 units tablet Take 1,000 Units by mouth daily   Yes Historical Provider, MD   hydrochlorothiazide (HYDRODIURIL) 25 mg tablet TAKE 1 TABLET DAILY   11/19/19  Yes JAMES Monzon   levothyroxine 175 mcg tablet Take 1 tablet (175 mcg total) by mouth daily 10/8/19  Yes Gentry Henry MD   lovastatin (MEVACOR) 40 MG tablet Take 1 tablet (40 mg total) by mouth daily at bedtime 7/9/19  Yes Poppy Michaels MD   multivitamin [facility]) TABS Take 1 tablet by mouth daily    Yes Historical Provider, MD   sildenafil (VIAGRA) 50 MG tablet Take 1 tablet (50 mg total) by mouth daily as needed for erectile dysfunction 3/21/19  Yes JAMES Monzon   naproxen (NAPROSYN) 500 mg tablet Take 1 tablet (500 mg total) by mouth 2 (two) times a day with meals  Patient not taking: Reported on 11/18/2019 10/7/19   Sheila Ng DO       Subjective:     Review of Systems:    General: negative for - chills, fatigue, fever,  weight gain or weight loss  Psychological: negative for - anxiety, behavioral disorder, concentration difficulties, decreased libido, depression, irritability, memory difficulties, mood swings, sleep disturbances or suicidal ideation  ENT: negative for - hearing difficulties , nasal congestion, nasal discharge, oral lesions, sinus pain, sneezing, sore throat  Allergy and Immunology: negative for - hives, insect bite sensitivity,  Hematological and Lymphatic: negative for - bleeding problems, blood clots,bruising, swollen lymph nodes  Endocrine: negative for - hair pattern changes, hot flashes, malaise/lethargy, mood swings, palpitations, polydipsia/polyuria, skin changes, temperature intolerance or unexpected weight change  Respiratory: negative for - cough, hemoptysis, orthopnea, shortness of breath, or wheezing  Cardiovascular: negative for - chest pain, dyspnea on exertion, edema,  Gastrointestinal: negative for - abdominal pain, nausea/vomiting  Genito-Urinary: negative for - dysuria, incontinence, irregular/heavy menses or urinary frequency/urgency  Musculoskeletal: negative for - gait disturbance, joint pain, joint stiffness, joint swelling, muscle pain, muscular weakness  Dermatological:  As in HPI  Neurological: negative for confusion, dizziness, headaches, impaired coordination/balance, memory loss, numbness/tingling, seizures, speech problems, tremors or weakness       Objective: There were no vitals taken for this visit      Physical Exam:    General Appearance:    Alert, cooperative, no distress   Head:    Normocephalic, without obvious abnormality, atraumatic   Lymphatics:    No lymphadenopathy noted      Abdomen:   No hepatosplenomegaly   Skin:   A full skin exam was performed including scalp, head scalp, eyes, ears, nose, lips, neck, chest, axilla, abdomen, back, buttocks, bilateral upper extremities, bilateral lower extremities, hands, feet, fingers, toes, fingernails, and toenails previous site of melanoma excision noted also pigmented lesion noted on the right back and right inframammary areas photos taking to as baseline the larger lesion on the back measures 1 1 cm size normal keratotic papules greasy stuck on appearance nothing else atypical noted on exam normal pigmented lesions regular shape and color             Assessment:     1  Nevus     2  Seborrheic keratosis     3  Screening for skin condition     4  History of melanoma           Plan:   Nevi reviewed the concept of ABCDE and ugly duckling nothing markedly atypical patient reassured  Seborrheic keratosis patient reassured these are normal growths we acquire with age no treatment needed  History of melanoma recurrence nothing else atypical sunblock recommended follow-up in 6 months  Screening for dermatologic disorders nothing else of concern noted on complete exam follow-up in 6 months    Shamika Mario MD  12/16/2019,3:28 PM    Portions of the record may have been created with voice recognition software   Occasional wrong word or "sound a like" substitutions may have occurred due to the inherent limitations of voice recognition software   Read the chart carefully and recognize, using context, where substitutions have occurred

## 2019-12-19 ENCOUNTER — OFFICE VISIT (OUTPATIENT)
Dept: SURGICAL ONCOLOGY | Facility: CLINIC | Age: 69
End: 2019-12-19
Payer: MEDICARE

## 2019-12-19 VITALS
RESPIRATION RATE: 18 BRPM | DIASTOLIC BLOOD PRESSURE: 80 MMHG | HEART RATE: 68 BPM | BODY MASS INDEX: 30.24 KG/M2 | WEIGHT: 216 LBS | HEIGHT: 71 IN | SYSTOLIC BLOOD PRESSURE: 110 MMHG

## 2019-12-19 DIAGNOSIS — C73 FOLLICULAR THYROID CANCER (HCC): Primary | ICD-10-CM

## 2019-12-19 DIAGNOSIS — C73 THYROID CANCER (HCC): ICD-10-CM

## 2019-12-19 DIAGNOSIS — C77.3 MALIGNANT NEOPLASM METASTATIC TO LYMPH NODE OF AXILLA (HCC): ICD-10-CM

## 2019-12-19 DIAGNOSIS — K46.9 HERNIA OF ABDOMINAL CAVITY: ICD-10-CM

## 2019-12-19 PROCEDURE — 99214 OFFICE O/P EST MOD 30 MIN: CPT | Performed by: SURGERY

## 2019-12-19 NOTE — PROGRESS NOTES
Surgical Oncology Follow Up       Heather  43  [facility]  [facility] [place]  [address] [facility] [address]  1950  [phone]  [facility]  [facility]  [address] [place] [address] [place] [address]    Diagnoses and all orders for this visit:    Follicular thyroid cancer ([address] )    Thyroid cancer ([address] )    Malignant neoplasm metastatic to lymph node of axilla ([address] )  -     BUN; Future  -     Creatinine, serum; Future  -     CT chest abdomen pelvis w contrast; Future  -     CT chest abdomen pelvis w contrast; Future  -     BUN; Future  -     Creatinine, serum; Future    Hernia of abdominal cavity  -     Ambulatory referral to General Surgery; Future        Chief Complaint   Patient presents with    Follow-up     3 months to discuss completion thyroidectomy       Return in about 6 months (around 6/19/2020) for Office Visit, Imaging - See orders  Personal history of malignant melanoma    10/16/2018 Initial Diagnosis     Malignant melanoma of skin of abdomen ([address] )      12/14/2018 Surgery     Wide excision of abdomen with sentinel lymph node biopsies (right and left axillae)        Thyroid cancer ([address] )    4/24/2019 Initial Diagnosis     Thyroid cancer ([address] ) - isthmus/left hemithyroidectomy - invasive papillary and minimally invasive follicular carcinomas         Staging: [id number] melanoma of the abdomen  December 2018  Positive sentinel node in the right axilla     [id number] 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, April 2019     Treatment history:   Wide excision with sentinel lymph node biopsy December 2018  Left thyroid lobectomy, April 2019  Current treatment:   Observation for the melanoma  TSH suppression for the thyroid cancer  Disease status:   SYLVAIN    History of Present Illness:  Patient returns in follow-up of his melanoma and thyroid cancer    He is doing well at this time with no complaints  His voice has returned to normal   He denies any new abdominal pain, nausea, vomiting, back pain, bone pain, headaches, or cough  He has not had a recent CT  He continues to follow-up with his dermatologist     Review of Systems  Complete ROS Surg Onc:   Complete ROS Surg Onc:   Constitutional: The patient denies new or recent history of general fatigue, no recent weight loss, no change in appetite  Eyes: No complaints of visual problems, no scleral icterus  ENT: no complaints of ear pain, no hoarseness, no difficulty swallowing,  no tinnitus and no new masses in head, oral cavity, or neck  Cardiovascular: No complaints of chest pain, no palpitations, no ankle edema  Respiratory: No complaints of shortness of breath, no cough  Gastrointestinal: No complaints of jaundice, no bloody stools, no pale stools  Genitourinary: No complaints of dysuria, no hematuria, no nocturia, no frequent urination, no urethral discharge  Musculoskeletal: No complaints of weakness, paralysis, joint stiffness or arthralgias  Integumentary: No complaints of rash, no new lesions  Neurological: No complaints of convulsions, no seizures, no dizziness  Hematologic/Lymphatic: No complaints of easy bruising  Endocrine:  No hot or cold intolerance  No polydipsia, polyphagia, or polyuria  Allergy/immunology:  No environmental allergies  No food allergies  Not immunocompromised  Skin:  No pallor or rash  No wound          Patient Active Problem List   Diagnosis    Actinic keratosis    Benign essential hypertension    BPH (benign prostatic hyperplasia)    Erectile dysfunction    GERD without esophagitis    Hyperlipidemia    Impaired fasting glucose    Personal history of malignant melanoma    Liver mass    Other specified soft tissue disorders    Multiple thyroid nodules    [facility] [address] [facility]    Encounter for follow-up surveillance of melanoma    Hoarseness of voice    Vocal cord paralysis    Fatigue    Vitamin D deficiency    Postoperative hypothyroidism    Papillary thyroid carcinoma (HCC)    Follicular thyroid cancer (HCC)     Past Medical History:   Diagnosis Date    Arthritis     Cancer [facility] )     melanoma abdomen    GERD (gastroesophageal reflux disease)     diet controlled    Hyperlipidemia     Last Assessed:10/20/14    Hypertension     PONV (postoperative nausea and vomiting)      Past Surgical History:   Procedure Laterality Date    COLONOSCOPY      HERNIA REPAIR Right     Last Assessed:10/20/14 inguinal     LYMPH NODE BIOPSY N/A 12/14/2018    Procedure: SENTINEL LYMPH NODE BIOPSY; LYMPHOSCINTIGRAPHY; INTRAOPERATIVE LYMPHATIC MAPPING (INJECT AT 1100);   Surgeon: Sally Palumbo MD;  [facility];  Service: Surgical Oncology    SKIN LESION EXCISION N/A 12/14/2018    Procedure: MEDIAL UPPER ABDOMEN MELANOMA WIDE EXCISION; BILATERAL AXILLA SLN;  Surgeon: Sally Palumbo MD;  [facility];  Service: Surgical Oncology    THYROID LOBECTOMY Left 4/24/2019    Procedure: HEMITHYROIDECTOMY ISTHMUSECTOMY;  Surgeon: Sally Palumbo MD;  [facility];  Service: Surgical Oncology    TONSILLECTOMY      US GUIDED THYROID BIOPSY  2/7/2019    WISDOM TOOTH EXTRACTION       Family History   Problem Relation Age of Onset    Hypertension Mother     Hypothyroidism Mother     Hypertension Father     Skin cancer Father     Colon cancer Maternal Aunt     Colon cancer Paternal Aunt         62s    Skin cancer Paternal Uncle      Social History     Socioeconomic History    Marital status: /Civil Union     Spouse name: Not on file    Number of children: Not on file    Years of education: Not on file    Highest education level: Not on file   Occupational History    Not on file   Social Needs    Financial resource strain: Not on file    Food insecurity:     Worry: Not on file     Inability: Not on file    Transportation needs:     Medical: Not on file Non-medical: Not on file   Tobacco Use    Smoking status: Never Smoker    Smokeless tobacco: Never Used   Substance and Sexual Activity    Alcohol use: Yes     Alcohol/week: 2 0 standard drinks     Types: 1 Glasses of wine, 1 Cans of beer per week     Frequency: Monthly or less     Drinks per session: 1 or 2     Binge frequency: Never     Comment: rarely    Drug use: No    Sexual activity: Yes     Partners: Female   Lifestyle    Physical activity:     Days per week: 4 days     Minutes per session: 30 min    Stress: Not at all   Relationships    Social connections:     Talks on phone: Not on file     Gets together: Not on file     Attends Yazidi service: Not on file     Active member of club or organization: Not on file     Attends meetings of clubs or organizations: Not on file     Relationship status: Not on file    Intimate partner violence:     Fear of current or ex partner: Not on file     Emotionally abused: Not on file     Physically abused: Not on file     Forced sexual activity: Not on file   Other Topics Concern    Not on file   Social History Narrative    No advance directives       Current Outpatient Medications:     aspirin 81 MG tablet, Take by mouth, Disp: , Rfl:     atenolol (TENORMIN) 50 mg tablet, TAKE 1 TABLET DAILY, Disp: 90 tablet, Rfl: 1    cholecalciferol (VITAMIN D3) 1,000 units tablet, Take 1,000 Units by mouth daily, Disp: , Rfl:     hydrochlorothiazide (HYDRODIURIL) 25 mg tablet, TAKE 1 TABLET DAILY  , Disp: 90 tablet, Rfl: 1    levothyroxine 175 mcg tablet, Take 1 tablet (175 mcg total) by mouth daily, Disp: 90 tablet, Rfl: 3    lovastatin (MEVACOR) 40 MG tablet, Take 1 tablet (40 mg total) by mouth daily at bedtime, Disp: 90 tablet, Rfl: 2    multivitamin (THERAGRAN) TABS, Take 1 tablet by mouth daily , Disp: , Rfl:     naproxen (NAPROSYN) 500 mg tablet, Take 1 tablet (500 mg total) by mouth 2 (two) times a day with meals, Disp: 30 tablet, Rfl: 1    sildenafil (VIAGRA) 50 MG tablet, Take 1 tablet (50 mg total) by mouth daily as needed for erectile dysfunction, Disp: 30 tablet, Rfl: 3  No Known Allergies  Vitals:    12/19/19 1007   BP: 110/80   Pulse: 68   Resp: 18       Physical Exam  Constitutional: General appearance: The Patient is well-developed and well-nourished who appears the stated age in no acute distress  Patient is pleasant and talkative  HEENT:  Normocephalic  Sclerae are anicteric  Mucous membranes are moist  Neck is supple without adenopathy  No JVD  I do not appreciate any new or recurrent neck masses  Chest: The lungs are clear to auscultation  Cardiac: Heart is regular rate  Abdomen: Abdomen is soft, non-tender, non-distended and without masses  Extremities: There is no clubbing or cyanosis  There is no edema  Symmetric  Neuro: Grossly nonfocal  Gait is normal      Lymphatic: No evidence of cervical adenopathy bilaterally  No evidence of axillary adenopathy bilaterally  No evidence of inguinal adenopathy bilaterally  Skin: Warm, anicteric    wide excision site has healed  Of recurrence or in-transit disease  Psych:  Patient is pleasant and talkative  Breasts:        Pathology:  [unfilled]    Labs:      Imaging  No results found  I reviewed the above laboratory and imaging data  Discussion/Summary: 73-year-old male with a [id number] papillary carcinoma of the thyroid and a [id number] invasive follicular carcinoma of the thyroid  He also has a [id number] melanoma  From the melanoma standpoint he is clinically SYLVAIN at 1year  There is no evidence of recurrence by physical exam, imaging or symptomatology  He has not had any recent imaging  We will repeat his CT at this time  I will call him with the results  Assuming this is negative seeing again 6 with a CT     He has met with Endocrinology and ideally, completion thyroidectomy with radioactive iodine was recommended, but because of his postoperative hoarseness which has resolved, he did not completion thyroidectomy and radioactive iodine  He is going with TSH suppression at this time  I think this is reasonable  I discussed that based on his pathology there is a 3-80% risk recurrence TSH suppression should be adequate  He is already scheduled for a follow-up neck ultrasound May  I will see him back in 6 months  He is agreeable to this     All of his questions were answered

## 2019-12-19 NOTE — LETTER
December 19, 2019     Sherrie Diaz MD  [address], [address] [address]    Patient: Monica Shin   YOB: 1950   Date of Visit: 12/19/2019       Dear Dr Cabrera Nafisa: Thank you for referring Monica Shin to me for evaluation  Below are my notes for this consultation  If you have questions, please do not hesitate to call me  I look forward to following your patient along with you  Sincerely,        Mirela Thornton MD        CC: MD Celestina Strong MD Juliana Lora, MD  12/19/2019 10:35 AM  Incomplete               Surgical Oncology Follow Up       [address]  [facility] [place]  [facility] SURGICAL ONCOLOGY [address]  1950  [id number]  [facility]  CANCER [facility] SURGICAL ONCOLOGY [place]  [address]  [facility] [address]    Diagnoses and all orders for this visit:    Follicular thyroid cancer ([address] )    Thyroid cancer ([address] )    Malignant neoplasm metastatic to lymph node of axilla ([address] )  -     BUN; Future  -     Creatinine, serum; Future  -     CT chest abdomen pelvis w contrast; Future  -     CT chest abdomen pelvis w contrast; Future  -     BUN; Future  -     Creatinine, serum; Future    Hernia of abdominal cavity  -     Ambulatory referral to General Surgery; Future        Chief Complaint   Patient presents with    Follow-up     3 months to discuss completion thyroidectomy       Return in about 6 months (around 6/19/2020) for Office Visit, Imaging - See orders           Personal history of malignant melanoma    10/16/2018 Initial Diagnosis     Malignant melanoma of skin of abdomen ([address] )      12/14/2018 Surgery     Wide excision of abdomen with sentinel lymph node biopsies (right and left axillae)        Thyroid cancer ([address] )    4/24/2019 Initial Diagnosis     Thyroid cancer ([address] ) - isthmus/left hemithyroidectomy - invasive papillary and minimally invasive follicular carcinomas         Staging: [id number] melanoma of the abdomen  December 2018  Positive sentinel node in the right axilla     [id number] 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, April 2019     Treatment history:   Wide excision with sentinel lymph node biopsy December 2018  Left thyroid lobectomy, April 2019  Current treatment:   Observation for the melanoma  TSH suppression for the thyroid cancer  Disease status:   SYLVAIN    History of Present Illness:  Patient returns in follow-up of his melanoma and thyroid cancer  He is doing well at this time with no complaints  His voice has returned to normal   He denies any new abdominal pain, nausea, vomiting, back pain, bone pain, headaches, or cough  He has not had a recent CT  He continues to follow-up with his dermatologist     Review of Systems  Complete ROS Surg Onc:   Complete ROS Surg Onc:   Constitutional: The patient denies new or recent history of general fatigue, no recent weight loss, no change in appetite  Eyes: No complaints of visual problems, no scleral icterus  ENT: no complaints of ear pain, no hoarseness, no difficulty swallowing,  no tinnitus and no new masses in head, oral cavity, or neck  Cardiovascular: No complaints of chest pain, no palpitations, no ankle edema  Respiratory: No complaints of shortness of breath, no cough  Gastrointestinal: No complaints of jaundice, no bloody stools, no pale stools  Genitourinary: No complaints of dysuria, no hematuria, no nocturia, no frequent urination, no urethral discharge  Musculoskeletal: No complaints of weakness, paralysis, joint stiffness or arthralgias  Integumentary: No complaints of rash, no new lesions  Neurological: No complaints of convulsions, no seizures, no dizziness  Hematologic/Lymphatic: No complaints of easy bruising  Endocrine:  No hot or cold intolerance  No polydipsia, polyphagia, or polyuria    Allergy/immunology:  No environmental allergies  No food allergies  Not immunocompromised  Skin:  No pallor or rash  No wound  Patient Active Problem List   Diagnosis    Actinic keratosis    Benign essential hypertension    BPH (benign prostatic hyperplasia)    Erectile dysfunction    GERD without esophagitis    Hyperlipidemia    Impaired fasting glucose    Personal history of malignant melanoma    Liver mass    Other specified soft tissue disorders    Multiple thyroid nodules    Thyroid cancer ([facility] )    Encounter for follow-up surveillance of melanoma    Hoarseness of voice    Vocal cord paralysis    Fatigue    Vitamin D deficiency    Postoperative hypothyroidism    Papillary thyroid carcinoma ([place] [address] )    Follicular thyroid cancer ([place] [address] )     Past Medical History:   Diagnosis Date    Arthritis     Cancer ([facility] )     melanoma abdomen    GERD (gastroesophageal reflux disease)     diet controlled    Hyperlipidemia     Last Assessed:10/20/14    Hypertension     PONV (postoperative nausea and vomiting)      Past Surgical History:   Procedure Laterality Date    COLONOSCOPY      HERNIA REPAIR Right     Last Assessed:10/20/14 inguinal     LYMPH NODE BIOPSY N/A 12/14/2018    Procedure: SENTINEL LYMPH NODE BIOPSY; LYMPHOSCINTIGRAPHY; INTRAOPERATIVE LYMPHATIC MAPPING (INJECT AT 1100);   Surgeon: Connor Sosa MD;  [facility];  Service: Surgical Oncology    SKIN LESION EXCISION N/A 12/14/2018    Procedure: MEDIAL UPPER ABDOMEN MELANOMA WIDE EXCISION; BILATERAL AXILLA SLN;  Surgeon: Connor Soas MD;  [facility];  Service: Surgical Oncology    THYROID LOBECTOMY Left 4/24/2019    Procedure: HEMITHYROIDECTOMY ISTHMUSECTOMY;  Surgeon: Connor Sosa MD;  [facility];  Service: Surgical Oncology    TONSILLECTOMY      US GUIDED THYROID BIOPSY  2/7/2019    WISDOM TOOTH EXTRACTION       Family History   Problem Relation Age of Onset    Hypertension Mother     Hypothyroidism Mother    Tom Galicia Hypertension Father     Skin cancer Father     Colon cancer Maternal Aunt     Colon cancer Paternal Aunt         62s    Skin cancer Paternal Uncle      Social History     Socioeconomic History    Marital status: /Civil Union     Spouse name: Not on file    Number of children: Not on file    Years of education: Not on file    Highest education level: Not on file   Occupational History    Not on file   Social Needs    Financial resource strain: Not on file    Food insecurity:     Worry: Not on file     Inability: Not on file    Transportation needs:     Medical: Not on file     Non-medical: Not on file   Tobacco Use    Smoking status: Never Smoker    Smokeless tobacco: Never Used   Substance and Sexual Activity    Alcohol use:  Yes     Alcohol/week: 2 0 standard drinks     Types: 1 Glasses of wine, 1 Cans of beer per week     Frequency: Monthly or less     Drinks per session: 1 or 2     Binge frequency: Never     Comment: rarely    Drug use: No    Sexual activity: Yes     Partners: Female   Lifestyle    Physical activity:     Days per week: 4 days     Minutes per session: 30 min    Stress: Not at all   Relationships    Social connections:     Talks on phone: Not on file     Gets together: Not on file     Attends Religion service: Not on file     Active member of club or organization: Not on file     Attends meetings of clubs or organizations: Not on file     Relationship status: Not on file    Intimate partner violence:     Fear of current or ex partner: Not on file     Emotionally abused: Not on file     Physically abused: Not on file     Forced sexual activity: Not on file   Other Topics Concern    Not on file   Social History Narrative    No advance directives       Current Outpatient Medications:     aspirin 81 MG tablet, Take by mouth, Disp: , Rfl:     atenolol (TENORMIN) 50 mg tablet, TAKE 1 TABLET DAILY, Disp: 90 tablet, Rfl: 1    cholecalciferol (VITAMIN D3) 1,000 units tablet, Take 1,000 Units by mouth daily, Disp: , Rfl:     hydrochlorothiazide (HYDRODIURIL) 25 mg tablet, TAKE 1 TABLET DAILY  , Disp: 90 tablet, Rfl: 1    levothyroxine 175 mcg tablet, Take 1 tablet (175 mcg total) by mouth daily, Disp: 90 tablet, Rfl: 3    lovastatin (MEVACOR) 40 MG tablet, Take 1 tablet (40 mg total) by mouth daily at bedtime, Disp: 90 tablet, Rfl: 2    multivitamin (THERAGRAN) TABS, Take 1 tablet by mouth daily , Disp: , Rfl:     naproxen (NAPROSYN) 500 mg tablet, Take 1 tablet (500 mg total) by mouth 2 (two) times a day with meals, Disp: 30 tablet, Rfl: 1    sildenafil (VIAGRA) 50 MG tablet, Take 1 tablet (50 mg total) by mouth daily as needed for erectile dysfunction, Disp: 30 tablet, Rfl: 3  No Known Allergies  Vitals:    12/19/19 1007   BP: 110/80   Pulse: 68   Resp: 18       Physical Exam  Constitutional: General appearance: The Patient is well-developed and well-nourished who appears the stated age in no acute distress  Patient is pleasant and talkative  HEENT:  Normocephalic  Sclerae are anicteric  Mucous membranes are moist  Neck is supple without adenopathy  No JVD  I do not appreciate any new or recurrent neck masses  Chest: The lungs are clear to auscultation  Cardiac: Heart is regular rate  Abdomen: Abdomen is soft, non-tender, non-distended and without masses  Extremities: There is no clubbing or cyanosis  There is no edema  Symmetric  Neuro: Grossly nonfocal  Gait is normal      Lymphatic: No evidence of cervical adenopathy bilaterally  No evidence of axillary adenopathy bilaterally  No evidence of inguinal adenopathy bilaterally  Skin: Warm, anicteric     wide excision site has healed  Of recurrence or in-transit disease  Psych:  Patient is pleasant and talkative  Breasts:        Pathology:  [unfilled]    Labs:      Imaging  No results found  I reviewed the above laboratory and imaging data      Discussion/Summary: 61-year-old male with a M1pFiE1 papillary carcinoma of the thyroid and a [id number] LADWPFTHQ invasive follicular carcinoma of the thyroid  He also has a [id number] melanoma  From the melanoma standpoint he is clinically SYLVAIN at 1year  There is no evidence of recurrence by physical exam, imaging or symptomatology  He has not had any recent imaging  We will repeat his CT at this time  I will call him with the results  Assuming this is negative seeing again 6 with a CT  He has met with Endocrinology and ideally, completion thyroidectomy with radioactive iodine was recommended, but because of his postoperative hoarseness which has resolved, he did not completion thyroidectomy and radioactive iodine  He is going with TSH suppression at this time  I think this is reasonable  I discussed that based on his pathology there is a 3-80% risk recurrence TSH suppression should be adequate  He is already scheduled for a follow-up neck ultrasound May  I will see him back in 6 months  He is agreeable to this  All of his questions were answered          Dimple Pereira MD  12/19/2019 10:35 AM  Sign at close encounter               Surgical Oncology Follow Up       [address] ROS[address]INDA Tapia   1950  [id number]  [facility]  [facility] SURGICAL ONCOLOGY [place]  [address]  [facility] [address]    Diagnoses and all orders for this visit:    Follicular thyroid cancer [facility])    Thyroid cancer [facility])        Chief Complaint   Patient presents with    Follow-up     3 months to discuss completion thyroidectomy       No follow-ups on file           Personal history of malignant melanoma    10/16/2018 Initial Diagnosis     Malignant melanoma of skin of abdomen (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, April 2019     Treatment history:   Wide excision with sentinel lymph node biopsy December 2018  Left thyroid lobectomy, April 2019  Current treatment:   Observation for the melanoma  TSH suppression for the thyroid cancer  Disease status:   SYLVAIN    History of Present Illness:  Patient returns in follow-up of his melanoma and thyroid cancer  He is doing well at this time with no complaints  His voice has returned to normal   He denies any new abdominal pain, nausea, vomiting, back pain, bone pain, headaches, or cough  He has not had a recent CT  He continues to follow-up with his dermatologist     Review of Systems  Complete ROS Surg Onc:   Complete ROS Surg Onc:   Constitutional: The patient denies new or recent history of general fatigue, no recent weight loss, no change in appetite  Eyes: No complaints of visual problems, no scleral icterus  ENT: no complaints of ear pain, no hoarseness, no difficulty swallowing,  no tinnitus and no new masses in head, oral cavity, or neck  Cardiovascular: No complaints of chest pain, no palpitations, no ankle edema  Respiratory: No complaints of shortness of breath, no cough  Gastrointestinal: No complaints of jaundice, no bloody stools, no pale stools  Genitourinary: No complaints of dysuria, no hematuria, no nocturia, no frequent urination, no urethral discharge  Musculoskeletal: No complaints of weakness, paralysis, joint stiffness or arthralgias  Integumentary: No complaints of rash, no new lesions  Neurological: No complaints of convulsions, no seizures, no dizziness  Hematologic/Lymphatic: No complaints of easy bruising     Endocrine:  No hot or cold intolerance  No polydipsia, polyphagia, or polyuria  Allergy/immunology:  No environmental allergies  No food allergies  Not immunocompromised  Skin:  No pallor or rash  No wound  Patient Active Problem List   Diagnosis    Actinic keratosis    Benign essential hypertension    BPH (benign prostatic hyperplasia)    Erectile dysfunction    GERD without esophagitis    Hyperlipidemia    Impaired fasting glucose    Personal history of malignant melanoma    Liver mass    Other specified soft tissue disorders    Multiple thyroid nodules    Thyroid cancer ([place] [address] )    Encounter for follow-up surveillance of melanoma    Hoarseness of voice    Vocal cord paralysis    Fatigue    Vitamin D deficiency    Postoperative hypothyroidism    Papillary thyroid carcinoma ([place] [address] )    Follicular thyroid cancer ([place] [address] )     Past Medical History:   Diagnosis Date    Arthritis     Cancer ([place] [address] )     melanoma abdomen    GERD (gastroesophageal reflux disease)     diet controlled    Hyperlipidemia     Last Assessed:10/20/14    Hypertension     PONV (postoperative nausea and vomiting)      Past Surgical History:   Procedure Laterality Date    COLONOSCOPY      HERNIA REPAIR Right     Last Assessed:10/20/14 inguinal     LYMPH NODE BIOPSY N/A 12/14/2018    Procedure: SENTINEL LYMPH NODE BIOPSY; LYMPHOSCINTIGRAPHY; INTRAOPERATIVE LYMPHATIC MAPPING (INJECT AT 1100);   Surgeon: Mirela Thornton MD;  [facility];  Service: Surgical Oncology    SKIN LESION EXCISION N/A 12/14/2018    Procedure: MEDIAL UPPER ABDOMEN MELANOMA WIDE EXCISION; BILATERAL AXILLA SLN;  Surgeon: Mirela Thornton MD;  [facility];  Service: Surgical Oncology    THYROID LOBECTOMY Left 4/24/2019    Procedure: HEMITHYROIDECTOMY ISTHMUSECTOMY;  Surgeon: Mirela Thornton MD;  [facility];  Service: Surgical Oncology    TONSILLECTOMY      US GUIDED THYROID BIOPSY  2/7/2019    WISDOM TOOTH EXTRACTION       Family History Problem Relation Age of Onset    Hypertension Mother     Hypothyroidism Mother     Hypertension Father     Skin cancer Father     Colon cancer Maternal Aunt     Colon cancer Paternal Aunt         62s    Skin cancer Paternal Uncle      Social History     Socioeconomic History    Marital status: /Civil Union     Spouse name: Not on file    Number of children: Not on file    Years of education: Not on file    Highest education level: Not on file   Occupational History    Not on file   Social Needs    Financial resource strain: Not on file    Food insecurity:     Worry: Not on file     Inability: Not on file    Transportation needs:     Medical: Not on file     Non-medical: Not on file   Tobacco Use    Smoking status: Never Smoker    Smokeless tobacco: Never Used   Substance and Sexual Activity    Alcohol use:  Yes     Alcohol/week: 2 0 standard drinks     Types: 1 Glasses of wine, 1 Cans of beer per week     Frequency: Monthly or less     Drinks per session: 1 or 2     Binge frequency: Never     Comment: rarely    Drug use: No    Sexual activity: Yes     Partners: Female   Lifestyle    Physical activity:     Days per week: 4 days     Minutes per session: 30 min    Stress: Not at all   Relationships    Social connections:     Talks on phone: Not on file     Gets together: Not on file     Attends Yazidi service: Not on file     Active member of club or organization: Not on file     Attends meetings of clubs or organizations: Not on file     Relationship status: Not on file    Intimate partner violence:     Fear of current or ex partner: Not on file     Emotionally abused: Not on file     Physically abused: Not on file     Forced sexual activity: Not on file   Other Topics Concern    Not on file   Social History Narrative    No advance directives       Current Outpatient Medications:     aspirin 81 MG tablet, Take by mouth, Disp: , Rfl:     atenolol (TENORMIN) 50 mg tablet, TAKE 1 TABLET DAILY, Disp: 90 tablet, Rfl: 1    cholecalciferol (VITAMIN D3) 1,000 units tablet, Take 1,000 Units by mouth daily, Disp: , Rfl:     hydrochlorothiazide (HYDRODIURIL) 25 mg tablet, TAKE 1 TABLET DAILY  , Disp: 90 tablet, Rfl: 1    levothyroxine 175 mcg tablet, Take 1 tablet (175 mcg total) by mouth daily, Disp: 90 tablet, Rfl: 3    lovastatin (MEVACOR) 40 MG tablet, Take 1 tablet (40 mg total) by mouth daily at bedtime, Disp: 90 tablet, Rfl: 2    multivitamin (THERAGRAN) TABS, Take 1 tablet by mouth daily , Disp: , Rfl:     naproxen (NAPROSYN) 500 mg tablet, Take 1 tablet (500 mg total) by mouth 2 (two) times a day with meals, Disp: 30 tablet, Rfl: 1    sildenafil (VIAGRA) 50 MG tablet, Take 1 tablet (50 mg total) by mouth daily as needed for erectile dysfunction, Disp: 30 tablet, Rfl: 3  No Known Allergies  Vitals:    12/19/19 1007   BP: 110/80   Pulse: 68   Resp: 18       Physical Exam  Constitutional: General appearance: The Patient is well-developed and well-nourished who appears the stated age in no acute distress  Patient is pleasant and talkative  HEENT:  Normocephalic  Sclerae are anicteric  Mucous membranes are moist  Neck is supple without adenopathy  No JVD  I do not appreciate any new or recurrent neck masses  Chest: The lungs are clear to auscultation  Cardiac: Heart is regular rate  Abdomen: Abdomen is soft, non-tender, non-distended and without masses  Extremities: There is no clubbing or cyanosis  There is no edema  Symmetric  Neuro: Grossly nonfocal  Gait is normal      Lymphatic: No evidence of cervical adenopathy bilaterally  No evidence of axillary adenopathy bilaterally  No evidence of inguinal adenopathy bilaterally  Skin: Warm, anicteric     wide excision site has healed  Of recurrence or in-transit disease  Psych:  Patient is pleasant and talkative  Breasts:        Pathology:  [unfilled]    Labs:      Imaging  No results found    I reviewed the above laboratory and imaging data  Discussion/Summary: 79-year-old male with a [id number] papillary carcinoma of the thyroid and a [id number] PICINHDOG invasive follicular carcinoma of the thyroid  He also has a [id number] melanoma  From the melanoma standpoint he is clinically SYLVAIN at 1year  There is no evidence of recurrence by physical exam, imaging or symptomatology  He has not had any recent imaging  We will repeat his CT at this time  I will call him with the results  Assuming this is negative seeing again 6 with a CT  He has met with Endocrinology and ideally, completion thyroidectomy with radioactive iodine was recommended, but because of his postoperative hoarseness which has resolved, he did not completion thyroidectomy and radioactive iodine  He is going with TSH suppression at this time  I think this is reasonable  I discussed that based on his pathology there is a 3-80% risk recurrence TSH suppression should be adequate  He is already scheduled for a follow-up neck ultrasound May  I will see him back in 6 months  He is agreeable to this     All of his questions were answered

## 2019-12-31 DIAGNOSIS — C73 PAPILLARY THYROID CARCINOMA (HCC): ICD-10-CM

## 2019-12-31 DIAGNOSIS — E89.0 POSTOPERATIVE HYPOTHYROIDISM: ICD-10-CM

## 2020-01-02 ENCOUNTER — HOSPITAL ENCOUNTER (OUTPATIENT)
Dept: CT IMAGING | Facility: HOSPITAL | Age: 70
Discharge: HOME | End: 2020-01-02
Attending: SURGERY
Payer: MEDICARE

## 2020-01-02 DIAGNOSIS — C77.3 MALIGNANT NEOPLASM METASTATIC TO LYMPH NODE OF AXILLA (HCC): ICD-10-CM

## 2020-01-02 PROCEDURE — 74177 CT ABD & PELVIS W/CONTRAST: CPT

## 2020-01-02 PROCEDURE — 71260 CT THORAX DX C+: CPT

## 2020-01-02 RX ORDER — LEVOTHYROXINE SODIUM 175 UG/1
175 TABLET ORAL DAILY
Qty: 90 TABLET | Refills: 3 | Status: SHIPPED | OUTPATIENT
Start: 2020-01-02 | End: 2020-12-24

## 2020-01-02 RX ADMIN — IOHEXOL 100 ML: 350 INJECTION, SOLUTION INTRAVENOUS at 08:42

## 2020-01-07 PROBLEM — K46.9 HERNIA OF ABDOMINAL CAVITY: Status: ACTIVE | Noted: 2020-01-07

## 2020-01-10 ENCOUNTER — APPOINTMENT (OUTPATIENT)
Dept: LAB | Facility: HOSPITAL | Age: 70
End: 2020-01-10
Payer: MEDICARE

## 2020-01-10 DIAGNOSIS — R73.01 IMPAIRED FASTING GLUCOSE: ICD-10-CM

## 2020-01-10 DIAGNOSIS — Z12.5 SCREENING FOR PROSTATE CANCER: ICD-10-CM

## 2020-01-10 DIAGNOSIS — E78.2 MIXED HYPERLIPIDEMIA: ICD-10-CM

## 2020-01-10 DIAGNOSIS — I10 BENIGN ESSENTIAL HYPERTENSION: ICD-10-CM

## 2020-01-10 DIAGNOSIS — C73 THYROID CANCER (HCC): ICD-10-CM

## 2020-01-10 LAB
ALBUMIN SERPL BCP-MCNC: 3.6 G/DL (ref 3.5–5)
ALP SERPL-CCNC: 74 U/L (ref 46–116)
ALT SERPL W P-5'-P-CCNC: 34 U/L (ref 12–78)
ANION GAP SERPL CALCULATED.3IONS-SCNC: 6 MMOL/L (ref 4–13)
AST SERPL W P-5'-P-CCNC: <5 U/L (ref 5–45)
BASOPHILS # BLD AUTO: 0.04 THOUSANDS/ΜL (ref 0–0.1)
BASOPHILS NFR BLD AUTO: 1 % (ref 0–1)
BILIRUB SERPL-MCNC: 0.7 MG/DL (ref 0.2–1)
BUN SERPL-MCNC: 18 MG/DL (ref 5–25)
CALCIUM SERPL-MCNC: 8.9 MG/DL (ref 8.3–10.1)
CHLORIDE SERPL-SCNC: 104 MMOL/L (ref 100–108)
CO2 SERPL-SCNC: 31 MMOL/L (ref 21–32)
CREAT SERPL-MCNC: 0.98 MG/DL (ref 0.6–1.3)
EOSINOPHIL # BLD AUTO: 0.07 THOUSAND/ΜL (ref 0–0.61)
EOSINOPHIL NFR BLD AUTO: 1 % (ref 0–6)
ERYTHROCYTE [DISTWIDTH] IN BLOOD BY AUTOMATED COUNT: 12.7 % (ref 11.6–15.1)
EST. AVERAGE GLUCOSE BLD GHB EST-MCNC: 100 MG/DL
GFR SERPL CREATININE-BSD FRML MDRD: 79 ML/MIN/1.73SQ M
GLUCOSE SERPL-MCNC: 117 MG/DL (ref 65–140)
HBA1C MFR BLD: 5.1 % (ref 4.2–6.3)
HCT VFR BLD AUTO: 48.8 % (ref 36.5–49.3)
HGB BLD-MCNC: 16.2 G/DL (ref 12–17)
IMM GRANULOCYTES # BLD AUTO: 0.03 THOUSAND/UL (ref 0–0.2)
IMM GRANULOCYTES NFR BLD AUTO: 0 % (ref 0–2)
LYMPHOCYTES # BLD AUTO: 1.6 THOUSANDS/ΜL (ref 0.6–4.47)
LYMPHOCYTES NFR BLD AUTO: 23 % (ref 14–44)
MCH RBC QN AUTO: 29.7 PG (ref 26.8–34.3)
MCHC RBC AUTO-ENTMCNC: 33.2 G/DL (ref 31.4–37.4)
MCV RBC AUTO: 89 FL (ref 82–98)
MONOCYTES # BLD AUTO: 0.59 THOUSAND/ΜL (ref 0.17–1.22)
MONOCYTES NFR BLD AUTO: 8 % (ref 4–12)
NEUTROPHILS # BLD AUTO: 4.67 THOUSANDS/ΜL (ref 1.85–7.62)
NEUTS SEG NFR BLD AUTO: 67 % (ref 43–75)
NRBC BLD AUTO-RTO: 0 /100 WBCS
PLATELET # BLD AUTO: 184 THOUSANDS/UL (ref 149–390)
PMV BLD AUTO: 10.8 FL (ref 8.9–12.7)
POTASSIUM SERPL-SCNC: 4 MMOL/L (ref 3.5–5.3)
PROT SERPL-MCNC: 7.1 G/DL (ref 6.4–8.2)
PSA SERPL-MCNC: 0.4 NG/ML (ref 0–4)
RBC # BLD AUTO: 5.46 MILLION/UL (ref 3.88–5.62)
SODIUM SERPL-SCNC: 141 MMOL/L (ref 136–145)
T4 FREE SERPL-MCNC: 1.5 NG/DL (ref 0.76–1.46)
TSH SERPL DL<=0.05 MIU/L-ACNC: 0.01 UIU/ML (ref 0.36–3.74)
WBC # BLD AUTO: 7 THOUSAND/UL (ref 4.31–10.16)

## 2020-01-10 PROCEDURE — G0103 PSA SCREENING: HCPCS

## 2020-01-10 PROCEDURE — 36415 COLL VENOUS BLD VENIPUNCTURE: CPT

## 2020-01-10 PROCEDURE — 83036 HEMOGLOBIN GLYCOSYLATED A1C: CPT

## 2020-01-10 PROCEDURE — 84439 ASSAY OF FREE THYROXINE: CPT

## 2020-01-10 PROCEDURE — 85025 COMPLETE CBC W/AUTO DIFF WBC: CPT

## 2020-01-10 PROCEDURE — 80053 COMPREHEN METABOLIC PANEL: CPT

## 2020-01-10 PROCEDURE — 84443 ASSAY THYROID STIM HORMONE: CPT

## 2020-01-11 ENCOUNTER — APPOINTMENT (OUTPATIENT)
Dept: LAB | Facility: HOSPITAL | Age: 70
End: 2020-01-11
Payer: MEDICARE

## 2020-01-11 LAB
CHOLEST SERPL-MCNC: 158 MG/DL (ref 50–200)
HDLC SERPL-MCNC: 41 MG/DL
LDLC SERPL CALC-MCNC: 97 MG/DL (ref 0–100)
NONHDLC SERPL-MCNC: 117 MG/DL
TRIGL SERPL-MCNC: 101 MG/DL

## 2020-01-11 PROCEDURE — 36415 COLL VENOUS BLD VENIPUNCTURE: CPT

## 2020-01-11 PROCEDURE — 80061 LIPID PANEL: CPT

## 2020-01-13 ENCOUNTER — OFFICE VISIT (OUTPATIENT)
Dept: INTERNAL MEDICINE CLINIC | Facility: CLINIC | Age: 70
End: 2020-01-13
Payer: MEDICARE

## 2020-01-13 VITALS
HEIGHT: 71 IN | RESPIRATION RATE: 16 BRPM | WEIGHT: 214.6 LBS | BODY MASS INDEX: 30.04 KG/M2 | HEART RATE: 76 BPM | SYSTOLIC BLOOD PRESSURE: 110 MMHG | DIASTOLIC BLOOD PRESSURE: 80 MMHG

## 2020-01-13 DIAGNOSIS — I10 BENIGN ESSENTIAL HYPERTENSION: ICD-10-CM

## 2020-01-13 DIAGNOSIS — C73 THYROID CANCER (HCC): ICD-10-CM

## 2020-01-13 DIAGNOSIS — Z85.820 PERSONAL HISTORY OF MALIGNANT MELANOMA: ICD-10-CM

## 2020-01-13 DIAGNOSIS — R73.01 IMPAIRED FASTING GLUCOSE: Primary | ICD-10-CM

## 2020-01-13 DIAGNOSIS — I10 ESSENTIAL HYPERTENSION: ICD-10-CM

## 2020-01-13 DIAGNOSIS — E78.2 MIXED HYPERLIPIDEMIA: ICD-10-CM

## 2020-01-13 DIAGNOSIS — C73 PAPILLARY THYROID CARCINOMA (HCC): ICD-10-CM

## 2020-01-13 PROCEDURE — 99214 OFFICE O/P EST MOD 30 MIN: CPT | Performed by: NURSE PRACTITIONER

## 2020-01-13 RX ORDER — HYDROCHLOROTHIAZIDE 25 MG/1
25 TABLET ORAL DAILY
Qty: 90 TABLET | Refills: 1 | Status: SHIPPED | OUTPATIENT
Start: 2020-01-13 | End: 2020-09-08

## 2020-01-13 RX ORDER — ATENOLOL 50 MG/1
50 TABLET ORAL DAILY
Qty: 90 TABLET | Refills: 1 | Status: SHIPPED | OUTPATIENT
Start: 2020-01-13 | End: 2020-09-08

## 2020-01-13 NOTE — PATIENT INSTRUCTIONS
History of hypertension stable on current medication     history of follicular and papillary thyroid cancer status post thyroidectomy TSH suppressed and stable     hyperlipidemia LDL at goal on statin continue same     melanoma in remission following with Medical Oncology     health maintenance up-to-date declines flu shot recommend shingles vaccine     discussed the importance of exercise     impaired fasting glucose no evidence of diabetes A1c is fantastic keep up the good work with low starch diet

## 2020-01-13 NOTE — PROGRESS NOTES
Assessment/Plan:    Patient Instructions     History of hypertension stable on current medication     history of follicular and papillary thyroid cancer status post thyroidectomy TSH suppressed and stable     hyperlipidemia LDL at goal on statin continue same     melanoma in remission following with Medical Oncology     health maintenance up-to-date declines flu shot recommend shingles vaccine     discussed the importance of exercise     impaired fasting glucose no evidence of diabetes A1c is fantastic keep up the good work with low starch diet         Diagnoses and all orders for this visit:    Impaired fasting glucose  -     Hemoglobin A1C; Future    Essential hypertension  -     atenolol (TENORMIN) 50 mg tablet; Take 1 tablet (50 mg total) by mouth daily  -     hydrochlorothiazide (HYDRODIURIL) 25 mg tablet; Take 1 tablet (25 mg total) by mouth daily    Thyroid cancer ([facility] [address] )    Papillary thyroid carcinoma (HCC)    Benign essential hypertension  -     CBC and differential; Future  -     Comprehensive metabolic panel; Future    Mixed hyperlipidemia  -     Lipid panel; Future  -     TSH, 3rd generation with Free T4 reflex;  Future    Personal history of malignant melanoma         Subjective:      Patient ID: Elsa Barrientos is a 81 y o  male    Active no formal exercise   hx melanoma and thyroid ca   ED stable on Viagra   no home bps but taking meds  Tolerating chol meds        Current Outpatient Medications:     aspirin 81 MG tablet, Take by mouth, Disp: , Rfl:     atenolol (TENORMIN) 50 mg tablet, Take 1 tablet (50 mg total) by mouth daily, Disp: 90 tablet, Rfl: 1    cholecalciferol (VITAMIN D3) 1,000 units tablet, Take 1,000 Units by mouth daily, Disp: , Rfl:     hydrochlorothiazide (HYDRODIURIL) 25 mg tablet, Take 1 tablet (25 mg total) by mouth daily, Disp: 90 tablet, Rfl: 1    levothyroxine 175 mcg tablet, Take 1 tablet (175 mcg total) by mouth daily, Disp: 90 tablet, Rfl: 3    lovastatin (MEVACOR) 40 MG tablet, Take 1 tablet (40 mg total) by mouth daily at bedtime, Disp: 90 tablet, Rfl: 2    multivitamin (THERAGRAN) TABS, Take 1 tablet by mouth daily , Disp: , Rfl:     naproxen (NAPROSYN) 500 mg tablet, Take 1 tablet (500 mg total) by mouth 2 (two) times a day with meals (Patient taking differently: Take 500 mg by mouth as needed ), Disp: 30 tablet, Rfl: 1    sildenafil (VIAGRA) 50 MG tablet, Take 1 tablet (50 mg total) by mouth daily as needed for erectile dysfunction, Disp: 30 tablet, Rfl: 3    Recent Results (from the past 1008 hour(s))   T4, free    Collection Time: 12/06/19  8:11 AM   Result Value Ref Range    Free T4 1 65 (H) 0 76 - 1 46 ng/dL   TSH, 3rd generation    Collection Time: 12/06/19  8:11 AM   Result Value Ref Range    TSH 3RD GENERATON 0 018 (L) 0 358 - 3 740 uIU/mL   Testosterone, free, total    Collection Time: 12/06/19  8:11 AM   Result Value Ref Range    Testosterone, Free 10 6 6 6 - 18 1 pg/mL    TESTOSTERONE TOTAL 378 264 - 916 ng/dL   CBC and differential    Collection Time: 12/06/19  8:11 AM   Result Value Ref Range    WBC 6 93 4 31 - 10 16 Thousand/uL    RBC 5 34 3 88 - 5 62 Million/uL    Hemoglobin 16 1 12 0 - 17 0 g/dL    Hematocrit 48 0 36 5 - 49 3 %    MCV 90 82 - 98 fL    MCH 30 1 26 8 - 34 3 pg    MCHC 33 5 31 4 - 37 4 g/dL    RDW 12 4 11 6 - 15 1 %    MPV 10 7 8 9 - 12 7 fL    Platelets 726 760 - 151 Thousands/uL    nRBC 0 /100 WBCs    Neutrophils Relative 63 43 - 75 %    Immat GRANS % 0 0 - 2 %    Lymphocytes Relative 24 14 - 44 %    Monocytes Relative 11 4 - 12 %    Eosinophils Relative 1 0 - 6 %    Basophils Relative 1 0 - 1 %    Neutrophils Absolute 4 37 1 85 - 7 62 Thousands/µL    Immature Grans Absolute 0 02 0 00 - 0 20 Thousand/uL    Lymphocytes Absolute 1 64 0 60 - 4 47 Thousands/µL    Monocytes Absolute 0 77 0 17 - 1 22 Thousand/µL    Eosinophils Absolute 0 09 0 00 - 0 61 Thousand/µL    Basophils Absolute 0 04 0 00 - 0 10 Thousands/µL   Anti-thyroglobulin antibody Collection Time: 12/06/19  8:11 AM   Result Value Ref Range    Thyroglobulin Ab <1 0 0 0 - 0 9 IU/mL   Anti-thyroglobulin antibody    Collection Time: 12/09/19 11:00 AM   Result Value Ref Range    Thyroglobulin Ab No serum received   IU/mL   CBC and differential    Collection Time: 01/10/20 11:22 AM   Result Value Ref Range    WBC 7 00 4 31 - 10 16 Thousand/uL    RBC 5 46 3 88 - 5 62 Million/uL    Hemoglobin 16 2 12 0 - 17 0 g/dL    Hematocrit 48 8 36 5 - 49 3 %    MCV 89 82 - 98 fL    MCH 29 7 26 8 - 34 3 pg    MCHC 33 2 31 4 - 37 4 g/dL    RDW 12 7 11 6 - 15 1 %    MPV 10 8 8 9 - 12 7 fL    Platelets 922 412 - 349 Thousands/uL    nRBC 0 /100 WBCs    Neutrophils Relative 67 43 - 75 %    Immat GRANS % 0 0 - 2 %    Lymphocytes Relative 23 14 - 44 %    Monocytes Relative 8 4 - 12 %    Eosinophils Relative 1 0 - 6 %    Basophils Relative 1 0 - 1 %    Neutrophils Absolute 4 67 1 85 - 7 62 Thousands/µL    Immature Grans Absolute 0 03 0 00 - 0 20 Thousand/uL    Lymphocytes Absolute 1 60 0 60 - 4 47 Thousands/µL    Monocytes Absolute 0 59 0 17 - 1 22 Thousand/µL    Eosinophils Absolute 0 07 0 00 - 0 61 Thousand/µL    Basophils Absolute 0 04 0 00 - 0 10 Thousands/µL   Comprehensive metabolic panel    Collection Time: 01/10/20 11:22 AM   Result Value Ref Range    Sodium 141 136 - 145 mmol/L    Potassium 4 0 3 5 - 5 3 mmol/L    Chloride 104 100 - 108 mmol/L    CO2 31 21 - 32 mmol/L    ANION GAP 6 4 - 13 mmol/L    BUN 18 5 - 25 mg/dL    Creatinine 0 98 0 60 - 1 30 mg/dL    Glucose 117 65 - 140 mg/dL    Calcium 8 9 8 3 - 10 1 mg/dL    AST <5 (L) 5 - 45 U/L    ALT 34 12 - 78 U/L    Alkaline Phosphatase 74 46 - 116 U/L    Total Protein 7 1 6 4 - 8 2 g/dL    Albumin 3 6 3 5 - 5 0 g/dL    Total Bilirubin 0 70 0 20 - 1 00 mg/dL    eGFR 79 ml/min/1 73sq m   Hemoglobin A1C    Collection Time: 01/10/20 11:22 AM   Result Value Ref Range    Hemoglobin A1C 5 1 4 2 - 6 3 %     mg/dl   TSH, 3rd generation with Free T4 reflex Collection Time: 01/10/20 11:22 AM   Result Value Ref Range    TSH 3RD GENERATON 0 008 (L) 0 358 - 3 740 uIU/mL   PSA, Total Screen    Collection Time: 01/10/20 11:22 AM   Result Value Ref Range    PSA 0 4 0 0 - 4 0 ng/mL   T4, free    Collection Time: 01/10/20 11:22 AM   Result Value Ref Range    Free T4 1 50 (H) 0 76 - 1 46 ng/dL   Lipid panel    Collection Time: 01/11/20  8:05 AM   Result Value Ref Range    Cholesterol 158 50 - 200 mg/dL    Triglycerides 101 <=150 mg/dL    HDL, Direct 41 >=40 mg/dL    LDL Calculated 97 0 - 100 mg/dL    Non-HDL-Chol (CHOL-HDL) 117 mg/dl       The following portions of the patient's history were reviewed and updated as appropriate: allergies, current medications, past family history, past medical history, past social history, past surgical history and problem list      Review of Systems   Constitutional: Negative for appetite change, chills, diaphoresis, fatigue, fever and unexpected weight change  HENT: Negative for postnasal drip and sneezing  Eyes: Negative for visual disturbance  Respiratory: Negative for chest tightness and shortness of breath  Cardiovascular: Negative for chest pain, palpitations and leg swelling  Gastrointestinal: Negative for abdominal pain and blood in stool  Endocrine: Negative for cold intolerance, heat intolerance, polydipsia, polyphagia and polyuria  Genitourinary: Negative for difficulty urinating, dysuria, frequency and urgency  Musculoskeletal: Negative for arthralgias and myalgias  Skin: Negative for rash and wound  Neurological: Negative for dizziness, weakness, light-headedness and headaches  Hematological: Negative for adenopathy  Psychiatric/Behavioral: Negative for confusion, dysphoric mood and sleep disturbance  The patient is not nervous/anxious            Objective:      /80 (BP Location: Left arm, Patient Position: Sitting, Cuff Size: Standard)   Pulse 76   Resp 16   Ht 5' 11" (1 803 m)   Wt 97 3 kg (214 lb 9 6 oz)   BMI 29 93 kg/m²        Physical Exam   Constitutional: He is oriented to person, place, and time  He appears well-developed  No distress  HENT:   Head: Normocephalic and atraumatic  Nose: Nose normal    Mouth/Throat: Oropharynx is clear and moist    Eyes: Pupils are equal, round, and reactive to light  Conjunctivae and EOM are normal    Neck: Normal range of motion  Neck supple  No JVD present  No tracheal deviation present  No thyromegaly present  Cardiovascular: Normal rate, regular rhythm, normal heart sounds and intact distal pulses  Exam reveals no gallop and no friction rub  No murmur heard  Pulmonary/Chest: Effort normal and breath sounds normal  No respiratory distress  He has no wheezes  He has no rales  Abdominal: Soft  Bowel sounds are normal  He exhibits no distension  There is no tenderness  Musculoskeletal: Normal range of motion  He exhibits no edema  Lymphadenopathy:     He has no cervical adenopathy  Neurological: He is alert and oriented to person, place, and time  No cranial nerve deficit  Skin: Skin is warm and dry  No rash noted  He is not diaphoretic  Psychiatric: He has a normal mood and affect  His behavior is normal  Judgment and thought content normal    BMI Counseling: Body mass index is 29 93 kg/m²  The BMI is above normal  Nutrition recommendations include decreasing portion sizes, consuming healthier snacks and limiting drinks that contain sugar  Exercise recommendations include exercising 3-5 times per week  No pharmacotherapy was ordered

## 2020-02-11 DIAGNOSIS — E78.49 OTHER HYPERLIPIDEMIA: ICD-10-CM

## 2020-02-11 RX ORDER — LOVASTATIN 40 MG/1
40 TABLET ORAL
Qty: 90 TABLET | Refills: 2 | Status: SHIPPED | OUTPATIENT
Start: 2020-02-11 | End: 2020-11-04

## 2020-03-09 ENCOUNTER — APPOINTMENT (OUTPATIENT)
Dept: LAB | Facility: HOSPITAL | Age: 70
End: 2020-03-09
Attending: INTERNAL MEDICINE
Payer: MEDICARE

## 2020-03-09 DIAGNOSIS — C73 PAPILLARY THYROID CARCINOMA (HCC): ICD-10-CM

## 2020-03-09 DIAGNOSIS — E55.9 VITAMIN D DEFICIENCY: ICD-10-CM

## 2020-03-09 LAB
25(OH)D3 SERPL-MCNC: 33.2 NG/ML (ref 30–100)
T4 FREE SERPL-MCNC: 1.48 NG/DL (ref 0.76–1.46)
TSH SERPL DL<=0.05 MIU/L-ACNC: <0.007 UIU/ML (ref 0.36–3.74)

## 2020-03-09 PROCEDURE — 36415 COLL VENOUS BLD VENIPUNCTURE: CPT

## 2020-03-09 PROCEDURE — 84443 ASSAY THYROID STIM HORMONE: CPT | Performed by: INTERNAL MEDICINE

## 2020-03-09 PROCEDURE — 84439 ASSAY OF FREE THYROXINE: CPT | Performed by: INTERNAL MEDICINE

## 2020-03-09 PROCEDURE — 82306 VITAMIN D 25 HYDROXY: CPT

## 2020-03-09 PROCEDURE — 84432 ASSAY OF THYROGLOBULIN: CPT

## 2020-03-09 PROCEDURE — 86800 THYROGLOBULIN ANTIBODY: CPT

## 2020-03-10 LAB
THYROGLOB AB SERPL-ACNC: <1 IU/ML (ref 0–0.9)
THYROGLOB SERPL-MCNC: 0.8 NG/ML (ref 1.4–29.2)

## 2020-03-11 ENCOUNTER — OFFICE VISIT (OUTPATIENT)
Dept: ENDOCRINOLOGY | Facility: CLINIC | Age: 70
End: 2020-03-11
Payer: MEDICARE

## 2020-03-11 VITALS
HEIGHT: 71 IN | BODY MASS INDEX: 30.28 KG/M2 | DIASTOLIC BLOOD PRESSURE: 88 MMHG | SYSTOLIC BLOOD PRESSURE: 118 MMHG | WEIGHT: 216.3 LBS | HEART RATE: 63 BPM

## 2020-03-11 DIAGNOSIS — E89.0 POSTOPERATIVE HYPOTHYROIDISM: ICD-10-CM

## 2020-03-11 DIAGNOSIS — E55.9 VITAMIN D DEFICIENCY: ICD-10-CM

## 2020-03-11 DIAGNOSIS — C73 FOLLICULAR THYROID CANCER (HCC): ICD-10-CM

## 2020-03-11 DIAGNOSIS — C73 PAPILLARY THYROID CARCINOMA (HCC): Primary | ICD-10-CM

## 2020-03-11 PROCEDURE — 3079F DIAST BP 80-89 MM HG: CPT | Performed by: INTERNAL MEDICINE

## 2020-03-11 PROCEDURE — 3074F SYST BP LT 130 MM HG: CPT | Performed by: INTERNAL MEDICINE

## 2020-03-11 PROCEDURE — 3008F BODY MASS INDEX DOCD: CPT | Performed by: INTERNAL MEDICINE

## 2020-03-11 PROCEDURE — 99214 OFFICE O/P EST MOD 30 MIN: CPT | Performed by: INTERNAL MEDICINE

## 2020-03-11 PROCEDURE — 1036F TOBACCO NON-USER: CPT | Performed by: INTERNAL MEDICINE

## 2020-03-11 PROCEDURE — 1160F RVW MEDS BY RX/DR IN RCRD: CPT | Performed by: INTERNAL MEDICINE

## 2020-03-11 PROCEDURE — 4040F PNEUMOC VAC/ADMIN/RCVD: CPT | Performed by: INTERNAL MEDICINE

## 2020-03-11 NOTE — PROGRESS NOTES
Ángel Fischer 78 y o  male MRN: [id number]    Encounter: [id number]      Assessment/Plan     Assessment: This is a 78y o -year-old male with history of malignant melanoma, thyroid carcinoma, hypertension, hyperlipidemia, GERD, vitamin-D deficiency    Plan:  1  Papillary thyroid carcinoma, infiltrate follicular carcinoma with status post left hemithyroidectomy  2  Postoperative hypothyroidism  3  Thyroid nodules  TSH is at goal, thyroglobulin low at 0 8  -continue levothyroxine 175 mcg orally daily  -repeat TSH, free T4, TG, TG antibody in 4-6 months  -thyroid ultrasound to follow-up thyroid nodules, assess for cervical lymphadenopathy    4  Vitamin-D deficiency-most recent vitamin-D level within normal limits  Continue supplementation    CC:  Thyroid cancer    History of Present Illness     HPI:  Ángel Fischer is a 78 y o  male who is here for a follow-up of thyroid carcinoma    From my prior notes  "Incidentally found thyroid nodules during workup and management of malignant melanoma     FNA left thyroid nodule- AUS, affirma suspicious  BRAF, V600E - negative; RET/PTC - not detected  S/p left hemithyroidectomy on 04/24/2019     Final pathology  1)1 8 cm papillary carcinoma, follicular variant, infiltrative  9) 7 1 cm follicular carcinoma, minimally invasive  No lymph nodes submitted or found   Multinodular hyperplasia  Immuno stains positive for HBME-1 and CK19     Completion thyroidectomy was held off given complications of vocal cord paralysis and concerns of the risk of contralateral nerve being damaged if he were to go for total thyroidectomy"     Currently on levothyroxine 175 mcg orally daily  Compliant, takes on an empty stomach  Does not have any symptoms of hypo or hyperthyroidism  States that he feels well  Hoarseness of voice has completely resolved    CT chest abdomen pelvis 01/2020-within normal     All other systems were reviewed and are negative         Review of Systems    Historical Information   Past Medical History:   Diagnosis Date    Arthritis     Cancer ([facility] [address] )     melanoma abdomen    GERD (gastroesophageal reflux disease)     diet controlled    Hyperlipidemia     Last Assessed:10/20/14    Hypertension     PONV (postoperative nausea and vomiting)      Past Surgical History:   Procedure Laterality Date    COLONOSCOPY      HERNIA REPAIR Right     Last Assessed:10/20/14 inguinal     LYMPH NODE BIOPSY N/A 12/14/2018    Procedure: SENTINEL LYMPH NODE BIOPSY; LYMPHOSCINTIGRAPHY; INTRAOPERATIVE LYMPHATIC MAPPING (INJECT AT 1100);   Surgeon: Ally Cardona MD;  [facility];  Service: Surgical Oncology    SKIN LESION EXCISION N/A 12/14/2018    Procedure: MEDIAL UPPER ABDOMEN MELANOMA WIDE EXCISION; BILATERAL AXILLA SLN;  Surgeon: Ally Cardona MD;  [facility];  Service: Surgical Oncology    THYROID LOBECTOMY Left 4/24/2019    Procedure: HEMITHYROIDECTOMY ISTHMUSECTOMY;  Surgeon: Ally Cardona MD;  [facility];  Service: Surgical Oncology    TONSILLECTOMY      US GUIDED THYROID BIOPSY  2/7/2019    WISDOM TOOTH EXTRACTION       Social History   Social History     Substance and Sexual Activity   Alcohol Use Yes    Alcohol/week: 2 0 standard drinks    Types: 1 Glasses of wine, 1 Cans of beer per week    Frequency: Monthly or less    Drinks per session: 1 or 2    Binge frequency: Never    Comment: rarely     Social History     Substance and Sexual Activity   Drug Use No     Social History     Tobacco Use   Smoking Status Never Smoker   Smokeless Tobacco Never Used     Family History:   Family History   Problem Relation Age of Onset   Nolberto Rehman Hypertension Mother     Hypothyroidism Mother     Hypertension Father     Skin cancer Father     Colon cancer Maternal Aunt     Colon cancer Paternal Aunt         62s    Skin cancer Paternal Uncle        Meds/Allergies   Current Outpatient Medications   Medication Sig Dispense Refill    aspirin 81 MG tablet Take 81 mg by mouth daily       atenolol (TENORMIN) 50 mg tablet Take 1 tablet (50 mg total) by mouth daily 90 tablet 1    cholecalciferol (VITAMIN D3) 1,000 units tablet Take 1,000 Units by mouth daily      hydrochlorothiazide (HYDRODIURIL) 25 mg tablet Take 1 tablet (25 mg total) by mouth daily 90 tablet 1    levothyroxine 175 mcg tablet Take 1 tablet (175 mcg total) by mouth daily 90 tablet 3    lovastatin (MEVACOR) 40 MG tablet Take 1 tablet (40 mg total) by mouth daily at bedtime 90 tablet 2    multivitamin (THERAGRAN) TABS Take 1 tablet by mouth daily at bedtime       naproxen (NAPROSYN) 500 mg tablet Take 1 tablet (500 mg total) by mouth 2 (two) times a day with meals (Patient taking differently: Take 500 mg by mouth as needed ) 30 tablet 1    sildenafil (VIAGRA) 50 MG tablet Take 1 tablet (50 mg total) by mouth daily as needed for erectile dysfunction 30 tablet 3     No current facility-administered medications for this visit  No Known Allergies    Objective   Vitals: Blood pressure 118/88, pulse 63, height 5' 11" (1 803 m), weight 98 1 kg (216 lb 4 8 oz)  Physical Exam   Constitutional: He is oriented to person, place, and time  He appears well-developed and well-nourished  No distress  HENT:   Head: Normocephalic and atraumatic  Eyes: Pupils are equal, round, and reactive to light  Conjunctivae are normal    Neck: Normal range of motion  Neck supple  Well-healed anterior scar  No thyromegaly  No lymphadenopathy   Cardiovascular: Normal rate, regular rhythm and normal heart sounds  No murmur heard  Pulmonary/Chest: Effort normal and breath sounds normal  No respiratory distress  He has no wheezes  Abdominal: Soft  He exhibits no distension  There is no tenderness  There is no guarding  Musculoskeletal: He exhibits no edema  Neurological: He is alert and oriented to person, place, and time  He displays normal reflexes  No tremors on the outstretched arms   Skin: Skin is warm and dry   No rash noted  He is not diaphoretic  No erythema  Psychiatric: He has a normal mood and affect  His behavior is normal  Thought content normal    Vitals reviewed  The history was obtained from the review of the chart, patient  Lab Results:   Lab Results   Component Value Date/Time    TSH 3RD GENERATON <0 007 (L) 03/09/2020 09:17 AM    TSH 3RD GENERATON 0 008 (L) 01/10/2020 11:22 AM    TSH 3RD GENERATON 0 018 (L) 12/06/2019 08:11 AM    Free T4 1 48 (H) 03/09/2020 09:17 AM    Free T4 1 50 (H) 01/10/2020 11:22 AM    Free T4 1 65 (H) 12/06/2019 08:11 AM     Lab Results   Component Value Date    WBC 7 00 01/10/2020    HGB 16 2 01/10/2020    HCT 48 8 01/10/2020    MCV 89 01/10/2020     01/10/2020     Lab Results   Component Value Date    CREATININE 0 98 01/10/2020    BUN 18 01/10/2020     05/29/2017    K 4 0 01/10/2020     01/10/2020    CO2 31 01/10/2020     Lab Results   Component Value Date    HGBA1C 5 1 01/10/2020     Component      Latest Ref Rng & Units 1/10/2020 3/9/2020   THYROGLOBULIN AB      0 0 - 0 9 IU/mL  <1 0   Thyroglobulin-BURAK      1 4 - 29 2 ng/mL  0 8 (L)   Free T4      0 76 - 1 46 ng/dL 1 50 (H) 1 48 (H)   TSH 3RD GENERATON      0 358 - 3 740 uIU/mL 0 008 (L) <0 007 (L)       Imaging Studies:   Results for orders placed during the hospital encounter of 01/23/19   US thyroid    Impression The following meet current ACR criteria for recommending ultrasound guided biopsy:     Nodules #3 and 4  Meet Ti-RAD criteria for ultrasound-guided thyroid biopsy  Reference: ACR Thyroid Imaging, Reporting and Data System (TI-RADS): White Paper of the [employer]  J AM Nikki Radiol 3353;29:469-357  (additional recommendations based on American Thyroid Association 2015 guidelines )      Workstation performed: [id number]         I have personally reviewed pertinent reports  Portions of the record may have been created with voice recognition software   Occasional wrong word or "sound a like" substitutions may have occurred due to the inherent limitations of voice recognition software  Read the chart carefully and recognize, using context, where substitutions have occurred

## 2020-03-11 NOTE — PATIENT INSTRUCTIONS
Please continue levothyroxine 175 mcg orally daily    continue vitamin-D3 1000 IU orally daily  You are being referred for a ultrasound of the neck /thyroid    Repeat labs in 4-6 months with follow up

## 2020-05-06 ENCOUNTER — TELEPHONE (OUTPATIENT)
Dept: HEMATOLOGY ONCOLOGY | Facility: CLINIC | Age: 70
End: 2020-05-06

## 2020-05-07 ENCOUNTER — TELEPHONE (OUTPATIENT)
Dept: HEMATOLOGY ONCOLOGY | Facility: CLINIC | Age: 70
End: 2020-05-07

## 2020-06-16 ENCOUNTER — TELEPHONE (OUTPATIENT)
Dept: DERMATOLOGY | Facility: CLINIC | Age: 70
End: 2020-06-16

## 2020-06-17 ENCOUNTER — APPOINTMENT (OUTPATIENT)
Dept: LAB | Facility: HOSPITAL | Age: 70
End: 2020-06-17
Payer: MEDICARE

## 2020-06-17 ENCOUNTER — OFFICE VISIT (OUTPATIENT)
Dept: DERMATOLOGY | Facility: CLINIC | Age: 70
End: 2020-06-17
Payer: MEDICARE

## 2020-06-17 VITALS — TEMPERATURE: 97.6 F

## 2020-06-17 DIAGNOSIS — Z85.820 HISTORY OF MELANOMA: ICD-10-CM

## 2020-06-17 DIAGNOSIS — C43.59 MALIGNANT MELANOMA OF SKIN OF ABDOMEN (HCC): ICD-10-CM

## 2020-06-17 DIAGNOSIS — D22.9 NEVUS: Primary | ICD-10-CM

## 2020-06-17 DIAGNOSIS — L82.1 SEBORRHEIC KERATOSIS: ICD-10-CM

## 2020-06-17 DIAGNOSIS — Z13.89 SCREENING FOR SKIN CONDITION: ICD-10-CM

## 2020-06-17 DIAGNOSIS — C77.3 MALIGNANT NEOPLASM METASTATIC TO LYMPH NODE OF AXILLA (HCC): ICD-10-CM

## 2020-06-17 LAB
ALBUMIN SERPL BCP-MCNC: 3.7 G/DL (ref 3.5–5)
ALP SERPL-CCNC: 71 U/L (ref 46–116)
ALT SERPL W P-5'-P-CCNC: 31 U/L (ref 12–78)
ANION GAP SERPL CALCULATED.3IONS-SCNC: 4 MMOL/L (ref 4–13)
AST SERPL W P-5'-P-CCNC: 19 U/L (ref 5–45)
BASOPHILS # BLD AUTO: 0.04 THOUSANDS/ΜL (ref 0–0.1)
BASOPHILS NFR BLD AUTO: 1 % (ref 0–1)
BILIRUB SERPL-MCNC: 0.9 MG/DL (ref 0.2–1)
BUN SERPL-MCNC: 19 MG/DL (ref 5–25)
CALCIUM SERPL-MCNC: 9.2 MG/DL (ref 8.3–10.1)
CHLORIDE SERPL-SCNC: 105 MMOL/L (ref 100–108)
CO2 SERPL-SCNC: 33 MMOL/L (ref 21–32)
CREAT SERPL-MCNC: 1.11 MG/DL (ref 0.6–1.3)
EOSINOPHIL # BLD AUTO: 0.06 THOUSAND/ΜL (ref 0–0.61)
EOSINOPHIL NFR BLD AUTO: 1 % (ref 0–6)
ERYTHROCYTE [DISTWIDTH] IN BLOOD BY AUTOMATED COUNT: 12.8 % (ref 11.6–15.1)
GFR SERPL CREATININE-BSD FRML MDRD: 68 ML/MIN/1.73SQ M
GLUCOSE SERPL-MCNC: 114 MG/DL (ref 65–140)
HCT VFR BLD AUTO: 48.8 % (ref 36.5–49.3)
HGB BLD-MCNC: 16.4 G/DL (ref 12–17)
IMM GRANULOCYTES # BLD AUTO: 0.02 THOUSAND/UL (ref 0–0.2)
IMM GRANULOCYTES NFR BLD AUTO: 0 % (ref 0–2)
LDH SERPL-CCNC: 129 U/L (ref 81–234)
LYMPHOCYTES # BLD AUTO: 1.54 THOUSANDS/ΜL (ref 0.6–4.47)
LYMPHOCYTES NFR BLD AUTO: 21 % (ref 14–44)
MCH RBC QN AUTO: 29.9 PG (ref 26.8–34.3)
MCHC RBC AUTO-ENTMCNC: 33.6 G/DL (ref 31.4–37.4)
MCV RBC AUTO: 89 FL (ref 82–98)
MONOCYTES # BLD AUTO: 0.68 THOUSAND/ΜL (ref 0.17–1.22)
MONOCYTES NFR BLD AUTO: 10 % (ref 4–12)
NEUTROPHILS # BLD AUTO: 4.84 THOUSANDS/ΜL (ref 1.85–7.62)
NEUTS SEG NFR BLD AUTO: 67 % (ref 43–75)
NRBC BLD AUTO-RTO: 0 /100 WBCS
PLATELET # BLD AUTO: 182 THOUSANDS/UL (ref 149–390)
PMV BLD AUTO: 11 FL (ref 8.9–12.7)
POTASSIUM SERPL-SCNC: 3.9 MMOL/L (ref 3.5–5.3)
PROT SERPL-MCNC: 7.1 G/DL (ref 6.4–8.2)
RBC # BLD AUTO: 5.48 MILLION/UL (ref 3.88–5.62)
SODIUM SERPL-SCNC: 142 MMOL/L (ref 136–145)
WBC # BLD AUTO: 7.18 THOUSAND/UL (ref 4.31–10.16)

## 2020-06-17 PROCEDURE — 80053 COMPREHEN METABOLIC PANEL: CPT

## 2020-06-17 PROCEDURE — 36415 COLL VENOUS BLD VENIPUNCTURE: CPT

## 2020-06-17 PROCEDURE — 4040F PNEUMOC VAC/ADMIN/RCVD: CPT | Performed by: DERMATOLOGY

## 2020-06-17 PROCEDURE — 99213 OFFICE O/P EST LOW 20 MIN: CPT | Performed by: DERMATOLOGY

## 2020-06-17 PROCEDURE — 1160F RVW MEDS BY RX/DR IN RCRD: CPT | Performed by: DERMATOLOGY

## 2020-06-17 PROCEDURE — 3074F SYST BP LT 130 MM HG: CPT | Performed by: DERMATOLOGY

## 2020-06-17 PROCEDURE — 3079F DIAST BP 80-89 MM HG: CPT | Performed by: DERMATOLOGY

## 2020-06-17 PROCEDURE — 1036F TOBACCO NON-USER: CPT | Performed by: DERMATOLOGY

## 2020-06-17 PROCEDURE — 83615 LACTATE (LD) (LDH) ENZYME: CPT

## 2020-06-17 PROCEDURE — 85025 COMPLETE CBC W/AUTO DIFF WBC: CPT

## 2020-06-19 ENCOUNTER — HOSPITAL ENCOUNTER (OUTPATIENT)
Dept: CT IMAGING | Facility: HOSPITAL | Age: 70
Discharge: HOME | End: 2020-06-19
Attending: SURGERY
Payer: MEDICARE

## 2020-06-19 DIAGNOSIS — C77.3 MALIGNANT NEOPLASM METASTATIC TO LYMPH NODE OF AXILLA (HCC): ICD-10-CM

## 2020-06-19 PROCEDURE — 71260 CT THORAX DX C+: CPT

## 2020-06-19 PROCEDURE — 74177 CT ABD & PELVIS W/CONTRAST: CPT

## 2020-06-19 RX ADMIN — IOHEXOL 100 ML: 350 INJECTION, SOLUTION INTRAVENOUS at 08:42

## 2020-06-24 ENCOUNTER — HOSPITAL ENCOUNTER (OUTPATIENT)
Dept: RADIOLOGY | Facility: HOSPITAL | Age: 70
Discharge: HOME | End: 2020-06-24
Payer: MEDICARE

## 2020-06-24 DIAGNOSIS — C43.59 MALIGNANT MELANOMA OF SKIN OF ABDOMEN (HCC): ICD-10-CM

## 2020-06-24 PROCEDURE — 71046 X-RAY EXAM CHEST 2 VIEWS: CPT

## 2020-06-25 ENCOUNTER — TELEPHONE (OUTPATIENT)
Dept: HEMATOLOGY ONCOLOGY | Facility: CLINIC | Age: 70
End: 2020-06-25

## 2020-06-26 ENCOUNTER — OFFICE VISIT (OUTPATIENT)
Dept: HEMATOLOGY ONCOLOGY | Facility: CLINIC | Age: 70
End: 2020-06-26
Payer: MEDICARE

## 2020-06-26 VITALS
SYSTOLIC BLOOD PRESSURE: 114 MMHG | DIASTOLIC BLOOD PRESSURE: 80 MMHG | BODY MASS INDEX: 29.54 KG/M2 | WEIGHT: 211 LBS | RESPIRATION RATE: 16 BRPM | TEMPERATURE: 98.7 F | HEIGHT: 71 IN

## 2020-06-26 DIAGNOSIS — C43.9 MALIGNANT MELANOMA, UNSPECIFIED SITE (HCC): Primary | ICD-10-CM

## 2020-06-26 DIAGNOSIS — C73 PAPILLARY THYROID CARCINOMA (HCC): ICD-10-CM

## 2020-06-26 PROCEDURE — 99214 OFFICE O/P EST MOD 30 MIN: CPT | Performed by: INTERNAL MEDICINE

## 2020-06-26 PROCEDURE — 3079F DIAST BP 80-89 MM HG: CPT | Performed by: INTERNAL MEDICINE

## 2020-06-26 PROCEDURE — 3074F SYST BP LT 130 MM HG: CPT | Performed by: INTERNAL MEDICINE

## 2020-06-26 PROCEDURE — 4040F PNEUMOC VAC/ADMIN/RCVD: CPT | Performed by: INTERNAL MEDICINE

## 2020-06-26 PROCEDURE — 3008F BODY MASS INDEX DOCD: CPT | Performed by: INTERNAL MEDICINE

## 2020-06-26 PROCEDURE — 1160F RVW MEDS BY RX/DR IN RCRD: CPT | Performed by: INTERNAL MEDICINE

## 2020-06-26 PROCEDURE — 1036F TOBACCO NON-USER: CPT | Performed by: INTERNAL MEDICINE

## 2020-06-29 PROBLEM — Z85.850 HISTORY OF THYROID CANCER: Status: ACTIVE | Noted: 2019-09-09

## 2020-06-30 ENCOUNTER — TELEPHONE (OUTPATIENT)
Dept: SURGICAL ONCOLOGY | Facility: CLINIC | Age: 70
End: 2020-06-30

## 2020-07-02 ENCOUNTER — OFFICE VISIT (OUTPATIENT)
Dept: SURGICAL ONCOLOGY | Facility: CLINIC | Age: 70
End: 2020-07-02
Payer: MEDICARE

## 2020-07-02 VITALS
HEIGHT: 71 IN | TEMPERATURE: 98.1 F | HEART RATE: 55 BPM | SYSTOLIC BLOOD PRESSURE: 122 MMHG | DIASTOLIC BLOOD PRESSURE: 80 MMHG | BODY MASS INDEX: 29.54 KG/M2 | WEIGHT: 211 LBS

## 2020-07-02 DIAGNOSIS — Z08 ENCOUNTER FOR FOLLOW-UP EXAMINATION AFTER COMPLETED TREATMENT FOR MALIGNANT NEOPLASM: ICD-10-CM

## 2020-07-02 DIAGNOSIS — Z85.820 PERSONAL HISTORY OF MALIGNANT MELANOMA: Primary | ICD-10-CM

## 2020-07-02 DIAGNOSIS — Z85.850 HISTORY OF THYROID CANCER: ICD-10-CM

## 2020-07-02 PROCEDURE — 3079F DIAST BP 80-89 MM HG: CPT | Performed by: SURGERY

## 2020-07-02 PROCEDURE — 1036F TOBACCO NON-USER: CPT | Performed by: SURGERY

## 2020-07-02 PROCEDURE — 4040F PNEUMOC VAC/ADMIN/RCVD: CPT | Performed by: SURGERY

## 2020-07-02 PROCEDURE — 99214 OFFICE O/P EST MOD 30 MIN: CPT | Performed by: SURGERY

## 2020-07-02 PROCEDURE — 1160F RVW MEDS BY RX/DR IN RCRD: CPT | Performed by: SURGERY

## 2020-07-02 PROCEDURE — 3008F BODY MASS INDEX DOCD: CPT | Performed by: SURGERY

## 2020-07-02 PROCEDURE — 3074F SYST BP LT 130 MM HG: CPT | Performed by: SURGERY

## 2020-07-02 NOTE — PROGRESS NOTES
Surgical Oncology Follow Up       Heather [address]  CANCER [facility] SURGICAL ONCOLOGY [facility]  [address]  [facility] [address] [facility]  1950  [phone]  Heather  43  [facility] [place]  CANCER [facility] SURGICAL ONCOLOGY [facility]  [address]  [facility] [address]    Diagnoses and all orders for this visit:    Personal history of malignant melanoma  -     US extremity soft tissue; Future  -     US extremity soft tissue; Future  -     CT chest abdomen pelvis w contrast; Future  -     BUN; Future  -     Creatinine, serum; Future    Encounter for follow-up examination after completed treatment for malignant neoplasm    History of thyroid cancer  -     US head neck lymph node mapping; Future        Chief Complaint   Patient presents with    Follow-up       Return in about 6 months (around 1/2/2021) for Office Visit, Imaging - See orders           Personal history of malignant melanoma    10/16/2018 Initial Diagnosis     Malignant melanoma of skin of abdomen ([facility] [address] )      12/14/2018 Surgery     Wide excision of abdomen with sentinel lymph node biopsies (right and left axillae)        History of thyroid cancer    4/24/2019 Initial Diagnosis     Papillary and follicular carcinoma ([facility] [address] )      4/24/2019 Surgery     Thyroid, left and isthmus, hemithyroidectomy:  -  Papillary carcinoma, follicular variant, infiltrative - [id number]  -  Follicular carcinoma, minimally invasive - [id number]         Staging: [id number] melanoma of the abdomen  December 2018  Positive sentinel node in the right axilla     P4BCAT2 [id number] invasive follicular carcinoma of the thyroid, April 2019     Treatment history:   Wide excision with sentinel lymph node biopsy December 2018  Left thyroid lobectomy, April 2019  Current treatment:   Observation for the melanoma  TSH suppression for the thyroid cancer  Disease status:   SYLVAIN    History of Present Illness:  Patient returns in follow-up of his melanoma in thyroid cancer  He is doing well at this time with no complaints  He denies any abdominal pain, nausea, vomiting, back pain, bone pain, headaches, or cough  He has not had a recent neck ultrasound with lymph node mapping or an axillary ultrasound  CT of his chest, abdomen and pelvis from June 19, 2020 reveals no evidence of metastatic disease  I personally reviewed the films  His last thyroglobulin was 0 8  His TSH has been suppressed  His PSA level is normal     Review of Systems  Complete ROS Surg Onc:   Complete ROS Surg Onc:   Constitutional: The patient denies new or recent history of general fatigue, no recent weight loss, no change in appetite  Eyes: No complaints of visual problems, no scleral icterus  ENT: no complaints of ear pain, no hoarseness, no difficulty swallowing,  no tinnitus and no new masses in head, oral cavity, or neck  Cardiovascular: No complaints of chest pain, no palpitations, no ankle edema  Respiratory: No complaints of shortness of breath, no cough  Gastrointestinal: No complaints of jaundice, no bloody stools, no pale stools  Genitourinary: No complaints of dysuria, no hematuria, no nocturia, no frequent urination, no urethral discharge  Musculoskeletal: No complaints of weakness, paralysis, joint stiffness or arthralgias  Integumentary: No complaints of rash, no new lesions  Neurological: No complaints of convulsions, no seizures, no dizziness  Hematologic/Lymphatic: No complaints of easy bruising  Endocrine:  No hot or cold intolerance  No polydipsia, polyphagia, or polyuria  Allergy/immunology:  No environmental allergies  No food allergies  Not immunocompromised  Skin:  No pallor or rash  No wound          Patient Active Problem List   Diagnosis    Actinic keratosis    Benign essential hypertension    BPH (benign prostatic hyperplasia)    Erectile dysfunction    GERD without esophagitis    Hyperlipidemia    Impaired fasting glucose    Personal history of malignant melanoma    Liver mass    Other specified soft tissue disorders    Multiple thyroid nodules    Encounter for follow-up examination after completed treatment for malignant neoplasm    Hoarseness of voice    Vocal cord paralysis    Fatigue    Vitamin D deficiency    Postoperative hypothyroidism    History of thyroid cancer    Hernia of abdominal cavity     Past Medical History:   Diagnosis Date    Arthritis     Cancer ([address] )     melanoma abdomen    GERD (gastroesophageal reflux disease)     diet controlled    Hyperlipidemia     Last Assessed:10/20/14    Hypertension     PONV (postoperative nausea and vomiting)      Past Surgical History:   Procedure Laterality Date    COLONOSCOPY      HERNIA REPAIR Right     Last Assessed:10/20/14 inguinal     LYMPH NODE BIOPSY N/A 12/14/2018    Procedure: SENTINEL LYMPH NODE BIOPSY; LYMPHOSCINTIGRAPHY; INTRAOPERATIVE LYMPHATIC MAPPING (INJECT AT 1100);   Surgeon: Yasmin Hernandez MD;  [facility];  Service: Surgical Oncology    SKIN LESION EXCISION N/A 12/14/2018    Procedure: MEDIAL UPPER ABDOMEN MELANOMA WIDE EXCISION; BILATERAL AXILLA SLN;  Surgeon: Yasmin Hernandez MD;  [facility];  Service: Surgical Oncology    THYROID LOBECTOMY Left 4/24/2019    Procedure: HEMITHYROIDECTOMY ISTHMUSECTOMY;  Surgeon: Yasmin Hernandez MD;  [facility];  Service: Surgical Oncology    TONSILLECTOMY      US GUIDED THYROID BIOPSY  2/7/2019    WISDOM TOOTH EXTRACTION       Family History   Problem Relation Age of Onset    Hypertension Mother     Hypothyroidism Mother     Hypertension Father     Skin cancer Father     Colon cancer Maternal Aunt     Colon cancer Paternal Aunt         62s    Skin cancer Paternal Uncle      Social History     Socioeconomic History    Marital status: /Civil Union     Spouse name: Not on file    Number of children: Not on file    Years of education: Not on file   [facility] education level: Not on file   Occupational History    Not on file   Social Needs    Financial resource strain: Not on file    Food insecurity:     Worry: Not on file     Inability: Not on file    Transportation needs:     Medical: Not on file     Non-medical: Not on file   Tobacco Use    Smoking status: Never Smoker    Smokeless tobacco: Never Used   Substance and Sexual Activity    Alcohol use:  Yes     Alcohol/week: 2 0 standard drinks     Types: 1 Glasses of wine, 1 Cans of beer per week     Frequency: Monthly or less     Drinks per session: 1 or 2     Binge frequency: Never     Comment: rarely    Drug use: No    Sexual activity: Yes     Partners: Female   Lifestyle    Physical activity:     Days per week: 4 days     Minutes per session: 30 min    Stress: Not at all   Relationships    Social connections:     Talks on phone: Not on file     Gets together: Not on file     Attends Rastafarian service: Not on file     Active member of club or organization: Not on file     Attends meetings of clubs or organizations: Not on file     Relationship status: Not on file    Intimate partner violence:     Fear of current or ex partner: Not on file     Emotionally abused: Not on file     Physically abused: Not on file     Forced sexual activity: Not on file   Other Topics Concern    Not on file   Social History Narrative    No advance directives       Current Outpatient Medications:     aspirin 81 MG tablet, Take 81 mg by mouth daily , Disp: , Rfl:     atenolol (TENORMIN) 50 mg tablet, Take 1 tablet (50 mg total) by mouth daily, Disp: 90 tablet, Rfl: 1    cholecalciferol (VITAMIN D3) 1,000 units tablet, Take 1,000 Units by mouth daily, Disp: , Rfl:     hydrochlorothiazide (HYDRODIURIL) 25 mg tablet, Take 1 tablet (25 mg total) by mouth daily, Disp: 90 tablet, Rfl: 1    levothyroxine 175 mcg tablet, Take 1 tablet (175 mcg total) by mouth daily, Disp: 90 tablet, Rfl: 3    lovastatin (MEVACOR) 40 MG tablet, Take 1 tablet (40 mg total) by mouth daily at bedtime, Disp: 90 tablet, Rfl: 2    multivitamin (THERAGRAN) TABS, Take 1 tablet by mouth daily at bedtime , Disp: , Rfl:     naproxen (NAPROSYN) 500 mg tablet, Take 1 tablet (500 mg total) by mouth 2 (two) times a day with meals (Patient taking differently: Take 500 mg by mouth as needed ), Disp: 30 tablet, Rfl: 1    sildenafil (VIAGRA) 50 MG tablet, Take 1 tablet (50 mg total) by mouth daily as needed for erectile dysfunction, Disp: 30 tablet, Rfl: 3  No Known Allergies  Vitals:    07/02/20 0941   BP: 122/80   Pulse: 55   Temp: 98 1 °F (36 7 °C)       Physical Exam  Constitutional: General appearance: The Patient is well-developed and well-nourished who appears the stated age in no acute distress  Patient is pleasant and talkative  HEENT:  Normocephalic  Sclerae are anicteric  Mucous membranes are moist  Neck is supple without adenopathy  No JVD  I do not appreciate any new or recurrent neck masses  Chest: The lungs are clear to auscultation  Cardiac: Heart is regular rate  Abdomen: Abdomen is soft, non-tender, non-distended and without masses  Extremities: There is no clubbing or cyanosis  There is no edema  Symmetric  Neuro: Grossly nonfocal  Gait is normal      Lymphatic: No evidence of cervical adenopathy bilaterally  No evidence of axillary adenopathy bilaterally  No evidence of inguinal adenopathy bilaterally  Skin: Warm, anicteric  Wide excision site has healed well  No evidence of local recurrence or in-transit disease  Psych:  Patient is pleasant and talkative  Breasts:        Pathology:  [unfilled]    Labs:      Imaging  Xr Chest Pa & Lateral    Result Date: 6/25/2020  Narrative: CHEST INDICATION:   C43 59: Malignant melanoma of other part of trunk COMPARISON:  Chest radiograph from 11/13/2019 and chest CT from 6/19/2020  EXAM PERFORMED/VIEWS:  XR CHEST PA & LATERAL WITH DUAL ENERGY SUBTRACTION   FINDINGS: Cardiomediastinal silhouette is normal  Lungs are clear  Left base nodule is due to the nipple  No effusion or pneumothorax  Osseous structures are within normal limits for age  Impression: No acute cardiopulmonary disease  Workstation performed: [id number]     Ct Chest Abdomen Pelvis W Contrast    Result Date: 6/22/2020  Narrative: CT CHEST, ABDOMEN AND PELVIS WITH IV CONTRAST INDICATION:   C77 3: Secondary and unspecified malignant neoplasm of axilla and upper limb lymph nodes  COMPARISON:  1/2/2020 TECHNIQUE: CT examination of the chest, abdomen and pelvis was performed  Scanning through the abdomen was performed in arterial, venous and delayed phases according a protocol spefically designed to evaluate upper abdominal viscera  Axial, sagittal, and coronal 2D reformatted images were created from the source data and submitted for interpretation  Radiation dose length product (DLP) for this visit:  1562 mGy-cm   This examination, like all CT scans performed in the [facility], was performed utilizing techniques to minimize radiation dose exposure, including the use of iterative reconstruction and automated exposure control  IV Contrast:  100 mL of iohexol (OMNIPAQUE) Enteric Contrast: Enteric contrast was administered  FINDINGS: CHEST LUNGS:  Lungs are clear  There is no tracheal or endobronchial lesion  PLEURA:  Unremarkable  HEART/GREAT VESSELS:  Unremarkable for patient's age  MEDIASTINUM AND JG:  Unremarkable  CHEST WALL AND LOWER NECK:   There is no axillary adenopathy  There are no visible chest wall masses  There is a small area of slight skin thickening in the left low axillary region on images 22-27 series 4  Correlate with physical exam findings to assess for any skin lesion which may be present  ABDOMEN LIVER/BILIARY TREE:  Hepatic cysts are stable  No suspicious appearing liver masses are seen  GALLBLADDER:  No calcified gallstones  No pericholecystic inflammatory change  SPLEEN:  Unremarkable  PANCREAS:  Unremarkable  ADRENAL GLANDS:  Unremarkable  KIDNEYS/URETERS:  A right renal cyst is stable  No suspicious appearing kidney lesions are seen  No hydronephrosis or hydroureter  No stones are seen  STOMACH AND BOWEL:  Unremarkable  APPENDIX:  A normal appendix was visualized  ABDOMINOPELVIC CAVITY:  No ascites  No pneumoperitoneum  No lymphadenopathy  VESSELS:  Unremarkable for patient's age  PELVIS REPRODUCTIVE ORGANS:  Stable from prior  There seems to be some nodular prominence along the top portion of the prostate gland, impinging on the bladder  URINARY BLADDER:  Questionable subtle trabeculation of the bladder wall  Evaluation limited with only minimal distention present  ABDOMINAL WALL/INGUINAL REGIONS:  There is a small fat-containing umbilical hernia  OSSEOUS STRUCTURES:  No acute fracture or destructive osseous lesion  Impression: Small area of subtle skin thickening along the left low axillary region for which clinical correlation with physical exam findings is suggested  No evidence of metastatic disease in the chest, abdomen, or pelvis  Stable appearing hepatic and right renal cystic findings  Nodular contour of the top of the prostate with suspected mild bladder wall thickening  Correlate with PSA levels and any clinical evidence of urinary obstruction  Small fat-containing umbilical hernia  The study was marked in EPIC for significant notification  Workstation performed: [id number]     I reviewed the above laboratory and imaging data  Discussion/Summary: 62-year-old male with a P2aPmP2 papillary carcinoma of the thyroid and a D4TCSS9 [id number] invasive follicular carcinoma of the thyroid   He also has a Abisai Pleva the melanoma standpoint he is clinically SYLVAIN at 1 and 1/2 years  Sharon Ryan is no evidence of recurrence by physical exam, imaging or symptomatology  his chest CT is negative  I will obtain an axillary ultrasound     I will call him with the results  Assuming this is negative I will see him again 6 with a CT and repeat axillary ultrasound    He has met with Endocrinology and ideally, completion thyroidectomy with radioactive iodine was recommended, but because of his postoperative hoarseness which has resolved, he did not have completion thyroidectomy and radioactive iodine  He is going with TSH suppression at this time   I think this is reasonable  I discussed that based on his pathology there is a 3-8% risk recurrence, so TSH suppression should be adequate  We will schedule a repeat neck ultrasound with lymph node mapping  I will call him with those results as well  I will see him back in 6 months    He is agreeable to this    All of his questions were answered

## 2020-07-02 NOTE — LETTER
July 2, 2020     Kayli Middleton MD  [address], [address] [address]    Patient: Memory Canavan   YOB: 1950   Date of Visit: 7/2/2020       Dear Dr Haroldo Flores: Thank you for referring Memory Canavan to me for evaluation  Below are my notes for this consultation  If you have questions, please do not hesitate to call me  I look forward to following your patient along with you  Sincerely,        Jaclyn Hartman MD        CC: MD Harvey Acosta MD Tedra Corona, MD  7/2/2020 10:04 AM  Sign at close encounter               Surgical Oncology Follow Up       [facility]  [address]  [place]  [facility]  1950  [id number]  [address]  [facility] [place]  [address]  [place] [address]    Diagnoses and all orders for this visit:    Personal history of malignant melanoma  -     US extremity soft tissue; Future  -     US extremity soft tissue; Future  -     CT chest abdomen pelvis w contrast; Future  -     BUN; Future  -     Creatinine, serum; Future    Encounter for follow-up examination after completed treatment for malignant neoplasm    History of thyroid cancer  -     US head neck lymph node mapping; Future        Chief Complaint   Patient presents with    Follow-up       Return in about 6 months (around 1/2/2021) for Office Visit, Imaging - See orders           Personal history of malignant melanoma    10/16/2018 Initial Diagnosis     Malignant melanoma of skin of abdomen ([address] )      12/14/2018 Surgery     Wide excision of abdomen with sentinel lymph node biopsies (right and left axillae)        History of thyroid cancer    4/24/2019 Initial Diagnosis     Papillary and follicular carcinoma ([address] )      4/24/2019 Surgery     Thyroid, left and isthmus, hemithyroidectomy:  -  Papillary carcinoma, follicular variant, infiltrative - [id number]  -  Follicular carcinoma, minimally invasive - [id number]         Staging: [id number] melanoma of the abdomen  December 2018  Positive sentinel node in the right axilla     [id number] invasive follicular carcinoma of the thyroid, April 2019     Treatment history:   Wide excision with sentinel lymph node biopsy December 2018  Left thyroid lobectomy, April 2019  Current treatment:   Observation for the melanoma  TSH suppression for the thyroid cancer  Disease status:   SYLVAIN    History of Present Illness:  Patient returns in follow-up of his melanoma in thyroid cancer  He is doing well at this time with no complaints  He denies any abdominal pain, nausea, vomiting, back pain, bone pain, headaches, or cough  He has not had a recent neck ultrasound with lymph node mapping or an axillary ultrasound  CT of his chest, abdomen and pelvis from June 19, 2020 reveals no evidence of metastatic disease  I personally reviewed the films  His last thyroglobulin was 0 8  His TSH has been suppressed  His PSA level is normal     Review of Systems  Complete ROS Surg Onc:   Complete ROS Surg Onc:   Constitutional: The patient denies new or recent history of general fatigue, no recent weight loss, no change in appetite  Eyes: No complaints of visual problems, no scleral icterus  ENT: no complaints of ear pain, no hoarseness, no difficulty swallowing,  no tinnitus and no new masses in head, oral cavity, or neck  Cardiovascular: No complaints of chest pain, no palpitations, no ankle edema  Respiratory: No complaints of shortness of breath, no cough  Gastrointestinal: No complaints of jaundice, no bloody stools, no pale stools  Genitourinary: No complaints of dysuria, no hematuria, no nocturia, no frequent urination, no urethral discharge  Musculoskeletal: No complaints of weakness, paralysis, joint stiffness or arthralgias    Integumentary: No complaints of rash, no new lesions  Neurological: No complaints of convulsions, no seizures, no dizziness  Hematologic/Lymphatic: No complaints of easy bruising  Endocrine:  No hot or cold intolerance  No polydipsia, polyphagia, or polyuria  Allergy/immunology:  No environmental allergies  No food allergies  Not immunocompromised  Skin:  No pallor or rash  No wound  Patient Active Problem List   Diagnosis    Actinic keratosis    Benign essential hypertension    BPH (benign prostatic hyperplasia)    Erectile dysfunction    GERD without esophagitis    Hyperlipidemia    Impaired fasting glucose    Personal history of malignant melanoma    Liver mass    Other specified soft tissue disorders    Multiple thyroid nodules    Encounter for follow-up examination after completed treatment for malignant neoplasm    Hoarseness of voice    Vocal cord paralysis    Fatigue    Vitamin D deficiency    Postoperative hypothyroidism    History of thyroid cancer    Hernia of abdominal cavity     Past Medical History:   Diagnosis Date    Arthritis     Cancer ([address] )     melanoma abdomen    GERD (gastroesophageal reflux disease)     diet controlled    Hyperlipidemia     Last Assessed:10/20/14    Hypertension     PONV (postoperative nausea and vomiting)      Past Surgical History:   Procedure Laterality Date    COLONOSCOPY      HERNIA REPAIR Right     Last Assessed:10/20/14 inguinal     LYMPH NODE BIOPSY N/A 12/14/2018    Procedure: SENTINEL LYMPH NODE BIOPSY; LYMPHOSCINTIGRAPHY; INTRAOPERATIVE LYMPHATIC MAPPING (INJECT AT 1100);   Surgeon: Antoine Spain MD;  [facility];  Service: Surgical Oncology    SKIN LESION EXCISION N/A 12/14/2018    Procedure: MEDIAL UPPER ABDOMEN MELANOMA WIDE EXCISION; BILATERAL AXILLA SLN;  Surgeon: Antoine Spain MD;  [facility];  Service: Surgical Oncology    THYROID LOBECTOMY Left 4/24/2019    Procedure: HEMITHYROIDECTOMY ISTHMUSECTOMY;  Surgeon: Antoine Spain MD; [facility];  Service: Surgical Oncology    TONSILLECTOMY      US GUIDED THYROID BIOPSY  2/7/2019    WISDOM TOOTH EXTRACTION       Family History   Problem Relation Age of Onset    Hypertension Mother     Hypothyroidism Mother     Hypertension Father     Skin cancer Father     Colon cancer Maternal Aunt     Colon cancer Paternal Aunt         62s    Skin cancer Paternal Uncle      Social History     Socioeconomic History    Marital status: /Civil Union     Spouse name: Not on file    Number of children: Not on file    Years of education: Not on file    Highest education level: Not on file   Occupational History    Not on file   Social Needs    Financial resource strain: Not on file    Food insecurity:     Worry: Not on file     Inability: Not on file    Transportation needs:     Medical: Not on file     Non-medical: Not on file   Tobacco Use    Smoking status: Never Smoker    Smokeless tobacco: Never Used   Substance and Sexual Activity    Alcohol use:  Yes     Alcohol/week: 2 0 standard drinks     Types: 1 Glasses of wine, 1 Cans of beer per week     Frequency: Monthly or less     Drinks per session: 1 or 2     Binge frequency: Never     Comment: rarely    Drug use: No    Sexual activity: Yes     Partners: Female   Lifestyle    Physical activity:     Days per week: 4 days     Minutes per session: 30 min    Stress: Not at all   Relationships    Social connections:     Talks on phone: Not on file     Gets together: Not on file     Attends Cheondoism service: Not on file     Active member of club or organization: Not on file     Attends meetings of clubs or organizations: Not on file     Relationship status: Not on file    Intimate partner violence:     Fear of current or ex partner: Not on file     Emotionally abused: Not on file     Physically abused: Not on file     Forced sexual activity: Not on file   Other Topics Concern    Not on file   Social History Narrative    No advance directives       Current Outpatient Medications:     aspirin 81 MG tablet, Take 81 mg by mouth daily , Disp: , Rfl:     atenolol (TENORMIN) 50 mg tablet, Take 1 tablet (50 mg total) by mouth daily, Disp: 90 tablet, Rfl: 1    cholecalciferol (VITAMIN D3) 1,000 units tablet, Take 1,000 Units by mouth daily, Disp: , Rfl:     hydrochlorothiazide (HYDRODIURIL) 25 mg tablet, Take 1 tablet (25 mg total) by mouth daily, Disp: 90 tablet, Rfl: 1    levothyroxine 175 mcg tablet, Take 1 tablet (175 mcg total) by mouth daily, Disp: 90 tablet, Rfl: 3    lovastatin (MEVACOR) 40 MG tablet, Take 1 tablet (40 mg total) by mouth daily at bedtime, Disp: 90 tablet, Rfl: 2    multivitamin (THERAGRAN) TABS, Take 1 tablet by mouth daily at bedtime , Disp: , Rfl:     naproxen (NAPROSYN) 500 mg tablet, Take 1 tablet (500 mg total) by mouth 2 (two) times a day with meals (Patient taking differently: Take 500 mg by mouth as needed ), Disp: 30 tablet, Rfl: 1    sildenafil (VIAGRA) 50 MG tablet, Take 1 tablet (50 mg total) by mouth daily as needed for erectile dysfunction, Disp: 30 tablet, Rfl: 3  No Known Allergies  Vitals:    07/02/20 0941   BP: 122/80   Pulse: 55   Temp: 98 1 °F (36 7 °C)       Physical Exam  Constitutional: General appearance: The Patient is well-developed and well-nourished who appears the stated age in no acute distress  Patient is pleasant and talkative  HEENT:  Normocephalic  Sclerae are anicteric  Mucous membranes are moist  Neck is supple without adenopathy  No JVD  I do not appreciate any new or recurrent neck masses  Chest: The lungs are clear to auscultation  Cardiac: Heart is regular rate  Abdomen: Abdomen is soft, non-tender, non-distended and without masses  Extremities: There is no clubbing or cyanosis  There is no edema  Symmetric  Neuro: Grossly nonfocal  Gait is normal      Lymphatic: No evidence of cervical adenopathy bilaterally     No evidence of axillary adenopathy bilaterally  No evidence of inguinal adenopathy bilaterally  Skin: Warm, anicteric  Wide excision site has healed well  No evidence of local recurrence or in-transit disease  Psych:  Patient is pleasant and talkative  Breasts:        Pathology:  [unfilled]    Labs:      Imaging  Xr Chest Pa & Lateral    Result Date: 6/25/2020  Narrative: CHEST INDICATION:   C43 59: Malignant melanoma of other part of trunk COMPARISON:  Chest radiograph from 11/13/2019 and chest CT from 6/19/2020  EXAM PERFORMED/VIEWS:  XR CHEST PA & LATERAL WITH DUAL ENERGY SUBTRACTION  FINDINGS: Cardiomediastinal silhouette is normal  Lungs are clear  Left base nodule is due to the nipple  No effusion or pneumothorax  Osseous structures are within normal limits for age  Impression: No acute cardiopulmonary disease  Workstation performed: [id number]     Ct Chest Abdomen Pelvis W Contrast    Result Date: 6/22/2020  Narrative: CT CHEST, ABDOMEN AND PELVIS WITH IV CONTRAST INDICATION:   C77 3: Secondary and unspecified malignant neoplasm of axilla and upper limb lymph nodes  COMPARISON:  1/2/2020 TECHNIQUE: CT examination of the chest, abdomen and pelvis was performed  Scanning through the abdomen was performed in arterial, venous and delayed phases according a protocol spefically designed to evaluate upper abdominal viscera  Axial, sagittal, and coronal 2D reformatted images were created from the source data and submitted for interpretation  Radiation dose length product (DLP) for this visit:  1562 mGy-cm   This examination, like all CT scans performed in the [facility], was performed utilizing techniques to minimize radiation dose exposure, including the use of iterative reconstruction and automated exposure control  IV Contrast:  100 mL of iohexol (OMNIPAQUE) Enteric Contrast: Enteric contrast was administered  FINDINGS: CHEST LUNGS:  Lungs are clear  There is no tracheal or endobronchial lesion  PLEURA:  Unremarkable  HEART/GREAT VESSELS:  Unremarkable for patient's age  MEDIASTINUM AND JG:  Unremarkable  CHEST WALL AND LOWER NECK:   There is no axillary adenopathy  There are no visible chest wall masses  There is a small area of slight skin thickening in the left low axillary region on images 22-27 series 4  Correlate with physical exam findings to assess for any skin lesion which may be present  ABDOMEN LIVER/BILIARY TREE:  Hepatic cysts are stable  No suspicious appearing liver masses are seen  GALLBLADDER:  No calcified gallstones  No pericholecystic inflammatory change  SPLEEN:  Unremarkable  PANCREAS:  Unremarkable  ADRENAL GLANDS:  Unremarkable  KIDNEYS/URETERS:  A right renal cyst is stable  No suspicious appearing kidney lesions are seen  No hydronephrosis or hydroureter  No stones are seen  STOMACH AND BOWEL:  Unremarkable  APPENDIX:  A normal appendix was visualized  ABDOMINOPELVIC CAVITY:  No ascites  No pneumoperitoneum  No lymphadenopathy  VESSELS:  Unremarkable for patient's age  PELVIS REPRODUCTIVE ORGANS:  Stable from prior  There seems to be some nodular prominence along the top portion of the prostate gland, impinging on the bladder  URINARY BLADDER:  Questionable subtle trabeculation of the bladder wall  Evaluation limited with only minimal distention present  ABDOMINAL WALL/INGUINAL REGIONS:  There is a small fat-containing umbilical hernia  OSSEOUS STRUCTURES:  No acute fracture or destructive osseous lesion  Impression: Small area of subtle skin thickening along the left low axillary region for which clinical correlation with physical exam findings is suggested  No evidence of metastatic disease in the chest, abdomen, or pelvis  Stable appearing hepatic and right renal cystic findings  Nodular contour of the top of the prostate with suspected mild bladder wall thickening  Correlate with PSA levels and any clinical evidence of urinary obstruction   Small fat-containing umbilical hernia  The study was marked in EPIC for significant notification  Workstation performed: [id number]     I reviewed the above laboratory and imaging data  Discussion/Summary: 51-year-old male with a A2aOhR0 papillary carcinoma of the thyroid and a [id number] NUEQTVPLL invasive follicular carcinoma of the thyroid   He also has a Belen Ruts the melanoma standpoint he is clinically SYLVAIN at 1  and 1/2 years  Jasper Hannah is no evidence of recurrence by physical exam, imaging or symptomatology  his chest CT is negative  I will obtain an axillary ultrasound  I will call him with the results  Assuming this is negative I will see him again 6 with a CT and repeat axillary ultrasound    He has met with Endocrinology and ideally, completion thyroidectomy with radioactive iodine was recommended, but because of his postoperative hoarseness which has resolved, he did not have completion thyroidectomy and radioactive iodine  He is going with TSH suppression at this time   I think this is reasonable  I discussed that based on his pathology there is a 3-8% risk recurrence, so TSH suppression should be adequate  We will schedule a repeat neck ultrasound with lymph node mapping  I will call him with those results as well  I will see him back in 6 months    He is agreeable to this    All of his questions were answered

## 2020-07-06 ENCOUNTER — HOSPITAL ENCOUNTER (OUTPATIENT)
Dept: ULTRASOUND IMAGING | Facility: CLINIC | Age: 70
Discharge: HOME | End: 2020-07-06
Payer: MEDICARE

## 2020-07-06 ENCOUNTER — TRANSCRIBE ORDERS (OUTPATIENT)
Dept: ADMINISTRATIVE | Facility: HOSPITAL | Age: 70
End: 2020-07-06

## 2020-07-06 DIAGNOSIS — Z85.820 PERSONAL HISTORY OF MALIGNANT MELANOMA: ICD-10-CM

## 2020-07-06 DIAGNOSIS — Z85.850 HISTORY OF THYROID CANCER: ICD-10-CM

## 2020-07-06 PROCEDURE — 76536 US EXAM OF HEAD AND NECK: CPT

## 2020-07-06 PROCEDURE — 76882 US LMTD JT/FCL EVL NVASC XTR: CPT

## 2020-07-07 ENCOUNTER — TELEPHONE (OUTPATIENT)
Dept: SURGICAL ONCOLOGY | Facility: CLINIC | Age: 70
End: 2020-07-07

## 2020-07-09 ENCOUNTER — OFFICE VISIT (OUTPATIENT)
Dept: ENDOCRINOLOGY | Facility: CLINIC | Age: 70
End: 2020-07-09
Payer: MEDICARE

## 2020-07-09 VITALS
HEIGHT: 71 IN | TEMPERATURE: 98.4 F | SYSTOLIC BLOOD PRESSURE: 124 MMHG | HEART RATE: 58 BPM | WEIGHT: 210.1 LBS | DIASTOLIC BLOOD PRESSURE: 72 MMHG | BODY MASS INDEX: 29.41 KG/M2

## 2020-07-09 DIAGNOSIS — E89.0 POSTOPERATIVE HYPOTHYROIDISM: ICD-10-CM

## 2020-07-09 DIAGNOSIS — C73 PAPILLARY THYROID CARCINOMA (HCC): Primary | ICD-10-CM

## 2020-07-09 DIAGNOSIS — C73 FOLLICULAR THYROID CANCER (HCC): ICD-10-CM

## 2020-07-09 DIAGNOSIS — E04.2 MULTIPLE THYROID NODULES: ICD-10-CM

## 2020-07-09 PROCEDURE — 1160F RVW MEDS BY RX/DR IN RCRD: CPT | Performed by: INTERNAL MEDICINE

## 2020-07-09 PROCEDURE — 3008F BODY MASS INDEX DOCD: CPT | Performed by: INTERNAL MEDICINE

## 2020-07-09 PROCEDURE — 99214 OFFICE O/P EST MOD 30 MIN: CPT | Performed by: INTERNAL MEDICINE

## 2020-07-09 PROCEDURE — 1036F TOBACCO NON-USER: CPT | Performed by: INTERNAL MEDICINE

## 2020-07-09 PROCEDURE — 4040F PNEUMOC VAC/ADMIN/RCVD: CPT | Performed by: INTERNAL MEDICINE

## 2020-07-09 PROCEDURE — 3074F SYST BP LT 130 MM HG: CPT | Performed by: INTERNAL MEDICINE

## 2020-07-09 PROCEDURE — 3078F DIAST BP <80 MM HG: CPT | Performed by: INTERNAL MEDICINE

## 2020-07-09 NOTE — PATIENT INSTRUCTIONS
Please get thyroid ultrasound as ordered to follow up the right sided thyroid nodule   Continue levothyroxine 175 mcg orally daily for now   Please get labs done as ordered now and then in 6 months    follow up in 6 months

## 2020-07-09 NOTE — PROGRESS NOTES
Aurelia Cuello 78 y o  male MRN: [id number]    Encounter: [id number]      Assessment/Plan     Assessment: This is a 78y o -year-old male with past medical history of malignant melanoma, thyroid carcinoma, hypertension, hyperlipidemia, GERD, vitamin-D deficiency    Plan:  1  Papillary thyroid carcinoma, infiltrative follicular carcinoma status post left hemithyroidectomy  2  Postoperative hypothyroidism  3  Thyroid nodules  No concerns for recurrence based on surveillance neck ultrasound, thyroglobulin levels  Unfortunately recent ultrasound did not comment on right thyroid nodule, no images demonstrating the same, hence patient will need a repeat ultrasound to assess interval change in the right 1 1 cm thyroid nodule  Clinically euthyroid  -continue levothyroxine 175 mcg orally daily  - check TSH, free T4  -Repeat ultrasound to assess interval change in thyroid nodule  - repeat thyroid function test, TG, TG antibody in 6 months        CC:  Thyroid carcinoma    History of Present Illness     HPI:  Aurelia Cuello is a 78 y o  male who is here for a follow-up of malignant melanoma, thyroid carcinoma, hypertension, hyperlipidemia, GERD, vitamin-D deficiency    Per my prior note    "Incidentally found thyroid nodules during workup and management of malignant melanoma     FNA left thyroid nodule- AUS, affirma suspicious  BRAF, V600E - negative; RET/PTC - not detected  S/p left hemithyroidectomy on 04/24/2019     Final pathology  1)1 8 cm papillary carcinoma, follicular variant, infiltrative  8) 1 3 cm follicular carcinoma, minimally invasive  No lymph nodes submitted or found   Multinodular hyperplasia    Immuno stains positive for HBME-1 and CK19     Completion thyroidectomy was held off given complications of vocal cord paralysis and concerns of the risk of contralateral nerve being damaged if he were to go for total thyroidectomy"     Currently on levothyroxine 175 mcg orally daily  3/2020:   Tg 0 8, TSH <0 007 Tg Ab negative   Ultrasound 7/2020-  No evidence of recurrent or metastatic disease     C/o occasional tiredness but thinks this age related  Is able to carry out day-to-day activities  Denies having any hypo or hyperthyroid symptoms   Overall feels well and does not have any complaints     Denies swelling in the neck  All other systems were reviewed and are negative  Review of Systems    Historical Information   Past Medical History:   Diagnosis Date    Arthritis     Cancer ([address] )     melanoma abdomen    GERD (gastroesophageal reflux disease)     diet controlled    Hyperlipidemia     Last Assessed:10/20/14    Hypertension     PONV (postoperative nausea and vomiting)      Past Surgical History:   Procedure Laterality Date    COLONOSCOPY      HERNIA REPAIR Right     Last Assessed:10/20/14 inguinal     LYMPH NODE BIOPSY N/A 12/14/2018    Procedure: SENTINEL LYMPH NODE BIOPSY; LYMPHOSCINTIGRAPHY; INTRAOPERATIVE LYMPHATIC MAPPING (INJECT AT 1100);   Surgeon: Karen Jeong MD;  [facility];  Service: Surgical Oncology    SKIN LESION EXCISION N/A 12/14/2018    Procedure: MEDIAL UPPER ABDOMEN MELANOMA WIDE EXCISION; BILATERAL AXILLA SLN;  Surgeon: Karen Jeong MD;  [facility];  Service: Surgical Oncology    THYROID LOBECTOMY Left 4/24/2019    Procedure: HEMITHYROIDECTOMY ISTHMUSECTOMY;  Surgeon: Karen Jeong MD;  [facility];  Service: Surgical Oncology    TONSILLECTOMY      US GUIDED THYROID BIOPSY  2/7/2019    WISDOM TOOTH EXTRACTION       Social History   Social History     Substance and Sexual Activity   Alcohol Use Yes    Alcohol/week: 2 0 standard drinks    Types: 1 Glasses of wine, 1 Cans of beer per week    Frequency: Monthly or less    Drinks per session: 1 or 2    Binge frequency: Never    Comment: rarely     Social History     Substance and Sexual Activity   Drug Use No     Social History     Tobacco Use   Smoking Status Never Smoker   Smokeless Tobacco Never Used     Family History:   Family History   Problem Relation Age of Onset    Hypertension Mother     Hypothyroidism Mother     Hypertension Father     Skin cancer Father     Colon cancer Maternal Aunt     Colon cancer Paternal Aunt         62s    Skin cancer Paternal Uncle        Meds/Allergies   Current Outpatient Medications   Medication Sig Dispense Refill    cholecalciferol (VITAMIN D3) 1,000 units tablet Take 1,000 Units by mouth daily      hydrochlorothiazide (HYDRODIURIL) 25 mg tablet Take 1 tablet (25 mg total) by mouth daily 90 tablet 1    levothyroxine 175 mcg tablet Take 1 tablet (175 mcg total) by mouth daily 90 tablet 3    lovastatin (MEVACOR) 40 MG tablet Take 1 tablet (40 mg total) by mouth daily at bedtime 90 tablet 2    multivitamin (THERAGRAN) TABS Take 1 tablet by mouth daily at bedtime       naproxen (NAPROSYN) 500 mg tablet Take 1 tablet (500 mg total) by mouth 2 (two) times a day with meals (Patient taking differently: Take 500 mg by mouth as needed ) 30 tablet 1    sildenafil (VIAGRA) 50 MG tablet Take 1 tablet (50 mg total) by mouth daily as needed for erectile dysfunction 30 tablet 3    aspirin 81 MG tablet Take 81 mg by mouth daily       atenolol (TENORMIN) 50 mg tablet Take 1 tablet (50 mg total) by mouth daily 90 tablet 1     No current facility-administered medications for this visit  No Known Allergies    Objective   Vitals: Blood pressure 124/72, pulse 58, temperature 98 4 °F (36 9 °C), height 5' 11" (1 803 m), weight 95 3 kg (210 lb 1 6 oz)  Physical Exam   Constitutional: He is oriented to person, place, and time  He appears well-developed and well-nourished  No distress  HENT:   Head: Normocephalic and atraumatic  Eyes: Pupils are equal, round, and reactive to light  Conjunctivae are normal    Neck: Normal range of motion  Neck supple     Well-healed anterior scar  No thyromegaly, non tender, no nodules appreciated on palpation  No lymphadenopathy Cardiovascular: Normal rate, regular rhythm and normal heart sounds  No murmur heard  Pulmonary/Chest: Effort normal and breath sounds normal  No respiratory distress  He has no wheezes  Abdominal: Soft  He exhibits no distension  There is no tenderness  There is no guarding  Musculoskeletal: He exhibits no edema  Neurological: He is alert and oriented to person, place, and time  No tremors on the outstretched arms   Skin: Skin is warm and dry  No rash noted  He is not diaphoretic  No erythema  Psychiatric: He has a normal mood and affect  His behavior is normal  Thought content normal    Vitals reviewed  The history was obtained from the review of the chart, patient  Lab Results:   Lab Results   Component Value Date/Time    TSH 3RD GENERATON <0 007 (L) 03/09/2020 09:17 AM    TSH 3RD GENERATON 0 008 (L) 01/10/2020 11:22 AM    TSH 3RD GENERATON 0 018 (L) 12/06/2019 08:11 AM    Free T4 1 48 (H) 03/09/2020 09:17 AM    Free T4 1 50 (H) 01/10/2020 11:22 AM    Free T4 1 65 (H) 12/06/2019 08:11 AM     Lab Results   Component Value Date    WBC 7 18 06/17/2020    HGB 16 4 06/17/2020    HCT 48 8 06/17/2020    MCV 89 06/17/2020     06/17/2020     Lab Results   Component Value Date    CREATININE 1 11 06/17/2020    BUN 19 06/17/2020     05/29/2017    K 3 9 06/17/2020     06/17/2020    CO2 33 (H) 06/17/2020     Lab Results   Component Value Date    HGBA1C 5 1 01/10/2020         Imaging Studies:   Results for orders placed during the hospital encounter of 01/23/19   US thyroid    Impression The following meet current ACR criteria for recommending ultrasound guided biopsy:     Nodules #3 and 4  Meet Ti-RAD criteria for ultrasound-guided thyroid biopsy  Reference: ACR Thyroid Imaging, Reporting and Data System (TI-RADS): White Paper of the [facility]   J AM Nikki Radiol 4397;[phone]  (additional recommendations based on American Thyroid Association 2015 guidelines )      Workstation performed: [id number]         I have personally reviewed pertinent reports  Portions of the record may have been created with voice recognition software  Occasional wrong word or "sound a like" substitutions may have occurred due to the inherent limitations of voice recognition software  Read the chart carefully and recognize, using context, where substitutions have occurred

## 2020-07-24 ENCOUNTER — APPOINTMENT (OUTPATIENT)
Dept: LAB | Facility: HOSPITAL | Age: 70
End: 2020-07-24
Payer: MEDICARE

## 2020-07-24 ENCOUNTER — HOSPITAL ENCOUNTER (OUTPATIENT)
Dept: ULTRASOUND IMAGING | Facility: HOSPITAL | Age: 70
Discharge: HOME | End: 2020-07-24
Attending: INTERNAL MEDICINE
Payer: MEDICARE

## 2020-07-24 DIAGNOSIS — C73 FOLLICULAR THYROID CANCER (HCC): ICD-10-CM

## 2020-07-24 DIAGNOSIS — R73.01 IMPAIRED FASTING GLUCOSE: ICD-10-CM

## 2020-07-24 DIAGNOSIS — E89.0 POSTOPERATIVE HYPOTHYROIDISM: ICD-10-CM

## 2020-07-24 DIAGNOSIS — E04.2 MULTIPLE THYROID NODULES: ICD-10-CM

## 2020-07-24 DIAGNOSIS — C73 PAPILLARY THYROID CARCINOMA (HCC): ICD-10-CM

## 2020-07-24 DIAGNOSIS — E78.2 MIXED HYPERLIPIDEMIA: ICD-10-CM

## 2020-07-24 DIAGNOSIS — I10 BENIGN ESSENTIAL HYPERTENSION: ICD-10-CM

## 2020-07-24 LAB
ALBUMIN SERPL BCP-MCNC: 3.7 G/DL (ref 3.5–5)
ALP SERPL-CCNC: 73 U/L (ref 46–116)
ALT SERPL W P-5'-P-CCNC: 26 U/L (ref 12–78)
ANION GAP SERPL CALCULATED.3IONS-SCNC: 5 MMOL/L (ref 4–13)
AST SERPL W P-5'-P-CCNC: 16 U/L (ref 5–45)
BASOPHILS # BLD AUTO: 0.04 THOUSANDS/ΜL (ref 0–0.1)
BASOPHILS NFR BLD AUTO: 1 % (ref 0–1)
BILIRUB SERPL-MCNC: 1 MG/DL (ref 0.2–1)
BUN SERPL-MCNC: 18 MG/DL (ref 5–25)
CALCIUM SERPL-MCNC: 9 MG/DL (ref 8.3–10.1)
CHLORIDE SERPL-SCNC: 103 MMOL/L (ref 100–108)
CHOLEST SERPL-MCNC: 149 MG/DL (ref 50–200)
CO2 SERPL-SCNC: 32 MMOL/L (ref 21–32)
CREAT SERPL-MCNC: 1.08 MG/DL (ref 0.6–1.3)
EOSINOPHIL # BLD AUTO: 0.07 THOUSAND/ΜL (ref 0–0.61)
EOSINOPHIL NFR BLD AUTO: 1 % (ref 0–6)
ERYTHROCYTE [DISTWIDTH] IN BLOOD BY AUTOMATED COUNT: 12.7 % (ref 11.6–15.1)
EST. AVERAGE GLUCOSE BLD GHB EST-MCNC: 105 MG/DL
GFR SERPL CREATININE-BSD FRML MDRD: 70 ML/MIN/1.73SQ M
GLUCOSE P FAST SERPL-MCNC: 104 MG/DL (ref 65–99)
HBA1C MFR BLD: 5.3 %
HCT VFR BLD AUTO: 48 % (ref 36.5–49.3)
HDLC SERPL-MCNC: 40 MG/DL
HGB BLD-MCNC: 16.4 G/DL (ref 12–17)
IMM GRANULOCYTES # BLD AUTO: 0.03 THOUSAND/UL (ref 0–0.2)
IMM GRANULOCYTES NFR BLD AUTO: 0 % (ref 0–2)
LDLC SERPL CALC-MCNC: 90 MG/DL (ref 0–100)
LYMPHOCYTES # BLD AUTO: 1.43 THOUSANDS/ΜL (ref 0.6–4.47)
LYMPHOCYTES NFR BLD AUTO: 20 % (ref 14–44)
MCH RBC QN AUTO: 30.1 PG (ref 26.8–34.3)
MCHC RBC AUTO-ENTMCNC: 34.2 G/DL (ref 31.4–37.4)
MCV RBC AUTO: 88 FL (ref 82–98)
MONOCYTES # BLD AUTO: 0.63 THOUSAND/ΜL (ref 0.17–1.22)
MONOCYTES NFR BLD AUTO: 9 % (ref 4–12)
NEUTROPHILS # BLD AUTO: 4.8 THOUSANDS/ΜL (ref 1.85–7.62)
NEUTS SEG NFR BLD AUTO: 69 % (ref 43–75)
NONHDLC SERPL-MCNC: 109 MG/DL
NRBC BLD AUTO-RTO: 0 /100 WBCS
PLATELET # BLD AUTO: 182 THOUSANDS/UL (ref 149–390)
PMV BLD AUTO: 10.9 FL (ref 8.9–12.7)
POTASSIUM SERPL-SCNC: 3.8 MMOL/L (ref 3.5–5.3)
PROT SERPL-MCNC: 7.1 G/DL (ref 6.4–8.2)
RBC # BLD AUTO: 5.45 MILLION/UL (ref 3.88–5.62)
SODIUM SERPL-SCNC: 140 MMOL/L (ref 136–145)
T4 FREE SERPL-MCNC: 1.79 NG/DL (ref 0.76–1.46)
TRIGL SERPL-MCNC: 93 MG/DL
TSH SERPL DL<=0.05 MIU/L-ACNC: <0.007 UIU/ML (ref 0.36–3.74)
WBC # BLD AUTO: 7 THOUSAND/UL (ref 4.31–10.16)

## 2020-07-24 PROCEDURE — 86800 THYROGLOBULIN ANTIBODY: CPT

## 2020-07-24 PROCEDURE — 76536 US EXAM OF HEAD AND NECK: CPT

## 2020-07-24 PROCEDURE — 83036 HEMOGLOBIN GLYCOSYLATED A1C: CPT

## 2020-07-24 PROCEDURE — 84439 ASSAY OF FREE THYROXINE: CPT

## 2020-07-24 PROCEDURE — 85025 COMPLETE CBC W/AUTO DIFF WBC: CPT

## 2020-07-24 PROCEDURE — 80053 COMPREHEN METABOLIC PANEL: CPT

## 2020-07-24 PROCEDURE — 80061 LIPID PANEL: CPT

## 2020-07-24 PROCEDURE — 36415 COLL VENOUS BLD VENIPUNCTURE: CPT

## 2020-07-24 PROCEDURE — 84443 ASSAY THYROID STIM HORMONE: CPT

## 2020-07-25 LAB — THYROGLOB AB SERPL-ACNC: <1 IU/ML (ref 0–0.9)

## 2020-07-27 ENCOUNTER — OFFICE VISIT (OUTPATIENT)
Dept: INTERNAL MEDICINE CLINIC | Facility: CLINIC | Age: 70
End: 2020-07-27
Payer: MEDICARE

## 2020-07-27 VITALS
DIASTOLIC BLOOD PRESSURE: 70 MMHG | BODY MASS INDEX: 29.26 KG/M2 | WEIGHT: 209 LBS | TEMPERATURE: 98.6 F | OXYGEN SATURATION: 95 % | HEART RATE: 64 BPM | RESPIRATION RATE: 20 BRPM | HEIGHT: 71 IN | SYSTOLIC BLOOD PRESSURE: 118 MMHG

## 2020-07-27 DIAGNOSIS — I10 BENIGN ESSENTIAL HYPERTENSION: ICD-10-CM

## 2020-07-27 DIAGNOSIS — C73 FOLLICULAR THYROID CANCER (HCC): ICD-10-CM

## 2020-07-27 DIAGNOSIS — C73 PAPILLARY THYROID CARCINOMA (HCC): ICD-10-CM

## 2020-07-27 DIAGNOSIS — R73.01 IMPAIRED FASTING GLUCOSE: Primary | ICD-10-CM

## 2020-07-27 DIAGNOSIS — N52.9 ERECTILE DYSFUNCTION, UNSPECIFIED ERECTILE DYSFUNCTION TYPE: ICD-10-CM

## 2020-07-27 DIAGNOSIS — E89.0 POSTOPERATIVE HYPOTHYROIDISM: ICD-10-CM

## 2020-07-27 DIAGNOSIS — Z85.820 PERSONAL HISTORY OF MALIGNANT MELANOMA: ICD-10-CM

## 2020-07-27 DIAGNOSIS — E78.2 MIXED HYPERLIPIDEMIA: ICD-10-CM

## 2020-07-27 DIAGNOSIS — N40.0 BENIGN PROSTATIC HYPERPLASIA WITHOUT LOWER URINARY TRACT SYMPTOMS: ICD-10-CM

## 2020-07-27 DIAGNOSIS — E04.2 MULTIPLE THYROID NODULES: ICD-10-CM

## 2020-07-27 PROCEDURE — 3074F SYST BP LT 130 MM HG: CPT | Performed by: NURSE PRACTITIONER

## 2020-07-27 PROCEDURE — 3078F DIAST BP <80 MM HG: CPT | Performed by: NURSE PRACTITIONER

## 2020-07-27 PROCEDURE — G0439 PPPS, SUBSEQ VISIT: HCPCS | Performed by: NURSE PRACTITIONER

## 2020-07-27 PROCEDURE — 1170F FXNL STATUS ASSESSED: CPT | Performed by: NURSE PRACTITIONER

## 2020-07-27 PROCEDURE — 3008F BODY MASS INDEX DOCD: CPT | Performed by: NURSE PRACTITIONER

## 2020-07-27 PROCEDURE — 99214 OFFICE O/P EST MOD 30 MIN: CPT | Performed by: NURSE PRACTITIONER

## 2020-07-27 PROCEDURE — 4040F PNEUMOC VAC/ADMIN/RCVD: CPT | Performed by: NURSE PRACTITIONER

## 2020-07-27 PROCEDURE — 1160F RVW MEDS BY RX/DR IN RCRD: CPT | Performed by: NURSE PRACTITIONER

## 2020-07-27 PROCEDURE — 1036F TOBACCO NON-USER: CPT | Performed by: NURSE PRACTITIONER

## 2020-07-27 PROCEDURE — 1125F AMNT PAIN NOTED PAIN PRSNT: CPT | Performed by: NURSE PRACTITIONER

## 2020-07-27 PROCEDURE — 1123F ACP DISCUSS/DSCN MKR DOCD: CPT | Performed by: NURSE PRACTITIONER

## 2020-07-27 NOTE — PATIENT INSTRUCTIONS
History of hypertension stable on current medication     history of follicular and papillary thyroid cancer status post partial thyroidectomy TSH suppressed and stable following w/ endo     hyperlipidemia LDL at goal on statin continue same     melanoma in remission following with Medical Oncology     health maintenance up-to-date recommend shingles vaccine

## 2020-07-27 NOTE — PROGRESS NOTES
Assessment/Plan:    Patient Instructions   History of hypertension stable on current medication     history of follicular and papillary thyroid cancer status post partial thyroidectomy TSH suppressed and stable following w/ endo     hyperlipidemia LDL at goal on statin continue same     melanoma in remission following with Medical Oncology     health maintenance up-to-date recommend shingles vaccine             Diagnoses and all orders for this visit:    Impaired fasting glucose  -     Hemoglobin A1C; Future    Multiple thyroid nodules    Postoperative hypothyroidism    Follicular thyroid cancer (HCC)    Papillary thyroid carcinoma (HCC)    Benign essential hypertension  -     CBC and differential; Future  -     Comprehensive metabolic panel; Future    Benign prostatic hyperplasia without lower urinary tract symptoms    Erectile dysfunction, unspecified erectile dysfunction type    Mixed hyperlipidemia  -     Lipid panel; Future    Personal history of malignant melanoma    Other orders  -     Cancel: TSH, 3rd generation with Free T4 reflex;  Future         Subjective:      Patient ID: Elliott Issa is a 78 y o  male      Active no formal exercise   history of thyroid cancer and melanoma following with endocrinology, surgical oncology and medical oncology   tolerating cholesterol medication   taking blood pressure medication no home blood pressures   no physical concerns        Current Outpatient Medications:     aspirin 81 MG tablet, Take 81 mg by mouth daily , Disp: , Rfl:     atenolol (TENORMIN) 50 mg tablet, Take 1 tablet (50 mg total) by mouth daily, Disp: 90 tablet, Rfl: 1    cholecalciferol (VITAMIN D3) 1,000 units tablet, Take 1,000 Units by mouth daily, Disp: , Rfl:     hydrochlorothiazide (HYDRODIURIL) 25 mg tablet, Take 1 tablet (25 mg total) by mouth daily, Disp: 90 tablet, Rfl: 1    levothyroxine 175 mcg tablet, Take 1 tablet (175 mcg total) by mouth daily, Disp: 90 tablet, Rfl: 3    lovastatin (MEVACOR) 40 MG tablet, Take 1 tablet (40 mg total) by mouth daily at bedtime, Disp: 90 tablet, Rfl: 2    multivitamin (THERAGRAN) TABS, Take 1 tablet by mouth daily at bedtime , Disp: , Rfl:     naproxen (NAPROSYN) 500 mg tablet, Take 1 tablet (500 mg total) by mouth 2 (two) times a day with meals (Patient taking differently: Take 500 mg by mouth as needed ), Disp: 30 tablet, Rfl: 1    sildenafil (VIAGRA) 50 MG tablet, Take 1 tablet (50 mg total) by mouth daily as needed for erectile dysfunction, Disp: 30 tablet, Rfl: 3    Recent Results (from the past 1008 hour(s))   CBC and differential    Collection Time: 06/17/20  9:40 AM   Result Value Ref Range    WBC 7 18 4 31 - 10 16 Thousand/uL    RBC 5 48 3 88 - 5 62 Million/uL    Hemoglobin 16 4 12 0 - 17 0 g/dL    Hematocrit 48 8 36 5 - 49 3 %    MCV 89 82 - 98 fL    MCH 29 9 26 8 - 34 3 pg    MCHC 33 6 31 4 - 37 4 g/dL    RDW 12 8 11 6 - 15 1 %    MPV 11 0 8 9 - 12 7 fL    Platelets 912 245 - 053 Thousands/uL    nRBC 0 /100 WBCs    Neutrophils Relative 67 43 - 75 %    Immat GRANS % 0 0 - 2 %    Lymphocytes Relative 21 14 - 44 %    Monocytes Relative 10 4 - 12 %    Eosinophils Relative 1 0 - 6 %    Basophils Relative 1 0 - 1 %    Neutrophils Absolute 4 84 1 85 - 7 62 Thousands/µL    Immature Grans Absolute 0 02 0 00 - 0 20 Thousand/uL    Lymphocytes Absolute 1 54 0 60 - 4 47 Thousands/µL    Monocytes Absolute 0 68 0 17 - 1 22 Thousand/µL    Eosinophils Absolute 0 06 0 00 - 0 61 Thousand/µL    Basophils Absolute 0 04 0 00 - 0 10 Thousands/µL   Comprehensive metabolic panel    Collection Time: 06/17/20  9:40 AM   Result Value Ref Range    Sodium 142 136 - 145 mmol/L    Potassium 3 9 3 5 - 5 3 mmol/L    Chloride 105 100 - 108 mmol/L    CO2 33 (H) 21 - 32 mmol/L    ANION GAP 4 4 - 13 mmol/L    BUN 19 5 - 25 mg/dL    Creatinine 1 11 0 60 - 1 30 mg/dL    Glucose 114 65 - 140 mg/dL    Calcium 9 2 8 3 - 10 1 mg/dL    AST 19 5 - 45 U/L    ALT 31 12 - 78 U/L    Alkaline Phosphatase 71 46 - 116 U/L    Total Protein 7 1 6 4 - 8 2 g/dL    Albumin 3 7 3 5 - 5 0 g/dL    Total Bilirubin 0 90 0 20 - 1 00 mg/dL    eGFR 68 ml/min/1 73sq m   LD,Blood    Collection Time: 06/17/20  9:40 AM   Result Value Ref Range     81 - 234 U/L   CBC and differential    Collection Time: 07/24/20  8:49 AM   Result Value Ref Range    WBC 7 00 4 31 - 10 16 Thousand/uL    RBC 5 45 3 88 - 5 62 Million/uL    Hemoglobin 16 4 12 0 - 17 0 g/dL    Hematocrit 48 0 36 5 - 49 3 %    MCV 88 82 - 98 fL    MCH 30 1 26 8 - 34 3 pg    MCHC 34 2 31 4 - 37 4 g/dL    RDW 12 7 11 6 - 15 1 %    MPV 10 9 8 9 - 12 7 fL    Platelets 582 100 - 224 Thousands/uL    nRBC 0 /100 WBCs    Neutrophils Relative 69 43 - 75 %    Immat GRANS % 0 0 - 2 %    Lymphocytes Relative 20 14 - 44 %    Monocytes Relative 9 4 - 12 %    Eosinophils Relative 1 0 - 6 %    Basophils Relative 1 0 - 1 %    Neutrophils Absolute 4 80 1 85 - 7 62 Thousands/µL    Immature Grans Absolute 0 03 0 00 - 0 20 Thousand/uL    Lymphocytes Absolute 1 43 0 60 - 4 47 Thousands/µL    Monocytes Absolute 0 63 0 17 - 1 22 Thousand/µL    Eosinophils Absolute 0 07 0 00 - 0 61 Thousand/µL    Basophils Absolute 0 04 0 00 - 0 10 Thousands/µL   Comprehensive metabolic panel    Collection Time: 07/24/20  8:49 AM   Result Value Ref Range    Sodium 140 136 - 145 mmol/L    Potassium 3 8 3 5 - 5 3 mmol/L    Chloride 103 100 - 108 mmol/L    CO2 32 21 - 32 mmol/L    ANION GAP 5 4 - 13 mmol/L    BUN 18 5 - 25 mg/dL    Creatinine 1 08 0 60 - 1 30 mg/dL    Glucose, Fasting 104 (H) 65 - 99 mg/dL    Calcium 9 0 8 3 - 10 1 mg/dL    AST 16 5 - 45 U/L    ALT 26 12 - 78 U/L    Alkaline Phosphatase 73 46 - 116 U/L    Total Protein 7 1 6 4 - 8 2 g/dL    Albumin 3 7 3 5 - 5 0 g/dL    Total Bilirubin 1 00 0 20 - 1 00 mg/dL    eGFR 70 ml/min/1 73sq m   Lipid panel    Collection Time: 07/24/20  8:49 AM   Result Value Ref Range    Cholesterol 149 50 - 200 mg/dL    Triglycerides 93 <=150 mg/dL    HDL, Direct 40 >=40 mg/dL    LDL Calculated 90 0 - 100 mg/dL    Non-HDL-Chol (CHOL-HDL) 109 mg/dl   Hemoglobin A1C    Collection Time: 07/24/20  8:49 AM   Result Value Ref Range    Hemoglobin A1C 5 3 Normal 3 8-5 6%; PreDiabetic 5 7-6 4%; Diabetic >=6 5%; Glycemic control for adults with diabetes <7 0% %     mg/dl   TSH, 3rd generation with Free T4 reflex    Collection Time: 07/24/20  8:49 AM   Result Value Ref Range    TSH 3RD GENERATON <0 007 (L) 0 358 - 3 740 uIU/mL   Anti-thyroglobulin antibody    Collection Time: 07/24/20  8:49 AM   Result Value Ref Range    Thyroglobulin Ab <1 0 0 0 - 0 9 IU/mL   T4, free    Collection Time: 07/24/20  8:49 AM   Result Value Ref Range    Free T4 1 79 (H) 0 76 - 1 46 ng/dL       The following portions of the patient's history were reviewed and updated as appropriate: allergies, current medications, past family history, past medical history, past social history, past surgical history and problem list      Review of Systems   Constitutional: Negative for appetite change, chills, diaphoresis, fatigue, fever and unexpected weight change  HENT: Negative for postnasal drip and sneezing  Eyes: Negative for visual disturbance  Respiratory: Negative for chest tightness and shortness of breath  Cardiovascular: Negative for chest pain, palpitations and leg swelling  Gastrointestinal: Negative for abdominal pain and blood in stool  Endocrine: Negative for cold intolerance, heat intolerance, polydipsia, polyphagia and polyuria  Genitourinary: Negative for difficulty urinating, dysuria, frequency and urgency  Musculoskeletal: Negative for arthralgias and myalgias  Skin: Negative for rash and wound  Neurological: Negative for dizziness, weakness, light-headedness and headaches  Hematological: Negative for adenopathy  Psychiatric/Behavioral: Negative for confusion, dysphoric mood and sleep disturbance  The patient is not nervous/anxious            Objective:      BP 118/70 (BP Location: Left arm, Patient Position: Sitting, Cuff Size: Standard)   Pulse 64   Temp 98 6 °F (37 °C)   Resp 20   Ht 5' 11" (1 803 m)   Wt 94 8 kg (209 lb)   SpO2 95%   BMI 29 15 kg/m²        Physical Exam   Constitutional: He is oriented to person, place, and time  He appears well-developed  No distress  HENT:   Head: Normocephalic and atraumatic  Nose: Nose normal    Mouth/Throat: Oropharynx is clear and moist    Eyes: Pupils are equal, round, and reactive to light  Conjunctivae and EOM are normal    Neck: Normal range of motion  Neck supple  No JVD present  No tracheal deviation present  No thyromegaly present  Cardiovascular: Normal rate, regular rhythm, normal heart sounds and intact distal pulses  Exam reveals no gallop and no friction rub  No murmur heard  Pulmonary/Chest: Effort normal and breath sounds normal  No respiratory distress  He has no wheezes  He has no rales  Abdominal: Soft  Bowel sounds are normal  He exhibits no distension  There is no tenderness  Musculoskeletal: Normal range of motion  He exhibits no edema  Lymphadenopathy:     He has no cervical adenopathy  Neurological: He is alert and oriented to person, place, and time  No cranial nerve deficit  Skin: Skin is warm and dry  No rash noted  He is not diaphoretic  Psychiatric: He has a normal mood and affect   His behavior is normal  Judgment and thought content normal

## 2020-07-27 NOTE — PROGRESS NOTES
Assessment and Plan:     Problem List Items Addressed This Visit        Endocrine    Impaired fasting glucose - Primary    Multiple thyroid nodules    Postoperative hypothyroidism    Follicular thyroid cancer (HCC)    Papillary thyroid carcinoma (HCC)       Cardiovascular and Mediastinum    Benign essential hypertension       Genitourinary    BPH (benign prostatic hyperplasia)       Other    Erectile dysfunction    Hyperlipidemia    Personal history of malignant melanoma           Preventive health issues were discussed with patient, and age appropriate screening tests were ordered as noted in patient's After Visit Summary  Personalized health advice and appropriate referrals for health education or preventive services given if needed, as noted in patient's After Visit Summary       History of Present Illness:     Patient presents for Medicare Annual Wellness visit    Patient Care Team:  Merlyn Velazquez MD as PCP - General (Internal Medicine)  MD Sudheer Preciado MD (Oncology)     Problem List:     Patient Active Problem List   Diagnosis    Actinic keratosis    Benign essential hypertension    BPH (benign prostatic hyperplasia)    Erectile dysfunction    GERD without esophagitis    Hyperlipidemia    Impaired fasting glucose    Personal history of malignant melanoma    Liver mass    Other specified soft tissue disorders    Multiple thyroid nodules    Encounter for follow-up examination after completed treatment for malignant neoplasm    Hoarseness of voice    Vocal cord paralysis    Fatigue    Vitamin D deficiency    Postoperative hypothyroidism    History of thyroid cancer    Hernia of abdominal cavity    Follicular thyroid cancer ([address] )    Papillary thyroid carcinoma ([address] )      Past Medical and Surgical History:     Past Medical History:   Diagnosis Date    Arthritis     Cancer ([address] )     melanoma abdomen    GERD (gastroesophageal reflux disease)     diet controlled    Hyperlipidemia     Last Assessed:10/20/14    Hypertension     PONV (postoperative nausea and vomiting)      Past Surgical History:   Procedure Laterality Date    COLONOSCOPY      HERNIA REPAIR Right     Last Assessed:10/20/14 inguinal     LYMPH NODE BIOPSY N/A 12/14/2018    Procedure: SENTINEL LYMPH NODE BIOPSY; LYMPHOSCINTIGRAPHY; INTRAOPERATIVE LYMPHATIC MAPPING (INJECT AT 1100); Surgeon: Jed Lockwood MD;  [facility];  Service: Surgical Oncology    SKIN LESION EXCISION N/A 12/14/2018    Procedure: MEDIAL UPPER ABDOMEN MELANOMA WIDE EXCISION; BILATERAL AXILLA SLN;  Surgeon: Jed Lockwood MD;  [facility];  Service: Surgical Oncology    THYROID LOBECTOMY Left 4/24/2019    Procedure: HEMITHYROIDECTOMY ISTHMUSECTOMY;  Surgeon: Jed Lockwood MD;  [facility];  Service: Surgical Oncology    TONSILLECTOMY      US GUIDED THYROID BIOPSY  2/7/2019    WISDOM TOOTH EXTRACTION        Family History:     Family History   Problem Relation Age of Onset    Hypertension Mother     Hypothyroidism Mother     Hypertension Father     Skin cancer Father     Colon cancer Maternal Aunt     Colon cancer Paternal Aunt         62s    Skin cancer Paternal Uncle       Social History:     E-Cigarette/Vaping    E-Cigarette Use Never User      E-Cigarette/Vaping Substances    Nicotine No     THC No     CBD No     Flavoring No     Other No     Unknown No      Social History     Socioeconomic History    Marital status: /Civil Union     Spouse name: None    Number of children: None    Years of education: None    Highest education level: None   Occupational History    None   Social Needs    Financial resource strain: None    Food insecurity:     Worry: None     Inability: None    Transportation needs:     Medical: None     Non-medical: None   Tobacco Use    Smoking status: Never Smoker    Smokeless tobacco: Never Used   Substance and Sexual Activity    Alcohol use:  Yes Alcohol/week: 2 0 standard drinks     Types: 1 Glasses of wine, 1 Cans of beer per week     Frequency: Monthly or less     Drinks per session: 1 or 2     Binge frequency: Never     Comment: rarely    Drug use: No    Sexual activity: Yes     Partners: Female   Lifestyle    Physical activity:     Days per week: 4 days     Minutes per session: 30 min    Stress: Not at all   Relationships    Social connections:     Talks on phone: None     Gets together: None     Attends Confucianism service: None     Active member of club or organization: None     Attends meetings of clubs or organizations: None     Relationship status: None    Intimate partner violence:     Fear of current or ex partner: None     Emotionally abused: None     Physically abused: None     Forced sexual activity: None   Other Topics Concern    None   Social History Narrative    No advance directives      Medications and Allergies:     Current Outpatient Medications   Medication Sig Dispense Refill    aspirin 81 MG tablet Take 81 mg by mouth daily       atenolol (TENORMIN) 50 mg tablet Take 1 tablet (50 mg total) by mouth daily 90 tablet 1    cholecalciferol (VITAMIN D3) 1,000 units tablet Take 1,000 Units by mouth daily      hydrochlorothiazide (HYDRODIURIL) 25 mg tablet Take 1 tablet (25 mg total) by mouth daily 90 tablet 1    levothyroxine 175 mcg tablet Take 1 tablet (175 mcg total) by mouth daily 90 tablet 3    lovastatin (MEVACOR) 40 MG tablet Take 1 tablet (40 mg total) by mouth daily at bedtime 90 tablet 2    multivitamin (THERAGRAN) TABS Take 1 tablet by mouth daily at bedtime       naproxen (NAPROSYN) 500 mg tablet Take 1 tablet (500 mg total) by mouth 2 (two) times a day with meals (Patient taking differently: Take 500 mg by mouth as needed ) 30 tablet 1    sildenafil (VIAGRA) 50 MG tablet Take 1 tablet (50 mg total) by mouth daily as needed for erectile dysfunction 30 tablet 3     No current facility-administered medications for this visit  No Known Allergies   Immunizations:     Immunization History   Administered Date(s) Administered    Influenza TIV (IM) [birth year]    Influenza, high dose seasonal 0 5 mL 12/21/2018    Pneumococcal Conjugate 13-Valent 06/02/2017    Pneumococcal Polysaccharide PPV23 06/24/2019    Tdap 1950, 08/22/2016      Health Maintenance:         Topic Date Due    CRC Screening: Colonoscopy  11/09/2026    Hepatitis C Screening  Completed         Topic Date Due    Influenza Vaccine  07/01/2020      Medicare Health Risk Assessment:     /70 (BP Location: Left arm, Patient Position: Sitting, Cuff Size: Standard)   Pulse 64   Temp 98 6 °F (37 °C)   Resp 20   Ht 5' 11" (1 803 m)   Wt 94 8 kg (209 lb)   SpO2 95%   BMI 29 15 kg/m²      Maritza Leslie is here for his Subsequent Wellness visit  Health Risk Assessment:   Patient rates overall health as very good  Patient feels that their physical health rating is slightly better  Eyesight was rated as same  Hearing was rated as same  Patient feels that their emotional and mental health rating is same  Pain experienced in the last 7 days has been some  Patient's pain rating has been 3/10  Patient states that he has experienced no weight loss or gain in last 6 months  Depression Screening:   PHQ-2 Score: 0      Fall Risk Screening: In the past year, patient has experienced: no history of falling in past year      Home Safety:  Patient does not have trouble with stairs inside or outside of their home  Patient has working smoke alarms and has working carbon monoxide detector  Home safety hazards include: none  Nutrition:   Current diet is Regular and Limited junk food  Medications:   Patient is not currently taking any over-the-counter supplements  Patient is able to manage medications       Activities of Daily Living (ADLs)/Instrumental Activities of Daily Living (IADLs):   Walk and transfer into and out of bed and chair?: Yes  Dress and groom yourself?: Yes    Bathe or shower yourself?: Yes    Feed yourself?  Yes  Do your laundry/housekeeping?: Yes  Manage your money, pay your bills and track your expenses?: Yes  Make your own meals?: Yes    Do your own shopping?: Yes    Previous Hospitalizations:   Any hospitalizations or ED visits within the last 12 months?: No      Advance Care Planning:   Living will: No    Advanced directive: No    Five wishes given: No      Cognitive Screening:   Provider or family/friend/caregiver concerned regarding cognition?: No    PREVENTIVE SCREENINGS      Cardiovascular Screening:    General: Screening Not Indicated and History Lipid Disorder      Diabetes Screening:     General: Screening Current      Colorectal Cancer Screening:     General: Screening Current      Prostate Cancer Screening:    General: Screening Current      Osteoporosis Screening:    General: Screening Not Indicated      Abdominal Aortic Aneurysm (AAA) Screening:    Risk factors include: age between 73-69 yo        Lung Cancer Screening:     General: Screening Not Indicated      Hepatitis C Screening:    General: Screening Current      JAMES Cano

## 2020-07-28 ENCOUNTER — TELEPHONE (OUTPATIENT)
Dept: ENDOCRINOLOGY | Facility: CLINIC | Age: 70
End: 2020-07-28

## 2020-07-28 DIAGNOSIS — E89.0 POSTOPERATIVE HYPOTHYROIDISM: ICD-10-CM

## 2020-07-28 DIAGNOSIS — E04.2 MULTIPLE THYROID NODULES: Primary | ICD-10-CM

## 2020-07-28 DIAGNOSIS — C73 FOLLICULAR THYROID CANCER (HCC): ICD-10-CM

## 2020-07-28 DIAGNOSIS — C73 PAPILLARY THYROID CARCINOMA (HCC): ICD-10-CM

## 2020-07-28 NOTE — TELEPHONE ENCOUNTER
----- Message from Chaitanya Lozano MD sent at 7/27/2020  5:12 PM EDT -----  Please inform patient of stable right mid gland thyroid nodule  Recommend repeat thyroid ultrasound, surveillance ultrasound in 1 year

## 2020-07-28 NOTE — TELEPHONE ENCOUNTER
----- Message from Alicia Bellamy MD sent at 7/27/2020  5:11 PM EDT -----  Please ask the lab to add on a thyroglobulin level which was ordered

## 2020-07-28 NOTE — TELEPHONE ENCOUNTER
Call Ian Foy, was advsied since pt had the labs drawn in a hospital he will need to check to see if the thyroglobulin level  Can be added  He will call me back to let me know

## 2020-07-29 DIAGNOSIS — C73 FOLLICULAR THYROID CANCER (HCC): ICD-10-CM

## 2020-07-29 DIAGNOSIS — E89.0 POSTOPERATIVE HYPOTHYROIDISM: ICD-10-CM

## 2020-07-29 DIAGNOSIS — C73 PAPILLARY THYROID CARCINOMA (HCC): ICD-10-CM

## 2020-07-29 DIAGNOSIS — E04.2 MULTIPLE THYROID NODULES: Primary | ICD-10-CM

## 2020-07-29 NOTE — TELEPHONE ENCOUNTER
[address] to inquire if the Thyroglobulin can be ordered, was told it can not since the blood was drawn on Friday 7/24

## 2020-09-04 DIAGNOSIS — N52.9 ERECTILE DYSFUNCTION, UNSPECIFIED ERECTILE DYSFUNCTION TYPE: ICD-10-CM

## 2020-09-04 RX ORDER — SILDENAFIL 50 MG/1
TABLET, FILM COATED ORAL
Qty: 30 TABLET | Refills: 3 | Status: SHIPPED | OUTPATIENT
Start: 2020-09-04 | End: 2021-10-01

## 2020-09-06 DIAGNOSIS — I10 ESSENTIAL HYPERTENSION: ICD-10-CM

## 2020-09-08 RX ORDER — ATENOLOL 50 MG/1
TABLET ORAL
Qty: 90 TABLET | Refills: 1 | Status: SHIPPED | OUTPATIENT
Start: 2020-09-08 | End: 2021-03-11

## 2020-09-08 RX ORDER — HYDROCHLOROTHIAZIDE 25 MG/1
TABLET ORAL
Qty: 90 TABLET | Refills: 1 | Status: SHIPPED | OUTPATIENT
Start: 2020-09-08 | End: 2021-03-12

## 2020-11-04 DIAGNOSIS — E78.49 OTHER HYPERLIPIDEMIA: ICD-10-CM

## 2020-11-04 RX ORDER — LOVASTATIN 40 MG/1
TABLET ORAL
Qty: 90 TABLET | Refills: 2 | Status: SHIPPED | OUTPATIENT
Start: 2020-11-04 | End: 2021-08-19

## 2020-11-12 ENCOUNTER — LAB (OUTPATIENT)
Dept: LAB | Facility: HOSPITAL | Age: 70
End: 2020-11-12
Attending: INTERNAL MEDICINE
Payer: MEDICARE

## 2020-11-12 DIAGNOSIS — C77.3 MALIGNANT NEOPLASM METASTATIC TO LYMPH NODE OF AXILLA (HCC): ICD-10-CM

## 2020-11-12 DIAGNOSIS — C73 PAPILLARY THYROID CARCINOMA (HCC): ICD-10-CM

## 2020-11-12 DIAGNOSIS — Z85.820 PERSONAL HISTORY OF MALIGNANT MELANOMA: ICD-10-CM

## 2020-11-12 DIAGNOSIS — E89.0 POSTOPERATIVE HYPOTHYROIDISM: ICD-10-CM

## 2020-11-12 DIAGNOSIS — E04.2 MULTIPLE THYROID NODULES: ICD-10-CM

## 2020-11-12 DIAGNOSIS — C73 FOLLICULAR THYROID CANCER (HCC): ICD-10-CM

## 2020-11-12 LAB
BUN SERPL-MCNC: 17 MG/DL (ref 5–25)
CREAT SERPL-MCNC: 1.13 MG/DL (ref 0.6–1.3)
GFR SERPL CREATININE-BSD FRML MDRD: 66 ML/MIN/1.73SQ M
TSH SERPL DL<=0.05 MIU/L-ACNC: <0.007 UIU/ML (ref 0.36–3.74)

## 2020-11-12 PROCEDURE — 84443 ASSAY THYROID STIM HORMONE: CPT

## 2020-11-12 PROCEDURE — 84520 ASSAY OF UREA NITROGEN: CPT

## 2020-11-12 PROCEDURE — 84432 ASSAY OF THYROGLOBULIN: CPT

## 2020-11-12 PROCEDURE — 82565 ASSAY OF CREATININE: CPT

## 2020-11-12 PROCEDURE — 86800 THYROGLOBULIN ANTIBODY: CPT

## 2020-11-14 LAB
THYROGLOB AB SERPL-ACNC: <1 IU/ML (ref 0–0.9)
THYROGLOB SERPL-MCNC: 1.2 NG/ML (ref 1.4–29.2)

## 2020-11-17 ENCOUNTER — TELEPHONE (OUTPATIENT)
Dept: ENDOCRINOLOGY | Facility: CLINIC | Age: 70
End: 2020-11-17

## 2020-11-20 ENCOUNTER — OFFICE VISIT (OUTPATIENT)
Dept: ENDOCRINOLOGY | Facility: CLINIC | Age: 70
End: 2020-11-20
Payer: MEDICARE

## 2020-11-20 VITALS
SYSTOLIC BLOOD PRESSURE: 120 MMHG | TEMPERATURE: 96 F | DIASTOLIC BLOOD PRESSURE: 70 MMHG | HEIGHT: 71 IN | WEIGHT: 213 LBS | BODY MASS INDEX: 29.82 KG/M2 | HEART RATE: 61 BPM

## 2020-11-20 DIAGNOSIS — E55.9 VITAMIN D DEFICIENCY: ICD-10-CM

## 2020-11-20 DIAGNOSIS — C73 FOLLICULAR THYROID CANCER (HCC): ICD-10-CM

## 2020-11-20 DIAGNOSIS — C73 PAPILLARY THYROID CARCINOMA (HCC): ICD-10-CM

## 2020-11-20 DIAGNOSIS — E89.0 POSTOPERATIVE HYPOTHYROIDISM: ICD-10-CM

## 2020-11-20 DIAGNOSIS — E04.2 MULTIPLE THYROID NODULES: Primary | ICD-10-CM

## 2020-11-20 PROCEDURE — 99214 OFFICE O/P EST MOD 30 MIN: CPT | Performed by: INTERNAL MEDICINE

## 2020-11-27 ENCOUNTER — HOSPITAL ENCOUNTER (OUTPATIENT)
Dept: ULTRASOUND IMAGING | Facility: HOSPITAL | Age: 70
Discharge: HOME | End: 2020-11-27
Attending: INTERNAL MEDICINE
Payer: MEDICARE

## 2020-11-27 DIAGNOSIS — E55.9 VITAMIN D DEFICIENCY: ICD-10-CM

## 2020-11-27 DIAGNOSIS — E04.2 MULTIPLE THYROID NODULES: ICD-10-CM

## 2020-11-27 DIAGNOSIS — E89.0 POSTOPERATIVE HYPOTHYROIDISM: ICD-10-CM

## 2020-11-27 DIAGNOSIS — C73 FOLLICULAR THYROID CANCER (HCC): ICD-10-CM

## 2020-11-27 DIAGNOSIS — C73 PAPILLARY THYROID CARCINOMA (HCC): ICD-10-CM

## 2020-11-27 PROCEDURE — 76536 US EXAM OF HEAD AND NECK: CPT

## 2020-12-08 ENCOUNTER — TELEPHONE (OUTPATIENT)
Dept: ENDOCRINOLOGY | Facility: CLINIC | Age: 70
End: 2020-12-08

## 2020-12-08 DIAGNOSIS — C73 PAPILLARY THYROID CARCINOMA (HCC): ICD-10-CM

## 2020-12-08 DIAGNOSIS — E04.2 MULTIPLE THYROID NODULES: Primary | ICD-10-CM

## 2020-12-08 DIAGNOSIS — C73 FOLLICULAR THYROID CANCER (HCC): ICD-10-CM

## 2020-12-11 ENCOUNTER — HOSPITAL ENCOUNTER (OUTPATIENT)
Dept: CT IMAGING | Facility: HOSPITAL | Age: 70
Discharge: HOME | End: 2020-12-11
Attending: INTERNAL MEDICINE
Payer: MEDICARE

## 2020-12-11 DIAGNOSIS — C73 PAPILLARY THYROID CARCINOMA (HCC): ICD-10-CM

## 2020-12-11 DIAGNOSIS — C43.9 MALIGNANT MELANOMA, UNSPECIFIED SITE (HCC): ICD-10-CM

## 2020-12-11 PROCEDURE — 71260 CT THORAX DX C+: CPT

## 2020-12-11 PROCEDURE — 74177 CT ABD & PELVIS W/CONTRAST: CPT

## 2020-12-11 RX ADMIN — IOHEXOL 100 ML: 350 INJECTION, SOLUTION INTRAVENOUS at 08:43

## 2020-12-18 ENCOUNTER — OFFICE VISIT (OUTPATIENT)
Dept: HEMATOLOGY ONCOLOGY | Facility: CLINIC | Age: 70
End: 2020-12-18
Payer: MEDICARE

## 2020-12-18 VITALS
BODY MASS INDEX: 30.1 KG/M2 | SYSTOLIC BLOOD PRESSURE: 100 MMHG | OXYGEN SATURATION: 99 % | TEMPERATURE: 97.8 F | DIASTOLIC BLOOD PRESSURE: 70 MMHG | RESPIRATION RATE: 18 BRPM | WEIGHT: 215 LBS | HEART RATE: 61 BPM | HEIGHT: 71 IN

## 2020-12-18 DIAGNOSIS — Z85.820 PERSONAL HISTORY OF MALIGNANT MELANOMA: Primary | ICD-10-CM

## 2020-12-18 PROCEDURE — 99214 OFFICE O/P EST MOD 30 MIN: CPT | Performed by: INTERNAL MEDICINE

## 2020-12-24 DIAGNOSIS — E89.0 POSTOPERATIVE HYPOTHYROIDISM: ICD-10-CM

## 2020-12-24 DIAGNOSIS — C73 PAPILLARY THYROID CARCINOMA (HCC): ICD-10-CM

## 2020-12-24 RX ORDER — LEVOTHYROXINE SODIUM 175 UG/1
TABLET ORAL
Qty: 90 TABLET | Refills: 0 | Status: SHIPPED | OUTPATIENT
Start: 2020-12-24 | End: 2021-03-23 | Stop reason: SDUPTHER

## 2020-12-28 ENCOUNTER — HOSPITAL ENCOUNTER (OUTPATIENT)
Dept: ULTRASOUND IMAGING | Facility: HOSPITAL | Age: 70
Discharge: HOME | End: 2020-12-28
Attending: SURGERY
Payer: MEDICARE

## 2020-12-28 DIAGNOSIS — Z85.820 PERSONAL HISTORY OF MALIGNANT MELANOMA: ICD-10-CM

## 2020-12-28 PROCEDURE — 76882 US LMTD JT/FCL EVL NVASC XTR: CPT

## 2020-12-30 ENCOUNTER — OFFICE VISIT (OUTPATIENT)
Dept: DERMATOLOGY | Facility: CLINIC | Age: 70
End: 2020-12-30
Payer: MEDICARE

## 2020-12-30 VITALS — TEMPERATURE: 95 F

## 2020-12-30 DIAGNOSIS — D22.9 NEVUS: Primary | ICD-10-CM

## 2020-12-30 DIAGNOSIS — Z13.89 SCREENING FOR SKIN CONDITION: ICD-10-CM

## 2020-12-30 DIAGNOSIS — Z85.820 HISTORY OF MELANOMA: Chronic | ICD-10-CM

## 2020-12-30 DIAGNOSIS — L82.1 SEBORRHEIC KERATOSIS: ICD-10-CM

## 2020-12-30 PROCEDURE — 99213 OFFICE O/P EST LOW 20 MIN: CPT | Performed by: DERMATOLOGY

## 2021-01-06 ENCOUNTER — TELEPHONE (OUTPATIENT)
Dept: SURGICAL ONCOLOGY | Facility: CLINIC | Age: 71
End: 2021-01-06

## 2021-01-07 ENCOUNTER — OFFICE VISIT (OUTPATIENT)
Dept: SURGICAL ONCOLOGY | Facility: CLINIC | Age: 71
End: 2021-01-07
Payer: MEDICARE

## 2021-01-07 VITALS
DIASTOLIC BLOOD PRESSURE: 74 MMHG | HEIGHT: 71 IN | TEMPERATURE: 98.1 F | WEIGHT: 215.5 LBS | RESPIRATION RATE: 18 BRPM | BODY MASS INDEX: 30.17 KG/M2 | HEART RATE: 58 BPM | SYSTOLIC BLOOD PRESSURE: 120 MMHG

## 2021-01-07 DIAGNOSIS — Z08 ENCOUNTER FOR FOLLOW-UP EXAMINATION AFTER COMPLETED TREATMENT FOR MALIGNANT NEOPLASM: Primary | ICD-10-CM

## 2021-01-07 DIAGNOSIS — Z85.850 HISTORY OF THYROID CANCER: ICD-10-CM

## 2021-01-07 DIAGNOSIS — D48.1 NEOPLASM OF UNCERTAIN BEHAVIOR OF CONNECTIVE AND OTHER SOFT TISSUE: ICD-10-CM

## 2021-01-07 DIAGNOSIS — Z85.820 PERSONAL HISTORY OF MALIGNANT MELANOMA: ICD-10-CM

## 2021-01-07 PROCEDURE — 99214 OFFICE O/P EST MOD 30 MIN: CPT | Performed by: SURGERY

## 2021-01-07 NOTE — PROGRESS NOTES
Surgical Oncology Follow Up       [facility]  [facility] [place]  [address]  [place] TELLEZ [address] [facility]  1950  [id number]  [facility]  [facility] [place]  [address]  [place] [id number]    Diagnoses and all orders for this visit:    Encounter for follow-up examination after completed treatment for malignant neoplasm    Personal history of malignant melanoma  -     US extremity soft tissue; Future    History of thyroid cancer    Neoplasm of uncertain behavior of connective and other soft tissue   -     US extremity soft tissue; Future        Chief Complaint   Patient presents with    Follow-up       Return in about 6 months (around 7/7/2021) for Office Visit, Imaging - See orders  Oncology History   Personal history of malignant melanoma   10/16/2018 Initial Diagnosis    Malignant melanoma of skin of abdomen ([facility] [address] )     12/14/2018 Surgery    Wide excision of abdomen with sentinel lymph node biopsies (right and left axillae)     History of thyroid cancer   4/24/2019 Initial Diagnosis    Papillary and follicular carcinoma ([address] )     4/24/2019 Surgery    Thyroid, left and isthmus, hemithyroidectomy:  -  Papillary carcinoma, follicular variant, infiltrative - [id number]  -  Follicular carcinoma, minimally invasive - [id number]         Staging: [id number] melanoma of the abdomen  December 2018  Positive sentinel node in the right axilla     [id number] invasive follicular carcinoma of the thyroid, April 2019     Treatment history:   Wide excision with sentinel lymph node biopsy December 2018  Left thyroid lobectomy, April 2019  Current treatment:   Observation for the melanoma  TSH suppression for the thyroid cancer  Disease status:   SYLVAIN    History of Present Illness:  Patient returns in follow-up of his melanoma and thyroid cancer    He is doing well at this time with no complaints  He denies any new abdominal pain, nausea, vomiting, back pain, bone pain, headaches, or cough  Ultrasound of the right axilla from December 28, 2020 was negative  CT of his chest, abdomen, and pelvis from December 11, 2020 was negative  Neck ultrasound with lymph node mapping from November 27, 2020 was negative  His thyroid ultrasound was stable, but his endocrinologist has recommended biopsy  I personally reviewed his films  His thyroglobulin is 1 2  Thyroglobulin antibodies were undetectable  Review of Systems  Complete ROS Surg Onc:   Complete ROS Surg Onc:   Constitutional: The patient denies new or recent history of general fatigue, no recent weight loss, no change in appetite  Eyes: No complaints of visual problems, no scleral icterus  ENT: no complaints of ear pain, no hoarseness, no difficulty swallowing,  no tinnitus and no new masses in head, oral cavity, or neck  Cardiovascular: No complaints of chest pain, no palpitations, no ankle edema  Respiratory: No complaints of shortness of breath, no cough  Gastrointestinal: No complaints of jaundice, no bloody stools, no pale stools  Genitourinary: No complaints of dysuria, no hematuria, no nocturia, no frequent urination, no urethral discharge  Musculoskeletal: No complaints of weakness, paralysis, joint stiffness or arthralgias  Integumentary: No complaints of rash, no new lesions  Neurological: No complaints of convulsions, no seizures, no dizziness  Hematologic/Lymphatic: No complaints of easy bruising  Endocrine:  No hot or cold intolerance  No polydipsia, polyphagia, or polyuria  Allergy/immunology:  No environmental allergies  No food allergies  Not immunocompromised  Skin:  No pallor or rash  No wound          Patient Active Problem List   Diagnosis    Actinic keratosis    Benign essential hypertension    BPH (benign prostatic hyperplasia)    Erectile dysfunction    GERD without esophagitis    Hyperlipidemia    Impaired fasting glucose    Personal history of malignant melanoma    Liver mass    Other specified soft tissue disorders    Multiple thyroid nodules    Encounter for follow-up examination after completed treatment for malignant neoplasm    Hoarseness of voice    Vocal cord paralysis    Fatigue    Vitamin D deficiency    Postoperative hypothyroidism    History of thyroid cancer    Hernia of abdominal cavity    Follicular thyroid cancer ([facility] [address] )    Papillary thyroid carcinoma (HCC)     Past Medical History:   Diagnosis Date    Arthritis     Cancer ([facility] [address] )     melanoma abdomen    GERD (gastroesophageal reflux disease)     diet controlled    Hyperlipidemia     Last Assessed:10/20/14    Hypertension     PONV (postoperative nausea and vomiting)      Past Surgical History:   Procedure Laterality Date    COLONOSCOPY      HERNIA REPAIR Right     Last Assessed:10/20/14 inguinal     LYMPH NODE BIOPSY N/A 12/14/2018    Procedure: SENTINEL LYMPH NODE BIOPSY; LYMPHOSCINTIGRAPHY; INTRAOPERATIVE LYMPHATIC MAPPING (INJECT AT 1100);   Surgeon: Dimple Pereira MD;  [facility];  Service: Surgical Oncology    SKIN LESION EXCISION N/A 12/14/2018    Procedure: MEDIAL UPPER ABDOMEN MELANOMA WIDE EXCISION; BILATERAL AXILLA SLN;  Surgeon: Dimple Pereira MD;  [facility];  Service: Surgical Oncology    THYROID LOBECTOMY Left 4/24/2019    Procedure: HEMITHYROIDECTOMY ISTHMUSECTOMY;  Surgeon: Dimple Pereira MD;  [facility];  Service: Surgical Oncology    TONSILLECTOMY      US GUIDED THYROID BIOPSY  2/7/2019    WISDOM TOOTH EXTRACTION       Family History   Problem Relation Age of Onset    Hypertension Mother     Hypothyroidism Mother     Hypertension Father     Skin cancer Father     Colon cancer Maternal Aunt     Colon cancer Paternal Aunt         62s    Skin cancer Paternal Uncle      Social History     Socioeconomic History    Marital status: /Civil Union Spouse name: Not on file    Number of children: Not on file    Years of education: Not on file    Highest education level: Not on file   Occupational History    Not on file   Social Needs    Financial resource strain: Not on file    Food insecurity     Worry: Not on file     Inability: Not on file    Transportation needs     Medical: Not on file     Non-medical: Not on file   Tobacco Use    Smoking status: Never Smoker    Smokeless tobacco: Never Used   Substance and Sexual Activity    Alcohol use:  Yes     Alcohol/week: 2 0 standard drinks     Types: 1 Glasses of wine, 1 Cans of beer per week     Frequency: Monthly or less     Drinks per session: 1 or 2     Binge frequency: Never     Comment: rarely    Drug use: No    Sexual activity: Yes     Partners: Female   Lifestyle    Physical activity     Days per week: 4 days     Minutes per session: 30 min    Stress: Not at all   Relationships    Social connections     Talks on phone: Not on file     Gets together: Not on file     Attends Yazdanism service: Not on file     Active member of club or organization: Not on file     Attends meetings of clubs or organizations: Not on file     Relationship status: Not on file    Intimate partner violence     Fear of current or ex partner: Not on file     Emotionally abused: Not on file     Physically abused: Not on file     Forced sexual activity: Not on file   Other Topics Concern    Not on file   Social History Narrative    No advance directives       Current Outpatient Medications:     aspirin 81 MG tablet, Take 81 mg by mouth daily , Disp: , Rfl:     atenolol (TENORMIN) 50 mg tablet, TAKE ONE TABLET BY MOUTH EVERY DAY, Disp: 90 tablet, Rfl: 1    cholecalciferol (VITAMIN D3) 1,000 units tablet, Take 1,000 Units by mouth daily, Disp: , Rfl:     hydrochlorothiazide (HYDRODIURIL) 25 mg tablet, TAKE ONE TABLET BY MOUTH EVERY DAY, Disp: 90 tablet, Rfl: 1    levothyroxine 175 mcg tablet, TAKE ONE TABLET BY MOUTH EVERY DAY, Disp: 90 tablet, Rfl: 0    lovastatin (MEVACOR) 40 MG tablet, TAKE ONE TABLET BY MOUTH EVERY DAY AT BEDTIME, Disp: 90 tablet, Rfl: 2    multivitamin (THERAGRAN) TABS, Take 1 tablet by mouth daily at bedtime , Disp: , Rfl:     naproxen (NAPROSYN) 500 mg tablet, Take 1 tablet (500 mg total) by mouth 2 (two) times a day with meals (Patient taking differently: Take 500 mg by mouth as needed ), Disp: 30 tablet, Rfl: 1    sildenafil (VIAGRA) 50 MG tablet, TAKE ONE TABLET BY MOUTH EVERY DAY AS NEEDED FOR ERECTILE DYSFUNCTION, Disp: 30 tablet, Rfl: 3  No Known Allergies  Vitals:    01/07/21 1046   BP: 120/74   Pulse: 58   Resp: 18   Temp: 98 1 °F (36 7 °C)       Physical Exam  Constitutional: General appearance: The Patient is well-developed and well-nourished who appears the stated age in no acute distress  Patient is pleasant and talkative  HEENT:  Normocephalic  Sclerae are anicteric  Mucous membranes are moist  Neck is supple without adenopathy  No JVD  I do not appreciate any new or recurrent neck masses  Chest: The lungs are clear to auscultation  Cardiac: Heart is regular rate  Abdomen: Abdomen is soft, non-tender, non-distended and without masses  Extremities: There is no clubbing or cyanosis  There is no edema  Symmetric  Neuro: Grossly nonfocal  Gait is normal      Lymphatic: No evidence of cervical adenopathy bilaterally  No evidence of axillary adenopathy bilaterally  No evidence of inguinal adenopathy bilaterally  Skin: Warm, anicteric  Wide excision site has healed well  No evidence of local recurrence or in-transit disease  Psych:  Patient is pleasant and talkative    Breasts:        Pathology:  [unfilled]    Labs:   Ref Range & Units 11/12/20 10:22 AM   Thyroglobulin-BURAK 1 4 - 29 2 ng/mL 1 2Low           Imaging  Ct Chest Abdomen Pelvis W Contrast    Result Date: 12/13/2020  Narrative: CT CHEST, ABDOMEN AND PELVIS WITH IV CONTRAST INDICATION:   C43 9: Malignant melanoma of skin, unspecified C73: Malignant neoplasm of thyroid gland  COMPARISON:  Multiple prior examinations, most recently June 19, 2020 TECHNIQUE: CT examination of the chest, abdomen and pelvis was performed  In addition to portal venous phase postcontrast scanning through the abdomen and pelvis, delayed phase postcontrast scanning was performed through the upper abdominal viscera  Axial, sagittal, and coronal 2D reformatted images were created from the source data and submitted for interpretation  Radiation dose length product (DLP) for this visit:  3973 mGy-cm   This examination, like all CT scans performed in the [facility], was performed utilizing techniques to minimize radiation dose exposure, including the use of iterative reconstruction and automated exposure control  IV Contrast:  100 mL of iohexol (OMNIPAQUE) Enteric Contrast: Enteric contrast was not administered  FINDINGS: CHEST LUNGS:  Lungs are clear  There is no tracheal or endobronchial lesion  PLEURA:  Unremarkable  HEART/GREAT VESSELS:  Unremarkable for patient's age  MEDIASTINUM AND JG:  Unremarkable  CHEST WALL AND LOWER NECK:   There is no axillary adenopathy  There are no visible chest wall masses  There has been left thyroidectomy  ABDOMEN LIVER/BILIARY TREE:  Hepatic cysts are stable  No suspicious appearing liver masses are seen  No biliary dilatation  GALLBLADDER:  No calcified gallstones  No pericholecystic inflammatory change  SPLEEN:  Unremarkable  PANCREAS:  Unremarkable  ADRENAL GLANDS:  Unremarkable  KIDNEYS/URETERS:  No hydronephrosis or urinary tract calculus  One or more sharply circumscribed subcentimeter renal hypodensities are present, too small to accurately characterize, and statistically most likely benign findings, unchanged from previous examination  According to recent literature (Radiology 2019) no further workup of these findings is recommended   STOMACH AND BOWEL: Unremarkable  APPENDIX:  A normal appendix was visualized  ABDOMINOPELVIC CAVITY:  No ascites  No pneumoperitoneum  No lymphadenopathy  VESSELS:  Unremarkable for patient's age  PELVIS REPRODUCTIVE ORGANS:  Mild prostatomegaly with slight impression on the urinary bladder base, unchanged  URINARY BLADDER:  Unremarkable  ABDOMINAL WALL/INGUINAL REGIONS:  There is a small fat-containing umbilical hernia, unchanged from previous examination  OSSEOUS STRUCTURES:  No acute fracture or destructive osseous lesion  Impression: No CT evidence of recurrent or metastatic tumor in the chest, abdomen or pelvis  Workstation performed: [id number]     Us Extremity Soft Tissue    Result Date: 12/30/2020  Narrative: SUPERFICIAL SOFT TISSUE ULTRASOUND INDICATION:   Z85 820: Personal history of malignant melanoma of skin  COMPARISON:  Multiple priors, most recently CT of the chest, abdomen, and pelvis 12/11/2020 TECHNIQUE:   Real-time ultrasound of the right axilla was performed with a linear transducer with both volumetric sweeps and still imaging techniques  FINDINGS:  1 4 x 0 9 x 1 9 cm right axillary lymph node is noted  There is no particularly suspicious morphology  No other suspicious lymph node, mass, or fluid collection identified  Impression: Normal-appearing right axillary lymph node  Workstation performed: [id number]     I reviewed the above laboratory and imaging data  Discussion/Summary: 57-year-old male with a [id number] papillary carcinoma of the thyroid and a [id number] invasive follicular carcinoma of the thyroid   He also has a Finis Sink the melanoma standpoint he is clinically SYLVAIN at 2 years  Chaya Gamma is no evidence of recurrence by physical exam, imaging or symptomatology   His CT and axillary ultrasound are negative  I will see him again in 6 months with a repeat right axillary ultrasound    For the thyroid cancer, completion thyroidectomy with radioactive iodine was recommended, but because of his postoperative hoarseness which has resolved, he did not have completion thyroidectomy and radioactive Cesar Aliment is going with TSH suppression at this time   I think this is reasonable   I discussed that based on his pathology there is a 3-8% risk recurrence, so TSH suppression should be adequate  His neck ultrasound with lymph node mapping is negative    We will await the results of the biopsy ordered by his endocrinologist  I will see him back in 6 months  Dominick Hollis is agreeable to this    All of his questions were answered

## 2021-01-07 NOTE — LETTER
January 7, 2021     Rashmi Hannah MD  [address], [address] [address]    Patient: Mandy Cloud   YOB: 1950   Date of Visit: 1/7/2021       Dear Dr Marlen Tracey: Thank you for referring Mandy Cloud to me for evaluation  Below are my notes for this consultation  If you have questions, please do not hesitate to call me  I look forward to following your patient along with you  Sincerely,        Elena Matute MD        CC: MD Lauren Roth MD Loyola Priestly, MD  1/7/2021 11:05 AM  Sign when Signing Visit               Surgical Oncology Follow Up       [address] [facility] [address] [facility]  1950  [id number]  [address] [facility]  [facility]  [address]  [facility] [place] [address]    Diagnoses and all orders for this visit:    Encounter for follow-up examination after completed treatment for malignant neoplasm    Personal history of malignant melanoma  -     US extremity soft tissue; Future    History of thyroid cancer    Neoplasm of uncertain behavior of connective and other soft tissue   -     US extremity soft tissue; Future        Chief Complaint   Patient presents with    Follow-up       Return in about 6 months (around 7/7/2021) for Office Visit, Imaging - See orders        Oncology History   Personal history of malignant melanoma   10/16/2018 Initial Diagnosis    Malignant melanoma of skin of abdomen ([address] )     12/14/2018 Surgery    Wide excision of abdomen with sentinel lymph node biopsies (right and left axillae)     History of thyroid cancer   4/24/2019 Initial Diagnosis    Papillary and follicular carcinoma ([address] )     4/24/2019 Surgery    Thyroid, left and isthmus, hemithyroidectomy:  -  Papillary carcinoma, follicular variant, infiltrative - [id number]  -  Follicular carcinoma, minimally invasive - [id number]         Staging: [id number] melanoma of the abdomen  December 2018  Positive sentinel node in the right axilla     [id number] invasive follicular carcinoma of the thyroid, April 2019     Treatment history:   Wide excision with sentinel lymph node biopsy December 2018  Left thyroid lobectomy, April 2019  Current treatment:   Observation for the melanoma  TSH suppression for the thyroid cancer  Disease status:   SYLVAIN    History of Present Illness:  Patient returns in follow-up of his melanoma and thyroid cancer  He is doing well at this time with no complaints  He denies any new abdominal pain, nausea, vomiting, back pain, bone pain, headaches, or cough  Ultrasound of the right axilla from December 28, 2020 was negative  CT of his chest, abdomen, and pelvis from December 11, 2020 was negative  Neck ultrasound with lymph node mapping from November 27, 2020 was negative  His thyroid ultrasound was stable, but his endocrinologist has recommended biopsy  I personally reviewed his films  His thyroglobulin is 1 2  Thyroglobulin antibodies were undetectable  Review of Systems  Complete ROS Surg Onc:   Complete ROS Surg Onc:   Constitutional: The patient denies new or recent history of general fatigue, no recent weight loss, no change in appetite  Eyes: No complaints of visual problems, no scleral icterus  ENT: no complaints of ear pain, no hoarseness, no difficulty swallowing,  no tinnitus and no new masses in head, oral cavity, or neck  Cardiovascular: No complaints of chest pain, no palpitations, no ankle edema  Respiratory: No complaints of shortness of breath, no cough  Gastrointestinal: No complaints of jaundice, no bloody stools, no pale stools  Genitourinary: No complaints of dysuria, no hematuria, no nocturia, no frequent urination, no urethral discharge  Musculoskeletal: No complaints of weakness, paralysis, joint stiffness or arthralgias    Integumentary: No complaints of rash, no new lesions  Neurological: No complaints of convulsions, no seizures, no dizziness  Hematologic/Lymphatic: No complaints of easy bruising  Endocrine:  No hot or cold intolerance  No polydipsia, polyphagia, or polyuria  Allergy/immunology:  No environmental allergies  No food allergies  Not immunocompromised  Skin:  No pallor or rash  No wound  Patient Active Problem List   Diagnosis    Actinic keratosis    Benign essential hypertension    BPH (benign prostatic hyperplasia)    Erectile dysfunction    GERD without esophagitis    Hyperlipidemia    Impaired fasting glucose    Personal history of malignant melanoma    Liver mass    Other specified soft tissue disorders    Multiple thyroid nodules    Encounter for follow-up examination after completed treatment for malignant neoplasm    Hoarseness of voice    Vocal cord paralysis    Fatigue    Vitamin D deficiency    Postoperative hypothyroidism    History of thyroid cancer    Hernia of abdominal cavity    Follicular [facility] )    Papillary thyroid carcinoma (HCC)     Past Medical History:   Diagnosis Date    Arthritis     Cancer ([facility] )     melanoma abdomen    GERD (gastroesophageal reflux disease)     diet controlled    Hyperlipidemia     Last Assessed:10/20/14    Hypertension     PONV (postoperative nausea and vomiting)      Past Surgical History:   Procedure Laterality Date    COLONOSCOPY      HERNIA REPAIR Right     Last Assessed:10/20/14 inguinal     LYMPH NODE BIOPSY N/A 12/14/2018    Procedure: SENTINEL LYMPH NODE BIOPSY; LYMPHOSCINTIGRAPHY; INTRAOPERATIVE LYMPHATIC MAPPING (INJECT AT 1100);   Surgeon: Mary Lugo MD;  [facility];  Service: Surgical Oncology    SKIN LESION EXCISION N/A 12/14/2018    Procedure: MEDIAL UPPER ABDOMEN MELANOMA WIDE EXCISION; BILATERAL AXILLA SLN;  Surgeon: Mary Lugo MD;  [facility];  Service: Surgical Oncology    THYROID LOBECTOMY Left 4/24/2019    Procedure: HEMITHYROIDECTOMY ISTHMUSECTOMY;  Surgeon: Ajay Morgan MD;  [facility];  Service: Surgical Oncology    TONSILLECTOMY      US GUIDED THYROID BIOPSY  2/7/2019    WISDOM TOOTH EXTRACTION       Family History   Problem Relation Age of Onset    Hypertension Mother     Hypothyroidism Mother     Hypertension Father     Skin cancer Father     Colon cancer Maternal Aunt     Colon cancer Paternal Aunt         62s    Skin cancer Paternal Uncle      Social History     Socioeconomic History    Marital status: /Civil Union     Spouse name: Not on file    Number of children: Not on file    Years of education: Not on file    Highest education level: Not on file   Occupational History    Not on file   Social Needs    Financial resource strain: Not on file    Food insecurity     Worry: Not on file     Inability: Not on file    Transportation needs     Medical: Not on file     Non-medical: Not on file   Tobacco Use    Smoking status: Never Smoker    Smokeless tobacco: Never Used   Substance and Sexual Activity    Alcohol use:  Yes     Alcohol/week: 2 0 standard drinks     Types: 1 Glasses of wine, 1 Cans of beer per week     Frequency: Monthly or less     Drinks per session: 1 or 2     Binge frequency: Never     Comment: rarely    Drug use: No    Sexual activity: Yes     Partners: Female   Lifestyle    Physical activity     Days per week: 4 days     Minutes per session: 30 min    Stress: Not at all   Relationships    Social connections     Talks on phone: Not on file     Gets together: Not on file     Attends Mandaen service: Not on file     Active member of club or organization: Not on file     Attends meetings of clubs or organizations: Not on file     Relationship status: Not on file    Intimate partner violence     Fear of current or ex partner: Not on file     Emotionally abused: Not on file     Physically abused: Not on file     Forced sexual activity: Not on file   Other Topics Concern    Not on file   Social History Narrative    No advance directives       Current Outpatient Medications:     aspirin 81 MG tablet, Take 81 mg by mouth daily , Disp: , Rfl:     atenolol (TENORMIN) 50 mg tablet, TAKE ONE TABLET BY MOUTH EVERY DAY, Disp: 90 tablet, Rfl: 1    cholecalciferol (VITAMIN D3) 1,000 units tablet, Take 1,000 Units by mouth daily, Disp: , Rfl:     hydrochlorothiazide (HYDRODIURIL) 25 mg tablet, TAKE ONE TABLET BY MOUTH EVERY DAY, Disp: 90 tablet, Rfl: 1    levothyroxine 175 mcg tablet, TAKE ONE TABLET BY MOUTH EVERY DAY, Disp: 90 tablet, Rfl: 0    lovastatin (MEVACOR) 40 MG tablet, TAKE ONE TABLET BY MOUTH EVERY DAY AT BEDTIME, Disp: 90 tablet, Rfl: 2    multivitamin (THERAGRAN) TABS, Take 1 tablet by mouth daily at bedtime , Disp: , Rfl:     naproxen (NAPROSYN) 500 mg tablet, Take 1 tablet (500 mg total) by mouth 2 (two) times a day with meals (Patient taking differently: Take 500 mg by mouth as needed ), Disp: 30 tablet, Rfl: 1    sildenafil (VIAGRA) 50 MG tablet, TAKE ONE TABLET BY MOUTH EVERY DAY AS NEEDED FOR ERECTILE DYSFUNCTION, Disp: 30 tablet, Rfl: 3  No Known Allergies  Vitals:    01/07/21 1046   BP: 120/74   Pulse: 58   Resp: 18   Temp: 98 1 °F (36 7 °C)       Physical Exam  Constitutional: General appearance: The Patient is well-developed and well-nourished who appears the stated age in no acute distress  Patient is pleasant and talkative  HEENT:  Normocephalic  Sclerae are anicteric  Mucous membranes are moist  Neck is supple without adenopathy  No JVD  I do not appreciate any new or recurrent neck masses  Chest: The lungs are clear to auscultation  Cardiac: Heart is regular rate  Abdomen: Abdomen is soft, non-tender, non-distended and without masses  Extremities: There is no clubbing or cyanosis  There is no edema  Symmetric    Neuro: Grossly nonfocal  Gait is normal      Lymphatic: No evidence of cervical adenopathy bilaterally  No evidence of axillary adenopathy bilaterally  No evidence of inguinal adenopathy bilaterally  Skin: Warm, anicteric  Wide excision site has healed well  No evidence of local recurrence or in-transit disease  Psych:  Patient is pleasant and talkative  Breasts:        Pathology:  [unfilled]    Labs:   Ref Range & Units 11/12/20 10:22 AM   Thyroglobulin-BURAK 1 4 - 29 2 ng/mL 1 2Low           Imaging  Ct Chest Abdomen Pelvis W Contrast    Result Date: 12/13/2020  Narrative: CT CHEST, ABDOMEN AND PELVIS WITH IV CONTRAST INDICATION:   C43 9: Malignant melanoma of skin, unspecified C73: Malignant neoplasm of thyroid gland  COMPARISON:  Multiple prior examinations, most recently June 19, 2020 TECHNIQUE: CT examination of the chest, abdomen and pelvis was performed  In addition to portal venous phase postcontrast scanning through the abdomen and pelvis, delayed phase postcontrast scanning was performed through the upper abdominal viscera  Axial, sagittal, and coronal 2D reformatted images were created from the source data and submitted for interpretation  Radiation dose length product (DLP) for this visit:  6299 mGy-cm   This examination, like all CT scans performed in the [facility], was performed utilizing techniques to minimize radiation dose exposure, including the use of iterative reconstruction and automated exposure control  IV Contrast:  100 mL of iohexol (OMNIPAQUE) Enteric Contrast: Enteric contrast was not administered  FINDINGS: CHEST LUNGS:  Lungs are clear  There is no tracheal or endobronchial lesion  PLEURA:  Unremarkable  HEART/GREAT VESSELS:  Unremarkable for patient's age  MEDIASTINUM AND JG:  Unremarkable  CHEST WALL AND LOWER NECK:   There is no axillary adenopathy  There are no visible chest wall masses  There has been left thyroidectomy  ABDOMEN LIVER/BILIARY TREE:  Hepatic cysts are stable    No suspicious appearing liver masses are seen  No biliary dilatation  GALLBLADDER:  No calcified gallstones  No pericholecystic inflammatory change  SPLEEN:  Unremarkable  PANCREAS:  Unremarkable  ADRENAL GLANDS:  Unremarkable  KIDNEYS/URETERS:  No hydronephrosis or urinary tract calculus  One or more sharply circumscribed subcentimeter renal hypodensities are present, too small to accurately characterize, and statistically most likely benign findings, unchanged from previous examination  According to recent literature (Radiology 2019) no further workup of these findings is recommended  STOMACH AND BOWEL:  Unremarkable  APPENDIX:  A normal appendix was visualized  ABDOMINOPELVIC CAVITY:  No ascites  No pneumoperitoneum  No lymphadenopathy  VESSELS:  Unremarkable for patient's age  PELVIS REPRODUCTIVE ORGANS:  Mild prostatomegaly with slight impression on the urinary bladder base, unchanged  URINARY BLADDER:  Unremarkable  ABDOMINAL WALL/INGUINAL REGIONS:  There is a small fat-containing umbilical hernia, unchanged from previous examination  OSSEOUS STRUCTURES:  No acute fracture or destructive osseous lesion  Impression: No CT evidence of recurrent or metastatic tumor in the chest, abdomen or pelvis  Workstation performed: [id number]     Us Extremity Soft Tissue    Result Date: 12/30/2020  Narrative: SUPERFICIAL SOFT TISSUE ULTRASOUND INDICATION:   Z85 820: Personal history of malignant melanoma of skin  COMPARISON:  Multiple priors, most recently CT of the chest, abdomen, and pelvis 12/11/2020 TECHNIQUE:   Real-time ultrasound of the right axilla was performed with a linear transducer with both volumetric sweeps and still imaging techniques  FINDINGS:  1 4 x 0 9 x 1 9 cm right axillary lymph node is noted  There is no particularly suspicious morphology  No other suspicious lymph node, mass, or fluid collection identified  Impression: Normal-appearing right axillary lymph node   Workstation performed: [id number]     I reviewed the above laboratory and imaging data  Discussion/Summary: 81-year-old male with a [id number] papillary carcinoma of the thyroid and a [id number] invasive follicular carcinoma of the thyroid   He also has a [place] the melanoma standpoint he is clinically SYLVAIN at 2 years  Deedee Priestman is no evidence of recurrence by physical exam, imaging or symptomatology   His CT and axillary ultrasound are negative  I will see him again in 6 months with a repeat right axillary ultrasound  For the thyroid cancer, completion thyroidectomy with radioactive iodine was recommended, but because of his postoperative hoarseness which has resolved, he did not have completion thyroidectomy and radioactive [place] is going with TSH suppression at this time   I think this is reasonable   I discussed that based on his pathology there is a 3-8% risk recurrence, so TSH suppression should be adequate  His neck ultrasound with lymph node mapping is negative    We will await the results of the biopsy ordered by his endocrinologist  I will see him back in 6 months  [facility] is agreeable to this    All of his questions were answered

## 2021-01-14 ENCOUNTER — HOSPITAL ENCOUNTER (OUTPATIENT)
Dept: ULTRASOUND IMAGING | Facility: HOSPITAL | Age: 71
Discharge: HOME | End: 2021-01-14
Attending: INTERNAL MEDICINE | Admitting: RADIOLOGY
Payer: MEDICARE

## 2021-01-14 DIAGNOSIS — C73 FOLLICULAR THYROID CANCER (HCC): ICD-10-CM

## 2021-01-14 DIAGNOSIS — C73 PAPILLARY THYROID CARCINOMA (HCC): ICD-10-CM

## 2021-01-14 DIAGNOSIS — E04.2 MULTIPLE THYROID NODULES: ICD-10-CM

## 2021-01-14 PROCEDURE — 88173 CYTOPATH EVAL FNA REPORT: CPT | Performed by: PATHOLOGY

## 2021-01-14 PROCEDURE — 10005 FNA BX W/US GDN 1ST LES: CPT

## 2021-01-14 PROCEDURE — 88172 CYTP DX EVAL FNA 1ST EA SITE: CPT | Performed by: PATHOLOGY

## 2021-01-14 RX ORDER — LIDOCAINE HYDROCHLORIDE 10 MG/ML
2 INJECTION, SOLUTION EPIDURAL; INFILTRATION; INTRACAUDAL; PERINEURAL ONCE
Status: DISCONTINUED | OUTPATIENT
Start: 2021-01-14 | End: 2021-01-18 | Stop reason: HOSPADM

## 2021-01-20 ENCOUNTER — TELEPHONE (OUTPATIENT)
Dept: ENDOCRINOLOGY | Facility: CLINIC | Age: 71
End: 2021-01-20

## 2021-02-06 ENCOUNTER — LAB (OUTPATIENT)
Dept: LAB | Facility: HOSPITAL | Age: 71
End: 2021-02-06
Payer: MEDICARE

## 2021-02-06 DIAGNOSIS — E78.2 MIXED HYPERLIPIDEMIA: ICD-10-CM

## 2021-02-06 DIAGNOSIS — R73.01 IMPAIRED FASTING GLUCOSE: ICD-10-CM

## 2021-02-06 DIAGNOSIS — I10 BENIGN ESSENTIAL HYPERTENSION: ICD-10-CM

## 2021-02-06 LAB
ALBUMIN SERPL BCP-MCNC: 3.4 G/DL (ref 3.5–5)
ALP SERPL-CCNC: 81 U/L (ref 46–116)
ALT SERPL W P-5'-P-CCNC: 32 U/L (ref 12–78)
ANION GAP SERPL CALCULATED.3IONS-SCNC: 7 MMOL/L (ref 4–13)
AST SERPL W P-5'-P-CCNC: 20 U/L (ref 5–45)
BASOPHILS # BLD AUTO: 0.03 THOUSANDS/ΜL (ref 0–0.1)
BASOPHILS NFR BLD AUTO: 1 % (ref 0–1)
BILIRUB SERPL-MCNC: 0.6 MG/DL (ref 0.2–1)
BUN SERPL-MCNC: 20 MG/DL (ref 5–25)
CALCIUM ALBUM COR SERPL-MCNC: 9.7 MG/DL (ref 8.3–10.1)
CALCIUM SERPL-MCNC: 9.2 MG/DL (ref 8.3–10.1)
CHLORIDE SERPL-SCNC: 105 MMOL/L (ref 100–108)
CHOLEST SERPL-MCNC: 130 MG/DL (ref 50–200)
CO2 SERPL-SCNC: 31 MMOL/L (ref 21–32)
CREAT SERPL-MCNC: 1.25 MG/DL (ref 0.6–1.3)
EOSINOPHIL # BLD AUTO: 0.08 THOUSAND/ΜL (ref 0–0.61)
EOSINOPHIL NFR BLD AUTO: 1 % (ref 0–6)
ERYTHROCYTE [DISTWIDTH] IN BLOOD BY AUTOMATED COUNT: 12.3 % (ref 11.6–15.1)
EST. AVERAGE GLUCOSE BLD GHB EST-MCNC: 100 MG/DL
GFR SERPL CREATININE-BSD FRML MDRD: 58 ML/MIN/1.73SQ M
GLUCOSE P FAST SERPL-MCNC: 116 MG/DL (ref 65–99)
HBA1C MFR BLD: 5.1 %
HCT VFR BLD AUTO: 46.9 % (ref 36.5–49.3)
HDLC SERPL-MCNC: 38 MG/DL
HGB BLD-MCNC: 15.8 G/DL (ref 12–17)
IMM GRANULOCYTES # BLD AUTO: 0.02 THOUSAND/UL (ref 0–0.2)
IMM GRANULOCYTES NFR BLD AUTO: 0 % (ref 0–2)
LDLC SERPL CALC-MCNC: 80 MG/DL (ref 0–100)
LYMPHOCYTES # BLD AUTO: 1.4 THOUSANDS/ΜL (ref 0.6–4.47)
LYMPHOCYTES NFR BLD AUTO: 23 % (ref 14–44)
MCH RBC QN AUTO: 30 PG (ref 26.8–34.3)
MCHC RBC AUTO-ENTMCNC: 33.7 G/DL (ref 31.4–37.4)
MCV RBC AUTO: 89 FL (ref 82–98)
MONOCYTES # BLD AUTO: 0.56 THOUSAND/ΜL (ref 0.17–1.22)
MONOCYTES NFR BLD AUTO: 9 % (ref 4–12)
NEUTROPHILS # BLD AUTO: 4.05 THOUSANDS/ΜL (ref 1.85–7.62)
NEUTS SEG NFR BLD AUTO: 66 % (ref 43–75)
NONHDLC SERPL-MCNC: 92 MG/DL
NRBC BLD AUTO-RTO: 0 /100 WBCS
PLATELET # BLD AUTO: 191 THOUSANDS/UL (ref 149–390)
PMV BLD AUTO: 10.7 FL (ref 8.9–12.7)
POTASSIUM SERPL-SCNC: 3.9 MMOL/L (ref 3.5–5.3)
PROT SERPL-MCNC: 6.8 G/DL (ref 6.4–8.2)
RBC # BLD AUTO: 5.27 MILLION/UL (ref 3.88–5.62)
SODIUM SERPL-SCNC: 143 MMOL/L (ref 136–145)
TRIGL SERPL-MCNC: 62 MG/DL
WBC # BLD AUTO: 6.14 THOUSAND/UL (ref 4.31–10.16)

## 2021-02-06 PROCEDURE — 85025 COMPLETE CBC W/AUTO DIFF WBC: CPT

## 2021-02-06 PROCEDURE — 80061 LIPID PANEL: CPT

## 2021-02-06 PROCEDURE — 80053 COMPREHEN METABOLIC PANEL: CPT

## 2021-02-06 PROCEDURE — 36415 COLL VENOUS BLD VENIPUNCTURE: CPT

## 2021-02-06 PROCEDURE — 83036 HEMOGLOBIN GLYCOSYLATED A1C: CPT

## 2021-02-08 ENCOUNTER — OFFICE VISIT (OUTPATIENT)
Dept: INTERNAL MEDICINE CLINIC | Facility: CLINIC | Age: 71
End: 2021-02-08
Payer: MEDICARE

## 2021-02-08 VITALS
WEIGHT: 212.6 LBS | HEART RATE: 54 BPM | SYSTOLIC BLOOD PRESSURE: 128 MMHG | RESPIRATION RATE: 14 BRPM | OXYGEN SATURATION: 100 % | DIASTOLIC BLOOD PRESSURE: 82 MMHG | HEIGHT: 71 IN | TEMPERATURE: 97.8 F | BODY MASS INDEX: 29.76 KG/M2

## 2021-02-08 DIAGNOSIS — E78.2 MIXED HYPERLIPIDEMIA: ICD-10-CM

## 2021-02-08 DIAGNOSIS — I10 BENIGN ESSENTIAL HYPERTENSION: ICD-10-CM

## 2021-02-08 DIAGNOSIS — C73 PAPILLARY THYROID CARCINOMA (HCC): ICD-10-CM

## 2021-02-08 DIAGNOSIS — Z85.820 PERSONAL HISTORY OF MALIGNANT MELANOMA: ICD-10-CM

## 2021-02-08 DIAGNOSIS — R73.01 IMPAIRED FASTING GLUCOSE: ICD-10-CM

## 2021-02-08 DIAGNOSIS — Z12.5 SCREENING FOR PROSTATE CANCER: ICD-10-CM

## 2021-02-08 DIAGNOSIS — K21.9 GERD WITHOUT ESOPHAGITIS: Primary | ICD-10-CM

## 2021-02-08 DIAGNOSIS — C73 FOLLICULAR THYROID CANCER (HCC): ICD-10-CM

## 2021-02-08 DIAGNOSIS — C77.3 MALIGNANT NEOPLASM METASTATIC TO LYMPH NODE OF AXILLA (HCC): ICD-10-CM

## 2021-02-08 PROCEDURE — 99214 OFFICE O/P EST MOD 30 MIN: CPT | Performed by: NURSE PRACTITIONER

## 2021-02-08 NOTE — PROGRESS NOTES
Assessment/Plan:    Patient Instructions   Melanoma following with Medical Oncology and Surgical Oncology    thyroid cancer status post thyroidectomy TSH stable on current dose following with endocrinology    Hypertension stable on current medication    Hyperlipidemia LDL at goal on statin     health maintenance declines flu shot otherwise up-to-date                 Diagnoses and all orders for this visit:    GERD without esophagitis    Impaired fasting glucose    Follicular thyroid cancer ([facility] [address] )    Papillary thyroid carcinoma (HCC)    Benign essential hypertension  -     CBC and differential; Future  -     Comprehensive metabolic panel; Future    Mixed hyperlipidemia  -     Lipid panel; Future  -     TSH, 3rd generation with Free T4 reflex; Future    Personal history of malignant melanoma    Screening for prostate cancer  -     PSA, Total Screen; Future    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)    Other orders  -     Cancel: Hemoglobin A1C; Future         Subjective:      Patient ID: Sumanth Fay is a 73 y o  male     Patient here for 6 month follow-up, feeling well no new physical concerns  Taking all medications on a daily basis no home blood pressures, is interested in COVID shot when available, declines flu shot    He is active no formal exercise history of melanoma and thyroid cancer following with appropriate specialists        Current Outpatient Medications:     aspirin 81 MG tablet, Take 81 mg by mouth daily , Disp: , Rfl:     atenolol (TENORMIN) 50 mg tablet, TAKE ONE TABLET BY MOUTH EVERY DAY, Disp: 90 tablet, Rfl: 1    cholecalciferol (VITAMIN D3) 1,000 units tablet, Take 1,000 Units by mouth daily, Disp: , Rfl:     hydrochlorothiazide (HYDRODIURIL) 25 mg tablet, TAKE ONE TABLET BY MOUTH EVERY DAY, Disp: 90 tablet, Rfl: 1    levothyroxine 175 mcg tablet, TAKE ONE TABLET BY MOUTH EVERY DAY, Disp: 90 tablet, Rfl: 0    lovastatin (MEVACOR) 40 MG tablet, TAKE ONE TABLET BY MOUTH EVERY DAY AT BEDTIME, Disp: 90 tablet, Rfl: 2    multivitamin (THERAGRAN) TABS, Take 1 tablet by mouth daily at bedtime , Disp: , Rfl:     sildenafil (VIAGRA) 50 MG tablet, TAKE ONE TABLET BY MOUTH EVERY DAY AS NEEDED FOR ERECTILE DYSFUNCTION, Disp: 30 tablet, Rfl: 3    Recent Results (from the past 1008 hour(s))   Fine Needle Aspiration    Collection Time: 01/14/21  9:51 AM   Result Value Ref Range    Case Report       Non-gynecologic Cytology                          Case: [id number]                                  Authorizing Provider:  Phil Bassett MD          Collected:           01/14/2021 0951              Ordering Location:     [address] [place] [address] Received:            01/14/2021 1343                                     Ultrasound                                                                   Pathologist:           Jacques Reyes MD                                                                Specimens:   A) - Thyroid, Right, midpole                                                                        B) - Thyroid, Right, Midpole                                                               Final Diagnosis       A-B  Thyroid, right mid pole (fine needle aspiration):     - Negative for malignancy ([place] Category II) - See note  - Findings compatible with a benign follicular nodule with cystic change  Note: As reported in the [facility] for Reporting Thyroid Cytopathology*, the diagnostic category of "benign/negative for malignancy" carries 0% to 3% risk of malignancy being found in subsequent resections (in the few studies of patients with benign FNA results that were followed long-term, a false negative rate of <1% was reported), with the usual management being clinical follow-up supplemented by ultrasonography (US) as indicated  Repeat FNA may be indicated for nodules showing significant growth or developing US abnormalities   Ultimately, clinical/imaging correlation for this patient is needed in arriving at the actual management plan  *The [place] System for Reporting Thyroid Cytopathology, Dequan Vinnytram  Matty Miller Killian Tarango ), 2010 (1st ed )  Satisfactory for evaluation  Preliminary Diagnosis       A-B  Thyroid, right mid pole (fine needle aspiration):     - Follicular cells with rare nuclear enlargement, pending intradepartmental review  - Background of cystic change noted  Note       - The treating physician has requested a sample(s) from the above thyroid nodule(s) be sent for analysis by  [employer] Gene Expression  (Afirma GEC), performed by [facility] [address] [place] [address])  [facility] only performs this test on specimen(s) receiving a cytologic diagnosis of [place] Category III or  IV  If such a diagnosis is rendered (above) specimen will be sent to [facility], and a separate report with  results of Afirma GEC will [facility] (typically taking 14 days)  If a cytologic  interpretation other than [place] category III or IV is rendered, specimen will not be sent for Afirma GEC  - The history of papillary and follicular carcinoma of the left thyroid is noted  - Intradepartmental consultation concurs with the diagnosis  Intraoperative Consultation          B  Favor adequate    Dr Katheryn Pineda spoke with ROSALINDA Richardson at 10:46 am on 01/14/2021  Interpretation performed at [facility], [address] [place] [address], [facility]        Gross Description          A   20ml, light pink, clear, received in Cyto[employer]yt          B   6 slides received (3 alcohol, 3 diff quik)         Clinical Information       Size: 1 2x0 7x1cm Margins: smooth Echogenicity: solid Microcalcs: absent Flow: peripheral Size change: minimal Suspicion level: na Hx of Hashimoto's Thyroiditis: na      Additional Information       [employer]'s FDA approved ,  and ThinPrep Imaging Duo System are utilized with strict adherence to the 's instruction manual to prepare gynecologic and non-gynecologic cytology specimens for the production of ThinPrep slides as well as for gynecologic ThinPrep imaging  These processes have been validated by our laboratory and/or by the   These tests were developed and their performance characteristics determined by [facility] or Radha Terry  They may not be cleared or approved by the [place]  Food and Drug Administration  The FDA has determined that such clearance or approval is not necessary  These tests are used for clinical purposes  They should not be regarded as investigational or for research  This laboratory has been approved by [place] [id number], designated as a high-complexity laboratory and is qualified to perform these tests  Interpretation performed at [facility], [address] [place] [address]         CBC and differential    Collection Time: 02/06/21  8:31 AM   Result Value Ref Range    WBC 6 14 4 31 - 10 16 Thousand/uL    RBC 5 27 3 88 - 5 62 Million/uL    Hemoglobin 15 8 12 0 - 17 0 g/dL    Hematocrit 46 9 36 5 - 49 3 %    MCV 89 82 - 98 fL    MCH 30 0 26 8 - 34 3 pg    MCHC 33 7 31 4 - 37 4 g/dL    RDW 12 3 11 6 - 15 1 %    MPV 10 7 8 9 - 12 7 fL    Platelets 812 620 - 006 Thousands/uL    nRBC 0 /100 WBCs    Neutrophils Relative 66 43 - 75 %    Immat GRANS % 0 0 - 2 %    Lymphocytes Relative 23 14 - 44 %    Monocytes Relative 9 4 - 12 %    Eosinophils Relative 1 0 - 6 %    Basophils Relative 1 0 - 1 %    Neutrophils Absolute 4 05 1 85 - 7 62 Thousands/µL    Immature Grans Absolute 0 02 0 00 - 0 20 Thousand/uL    Lymphocytes Absolute 1 40 0 60 - 4 47 Thousands/µL    Monocytes Absolute 0 56 0 17 - 1 22 Thousand/µL    Eosinophils Absolute 0 08 0 00 - 0 61 Thousand/µL    Basophils Absolute 0 03 0 00 - 0 10 Thousands/µL   Comprehensive metabolic panel    Collection Time: 02/06/21  8:31 AM   Result Value Ref Range    Sodium 143 136 - 145 mmol/L    Potassium 3 9 3 5 - 5 3 mmol/L Chloride 105 100 - 108 mmol/L    CO2 31 21 - 32 mmol/L    ANION GAP 7 4 - 13 mmol/L    BUN 20 5 - 25 mg/dL    Creatinine 1 25 0 60 - 1 30 mg/dL    Glucose, Fasting 116 (H) 65 - 99 mg/dL    Calcium 9 2 8 3 - 10 1 mg/dL    Corrected Calcium 9 7 8 3 - 10 1 mg/dL    AST 20 5 - 45 U/L    ALT 32 12 - 78 U/L    Alkaline Phosphatase 81 46 - 116 U/L    Total Protein 6 8 6 4 - 8 2 g/dL    Albumin 3 4 (L) 3 5 - 5 0 g/dL    Total Bilirubin 0 60 0 20 - 1 00 mg/dL    eGFR 58 ml/min/1 73sq m   Lipid panel    Collection Time: 02/06/21  8:31 AM   Result Value Ref Range    Cholesterol 130 50 - 200 mg/dL    Triglycerides 62 <=150 mg/dL    HDL, Direct 38 (L) >=40 mg/dL    LDL Calculated 80 0 - 100 mg/dL    Non-HDL-Chol (CHOL-HDL) 92 mg/dl   Hemoglobin A1C    Collection Time: 02/06/21  8:31 AM   Result Value Ref Range    Hemoglobin A1C 5 1 Normal 3 8-5 6%; PreDiabetic 5 7-6 4%; Diabetic >=6 5%; Glycemic control for adults with diabetes <7 0% %     mg/dl       The following portions of the patient's history were reviewed and updated as appropriate: allergies, current medications, past family history, past medical history, past social history, past surgical history and problem list      Review of Systems   Constitutional: Negative for appetite change, chills, diaphoresis, fatigue, fever and unexpected weight change  HENT: Negative for postnasal drip and sneezing  Eyes: Negative for visual disturbance  Respiratory: Negative for chest tightness and shortness of breath  Cardiovascular: Negative for chest pain, palpitations and leg swelling  Gastrointestinal: Negative for abdominal pain and blood in stool  Endocrine: Negative for cold intolerance, heat intolerance, polydipsia, polyphagia and polyuria  Genitourinary: Negative for difficulty urinating, dysuria, frequency and urgency  Musculoskeletal: Negative for arthralgias and myalgias  Skin: Negative for rash and wound     Neurological: Negative for dizziness, weakness, light-headedness and headaches  Hematological: Negative for adenopathy  Psychiatric/Behavioral: Negative for confusion, dysphoric mood and sleep disturbance  The patient is not nervous/anxious  Objective:      /82 (BP Location: Left arm, Patient Position: Sitting, Cuff Size: Standard)   Pulse (!) 54   Temp 97 8 °F (36 6 °C) (Temporal) Comment: NO NSAIDS  Resp 14   Ht 5' 10 5" (1 791 m)   Wt 96 4 kg (212 lb 9 6 oz)   SpO2 100%   BMI 30 07 kg/m²        Physical Exam  Constitutional:       General: He is not in acute distress  Appearance: He is well-developed  He is not diaphoretic  HENT:      Head: Normocephalic and atraumatic  Nose: Nose normal    Eyes:      Conjunctiva/sclera: Conjunctivae normal       Pupils: Pupils are equal, round, and reactive to light  Neck:      Musculoskeletal: Normal range of motion and neck supple  Thyroid: No thyromegaly  Vascular: No JVD  Trachea: No tracheal deviation  Cardiovascular:      Rate and Rhythm: Normal rate and regular rhythm  Heart sounds: Normal heart sounds  No murmur  No friction rub  No gallop  Pulmonary:      Effort: Pulmonary effort is normal  No respiratory distress  Breath sounds: Normal breath sounds  No wheezing or rales  Abdominal:      General: Bowel sounds are normal  There is no distension  Palpations: Abdomen is soft  Tenderness: There is no abdominal tenderness  Musculoskeletal: Normal range of motion  Lymphadenopathy:      Cervical: No cervical adenopathy  Skin:     General: Skin is warm and dry  Findings: No rash  Neurological:      Mental Status: He is alert and oriented to person, place, and time  Cranial Nerves: No cranial nerve deficit  Psychiatric:         Behavior: Behavior normal          Thought Content: Thought content normal          Judgment: Judgment normal      BMI Counseling: Body mass index is 30 07 kg/m²   The BMI is above normal  Nutrition recommendations include decreasing portion sizes and decreasing fast food intake  Exercise recommendations include exercising 3-5 times per week  No pharmacotherapy was ordered

## 2021-02-08 NOTE — PATIENT INSTRUCTIONS
Melanoma following with Medical Oncology and Surgical Oncology    thyroid cancer status post thyroidectomy TSH stable on current dose following with endocrinology    Hypertension stable on current medication    Hyperlipidemia LDL at goal on statin     health maintenance declines flu shot otherwise up-to-date

## 2021-02-25 ENCOUNTER — TELEPHONE (OUTPATIENT)
Dept: ENDOCRINOLOGY | Facility: CLINIC | Age: 71
End: 2021-02-25

## 2021-02-26 DIAGNOSIS — C73 FOLLICULAR THYROID CANCER (HCC): ICD-10-CM

## 2021-02-26 DIAGNOSIS — E89.0 POSTOPERATIVE HYPOTHYROIDISM: ICD-10-CM

## 2021-02-26 DIAGNOSIS — E04.2 MULTIPLE THYROID NODULES: ICD-10-CM

## 2021-02-26 DIAGNOSIS — C73 PAPILLARY THYROID CARCINOMA (HCC): Primary | ICD-10-CM

## 2021-03-10 DIAGNOSIS — Z23 ENCOUNTER FOR IMMUNIZATION: ICD-10-CM

## 2021-03-11 DIAGNOSIS — I10 ESSENTIAL HYPERTENSION: ICD-10-CM

## 2021-03-11 RX ORDER — ATENOLOL 50 MG/1
TABLET ORAL
Qty: 90 TABLET | Refills: 1 | Status: SHIPPED | OUTPATIENT
Start: 2021-03-11 | End: 2021-09-16

## 2021-03-12 RX ORDER — HYDROCHLOROTHIAZIDE 25 MG/1
TABLET ORAL
Qty: 90 TABLET | Refills: 1 | Status: SHIPPED | OUTPATIENT
Start: 2021-03-12 | End: 2021-08-17

## 2021-03-23 DIAGNOSIS — E89.0 POSTOPERATIVE HYPOTHYROIDISM: ICD-10-CM

## 2021-03-23 DIAGNOSIS — C73 PAPILLARY THYROID CARCINOMA (HCC): ICD-10-CM

## 2021-03-23 RX ORDER — LEVOTHYROXINE SODIUM 175 UG/1
TABLET ORAL
Qty: 90 TABLET | Refills: 2 | Status: SHIPPED | OUTPATIENT
Start: 2021-03-23 | End: 2021-03-25 | Stop reason: SDUPTHER

## 2021-03-25 DIAGNOSIS — E89.0 POSTOPERATIVE HYPOTHYROIDISM: ICD-10-CM

## 2021-03-25 DIAGNOSIS — C73 PAPILLARY THYROID CARCINOMA (HCC): ICD-10-CM

## 2021-03-25 RX ORDER — LEVOTHYROXINE SODIUM 175 UG/1
TABLET ORAL
Qty: 90 TABLET | Refills: 2 | Status: SHIPPED | OUTPATIENT
Start: 2021-03-25 | End: 2022-04-12

## 2021-03-29 ENCOUNTER — APPOINTMENT (OUTPATIENT)
Dept: LAB | Facility: HOSPITAL | Age: 71
End: 2021-03-29
Attending: INTERNAL MEDICINE
Payer: MEDICARE

## 2021-03-29 ENCOUNTER — OFFICE VISIT (OUTPATIENT)
Dept: ENDOCRINOLOGY | Facility: CLINIC | Age: 71
End: 2021-03-29
Payer: MEDICARE

## 2021-03-29 VITALS
BODY MASS INDEX: 29.72 KG/M2 | HEIGHT: 71 IN | DIASTOLIC BLOOD PRESSURE: 76 MMHG | TEMPERATURE: 99 F | SYSTOLIC BLOOD PRESSURE: 126 MMHG | HEART RATE: 60 BPM | WEIGHT: 212.3 LBS

## 2021-03-29 DIAGNOSIS — C73 FOLLICULAR THYROID CANCER (HCC): ICD-10-CM

## 2021-03-29 DIAGNOSIS — E04.2 MULTIPLE THYROID NODULES: ICD-10-CM

## 2021-03-29 DIAGNOSIS — C73 PAPILLARY THYROID CARCINOMA (HCC): Primary | ICD-10-CM

## 2021-03-29 DIAGNOSIS — C73 PAPILLARY THYROID CARCINOMA (HCC): ICD-10-CM

## 2021-03-29 DIAGNOSIS — E89.0 POSTOPERATIVE HYPOTHYROIDISM: ICD-10-CM

## 2021-03-29 LAB
T4 FREE SERPL-MCNC: 1.61 NG/DL (ref 0.76–1.46)
TSH SERPL DL<=0.05 MIU/L-ACNC: <0.007 UIU/ML (ref 0.36–3.74)

## 2021-03-29 PROCEDURE — 84443 ASSAY THYROID STIM HORMONE: CPT

## 2021-03-29 PROCEDURE — 84432 ASSAY OF THYROGLOBULIN: CPT

## 2021-03-29 PROCEDURE — 99214 OFFICE O/P EST MOD 30 MIN: CPT | Performed by: INTERNAL MEDICINE

## 2021-03-29 PROCEDURE — 86800 THYROGLOBULIN ANTIBODY: CPT

## 2021-03-29 PROCEDURE — 36415 COLL VENOUS BLD VENIPUNCTURE: CPT

## 2021-03-29 PROCEDURE — 84439 ASSAY OF FREE THYROXINE: CPT

## 2021-03-29 NOTE — PATIENT INSTRUCTIONS
Continue levothyroxine 175 mcg orally daily for now   once you results are back, will discuss further plan of management   Follow up in 6 months

## 2021-03-29 NOTE — PROGRESS NOTES
Justina Del Cid 73 y o  male MRN: [id number]    Encounter: [id number]      Assessment/Plan     1   follicular carcinoma minimally invasive  2  Papillary carcinoma, follicular variant, infiltrative    3  Thyroid nodule   TG, TG antibody from today - pending   TSH is suppressed  If thyroglobulin levels are stable, will slightly decrease dose of levothyroxine was to maintaining TSH  Goal <0 1  -  Continue current dose of levothyroxine for now   - repeat thyroid ultrasound to assess change in nodule, ultrasound for lymph node mapping around 12/2021    4  History of vitamin-D deficiency- continue supplementation, check vitamin-D level    CC: Thyroid carcinoma    History of Present Illness     HPI:  Justina Del Cid is a 73 y o  male who is here for a follow-up of  Thyroid carcinoma, postoperative hypothyroidism   Met Dr Gia Tracy in 11/2020     In the interim  , has had FNA right mid pole thyroid nodule 1/14/2021-benign  US neck 11/2020 - no evidence of recurrent or metastatic disease      per my prior notes   "Incidentally found thyroid nodules during workup and management of malignant melanoma     FNA left thyroid nodule- AUS, affirma suspicious  BRAF, V600E - negative; RET/PTC - not detected  S/p left hemithyroidectomy on 04/24/2019     Final pathology  1)1 8 cm papillary carcinoma, follicular variant, infiltrative  1) 5 0 cm follicular carcinoma, minimally invasive  No lymph nodes submitted or found   Multinodular hyperplasia  Immuno stains positive for HBME-1 and CK19     Completion thyroidectomy was held off given complications of vocal cord paralysis and concerns of the risk of contralateral nerve being damaged if he were to go for total thyroidectomy"      currently on levothyroxine 175 mcg orally daily   Complaint, take son an empty stomach     Feels well,   energy levels fluctuate but denies having any other hypo or hyperthyroid symptoms    All other systems were reviewed and are negative         Review of Systems    Historical Information   Past Medical History:   Diagnosis Date    Arthritis     Cancer ([address] )     melanoma abdomen    GERD (gastroesophageal reflux disease)     diet controlled    Hyperlipidemia     Last Assessed:10/20/14    Hypertension     PONV (postoperative nausea and vomiting)      Past Surgical History:   Procedure Laterality Date    COLONOSCOPY      HERNIA REPAIR Right     Last Assessed:10/20/14 inguinal     LYMPH NODE BIOPSY N/A 12/14/2018    Procedure: SENTINEL LYMPH NODE BIOPSY; LYMPHOSCINTIGRAPHY; INTRAOPERATIVE LYMPHATIC MAPPING (INJECT AT 1100);   Surgeon: Kamila Robison MD;  [facility];  Service: Surgical Oncology    SKIN LESION EXCISION N/A 12/14/2018    Procedure: MEDIAL UPPER ABDOMEN MELANOMA WIDE EXCISION; BILATERAL AXILLA SLN;  Surgeon: Kamila Robison MD;  [facility];  Service: Surgical Oncology    THYROID LOBECTOMY Left 4/24/2019    Procedure: HEMITHYROIDECTOMY ISTHMUSECTOMY;  Surgeon: Kamila Robison MD;  [facility];  Service: Surgical Oncology    TONSILLECTOMY      US GUIDED THYROID BIOPSY  2/7/2019    US GUIDED THYROID BIOPSY  1/14/2021    WISDOM TOOTH EXTRACTION       Social History   Social History     Substance and Sexual Activity   Alcohol Use Yes    Alcohol/week: 2 0 standard drinks    Types: 1 Glasses of wine, 1 Cans of beer per week    Frequency: Monthly or less    Drinks per session: 1 or 2    Binge frequency: Never    Comment: rarely     Social History     Substance and Sexual Activity   Drug Use No     Social History     Tobacco Use   Smoking Status Never Smoker   Smokeless Tobacco Never Used     Family History:   Family History   Problem Relation Age of Onset   [facility] Hypertension Mother     Hypothyroidism Mother     Hypertension Father     Skin cancer Father     Colon cancer Maternal Aunt     Colon cancer Paternal Aunt         62s    Skin cancer Paternal Uncle        Meds/Allergies   Current Outpatient Medications Medication Sig Dispense Refill    aspirin 81 MG tablet Take 81 mg by mouth daily       atenolol (TENORMIN) 50 mg tablet TAKE ONE TABLET BY MOUTH EVERY DAY 90 tablet 1    cholecalciferol (VITAMIN D3) 1,000 units tablet Take 1,000 Units by mouth daily      hydrochlorothiazide (HYDRODIURIL) 25 mg tablet TAKE ONE TABLET BY MOUTH EVERY DAY 90 tablet 1    levothyroxine 175 mcg tablet Take one tablet (175 mcg) daily on an empty stomach 90 tablet 2    lovastatin (MEVACOR) 40 MG tablet TAKE ONE TABLET BY MOUTH EVERY DAY AT BEDTIME 90 tablet 2    multivitamin (THERAGRAN) TABS Take 1 tablet by mouth daily at bedtime       sildenafil (VIAGRA) 50 MG tablet TAKE ONE TABLET BY MOUTH EVERY DAY AS NEEDED FOR ERECTILE DYSFUNCTION 30 tablet 3     No current facility-administered medications for this visit  No Known Allergies    Objective   Vitals: Blood pressure 126/76, pulse 60, temperature 99 °F (37 2 °C), height 5' 10 5" (1 791 m), weight 96 3 kg (212 lb 4 8 oz)  Physical Exam  Constitutional:       General: He is not in acute distress  Appearance: He is well-developed  He is not diaphoretic  HENT:      Head: Normocephalic and atraumatic  Eyes:      Conjunctiva/sclera: Conjunctivae normal       Pupils: Pupils are equal, round, and reactive to light  Neck:      Musculoskeletal: Normal range of motion and neck supple  Comments: Well-healed anterior incision   No thyromegaly, nontender, no nodules appreciated on palpation   No lymphadenopathy  Cardiovascular:      Rate and Rhythm: Normal rate and regular rhythm  Heart sounds: Normal heart sounds  No murmur  Pulmonary:      Effort: Pulmonary effort is normal  No respiratory distress  Breath sounds: Normal breath sounds  No wheezing  Abdominal:      General: There is no distension  Palpations: Abdomen is soft  Tenderness: There is no abdominal tenderness  There is no guarding  Skin:     General: Skin is warm and dry  Findings: No erythema or rash  Neurological:      Mental Status: He is alert and oriented to person, place, and time  Deep Tendon Reflexes: Reflexes normal       Comments: Fine tremors on the outstretched arms   Psychiatric:         Behavior: Behavior normal          Thought Content: Thought content normal          The history was obtained from the review of the chart, patient  Lab Results:   Lab Results   Component Value Date/Time    TSH 3RD GENERATON <0 007 (L) 03/29/2021 08:42 AM    TSH 3RD GENERATON <0 007 (L) 11/12/2020 10:22 AM    TSH 3RD GENERATON <0 007 (L) 07/24/2020 08:49 AM    Free T4 1 51 (H) 11/12/2020 10:22 AM    Free T4 1 79 (H) 07/24/2020 08:49 AM     Lab Results   Component Value Date    WBC 6 14 02/06/2021    HGB 15 8 02/06/2021    HCT 46 9 02/06/2021    MCV 89 02/06/2021     02/06/2021     Lab Results   Component Value Date    CREATININE 1 25 02/06/2021    BUN 20 02/06/2021     05/29/2017    K 3 9 02/06/2021     02/06/2021    CO2 31 02/06/2021     Lab Results   Component Value Date    HGBA1C 5 1 02/06/2021         Imaging Studies:   Results for orders placed during the hospital encounter of 07/24/20   US thyroid    Impression Stable 1 2 cm right thyroid lobe nodule  Status post left hemithyroidectomy  Recommend continued surveillance according to the patient's oncologic imaging protocol  Reference: ACR Thyroid Imaging, Reporting and Data System (TI-RADS): White Paper of the [facility]  J AM Nikki Radiol 0431;51:640-479  (additional recommendations based on American Thyroid Association 2015 guidelines )    Workstation performed: [id number]         I have personally reviewed pertinent reports  Portions of the record may have been created with voice recognition software  Occasional wrong word or "sound a like" substitutions may have occurred due to the inherent limitations of voice recognition software   Read the chart carefully and recognize, using context, where substitutions have occurred

## 2021-03-31 LAB
THYROGLOB AB SERPL-ACNC: <1 IU/ML (ref 0–0.9)
THYROGLOB SERPL-MCNC: 1 NG/ML (ref 1.4–29.2)

## 2021-04-01 ENCOUNTER — TELEPHONE (OUTPATIENT)
Dept: ENDOCRINOLOGY | Facility: CLINIC | Age: 71
End: 2021-04-01

## 2021-04-01 DIAGNOSIS — C73 PAPILLARY THYROID CARCINOMA (HCC): Primary | ICD-10-CM

## 2021-04-01 NOTE — TELEPHONE ENCOUNTER
----- Message from Abril Mckoy MD sent at 3/31/2021  4:08 PM EDT -----  Please call inform patient of results   Thyroglobulin low, thyroglobulin antibody negative   Free T4  Higher than before 1 6, TSH <0 007    recommend continue levothyroxine 175 mcg orally daily on 6 days of the week, on the 7th day take half a tablet   Repeat TSH, free T4 in 2-3 months

## 2021-06-21 ENCOUNTER — HOSPITAL ENCOUNTER (OUTPATIENT)
Dept: CT IMAGING | Facility: HOSPITAL | Age: 71
Discharge: HOME | End: 2021-06-21
Attending: INTERNAL MEDICINE
Payer: MEDICARE

## 2021-06-21 ENCOUNTER — APPOINTMENT (OUTPATIENT)
Dept: LAB | Facility: HOSPITAL | Age: 71
End: 2021-06-21
Attending: INTERNAL MEDICINE
Payer: MEDICARE

## 2021-06-21 DIAGNOSIS — C73 PAPILLARY THYROID CARCINOMA (HCC): ICD-10-CM

## 2021-06-21 DIAGNOSIS — C43.9 MALIGNANT MELANOMA, UNSPECIFIED SITE (HCC): ICD-10-CM

## 2021-06-21 DIAGNOSIS — Z85.820 PERSONAL HISTORY OF MALIGNANT MELANOMA: ICD-10-CM

## 2021-06-21 LAB
ALBUMIN SERPL BCP-MCNC: 3.7 G/DL (ref 3.5–5)
ALP SERPL-CCNC: 82 U/L (ref 46–116)
ALT SERPL W P-5'-P-CCNC: 31 U/L (ref 12–78)
ANION GAP SERPL CALCULATED.3IONS-SCNC: 5 MMOL/L (ref 4–13)
AST SERPL W P-5'-P-CCNC: 20 U/L (ref 5–45)
BASOPHILS # BLD AUTO: 0.04 THOUSANDS/ΜL (ref 0–0.1)
BASOPHILS NFR BLD AUTO: 1 % (ref 0–1)
BILIRUB SERPL-MCNC: 0.78 MG/DL (ref 0.2–1)
BUN SERPL-MCNC: 19 MG/DL (ref 5–25)
CALCIUM SERPL-MCNC: 9.2 MG/DL (ref 8.3–10.1)
CHLORIDE SERPL-SCNC: 103 MMOL/L (ref 100–108)
CO2 SERPL-SCNC: 32 MMOL/L (ref 21–32)
CREAT SERPL-MCNC: 1.11 MG/DL (ref 0.6–1.3)
EOSINOPHIL # BLD AUTO: 0.12 THOUSAND/ΜL (ref 0–0.61)
EOSINOPHIL NFR BLD AUTO: 2 % (ref 0–6)
ERYTHROCYTE [DISTWIDTH] IN BLOOD BY AUTOMATED COUNT: 12.9 % (ref 11.6–15.1)
GFR SERPL CREATININE-BSD FRML MDRD: 67 ML/MIN/1.73SQ M
GLUCOSE P FAST SERPL-MCNC: 117 MG/DL (ref 65–99)
HCT VFR BLD AUTO: 47.6 % (ref 36.5–49.3)
HGB BLD-MCNC: 16.1 G/DL (ref 12–17)
IMM GRANULOCYTES # BLD AUTO: 0.01 THOUSAND/UL (ref 0–0.2)
IMM GRANULOCYTES NFR BLD AUTO: 0 % (ref 0–2)
LDH SERPL-CCNC: 125 U/L (ref 81–234)
LYMPHOCYTES # BLD AUTO: 1.54 THOUSANDS/ΜL (ref 0.6–4.47)
LYMPHOCYTES NFR BLD AUTO: 23 % (ref 14–44)
MCH RBC QN AUTO: 29.7 PG (ref 26.8–34.3)
MCHC RBC AUTO-ENTMCNC: 33.8 G/DL (ref 31.4–37.4)
MCV RBC AUTO: 88 FL (ref 82–98)
MONOCYTES # BLD AUTO: 0.65 THOUSAND/ΜL (ref 0.17–1.22)
MONOCYTES NFR BLD AUTO: 10 % (ref 4–12)
NEUTROPHILS # BLD AUTO: 4.26 THOUSANDS/ΜL (ref 1.85–7.62)
NEUTS SEG NFR BLD AUTO: 64 % (ref 43–75)
NRBC BLD AUTO-RTO: 0 /100 WBCS
PLATELET # BLD AUTO: 181 THOUSANDS/UL (ref 149–390)
PMV BLD AUTO: 10 FL (ref 8.9–12.7)
POTASSIUM SERPL-SCNC: 3.7 MMOL/L (ref 3.5–5.3)
PROT SERPL-MCNC: 6.8 G/DL (ref 6.4–8.2)
RBC # BLD AUTO: 5.43 MILLION/UL (ref 3.88–5.62)
SODIUM SERPL-SCNC: 140 MMOL/L (ref 136–145)
WBC # BLD AUTO: 6.62 THOUSAND/UL (ref 4.31–10.16)

## 2021-06-21 PROCEDURE — G1004 CDSM NDSC: HCPCS

## 2021-06-21 PROCEDURE — 80053 COMPREHEN METABOLIC PANEL: CPT

## 2021-06-21 PROCEDURE — 85025 COMPLETE CBC W/AUTO DIFF WBC: CPT

## 2021-06-21 PROCEDURE — 36415 COLL VENOUS BLD VENIPUNCTURE: CPT

## 2021-06-21 PROCEDURE — 83615 LACTATE (LD) (LDH) ENZYME: CPT

## 2021-06-21 PROCEDURE — 71260 CT THORAX DX C+: CPT

## 2021-06-21 PROCEDURE — 74177 CT ABD & PELVIS W/CONTRAST: CPT

## 2021-06-21 RX ADMIN — IOHEXOL 100 ML: 350 INJECTION, SOLUTION INTRAVENOUS at 07:35

## 2021-06-25 ENCOUNTER — OFFICE VISIT (OUTPATIENT)
Dept: HEMATOLOGY ONCOLOGY | Facility: CLINIC | Age: 71
End: 2021-06-25
Payer: MEDICARE

## 2021-06-25 VITALS
HEART RATE: 68 BPM | BODY MASS INDEX: 29.54 KG/M2 | RESPIRATION RATE: 16 BRPM | DIASTOLIC BLOOD PRESSURE: 62 MMHG | SYSTOLIC BLOOD PRESSURE: 124 MMHG | OXYGEN SATURATION: 97 % | HEIGHT: 71 IN | WEIGHT: 211 LBS | TEMPERATURE: 97.9 F

## 2021-06-25 DIAGNOSIS — Z85.820 PERSONAL HISTORY OF MALIGNANT MELANOMA: ICD-10-CM

## 2021-06-25 DIAGNOSIS — Z85.850 HISTORY OF THYROID CANCER: Primary | ICD-10-CM

## 2021-06-25 PROCEDURE — 99214 OFFICE O/P EST MOD 30 MIN: CPT | Performed by: INTERNAL MEDICINE

## 2021-06-25 NOTE — PROGRESS NOTES
HEMATOLOGY / ONCOLOGY CLINIC NOTE    Primary Care Provider: Mellisa Oneal MD  Referring Provider:    MRN: [id number]  : 1950    DATE: 2021  Reason for Encounter:  Follow up Stage IIIA Malignant Melanoma of Abdominal Wall      Hematology / Oncology History:     Sherman Bolivar is a 73 y o  male who came in for follow up  1   Stage IIIA ([id number]) Malignant Melanoma of Abdomen wall, no adjuvant treatment, on observation      -  Dermatologist - Biopsy on 10/16/18, Consistent with malignant melanoma, it was 1 1 mm, was not ulcerated, and had a mitotic rate of 1/mm2   -  Dr Cherelle Cabrera - wide excision and SLNB on 18, no residual melanoma, did have 1/4 lymph nodes positive with 2 mm of focal invasion into the lymph node,                       there was not any extranodal extension seen  -  Not recommend Adjuvant Treatment, as chance of cure high  Observation    -  CT scan of the chest, abdomen, and pelvis on 19, with 3 small liver lesions    -  MRI of the abdomen on 19 was unremarkable for evidence of metastatic disease to the liver  2   S/P left hemithyroidectomy for thyroid cancer  - Papillary, Follicular variant        Previous Hematologic/ Oncologic History:    Cancer Staging  Personal history of malignant melanoma  Staging form: Melanoma of the Skin, AJCC 8th Edition  - Pathologic stage from 2018: Stage IIIA (pT2a, pN1a, cM0) - Unsigned     BRAF V600E (+)  Oncology History   Personal history of malignant melanoma   10/16/2018 Initial Diagnosis    Malignant melanoma of skin of abdomen ([facility] [address] )     2018 Surgery    Wide excision of abdomen with sentinel lymph node biopsies (right and left axillae)     History of thyroid cancer   2019 Initial Diagnosis    Papillary and follicular carcinoma ([facility] [address] )     2019 Surgery    Thyroid, left and isthmus, hemithyroidectomy:  -  Papillary carcinoma, follicular variant, infiltrative - [id number]  -  Follicular carcinoma, minimally invasive - [id number]       Current Hematologic/Oncologic Treatment:    Observation    Interval History:   11/18/19:  Needs visit with Dermatology - Dr Macario Pino  Run down  Tires easy  Still trying to get thyroid regulated, and has Vitamin D deficiency  Getting a little better  Was ill recently (WBC elevated)  LDH not obtained with recent lab work  Otherwise no lumps or bumps or skin abnormality  Assessment / Plan:       1  History of thyroid cancer     - CT chest abdomen pelvis w contrast; Future  - CBC and differential; Standing  - Comprehensive metabolic panel; Standing  - CBC and differential  - Comprehensive metabolic panel    2  Personal history of malignant melanoma     - close to 3 years since initial diagnosis  Continue monitor patient by checking labs and CT scan in 3 months afterwards will continue CT scan on yearly basis, and x-ray chest in between  - CT chest abdomen pelvis w contrast; Future  - CBC and differential; Standing  - LD,Blood; Standing  - Comprehensive metabolic panel; Standing  - CBC and differential  - LD,Blood  - Comprehensive metabolic panel         25 minutes time spent in this encounter, More than 50% time spent in direct patient care all questions answered properly      Problem list:       Patient Active Problem List   Diagnosis    Actinic keratosis    Benign essential hypertension    BPH (benign prostatic hyperplasia)    Erectile dysfunction    GERD without esophagitis    Hyperlipidemia    Impaired fasting glucose    Personal history of malignant melanoma    Liver mass    Other specified soft tissue disorders    Multiple thyroid nodules    Encounter for follow-up examination after completed treatment for malignant neoplasm    Hoarseness of voice    Vocal cord paralysis    Fatigue    Vitamin D deficiency    Postoperative hypothyroidism    History of thyroid cancer    Hernia of abdominal cavity    Follicular thyroid cancer (HCC)    Papillary thyroid carcinoma (HCC)     PHYSICIAL EXAMINATION:   Vital Signs:   /62   Pulse 68   Temp 97 9 °F (36 6 °C)   Resp 16   Ht 5' 10 5" (1 791 m)   Wt 95 7 kg (211 lb)   SpO2 97%   BMI 29 85 kg/m²    Body mass index is 29 85 kg/m²  Body surface area is 2 15 meters squared  Abdominal wall incision scar is clear, no palpable lymphadenopathy  No other major finding of examination        Physical Exam  Constitutional:       General: He is not in acute distress  Appearance: He is well-developed  He is not diaphoretic  HENT:      Head: Normocephalic and atraumatic  Mouth/Throat:      Pharynx: No oropharyngeal exudate  Eyes:      General: No scleral icterus  Right eye: No discharge  Left eye: No discharge  Conjunctiva/sclera: Conjunctivae normal       Pupils: Pupils are equal, round, and reactive to light  Neck:      Thyroid: No thyromegaly  Trachea: No tracheal deviation  Cardiovascular:      Rate and Rhythm: Normal rate and regular rhythm  Heart sounds: Normal heart sounds  No murmur heard  No friction rub  No gallop  Pulmonary:      Effort: Pulmonary effort is normal  No respiratory distress  Breath sounds: Normal breath sounds  No stridor  No wheezing or rales  Abdominal:      General: Bowel sounds are normal  There is no distension  Palpations: Abdomen is soft  There is no mass  Tenderness: There is no abdominal tenderness  There is no guarding  Hernia: A hernia (Superior to umbilicus, Protrusion noted, is reducible   ) is present  Genitourinary:     Comments: Deferred  Musculoskeletal:         General: Normal range of motion  Cervical back: Normal range of motion and neck supple  Lymphadenopathy:      Cervical: No cervical adenopathy  Skin:     General: Skin is warm and dry  Findings: No erythema or rash  Comments: Positive scar, horizontal scar in midline upper abdomen, well healed       Neurological:      Mental Status: He is alert and oriented to person, place, and time  Sensory: No sensory deficit  Psychiatric:         Behavior: Behavior normal        HISTORY/MEDS/ALLERGIES/ROS/Results   PAST MEDICAL HISTORY:   has a past medical history of Arthritis, Cancer ([address] ), GERD (gastroesophageal reflux disease), Hyperlipidemia, Hypertension, and PONV (postoperative nausea and vomiting)  PAST SURGICAL HISTORY:   has a past surgical history that includes Colonoscopy; Hernia repair (Right); Tonsillectomy; [place] tooth extraction; Skin lesion excision (N/A, 12/14/2018); Lymph node biopsy (N/A, 12/14/2018); US guided thyroid biopsy (2/7/2019); Thyroid lobectomy (Left, 4/24/2019); and US guided thyroid biopsy (1/14/2021)  CURRENT MEDICATIONS:     Current Outpatient Medications   Medication Sig Dispense Refill    aspirin 81 MG tablet Take 81 mg by mouth daily       atenolol (TENORMIN) 50 mg tablet TAKE ONE TABLET BY MOUTH EVERY DAY 90 tablet 1    cholecalciferol (VITAMIN D3) 1,000 units tablet Take 1,000 Units by mouth daily      hydrochlorothiazide (HYDRODIURIL) 25 mg tablet TAKE ONE TABLET BY MOUTH EVERY DAY 90 tablet 1    levothyroxine 175 mcg tablet Take one tablet (175 mcg) daily on an empty stomach 90 tablet 2    lovastatin (MEVACOR) 40 MG tablet TAKE ONE TABLET BY MOUTH EVERY DAY AT BEDTIME 90 tablet 2    multivitamin (THERAGRAN) TABS Take 1 tablet by mouth daily at bedtime       sildenafil (VIAGRA) 50 MG tablet TAKE ONE TABLET BY MOUTH EVERY DAY AS NEEDED FOR ERECTILE DYSFUNCTION 30 tablet 3     No current facility-administered medications for this visit  SOCIAL HISTORY:   reports that he has never smoked  He has never used smokeless tobacco  He reports current alcohol use of about 2 0 standard drinks of alcohol per week  He reports that he does not use drugs  FAMILY HISTORY:  family history includes Colon cancer in his maternal aunt and paternal aunt;  Hypertension in his father and mother; Hypothyroidism in his mother; Skin cancer in his father and paternal uncle  ALLERGIES:  has No Known Allergies  REVIEW OF SYSTEMS:  Review of Systems   Constitutional: Positive for fatigue  Negative for activity change, appetite change and fever  HENT: Negative for congestion, nosebleeds, sore throat and trouble swallowing  Eyes: Negative for visual disturbance  Respiratory: Negative for cough, chest tightness, shortness of breath and wheezing  Cardiovascular: Negative for chest pain, palpitations and leg swelling  Gastrointestinal: Negative for abdominal distention, abdominal pain, blood in stool, constipation, diarrhea, nausea and vomiting  Hernia above umbilicus  Genitourinary: Negative for difficulty urinating, dysuria, hematuria and urgency  Musculoskeletal: Negative for arthralgias, back pain and myalgias  Left shoulder recent MRI  Told maybe PT will be done, and occurs in certain positions  Skin: Negative for pallor and rash  No skin changes  Neurological: Negative for dizziness, weakness, numbness and headaches  Hematological: Negative for adenopathy  Does not bruise/bleed easily  Psychiatric/Behavioral: Negative for confusion and sleep disturbance  The patient is not nervous/anxious  Labs:   Lab Results   Component Value Date    WBC 6 62 06/21/2021    HGB 16 1 06/21/2021    HCT 47 6 06/21/2021    MCV 88 06/21/2021     06/21/2021   Mild increase in WBC count  Otherwise stable      Lab Results   Component Value Date     05/29/2017    K 3 7 06/21/2021     06/21/2021    CO2 32 06/21/2021    BUN 19 06/21/2021    CREATININE 1 11 06/21/2021    GLUCOSE 111 (H) 05/29/2017    GLUF 117 (H) 06/21/2021    CALCIUM 9 2 06/21/2021    CORRECTEDCA 9 7 02/06/2021    AST 20 06/21/2021    ALT 31 06/21/2021    ALKPHOS 82 06/21/2021    PROT 6 3 05/29/2017    BILITOT 0 8 05/29/2017    EGFR 67 06/21/2021   Sodium 141, potassium slightly low at 3 4, albumin 3 8  Lab Results   Component Value Date    PSA 0 4 01/10/2020    PSA 0 4 12/20/2018    PSA 0 3 05/29/2017   Stable    IMAGING:  Xr Chest Pa & Lateral  Result Date: 11/15/2019  Narrative: CHEST INDICATION:   Z85 [id number]: Personal history of malignant melanoma of skin  COMPARISON:  Chest radiograph from 11/30/2018  Chest CT from 5/9/2019  EXAM PERFORMED/VIEWS:  XR CHEST PA & LATERAL  DUAL ENERGY SUBTRACTION TECHNIQUE FINDINGS: Cardiomediastinal silhouette appears unremarkable  The lungs are clear  No pneumothorax or pleural effusion  Mild degenerative disease in the spine  Impression: No acute cardiopulmonary disease  No metastases  Workstation performed: [id number]     Mri Shoulder Left Wo Contrast  Result Date: 10/23/2019  Narrative: MRI LEFT SHOULDER INDICATION:   M25 512: Pain in left shoulder G89 29: Other chronic pain  COMPARISON:  None  TECHNIQUE:   The following MR sequences were obtained of the left shoulder: Localizer, axial GRE/PD fat sat, oblique coronal T2 fat sat, oblique sagittal T1/T2 fat sat  Gadolinium was not used  FINDINGS: SUBCUTANEOUS TISSUES: Normal JOINT EFFUSION: None  ACROMION PROCESS: Type I acromion ROTATOR CUFF: Infraspinatus is intact  There is tendinosis of the supraspinatus with mild partial articular surface tear, measuring 2 mm Mild tendinosis of the subscapularis The long head of the biceps tendon appears unremarkable SUBACROMIAL/SUBDELTOID BURSA: Normal  LONG HEAD OF BICEPS TENDON: The long head of the biceps is intact    Tendinosis of the intra-articular portion of the long head of the biceps tendon seen GLENOID LABRUM: Chronic degenerative changes anteriorly noted within the superior labrum GLENOHUMERAL JOINT: No full-thickness articular cartilage tear ACROMIOCLAVICULAR JOINT:  Mild degenerative changes in the [facility] [place] joint BONES: No bone contusion seen     Impression: No full-thickness rotator cuff tear seen Tendinosis supraspinatus with the small 2 mm low-grade partial articular surface tear  Tendinosis of the intra-articular portion of the long head of the biceps tendon   Tendinosis subscapularis Chronic degenerative tearing superior labrum Workstation performed: [id number]

## 2021-07-12 ENCOUNTER — TELEPHONE (OUTPATIENT)
Dept: HEMATOLOGY ONCOLOGY | Facility: CLINIC | Age: 71
End: 2021-07-12

## 2021-07-12 NOTE — TELEPHONE ENCOUNTER
Patient states that he received a message from Dr Tessie Mccall nurse Brittney Gloria advising him to reschedule his follow up to after the date of his Ultrasound  Ultrasound is scheduled for 7/20/2021  Next available appointment @ the [facility] office is 9/2/2021  Please contact the patient for possible schedule override

## 2021-07-20 ENCOUNTER — HOSPITAL ENCOUNTER (OUTPATIENT)
Dept: ULTRASOUND IMAGING | Facility: HOSPITAL | Age: 71
Discharge: HOME | End: 2021-07-20
Attending: SURGERY
Payer: MEDICARE

## 2021-07-20 DIAGNOSIS — D48.1 NEOPLASM OF UNCERTAIN BEHAVIOR OF CONNECTIVE AND OTHER SOFT TISSUE: ICD-10-CM

## 2021-07-20 DIAGNOSIS — Z85.820 PERSONAL HISTORY OF MALIGNANT MELANOMA: ICD-10-CM

## 2021-07-20 PROCEDURE — 76882 US LMTD JT/FCL EVL NVASC XTR: CPT

## 2021-07-22 ENCOUNTER — OFFICE VISIT (OUTPATIENT)
Dept: DERMATOLOGY | Facility: CLINIC | Age: 71
End: 2021-07-22
Payer: MEDICARE

## 2021-07-22 DIAGNOSIS — Z85.820 HISTORY OF MELANOMA: ICD-10-CM

## 2021-07-22 DIAGNOSIS — L82.1 SEBORRHEIC KERATOSIS: ICD-10-CM

## 2021-07-22 DIAGNOSIS — Z13.89 SCREENING FOR SKIN CONDITION: ICD-10-CM

## 2021-07-22 DIAGNOSIS — D22.9 NEVUS: Primary | ICD-10-CM

## 2021-07-22 PROCEDURE — 99213 OFFICE O/P EST LOW 20 MIN: CPT | Performed by: DERMATOLOGY

## 2021-07-22 NOTE — PATIENT INSTRUCTIONS
Nevi reviewed the concept of ABCDE and ugly duckling nothing markedly atypical patient reassured  Seborrheic keratosis patient reassured these are normal growths we acquire with age no treatment needed  History of melanoma no recurrence nothing else atypical sunblock recommended follow-up in 6 months  Screening for dermatologic disorders nothing else of concern noted on complete exam follow-up in 6 months

## 2021-07-27 ENCOUNTER — OFFICE VISIT (OUTPATIENT)
Dept: SURGICAL ONCOLOGY | Facility: CLINIC | Age: 71
End: 2021-07-27
Payer: MEDICARE

## 2021-07-27 VITALS
HEIGHT: 71 IN | SYSTOLIC BLOOD PRESSURE: 132 MMHG | RESPIRATION RATE: 17 BRPM | TEMPERATURE: 98.6 F | BODY MASS INDEX: 29.4 KG/M2 | HEART RATE: 56 BPM | DIASTOLIC BLOOD PRESSURE: 90 MMHG | WEIGHT: 210 LBS | OXYGEN SATURATION: 98 %

## 2021-07-27 DIAGNOSIS — Z85.850 HISTORY OF THYROID CANCER: ICD-10-CM

## 2021-07-27 DIAGNOSIS — Z08 ENCOUNTER FOR FOLLOW-UP EXAMINATION AFTER COMPLETED TREATMENT FOR MALIGNANT NEOPLASM: Primary | ICD-10-CM

## 2021-07-27 DIAGNOSIS — Z85.820 PERSONAL HISTORY OF MALIGNANT MELANOMA: ICD-10-CM

## 2021-07-27 DIAGNOSIS — M79.89 OTHER SPECIFIED SOFT TISSUE DISORDERS: ICD-10-CM

## 2021-07-27 PROCEDURE — 99214 OFFICE O/P EST MOD 30 MIN: CPT | Performed by: SURGERY

## 2021-07-27 NOTE — LETTER
July 27, 2021     Oneil Frankel, MD  [address], [address] [address]    Patient: Ana Casillas   YOB: 1950   Date of Visit: 7/27/2021       Dear Dr Claudia Stacy: Thank you for referring Ana Casillas to me for evaluation  Below are my notes for this consultation  If you have questions, please do not hesitate to call me  I look forward to following your patient along with you  Sincerely,        Elias Zaman MD        CC: MD Kassi Ramirez MD PhD  MD Elias Louie MD  7/27/2021 10:43 AM  Sign when Signing Visit               Surgical Oncology Follow Up       [address] [facility] [address] [address]  [address] [facility]  1950  [id number]  [address] [facility] [place]  [address] [facility] [address]  [phone]    Diagnoses and all orders for this visit:    Encounter for follow-up examination after completed treatment for malignant neoplasm    Personal history of malignant melanoma  -     US extremity soft tissue; Future    History of thyroid cancer    Other specified soft tissue disorders   -     US extremity soft tissue; Future        Chief Complaint   Patient presents with    Follow-up       Return in about 6 months (around 1/27/2022) for Office Visit, Imaging - See orders        Oncology History   Personal history of malignant melanoma   10/16/2018 Initial Diagnosis    Malignant melanoma of skin of abdomen ([address] )     12/14/2018 Surgery    Wide excision of abdomen with sentinel lymph node biopsies (right and left axillae)     History of thyroid cancer   4/24/2019 Initial Diagnosis    Papillary and follicular carcinoma ([address] )     4/24/2019 Surgery    Thyroid, left and isthmus, hemithyroidectomy:  -  Papillary carcinoma, follicular variant, infiltrative - [id number]  -  Follicular carcinoma, minimally invasive - [id number]         Staging: [id number] melanoma of the abdomen  December 2018  Positive sentinel node in the right axilla     [id number] LEWDOAUYM invasive follicular carcinoma of the thyroid, April 2019     Treatment history:   Wide excision with sentinel lymph node biopsy December 2018  Left thyroid lobectomy, April 2019  Current treatment:   Observation for the melanoma  TSH suppression for the thyroid cancer  Disease status:   SYLVAIN    History of Present Illness:  Patient returns in follow-up of his melanoma thyroid cancer  He is doing well at this time with no complaints  Denies any new abdominal pain, nausea or vomiting  Back pain, bone pain, headaches, or cough  No dysphagia or hoarseness  He did have a biopsy of his right thyroid lobe nodule in January of 2021 and this was benign  CT from June 21, 2021 revealed no evidence of metastatic disease  Ultrasound of his lymph nodes from July 20, 2021 revealed no evidence of recurrence  I personally reviewed the films  Review of Systems  Complete ROS Surg Onc:   Complete ROS Surg Onc:   Constitutional: The patient denies new or recent history of general fatigue, no recent weight loss, no change in appetite  Eyes: No complaints of visual problems, no scleral icterus  ENT: no complaints of ear pain, no hoarseness, no difficulty swallowing,  no tinnitus and no new masses in head, oral cavity, or neck  Cardiovascular: No complaints of chest pain, no palpitations, no ankle edema  Respiratory: No complaints of shortness of breath, no cough  Gastrointestinal: No complaints of jaundice, no bloody stools, no pale stools  Genitourinary: No complaints of dysuria, no hematuria, no nocturia, no frequent urination, no urethral discharge  Musculoskeletal: No complaints of weakness, paralysis, joint stiffness or arthralgias  Integumentary: No complaints of rash, no new lesions     Neurological: No complaints of convulsions, no seizures, no dizziness  Hematologic/Lymphatic: No complaints of easy bruising  Endocrine:  No hot or cold intolerance  No polydipsia, polyphagia, or polyuria  Allergy/immunology:  No environmental allergies  No food allergies  Not immunocompromised  Skin:  No pallor or rash  No wound  Patient Active Problem List   Diagnosis    Actinic keratosis    Benign essential hypertension    BPH (benign prostatic hyperplasia)    Erectile dysfunction    GERD without esophagitis    Hyperlipidemia    Impaired fasting glucose    Personal history of malignant melanoma    Liver mass    Other specified soft tissue disorders    Multiple thyroid nodules    Encounter for follow-up examination after completed treatment for malignant neoplasm    Hoarseness of voice    Vocal cord paralysis    Fatigue    Vitamin D deficiency    Postoperative hypothyroidism    History of thyroid cancer    Hernia of abdominal cavity    [facility]lic[facility] )    Papillary thyroid carcinoma (HCC)     Past Medical History:   Diagnosis Date    Arthritis     Cancer ([facility] )     melanoma abdomen    GERD (gastroesophageal reflux disease)     diet controlled    Hyperlipidemia     Last Assessed:10/20/14    Hypertension     PONV (postoperative nausea and vomiting)      Past Surgical History:   Procedure Laterality Date    COLONOSCOPY      HERNIA REPAIR Right     Last Assessed:10/20/14 inguinal     LYMPH NODE BIOPSY N/A 12/14/2018    Procedure: SENTINEL LYMPH NODE BIOPSY; LYMPHOSCINTIGRAPHY; INTRAOPERATIVE LYMPHATIC MAPPING (INJECT AT 1100);   Surgeon: Anna Harman MD;  [facility];  Service: Surgical Oncology    SKIN LESION EXCISION N/A 12/14/2018    Procedure: MEDIAL UPPER ABDOMEN MELANOMA WIDE EXCISION; BILATERAL AXILLA SLN;  Surgeon: Anna Harman MD;  [facility];  Service: Surgical Oncology    THYROID LOBECTOMY Left 4/24/2019    Procedure: HEMITHYROIDECTOMY ISTHMUSECTOMY; Surgeon: Michael Heath MD;  [facility];  Service: Surgical Oncology    TONSILLECTOMY      US GUIDED THYROID BIOPSY  2/7/2019    US GUIDED THYROID BIOPSY  1/14/2021    WISDOM TOOTH EXTRACTION       Family History   Problem Relation Age of Onset    Hypertension Mother     Hypothyroidism Mother     Hypertension Father     Skin cancer Father     Colon cancer Maternal Aunt     Colon cancer Paternal Aunt         62s    Skin cancer Paternal Uncle      Social History     Socioeconomic History    Marital status: /Civil Union     Spouse name: Not on file    Number of children: Not on file    Years of education: Not on file    Highest education level: Not on file   Occupational History    Not on file   Tobacco Use    Smoking status: Never Smoker    Smokeless tobacco: Never Used   Vaping Use    Vaping Use: Never used   Substance and Sexual Activity    Alcohol use: Yes     Alcohol/week: 2 0 standard drinks     Types: 1 Glasses of wine, 1 Cans of beer per week     Comment: rarely    Drug use: No    Sexual activity: Yes     Partners: Female   Other Topics Concern    Not on file   Social History Narrative    No advance directives     Social Determinants of Health     Financial Resource Strain:     Difficulty of Paying Living Expenses:    Food Insecurity:     Worried About Running Out of Food in the Last Year:     Ran Out of Food in the Last Year:    Transportation Needs:     Lack of Transportation (Medical):      Lack of Transportation (Non-Medical):    Physical Activity: Inactive    Days of Exercise per Week: 0 days    Minutes of Exercise per Session: 0 min   Stress: No Stress Concern Present    Feeling of Stress : Not at all   Social Connections:     Frequency of Communication with Friends and Family:     Frequency of Social Gatherings with Friends and Family:     Attends [employer] Services:     Active Member of Clubs or Organizations:     Attends Club or Organization Meetings:  Marital Status:    Intimate Partner Violence:     Fear of Current or Ex-Partner:     Emotionally Abused:     Physically Abused:     Sexually Abused:        Current Outpatient Medications:     aspirin 81 MG tablet, Take 81 mg by mouth daily , Disp: , Rfl:     atenolol (TENORMIN) 50 mg tablet, TAKE ONE TABLET BY MOUTH EVERY DAY, Disp: 90 tablet, Rfl: 1    cholecalciferol (VITAMIN D3) 1,000 units tablet, Take 1,000 Units by mouth daily, Disp: , Rfl:     hydrochlorothiazide (HYDRODIURIL) 25 mg tablet, TAKE ONE TABLET BY MOUTH EVERY DAY, Disp: 90 tablet, Rfl: 1    levothyroxine 175 mcg tablet, Take one tablet (175 mcg) daily on an empty stomach, Disp: 90 tablet, Rfl: 2    lovastatin (MEVACOR) 40 MG tablet, TAKE ONE TABLET BY MOUTH EVERY DAY AT BEDTIME, Disp: 90 tablet, Rfl: 2    multivitamin (THERAGRAN) TABS, Take 1 tablet by mouth daily at bedtime , Disp: , Rfl:     sildenafil (VIAGRA) 50 MG tablet, TAKE ONE TABLET BY MOUTH EVERY DAY AS NEEDED FOR ERECTILE DYSFUNCTION, Disp: 30 tablet, Rfl: 3  No Known Allergies  Vitals:    07/27/21 1015   BP: 132/90   Pulse: 56   Resp: 17   Temp: 98 6 °F (37 °C)   SpO2: 98%       Physical Exam  Constitutional: General appearance: The Patient is well-developed and well-nourished who appears the stated age in no acute distress  Patient is pleasant and talkative  HEENT:  Normocephalic  Sclerae are anicteric  Mucous membranes are moist  Neck is supple without adenopathy  No JVD  Chest: The lungs are clear to auscultation  Cardiac: Heart is regular rate  Abdomen: Abdomen is soft, non-tender, non-distended and without masses  Extremities: There is no clubbing or cyanosis  There is no edema  Symmetric  Neuro: Grossly nonfocal  Gait is normal      Lymphatic: No evidence of cervical adenopathy bilaterally  No evidence of axillary adenopathy bilaterally  No evidence of inguinal adenopathy bilaterally  Skin: Warm, anicteric    Wide excision site has healed well  No evidence of local recurrence or in-transit disease  Psych:  Patient is pleasant and talkative  Breasts:        Pathology:  [unfilled]    Labs:      Imaging  US extremity soft tissue    Result Date: 7/24/2021  Narrative: SUPERFICIAL SOFT TISSUE ULTRASOUND INDICATION:   D48 1: Neoplasm of uncertain behavior of connective and other soft tissue Z85 820: Personal history of malignant melanoma of skin  COMPARISON:  December 28, 2020  TECHNIQUE:   Real-time ultrasound of the right axilla was performed with a linear transducer with both volumetric sweeps and still imaging techniques  Limited comparison imaging of the contralateral left axilla was also performed  FINDINGS: There is a normal-appearing left axillary lymph node measuring 1 4 x 0 9 x 2 0 cm, similar to prior exam   Limited comparison imaging of the left axilla demonstrates a similar normal-appearing axillary lymph node measuring 1 4 x 0 9 x 1 2 cm  Otherwise, no enlarged lymph nodes, mass or localized fluid collection seen  Impression: Unremarkable examination  Workstation performed: [id number]     I reviewed the above laboratory and imaging data  Discussion/Summary: 53-year-old male with a H4nMnD3 papillary carcinoma of the thyroid and a [id number] AGGGCTMQA invasive follicular carcinoma of the thyroid   He also has a Yolande Marking the melanoma standpoint he is clinically SYLVAIN at 2 and 1/2 years  Chantale Pry is no evidence of recurrence by physical exam, imaging or symptomatology   His CT and axillary ultrasound are negative  I will see him again in 6 months with a repeat right axillary ultrasound    For the thyroid cancer, completion thyroidectomy with radioactive iodine was recommended, but because of his postoperative hoarseness which has resolved, he did not have completion thyroidectomy and radioactive Genell Jesus is going with TSH suppression at this time   I think this is reasonable   I discussed that based on his pathology there is a 3-8% risk recurrence, so TSH suppression should be adequate  His neck ultrasound with lymph node mapping is negative    I will see him back in 6 months  Dominique Arrington is agreeable to this    All of his questions were answered

## 2021-07-27 NOTE — PROGRESS NOTES
Surgical Oncology Follow Up       [facility]  [facility]  [address]  [facility] [address]  [address] [facility]  1950  [id number]  13[address] [facility]  [address]  [facility] [id number] [phone]    Diagnoses and all orders for this visit:    Encounter for follow-up examination after completed treatment for malignant neoplasm    Personal history of malignant melanoma  -     US extremity soft tissue; Future    History of thyroid cancer    Other specified soft tissue disorders   -     US extremity soft tissue; Future        Chief Complaint   Patient presents with    Follow-up       Return in about 6 months (around 1/27/2022) for Office Visit, Imaging - See orders  Oncology History   Personal history of malignant melanoma   10/16/2018 Initial Diagnosis    Malignant melanoma of skin of abdomen ([address] )     12/14/2018 Surgery    Wide excision of abdomen with sentinel lymph node biopsies (right and left axillae)     History of thyroid cancer   4/24/2019 Initial Diagnosis    Papillary and follicular carcinoma ([address] )     4/24/2019 Surgery    Thyroid, left and isthmus, hemithyroidectomy:  -  Papillary carcinoma, follicular variant, infiltrative - [id number]  -  Follicular carcinoma, minimally invasive - [id number]         Staging: [id number] melanoma of the abdomen  December 2018  Positive sentinel node in the right axilla     [id number] invasive follicular carcinoma of the thyroid, April 2019     Treatment history:   Wide excision with sentinel lymph node biopsy December 2018  Left thyroid lobectomy, April 2019  Current treatment:   Observation for the melanoma  TSH suppression for the thyroid cancer  Disease status:   SYLVAIN    History of Present Illness:  Patient returns in follow-up of his melanoma thyroid cancer    He is doing well at this time with no complaints  Denies any new abdominal pain, nausea or vomiting  Back pain, bone pain, headaches, or cough  No dysphagia or hoarseness  He did have a biopsy of his right thyroid lobe nodule in January of 2021 and this was benign  CT from June 21, 2021 revealed no evidence of metastatic disease  Ultrasound of his lymph nodes from July 20, 2021 revealed no evidence of recurrence  I personally reviewed the films  Review of Systems  Complete ROS Surg Onc:   Complete ROS Surg Onc:   Constitutional: The patient denies new or recent history of general fatigue, no recent weight loss, no change in appetite  Eyes: No complaints of visual problems, no scleral icterus  ENT: no complaints of ear pain, no hoarseness, no difficulty swallowing,  no tinnitus and no new masses in head, oral cavity, or neck  Cardiovascular: No complaints of chest pain, no palpitations, no ankle edema  Respiratory: No complaints of shortness of breath, no cough  Gastrointestinal: No complaints of jaundice, no bloody stools, no pale stools  Genitourinary: No complaints of dysuria, no hematuria, no nocturia, no frequent urination, no urethral discharge  Musculoskeletal: No complaints of weakness, paralysis, joint stiffness or arthralgias  Integumentary: No complaints of rash, no new lesions  Neurological: No complaints of convulsions, no seizures, no dizziness  Hematologic/Lymphatic: No complaints of easy bruising  Endocrine:  No hot or cold intolerance  No polydipsia, polyphagia, or polyuria  Allergy/immunology:  No environmental allergies  No food allergies  Not immunocompromised  Skin:  No pallor or rash  No wound          Patient Active Problem List   Diagnosis    Actinic keratosis    Benign essential hypertension    BPH (benign prostatic hyperplasia)    Erectile dysfunction    GERD without esophagitis    Hyperlipidemia    Impaired fasting glucose    Personal history of malignant melanoma    Liver mass    Other specified soft tissue disorders    Multiple thyroid nodules    Encounter for follow-up examination after completed treatment for malignant neoplasm    Hoarseness of voice    Vocal cord paralysis    Fatigue    Vitamin D deficiency    Postoperative hypothyroidism    History of thyroid cancer    Hernia of abdominal cavity    Follicular thyroid cancer ([facility] )    Papillary thyroid carcinoma (HCC)     Past Medical History:   Diagnosis Date    Arthritis     Cancer ([facility] )     melanoma abdomen    GERD (gastroesophageal reflux disease)     diet controlled    Hyperlipidemia     Last Assessed:10/20/14    Hypertension     PONV (postoperative nausea and vomiting)      Past Surgical History:   Procedure Laterality Date    COLONOSCOPY      HERNIA REPAIR Right     Last Assessed:10/20/14 inguinal     LYMPH NODE BIOPSY N/A 12/14/2018    Procedure: SENTINEL LYMPH NODE BIOPSY; LYMPHOSCINTIGRAPHY; INTRAOPERATIVE LYMPHATIC MAPPING (INJECT AT 1100);   Surgeon: Janette Laird MD;  [facility];  Service: Surgical Oncology    SKIN LESION EXCISION N/A 12/14/2018    Procedure: MEDIAL UPPER ABDOMEN MELANOMA WIDE EXCISION; BILATERAL AXILLA SLN;  Surgeon: Janette Laird MD;  [facility];  Service: Surgical Oncology    THYROID LOBECTOMY Left 4/24/2019    Procedure: HEMITHYROIDECTOMY ISTHMUSECTOMY;  Surgeon: Janette Laird MD;  [facility];  Service: Surgical Oncology    TONSILLECTOMY      US GUIDED THYROID BIOPSY  2/7/2019    US GUIDED THYROID BIOPSY  1/14/2021    WISDOM TOOTH EXTRACTION       Family History   Problem Relation Age of Onset    Hypertension Mother     Hypothyroidism Mother     Hypertension Father     Skin cancer Father     Colon cancer Maternal Aunt     Colon cancer Paternal Aunt         62s    Skin cancer Paternal Uncle      Social History     Socioeconomic History    Marital status: /Civil Union     Spouse name: Not on file    Number of children: Not on file    Years of education: Not on file    Highest education level: Not on file   Occupational History    Not on file   Tobacco Use    Smoking status: Never Smoker    Smokeless tobacco: Never Used   Vaping Use    Vaping Use: Never used   Substance and Sexual Activity    Alcohol use: Yes     Alcohol/week: 2 0 standard drinks     Types: 1 Glasses of wine, 1 Cans of beer per week     Comment: rarely    Drug use: No    Sexual activity: Yes     Partners: Female   Other Topics Concern    Not on file   Social History Narrative    No advance directives     Social Determinants of Health     Financial Resource Strain:     Difficulty of Paying Living Expenses:    Food Insecurity:     Worried About Running Out of Food in the Last Year:     Ran Out of Food in the Last Year:    Transportation Needs:     Lack of Transportation (Medical):      Lack of Transportation (Non-Medical):    Physical Activity: Inactive    Days of Exercise per Week: 0 days    Minutes of Exercise per Session: 0 min   Stress: No Stress Concern Present    Feeling of Stress : Not at all   Social Connections:     Frequency of Communication with Friends and Family:     Frequency of Social Gatherings with Friends and Family:     Attends [employer]ist Services:     Active Member of Clubs or Organizations:     Attends Club or Organization Meetings:     Marital Status:    Intimate Partner Violence:     Fear of Current or Ex-Partner:     Emotionally Abused:     Physically Abused:     Sexually Abused:        Current Outpatient Medications:     aspirin 81 MG tablet, Take 81 mg by mouth daily , Disp: , Rfl:     atenolol (TENORMIN) 50 mg tablet, TAKE ONE TABLET BY MOUTH EVERY DAY, Disp: 90 tablet, Rfl: 1    cholecalciferol (VITAMIN D3) 1,000 units tablet, Take 1,000 Units by mouth daily, Disp: , Rfl:     hydrochlorothiazide (HYDRODIURIL) 25 mg tablet, TAKE ONE TABLET BY MOUTH EVERY DAY, Disp: 90 tablet, Rfl: 1    levothyroxine 175 mcg tablet, Take one tablet (175 mcg) daily on an empty stomach, Disp: 90 tablet, Rfl: 2    lovastatin (MEVACOR) 40 MG tablet, TAKE ONE TABLET BY MOUTH EVERY DAY AT BEDTIME, Disp: 90 tablet, Rfl: 2    multivitamin (THERAGRAN) TABS, Take 1 tablet by mouth daily at bedtime , Disp: , Rfl:     sildenafil (VIAGRA) 50 MG tablet, TAKE ONE TABLET BY MOUTH EVERY DAY AS NEEDED FOR ERECTILE DYSFUNCTION, Disp: 30 tablet, Rfl: 3  No Known Allergies  Vitals:    07/27/21 1015   BP: 132/90   Pulse: 56   Resp: 17   Temp: 98 6 °F (37 °C)   SpO2: 98%       Physical Exam  Constitutional: General appearance: The Patient is well-developed and well-nourished who appears the stated age in no acute distress  Patient is pleasant and talkative  HEENT:  Normocephalic  Sclerae are anicteric  Mucous membranes are moist  Neck is supple without adenopathy  No JVD  Chest: The lungs are clear to auscultation  Cardiac: Heart is regular rate  Abdomen: Abdomen is soft, non-tender, non-distended and without masses  Extremities: There is no clubbing or cyanosis  There is no edema  Symmetric  Neuro: Grossly nonfocal  Gait is normal      Lymphatic: No evidence of cervical adenopathy bilaterally  No evidence of axillary adenopathy bilaterally  No evidence of inguinal adenopathy bilaterally  Skin: Warm, anicteric  Wide excision site has healed well  No evidence of local recurrence or in-transit disease  Psych:  Patient is pleasant and talkative  Breasts:        Pathology:  [unfilled]    Labs:      Imaging  US extremity soft tissue    Result Date: 7/24/2021  Narrative: SUPERFICIAL SOFT TISSUE ULTRASOUND INDICATION:   D48 1: Neoplasm of uncertain behavior of connective and other soft tissue Z85 820: Personal history of malignant melanoma of skin  COMPARISON:  December 28, 2020  TECHNIQUE:   Real-time ultrasound of the right axilla was performed with a linear transducer with both volumetric sweeps and still imaging techniques    Limited comparison imaging of the contralateral left axilla was also performed  FINDINGS: There is a normal-appearing left axillary lymph node measuring 1 4 x 0 9 x 2 0 cm, similar to prior exam   Limited comparison imaging of the left axilla demonstrates a similar normal-appearing axillary lymph node measuring 1 4 x 0 9 x 1 2 cm  Otherwise, no enlarged lymph nodes, mass or localized fluid collection seen  Impression: Unremarkable examination  Workstation performed: [id number]     I reviewed the above laboratory and imaging data  Discussion/Summary: 45-year-old male with a [id number] papillary carcinoma of the thyroid and a [id number] OIJIFJHOA invasive follicular carcinoma of the thyroid   He also has a Hermina Shola the melanoma standpoint he is clinically SYLVAIN at 2 and 1/2 years  Gelene Sit is no evidence of recurrence by physical exam, imaging or symptomatology   His CT and axillary ultrasound are negative  I will see him again in 6 months with a repeat right axillary ultrasound  For the thyroid cancer, completion thyroidectomy with radioactive iodine was recommended, but because of his postoperative hoarseness which has resolved, he did not have completion thyroidectomy and radioactive Sheryl Yeager is going with TSH suppression at this time   I think this is reasonable   I discussed that based on his pathology there is a 3-8% risk recurrence, so TSH suppression should be adequate  His neck ultrasound with lymph node mapping is negative    I will see him back in 6 months  Daisy Julio is agreeable to this    All of his questions were answered

## 2021-08-17 ENCOUNTER — APPOINTMENT (OUTPATIENT)
Dept: LAB | Facility: HOSPITAL | Age: 71
End: 2021-08-17
Attending: INTERNAL MEDICINE
Payer: MEDICARE

## 2021-08-17 ENCOUNTER — APPOINTMENT (OUTPATIENT)
Dept: LAB | Facility: HOSPITAL | Age: 71
End: 2021-08-17
Payer: MEDICARE

## 2021-08-17 ENCOUNTER — OFFICE VISIT (OUTPATIENT)
Dept: INTERNAL MEDICINE CLINIC | Facility: CLINIC | Age: 71
End: 2021-08-17
Payer: MEDICARE

## 2021-08-17 VITALS
BODY MASS INDEX: 29.43 KG/M2 | SYSTOLIC BLOOD PRESSURE: 104 MMHG | HEIGHT: 71 IN | DIASTOLIC BLOOD PRESSURE: 60 MMHG | TEMPERATURE: 98.3 F | OXYGEN SATURATION: 98 % | WEIGHT: 210.2 LBS | HEART RATE: 65 BPM

## 2021-08-17 DIAGNOSIS — I10 BENIGN ESSENTIAL HYPERTENSION: ICD-10-CM

## 2021-08-17 DIAGNOSIS — E88.9 BONE METABOLISM DISORDER: ICD-10-CM

## 2021-08-17 DIAGNOSIS — Z85.850 HISTORY OF THYROID CANCER: ICD-10-CM

## 2021-08-17 DIAGNOSIS — M90.80 BONE METABOLISM DISORDER: ICD-10-CM

## 2021-08-17 DIAGNOSIS — K21.9 GERD WITHOUT ESOPHAGITIS: ICD-10-CM

## 2021-08-17 DIAGNOSIS — N40.0 BENIGN PROSTATIC HYPERPLASIA WITHOUT LOWER URINARY TRACT SYMPTOMS: ICD-10-CM

## 2021-08-17 DIAGNOSIS — E78.2 MIXED HYPERLIPIDEMIA: ICD-10-CM

## 2021-08-17 DIAGNOSIS — C73 PAPILLARY THYROID CARCINOMA (HCC): ICD-10-CM

## 2021-08-17 DIAGNOSIS — E89.0 POSTOPERATIVE HYPOTHYROIDISM: ICD-10-CM

## 2021-08-17 DIAGNOSIS — R73.01 IMPAIRED FASTING GLUCOSE: ICD-10-CM

## 2021-08-17 DIAGNOSIS — E55.9 VITAMIN D DEFICIENCY: Primary | ICD-10-CM

## 2021-08-17 DIAGNOSIS — Z12.5 SCREENING FOR PROSTATE CANCER: ICD-10-CM

## 2021-08-17 LAB
ALBUMIN SERPL BCP-MCNC: 3.6 G/DL (ref 3.5–5)
ALP SERPL-CCNC: 69 U/L (ref 46–116)
ALT SERPL W P-5'-P-CCNC: 28 U/L (ref 12–78)
ANION GAP SERPL CALCULATED.3IONS-SCNC: 6 MMOL/L (ref 4–13)
AST SERPL W P-5'-P-CCNC: 19 U/L (ref 5–45)
BASOPHILS # BLD AUTO: 0.06 THOUSANDS/ΜL (ref 0–0.1)
BASOPHILS NFR BLD AUTO: 1 % (ref 0–1)
BILIRUB SERPL-MCNC: 0.9 MG/DL (ref 0.2–1)
BUN SERPL-MCNC: 18 MG/DL (ref 5–25)
CALCIUM SERPL-MCNC: 9 MG/DL (ref 8.3–10.1)
CHLORIDE SERPL-SCNC: 102 MMOL/L (ref 100–108)
CHOLEST SERPL-MCNC: 172 MG/DL (ref 50–200)
CO2 SERPL-SCNC: 31 MMOL/L (ref 21–32)
CREAT SERPL-MCNC: 1.02 MG/DL (ref 0.6–1.3)
EOSINOPHIL # BLD AUTO: 0.12 THOUSAND/ΜL (ref 0–0.61)
EOSINOPHIL NFR BLD AUTO: 2 % (ref 0–6)
ERYTHROCYTE [DISTWIDTH] IN BLOOD BY AUTOMATED COUNT: 12.8 % (ref 11.6–15.1)
GFR SERPL CREATININE-BSD FRML MDRD: 75 ML/MIN/1.73SQ M
GLUCOSE P FAST SERPL-MCNC: 99 MG/DL (ref 65–99)
HCT VFR BLD AUTO: 48 % (ref 36.5–49.3)
HDLC SERPL-MCNC: 41 MG/DL
HGB BLD-MCNC: 16.3 G/DL (ref 12–17)
IMM GRANULOCYTES # BLD AUTO: 0.02 THOUSAND/UL (ref 0–0.2)
IMM GRANULOCYTES NFR BLD AUTO: 0 % (ref 0–2)
LDLC SERPL CALC-MCNC: 104 MG/DL (ref 0–100)
LYMPHOCYTES # BLD AUTO: 1.67 THOUSANDS/ΜL (ref 0.6–4.47)
LYMPHOCYTES NFR BLD AUTO: 25 % (ref 14–44)
MCH RBC QN AUTO: 30 PG (ref 26.8–34.3)
MCHC RBC AUTO-ENTMCNC: 34 G/DL (ref 31.4–37.4)
MCV RBC AUTO: 88 FL (ref 82–98)
MONOCYTES # BLD AUTO: 0.66 THOUSAND/ΜL (ref 0.17–1.22)
MONOCYTES NFR BLD AUTO: 10 % (ref 4–12)
NEUTROPHILS # BLD AUTO: 4.28 THOUSANDS/ΜL (ref 1.85–7.62)
NEUTS SEG NFR BLD AUTO: 62 % (ref 43–75)
NONHDLC SERPL-MCNC: 131 MG/DL
NRBC BLD AUTO-RTO: 0 /100 WBCS
PLATELET # BLD AUTO: 179 THOUSANDS/UL (ref 149–390)
PMV BLD AUTO: 10.8 FL (ref 8.9–12.7)
POTASSIUM SERPL-SCNC: 3.7 MMOL/L (ref 3.5–5.3)
PROT SERPL-MCNC: 7 G/DL (ref 6.4–8.2)
PSA SERPL-MCNC: 0.4 NG/ML (ref 0–4)
RBC # BLD AUTO: 5.43 MILLION/UL (ref 3.88–5.62)
SODIUM SERPL-SCNC: 139 MMOL/L (ref 136–145)
T4 FREE SERPL-MCNC: 1.3 NG/DL (ref 0.76–1.46)
T4 FREE SERPL-MCNC: 1.33 NG/DL (ref 0.76–1.46)
TRIGL SERPL-MCNC: 133 MG/DL
TSH SERPL DL<=0.05 MIU/L-ACNC: 0.01 UIU/ML (ref 0.36–3.74)
WBC # BLD AUTO: 6.81 THOUSAND/UL (ref 4.31–10.16)

## 2021-08-17 PROCEDURE — 84439 ASSAY OF FREE THYROXINE: CPT

## 2021-08-17 PROCEDURE — 80061 LIPID PANEL: CPT

## 2021-08-17 PROCEDURE — 99214 OFFICE O/P EST MOD 30 MIN: CPT | Performed by: INTERNAL MEDICINE

## 2021-08-17 PROCEDURE — 84443 ASSAY THYROID STIM HORMONE: CPT

## 2021-08-17 PROCEDURE — 36415 COLL VENOUS BLD VENIPUNCTURE: CPT

## 2021-08-17 PROCEDURE — 85025 COMPLETE CBC W/AUTO DIFF WBC: CPT

## 2021-08-17 PROCEDURE — 80053 COMPREHEN METABOLIC PANEL: CPT

## 2021-08-17 PROCEDURE — G0103 PSA SCREENING: HCPCS

## 2021-08-17 RX ORDER — OMEPRAZOLE 20 MG/1
20 CAPSULE, DELAYED RELEASE ORAL
Qty: 30 CAPSULE | Refills: 5 | Status: SHIPPED | OUTPATIENT
Start: 2021-08-17

## 2021-08-17 NOTE — ASSESSMENT & PLAN NOTE
Patient has some heartburn, he started taking Prilosec 20 mg in the morning  Advised to continue diet modifications and Prilosec

## 2021-08-17 NOTE — ASSESSMENT & PLAN NOTE
Patient has a history of hypertension, he takes hydrochlorothiazide and atenolol  Patient discharged to discontinue hydrochlorothiazide due to photosensitivity and history of melanoma  BP currently controlled, can consider continue atenolol 50 mg daily  Continue to measure blood pressure at home, will reassess the need of a new antihypertensive agent

## 2021-08-17 NOTE — ASSESSMENT & PLAN NOTE
Patient has a history of follicular and papillary thyroid cancer, he follows with endocrinologist and hematologist  Last TSH was 0 01 at call  His levothyroxine was decreased from 175 mcg daily to 175 mcg daily except on Sunday he takes half a tablet

## 2021-08-17 NOTE — PROGRESS NOTES
Assessment/Plan:    GERD without esophagitis  Patient has some heartburn, he started taking Prilosec 20 mg in the morning  Advised to continue diet modifications and Prilosec    Impaired fasting glucose  Hemoglobin A1c stable  Continue diet and exercise    Benign essential hypertension  Patient has a history of hypertension, he takes hydrochlorothiazide and atenolol  Patient discharged to discontinue hydrochlorothiazide due to photosensitivity and history of melanoma  BP currently controlled, can consider continue atenolol 50 mg daily  Continue to measure blood pressure at home, will reassess the need of a new antihypertensive agent    Hyperlipidemia  Lipid panel review with patient, advised to maintain heart healthy diet  Continue lovastatin  Vitamin D deficiency  Continue vitamin-D supplementation    History of thyroid cancer  Patient has a history of follicular and papillary thyroid cancer, he follows with endocrinologist and hematologist  Last TSH was 0 01 at call  His levothyroxine was decreased from 175 mcg daily to 175 mcg daily except on Sunday he takes half a tablet  BPH (benign prostatic hyperplasia)  Patient denies any symptom, PSA is pending  Diagnoses and all orders for this visit:    Vitamin D deficiency    GERD without esophagitis    Impaired fasting glucose    Postoperative hypothyroidism    Benign essential hypertension    Mixed hyperlipidemia    History of thyroid cancer    Benign prostatic hyperplasia without lower urinary tract symptoms          Subjective:      Patient ID: Raffy Veronica is a 73 y o  male  61-year-old male with a history of hypertension, hyperlipidemia, prediabetes, thyroid cancer and melanoma who comes for follow-up  Blood work reviewed with patient, lipid panel noted to be slightly elevated  Patient is concerned about hydrochlorothiazide given the fact that he had melanoma  No other complaint    Patient is very active, advised to exercise more frequent  The following portions of the patient's history were reviewed and updated as appropriate: allergies, current medications, past family history, past medical history, past social history, past surgical history and problem list     Review of Systems   Constitutional: Negative for chills, fatigue and fever  HENT: Negative for dental problem, sore throat and trouble swallowing  Eyes: Negative for visual disturbance  Respiratory: Negative for cough and shortness of breath  Cardiovascular: Negative for chest pain, palpitations and leg swelling  Gastrointestinal: Negative for abdominal pain, anal bleeding, blood in stool, constipation, diarrhea, nausea and vomiting  Genitourinary: Negative for difficulty urinating and dysuria  Musculoskeletal: Positive for back pain  Neurological: Negative for tremors, light-headedness and headaches  Psychiatric/Behavioral: Positive for sleep disturbance  Objective:      /60   Pulse 65   Temp 98 3 °F (36 8 °C)   Ht 5' 10 5" (1 791 m)   Wt 95 3 kg (210 lb 3 2 oz)   SpO2 98%   BMI 29 73 kg/m²          Physical Exam  Vitals reviewed  Constitutional:       Appearance: He is obese  HENT:      Head: Normocephalic  Mouth/Throat:      Mouth: Mucous membranes are moist    Eyes:      General:         Right eye: No discharge  Left eye: No discharge  Conjunctiva/sclera: Conjunctivae normal    Cardiovascular:      Rate and Rhythm: Normal rate and regular rhythm  Heart sounds: Normal heart sounds  Pulmonary:      Effort: Pulmonary effort is normal       Breath sounds: Normal breath sounds  Abdominal:      General: Bowel sounds are normal  There is no distension  Tenderness: There is no abdominal tenderness  There is no guarding  Musculoskeletal:      Right lower leg: No edema  Left lower leg: No edema  Skin:     General: Skin is warm  Neurological:      General: No focal deficit present        Mental Status: He is alert and oriented to person, place, and time     Psychiatric:         Mood and Affect: Mood normal

## 2021-08-17 NOTE — PATIENT INSTRUCTIONS
GERD (Gastroesophageal Reflux Disease)   WHAT YOU NEED TO KNOW:   Gastroesophageal reflux disease (GERD) is reflux that occurs more than twice a week for a few weeks  Reflux means acid and food in the stomach back up into the esophagus  It usually causes heartburn and other symptoms  GERD can cause other health problems over time if it is not treated  DISCHARGE INSTRUCTIONS:   Call your local emergency number (911 in the [address] [facility]) if:   · You have severe chest pain and sudden trouble breathing  Return to the emergency department if:   · You have trouble breathing after you vomit  · You have trouble swallowing, or pain with swallowing  · Your bowel movements are black, bloody, or tarry-looking  · Your vomit looks like coffee grounds or has blood in it  Call your doctor or gastroenterologist if:   · You feel full and cannot burp or vomit  · You vomit large amounts, or you vomit often  · You are losing weight without trying  · Your symptoms get worse or do not improve with treatment  · You have questions or concerns about your condition or care  Medicines:   · Medicines  are used to decrease stomach acid  Medicine may also be used to help your lower esophageal sphincter and stomach contract (tighten) more  · Take your medicine as directed  Contact your healthcare provider if you think your medicine is not helping or if you have side effects  Tell him of her if you are allergic to any medicine  Keep a list of the medicines, vitamins, and herbs you take  Include the amounts, and when and why you take them  Bring the list or the pill bottles to follow-up visits  Carry your medicine list with you in case of an emergency  Manage GERD:       · Do not have foods or drinks that may increase heartburn  These include chocolate, peppermint, fried or fatty foods, drinks that contain caffeine, or carbonated drinks (soda)   Other foods include spicy foods, onions, tomatoes, and tomato-based foods  Do not have foods or drinks that can irritate your esophagus, such as citrus fruits, juices, and alcohol  · Do not eat large meals  When you eat a lot of food at one time, your stomach needs more acid to digest it  Eat 6 small meals each day instead of 3 large ones, and eat slowly  Do not eat meals 2 to 3 hours before bedtime  · Elevate the head of your bed  Place 6-inch blocks under the head of your bed frame  You may also use more than one pillow under your head and shoulders while you sleep  · Maintain a healthy weight  If you are overweight, weight loss may help relieve symptoms of GERD  · Do not smoke  Smoking weakens the lower esophageal sphincter and increases the risk of GERD  Ask your healthcare provider for information if you currently smoke and need help to quit  E-cigarettes or smokeless tobacco still contain nicotine  Talk to your healthcare provider before you use these products  · Do not wear clothing that is tight around your waist   Tight clothing can put pressure on your stomach and cause or worsen GERD symptoms  Follow up with your doctor or gastroenterologist as directed:  Write down your questions so you remember to ask them during your visits  © Copyright [employer] 2021 Information is for End User's use only and may not be sold, redistributed or otherwise used for commercial purposes  All illustrations and images included in CareNotes® are the copyrighted property of [employer]  or Mariya Moore  The above information is an  only  It is not intended as medical advice for individual conditions or treatments  Talk to your doctor, nurse or pharmacist before following any medical regimen to see if it is safe and effective for you  Stop taking your HCTZ, measure your BP at home, call the office if BP higher than 120/80 mmhg consistently

## 2021-08-19 DIAGNOSIS — E78.49 OTHER HYPERLIPIDEMIA: ICD-10-CM

## 2021-08-19 RX ORDER — LOVASTATIN 40 MG/1
TABLET ORAL
Qty: 90 TABLET | Refills: 2 | Status: SHIPPED | OUTPATIENT
Start: 2021-08-19 | End: 2022-05-31

## 2021-09-16 DIAGNOSIS — I10 ESSENTIAL HYPERTENSION: ICD-10-CM

## 2021-09-16 RX ORDER — ATENOLOL 50 MG/1
TABLET ORAL
Qty: 90 TABLET | Refills: 1 | Status: SHIPPED | OUTPATIENT
Start: 2021-09-16 | End: 2022-03-15

## 2021-09-20 ENCOUNTER — OFFICE VISIT (OUTPATIENT)
Dept: ENDOCRINOLOGY | Facility: CLINIC | Age: 71
End: 2021-09-20
Payer: MEDICARE

## 2021-09-20 VITALS
HEIGHT: 60 IN | DIASTOLIC BLOOD PRESSURE: 80 MMHG | TEMPERATURE: 97.3 F | WEIGHT: 213.3 LBS | SYSTOLIC BLOOD PRESSURE: 118 MMHG | BODY MASS INDEX: 41.88 KG/M2 | HEART RATE: 54 BPM

## 2021-09-20 DIAGNOSIS — C73 THYROID CANCER (HCC): Primary | ICD-10-CM

## 2021-09-20 DIAGNOSIS — N52.9 ERECTILE DYSFUNCTION, UNSPECIFIED ERECTILE DYSFUNCTION TYPE: ICD-10-CM

## 2021-09-20 DIAGNOSIS — C73 PAPILLARY THYROID CARCINOMA (HCC): ICD-10-CM

## 2021-09-20 DIAGNOSIS — E55.9 VITAMIN D DEFICIENCY: ICD-10-CM

## 2021-09-20 DIAGNOSIS — E89.0 POSTOPERATIVE HYPOTHYROIDISM: ICD-10-CM

## 2021-09-20 DIAGNOSIS — C73 FOLLICULAR THYROID CANCER (HCC): ICD-10-CM

## 2021-09-20 DIAGNOSIS — R53.83 FATIGUE, UNSPECIFIED TYPE: ICD-10-CM

## 2021-09-20 PROCEDURE — 99214 OFFICE O/P EST MOD 30 MIN: CPT | Performed by: INTERNAL MEDICINE

## 2021-09-20 NOTE — PROGRESS NOTES
Raffy Veronica 73 y o  male MRN: [id number]    Encounter: [id number]      Assessment/Plan     1   follicular carcinoma minimally invasive  2  Papillary carcinoma, follicular variant, infiltrated  3  Thyroid nodule   Status post  Hemithyroidectomy for/2019   -Continue levothyroxine at current dose  -repeat TSH, free T4, TG, TG antibody in 12/2021, surveillance neck ultrasound ordered for that time as well  If no concerns for recurrence, will lower dose of levothyroxine, aiming at a more relaxed TSH level     4  History of vitamin-D deficiency - continue supplementation  Check vitamin-D level    5  Fatigue    6  Erectile dysfunction   -check total, free testosterone level     Follow up in 6 months    CC:  Thyroid carcinoma    History of Present Illness     HPI:  Raffy Veronica is a 73 y o  male who is here for a follow-up of Thyroid carcinoma    Last seen in  03/2021   per my prior notes   "Incidentally found thyroid nodules during workup and management of malignant melanoma     FNA left thyroid nodule- AUS, affirma suspicious  BRAF, V600E - negative; RET/PTC - not detected  S/p left hemithyroidectomy on 04/24/2019     Final pathology  1)1 8 cm papillary carcinoma, follicular variant, infiltrative  1) 2 6 cm follicular carcinoma, minimally invasive  No lymph nodes submitted or found   Multinodular hyperplasia    Immuno stains positive for HBME-1 and CK19     Completion thyroidectomy was held off given complications of vocal cord paralysis and concerns of the risk of contralateral nerve being damaged if he were to go for total thyroidectomy"     FNA right mid pole thyroid nodule 1/14/2021-benign  US neck 11/2020 - no evidence of recurrent or metastatic disease     Currently on levothyroxine 175 mcg orally daily on 6 days of the week, half a tablet on the 7th day    compliant, takes on an empty stomach    C/o fatigue   Weight fluctuates a few lbs   Never slept well   No other symptoms of hypo or hyperthyroidism    Has had ED and uses viagra which helps some   Difficult maintain   Gets spontaneous erections  Libido is good  No h/o head or genital trauma      Taking Vitamin D supplements      All other systems were reviewed and are negative  Review of Systems    Historical Information   Past Medical History:   Diagnosis Date    Arthritis     Cancer ([address] )     melanoma abdomen    GERD (gastroesophageal reflux disease)     diet controlled    Hyperlipidemia     Last Assessed:10/20/14    Hypertension     PONV (postoperative nausea and vomiting)      Past Surgical History:   Procedure Laterality Date    COLONOSCOPY      HERNIA REPAIR Right     Last Assessed:10/20/14 inguinal     LYMPH NODE BIOPSY N/A 12/14/2018    Procedure: SENTINEL LYMPH NODE BIOPSY; LYMPHOSCINTIGRAPHY; INTRAOPERATIVE LYMPHATIC MAPPING (INJECT AT 1100);   Surgeon: Дмитрий Christensen MD;  [facility];  Service: Surgical Oncology    SKIN LESION EXCISION N/A 12/14/2018    Procedure: MEDIAL UPPER ABDOMEN MELANOMA WIDE EXCISION; BILATERAL AXILLA SLN;  Surgeon: Дмитрий Christensen MD;  [facility];  Service: Surgical Oncology    THYROID LOBECTOMY Left 4/24/2019    Procedure: HEMITHYROIDECTOMY ISTHMUSECTOMY;  Surgeon: Дмитрий Christensen MD;  [facility];  Service: Surgical Oncology    TONSILLECTOMY      US GUIDED THYROID BIOPSY  2/7/2019    US GUIDED THYROID BIOPSY  1/14/2021    WISDOM TOOTH EXTRACTION       Social History   Social History     Substance and Sexual Activity   Alcohol Use Yes    Alcohol/week: 2 0 standard drinks    Types: 1 Glasses of wine, 1 Cans of beer per week    Comment: rarely     Social History     Substance and Sexual Activity   Drug Use No     Social History     Tobacco Use   Smoking Status Never Smoker   Smokeless Tobacco Never Used     Family History:   Family History   Problem Relation Age of Onset    Hypertension Mother     Hypothyroidism Mother     Hypertension Father     Skin cancer Father     Colon cancer Maternal Aunt     Colon cancer Paternal Aunt         62s    Skin cancer Paternal Uncle        Meds/Allergies   Current Outpatient Medications   Medication Sig Dispense Refill    aspirin 81 MG tablet Take 81 mg by mouth daily       atenolol (TENORMIN) 50 mg tablet TAKE ONE TABLET BY MOUTH EVERY DAY 90 tablet 1    cholecalciferol (VITAMIN D3) 1,000 units tablet Take 1,000 Units by mouth daily      levothyroxine 175 mcg tablet Take one tablet (175 mcg) daily on an empty stomach 90 tablet 2    lovastatin (MEVACOR) 40 MG tablet TAKE ONE TABLET BY MOUTH EVERY DAY AT BEDTIME 90 tablet 2    multivitamin (THERAGRAN) TABS Take 1 tablet by mouth daily at bedtime       omeprazole (PriLOSEC) 20 mg delayed release capsule Take 1 capsule (20 mg total) by mouth daily before breakfast (Patient taking differently: Take 20 mg by mouth as needed ) 30 capsule 5    sildenafil (VIAGRA) 50 MG tablet TAKE ONE TABLET BY MOUTH EVERY DAY AS NEEDED FOR ERECTILE DYSFUNCTION 30 tablet 3     No current facility-administered medications for this visit  No Known Allergies    Objective   Vitals: Blood pressure 118/80, pulse (!) 54, temperature (!) 97 3 °F (36 3 °C), height 5' (1 524 m), weight 96 8 kg (213 lb 4 8 oz)  Physical Exam  Constitutional:       General: He is not in acute distress  Appearance: He is well-developed  He is not diaphoretic  HENT:      Head: Normocephalic and atraumatic  Eyes:      Conjunctiva/sclera: Conjunctivae normal       Pupils: Pupils are equal, round, and reactive to light  Neck:      Comments: No thyromegaly  Well-healed anterior scar  Cardiovascular:      Rate and Rhythm: Normal rate and regular rhythm  Heart sounds: Normal heart sounds  No murmur heard  Pulmonary:      Effort: Pulmonary effort is normal  No respiratory distress  Breath sounds: Normal breath sounds  No wheezing  Abdominal:      General: There is no distension        Palpations: Abdomen is soft  Tenderness: There is no abdominal tenderness  There is no guarding  Musculoskeletal:      Cervical back: Normal range of motion and neck supple  Lymphadenopathy:      Cervical: No cervical adenopathy  Skin:     General: Skin is warm and dry  Findings: No erythema or rash  Neurological:      Mental Status: He is alert and oriented to person, place, and time  Deep Tendon Reflexes: Reflexes normal       Comments: No tremors on the outstretched arms     Psychiatric:         Behavior: Behavior normal          Thought Content: Thought content normal          The history was obtained from the review of the chart, patient  Lab Results:   Component      Latest Ref Rng & Units 3/29/2021 8/17/2021 8/17/2021            6:55 AM  6:55 AM   Free T4      0 76 - 1 46 ng/dL 1 61 (H) 1 30 1 33   TSH 3RD GENERATON      0 358 - 3 740 uIU/mL <0 007 (L) 0 010 (L)    THYROGLOBULIN AB      0 0 - 0 9 IU/mL <1 0     Thyroglobulin-BURAK      1 4 - 29 2 ng/mL 1 0 (L)         Lab Results   Component Value Date/Time    TSH 3RD GENERATON 0 010 (L) 08/17/2021 06:55 AM    TSH 3RD GENERATON <0 007 (L) 03/29/2021 08:42 AM    TSH 3RD GENERATON <0 007 (L) 11/12/2020 10:22 AM    Free T4 1 30 08/17/2021 06:55 AM    Free T4 1 33 08/17/2021 06:55 AM    Free T4 1 61 (H) 03/29/2021 08:42 AM     Lab Results   Component Value Date    WBC 6 81 08/17/2021    HGB 16 3 08/17/2021    HCT 48 0 08/17/2021    MCV 88 08/17/2021     08/17/2021     Lab Results   Component Value Date    CREATININE 1 02 08/17/2021    BUN 18 08/17/2021     05/29/2017    K 3 7 08/17/2021     08/17/2021    CO2 31 08/17/2021     Lab Results   Component Value Date    HGBA1C 5 1 02/06/2021         Imaging Studies:   Results for orders placed during the hospital encounter of 07/24/20    US thyroid    Impression  Stable 1 2 cm right thyroid lobe nodule  Status post left hemithyroidectomy      Recommend continued surveillance according to the patient's oncologic imaging protocol  Reference: ACR Thyroid Imaging, Reporting and Data System (TI-RADS): White Paper of the [employer]  J AM Nikki Radiol 5910;[id number]  (additional recommendations based on American Thyroid Association 2015 guidelines )    Workstation performed: [id number]      I have personally reviewed pertinent reports  Portions of the record may have been created with voice recognition software  Occasional wrong word or "sound a like" substitutions may have occurred due to the inherent limitations of voice recognition software  Read the chart carefully and recognize, using context, where substitutions have occurred

## 2021-09-20 NOTE — PATIENT INSTRUCTIONS
Continue current regimen   Get labs done as ordered     Repeat thyroid function tests in 4-6 months  USG as ordered in 12/2021    Follow up in 6 months

## 2021-09-28 ENCOUNTER — HOSPITAL ENCOUNTER (OUTPATIENT)
Dept: MAMMOGRAPHY | Facility: CLINIC | Age: 71
Discharge: HOME | End: 2021-09-28
Payer: MEDICARE

## 2021-09-28 DIAGNOSIS — M90.80 BONE METABOLISM DISORDER: ICD-10-CM

## 2021-09-28 DIAGNOSIS — E88.9 BONE METABOLISM DISORDER: ICD-10-CM

## 2021-09-28 PROCEDURE — 77080 DXA BONE DENSITY AXIAL: CPT

## 2021-10-01 DIAGNOSIS — N52.9 ERECTILE DYSFUNCTION, UNSPECIFIED ERECTILE DYSFUNCTION TYPE: ICD-10-CM

## 2021-10-01 RX ORDER — SILDENAFIL 50 MG/1
TABLET, FILM COATED ORAL
Qty: 30 TABLET | Refills: 3 | Status: SHIPPED | OUTPATIENT
Start: 2021-10-01

## 2021-11-29 ENCOUNTER — TELEPHONE (OUTPATIENT)
Dept: ENDOCRINOLOGY | Facility: CLINIC | Age: 71
End: 2021-11-29

## 2021-11-29 DIAGNOSIS — C73 THYROID CANCER (HCC): Primary | ICD-10-CM

## 2022-01-31 ENCOUNTER — APPOINTMENT (OUTPATIENT)
Dept: LAB | Facility: HOSPITAL | Age: 72
End: 2022-01-31
Payer: MEDICARE

## 2022-01-31 LAB
ALBUMIN SERPL BCP-MCNC: 3.5 G/DL (ref 3.5–5)
ALP SERPL-CCNC: 87 U/L (ref 46–116)
ALT SERPL W P-5'-P-CCNC: 27 U/L (ref 12–78)
ANION GAP SERPL CALCULATED.3IONS-SCNC: 4 MMOL/L (ref 4–13)
AST SERPL W P-5'-P-CCNC: 19 U/L (ref 5–45)
BASOPHILS # BLD AUTO: 0.04 THOUSANDS/ΜL (ref 0–0.1)
BASOPHILS NFR BLD AUTO: 1 % (ref 0–1)
BILIRUB SERPL-MCNC: 0.64 MG/DL (ref 0.2–1)
BUN SERPL-MCNC: 16 MG/DL (ref 5–25)
CALCIUM SERPL-MCNC: 9.2 MG/DL (ref 8.3–10.1)
CHLORIDE SERPL-SCNC: 105 MMOL/L (ref 100–108)
CO2 SERPL-SCNC: 32 MMOL/L (ref 21–32)
CREAT SERPL-MCNC: 1 MG/DL (ref 0.6–1.3)
EOSINOPHIL # BLD AUTO: 0.12 THOUSAND/ΜL (ref 0–0.61)
EOSINOPHIL NFR BLD AUTO: 2 % (ref 0–6)
ERYTHROCYTE [DISTWIDTH] IN BLOOD BY AUTOMATED COUNT: 13 % (ref 11.6–15.1)
GFR SERPL CREATININE-BSD FRML MDRD: 75 ML/MIN/1.73SQ M
GLUCOSE SERPL-MCNC: 93 MG/DL (ref 65–140)
HCT VFR BLD AUTO: 44.7 % (ref 36.5–49.3)
HGB BLD-MCNC: 15.1 G/DL (ref 12–17)
IMM GRANULOCYTES # BLD AUTO: 0.01 THOUSAND/UL (ref 0–0.2)
IMM GRANULOCYTES NFR BLD AUTO: 0 % (ref 0–2)
LDH SERPL-CCNC: 154 U/L (ref 81–234)
LYMPHOCYTES # BLD AUTO: 1.62 THOUSANDS/ΜL (ref 0.6–4.47)
LYMPHOCYTES NFR BLD AUTO: 27 % (ref 14–44)
MCH RBC QN AUTO: 30.3 PG (ref 26.8–34.3)
MCHC RBC AUTO-ENTMCNC: 33.8 G/DL (ref 31.4–37.4)
MCV RBC AUTO: 90 FL (ref 82–98)
MONOCYTES # BLD AUTO: 0.63 THOUSAND/ΜL (ref 0.17–1.22)
MONOCYTES NFR BLD AUTO: 11 % (ref 4–12)
NEUTROPHILS # BLD AUTO: 3.51 THOUSANDS/ΜL (ref 1.85–7.62)
NEUTS SEG NFR BLD AUTO: 59 % (ref 43–75)
NRBC BLD AUTO-RTO: 0 /100 WBCS
PLATELET # BLD AUTO: 191 THOUSANDS/UL (ref 149–390)
PMV BLD AUTO: 10.5 FL (ref 8.9–12.7)
POTASSIUM SERPL-SCNC: 4.3 MMOL/L (ref 3.5–5.3)
PROT SERPL-MCNC: 6.9 G/DL (ref 6.4–8.2)
RBC # BLD AUTO: 4.99 MILLION/UL (ref 3.88–5.62)
SODIUM SERPL-SCNC: 141 MMOL/L (ref 136–145)
WBC # BLD AUTO: 5.93 THOUSAND/UL (ref 4.31–10.16)

## 2022-01-31 PROCEDURE — 36415 COLL VENOUS BLD VENIPUNCTURE: CPT | Performed by: INTERNAL MEDICINE

## 2022-01-31 PROCEDURE — 83615 LACTATE (LD) (LDH) ENZYME: CPT | Performed by: INTERNAL MEDICINE

## 2022-01-31 PROCEDURE — 85025 COMPLETE CBC W/AUTO DIFF WBC: CPT | Performed by: INTERNAL MEDICINE

## 2022-01-31 PROCEDURE — 80053 COMPREHEN METABOLIC PANEL: CPT | Performed by: INTERNAL MEDICINE

## 2022-02-02 ENCOUNTER — HOSPITAL ENCOUNTER (OUTPATIENT)
Dept: CT IMAGING | Facility: HOSPITAL | Age: 72
Discharge: HOME | End: 2022-02-02
Attending: INTERNAL MEDICINE
Payer: MEDICARE

## 2022-02-02 ENCOUNTER — HOSPITAL ENCOUNTER (OUTPATIENT)
Dept: ULTRASOUND IMAGING | Facility: HOSPITAL | Age: 72
Discharge: HOME | End: 2022-02-02
Attending: INTERNAL MEDICINE
Payer: MEDICARE

## 2022-02-02 ENCOUNTER — HOSPITAL ENCOUNTER (OUTPATIENT)
Dept: ULTRASOUND IMAGING | Facility: HOSPITAL | Age: 72
Discharge: HOME | End: 2022-02-02
Attending: SURGERY
Payer: MEDICARE

## 2022-02-02 DIAGNOSIS — Z85.820 PERSONAL HISTORY OF MALIGNANT MELANOMA: ICD-10-CM

## 2022-02-02 DIAGNOSIS — M79.89 OTHER SPECIFIED SOFT TISSUE DISORDERS: ICD-10-CM

## 2022-02-02 DIAGNOSIS — Z85.850 HISTORY OF THYROID CANCER: ICD-10-CM

## 2022-02-02 DIAGNOSIS — C73 THYROID CANCER (HCC): ICD-10-CM

## 2022-02-02 PROCEDURE — 76536 US EXAM OF HEAD AND NECK: CPT

## 2022-02-02 PROCEDURE — 71260 CT THORAX DX C+: CPT

## 2022-02-02 PROCEDURE — 74177 CT ABD & PELVIS W/CONTRAST: CPT

## 2022-02-02 PROCEDURE — 76882 US LMTD JT/FCL EVL NVASC XTR: CPT

## 2022-02-02 RX ADMIN — IOHEXOL 100 ML: 350 INJECTION, SOLUTION INTRAVENOUS at 07:22

## 2022-02-03 ENCOUNTER — OFFICE VISIT (OUTPATIENT)
Dept: SURGICAL ONCOLOGY | Facility: CLINIC | Age: 72
End: 2022-02-03
Payer: MEDICARE

## 2022-02-03 VITALS
SYSTOLIC BLOOD PRESSURE: 124 MMHG | OXYGEN SATURATION: 98 % | RESPIRATION RATE: 16 BRPM | DIASTOLIC BLOOD PRESSURE: 76 MMHG | BODY MASS INDEX: 30.69 KG/M2 | WEIGHT: 214.4 LBS | HEIGHT: 70 IN | TEMPERATURE: 98.3 F | HEART RATE: 71 BPM

## 2022-02-03 DIAGNOSIS — M79.89 OTHER SPECIFIED SOFT TISSUE DISORDERS: ICD-10-CM

## 2022-02-03 DIAGNOSIS — Z85.850 HISTORY OF THYROID CANCER: ICD-10-CM

## 2022-02-03 DIAGNOSIS — Z85.820 PERSONAL HISTORY OF MALIGNANT MELANOMA: ICD-10-CM

## 2022-02-03 DIAGNOSIS — Z08 ENCOUNTER FOR FOLLOW-UP EXAMINATION AFTER COMPLETED TREATMENT FOR MALIGNANT NEOPLASM: Primary | ICD-10-CM

## 2022-02-03 PROCEDURE — 99214 OFFICE O/P EST MOD 30 MIN: CPT

## 2022-02-03 NOTE — PROGRESS NOTES
Surgical Oncology Follow Up       [facility] [place]  [address] [facility] [address]  [address] [place], [place] [id number]  [place] 43  1950  [id number]  [facility]  [address] [facility] [address]  [facility] [place] [address] [place]  [phone]    Diagnoses and all orders for this visit:    Encounter for follow-up examination after completed treatment for malignant neoplasm    History of thyroid cancer    Personal history of malignant melanoma  -     US extremity soft tissue; Future    Other specified soft tissue disorders  -     US extremity soft tissue; Future        Chief Complaint   Patient presents with    Follow-up       Return in about 6 months (around 8/3/2022) for Office Visit, Imaging - See orders        Oncology History   Personal history of malignant melanoma   10/16/2018 Initial Diagnosis    Malignant melanoma of skin of abdomen ([facility] [address] )     12/14/2018 Surgery    Wide excision of abdomen with sentinel lymph node biopsies (right and left axillae)     History of thyroid cancer   4/24/2019 Initial Diagnosis    Papillary and follicular carcinoma ([address] )     4/24/2019 Surgery    Thyroid, left and isthmus, hemithyroidectomy:  -  Papillary carcinoma, follicular variant, infiltrative - [id number]  -  Follicular carcinoma, minimally invasive - [id number]         Staging: [id number] melanoma of the abdomen  December 2018  Positive sentinel node in the right axilla     [id number] invasive follicular carcinoma of the thyroid, April 2019     Treatment history:   Wide excision with sentinel lymph node biopsy December 2018  Left thyroid lobectomy, April 2019  Current treatment:   Observation for the melanoma  TSH suppression for the thyroid cancer  Disease status:   SYLVAIN    History of Present Illness:  Patient returns today for follow-up of his abdominal melanoma and thyroid cancer  He is feeling well, and has no complaints today  He denies any nausea, vomiting, weight loss, change in appetite, abdominal pain  He also denies any shortness of breath, cough, hoarseness or difficulty swallowing  CT scan of the chest abdomen pelvis, as well as ultrasounds of the axilla and thyroid have been reviewed  He continues follow-up with endocrinology for thyroid lab work and ultrasound  He also continues to see Dr Brunilda Dc for dermatology visits  He does not currently have any appointment scheduled with Medical Oncology  Review of Systems   Complete ROS Surg Onc:   Constitutional: The patient denies new or recent history of general fatigue, no recent weight loss, no change in appetite  Eyes: No complaints of visual problems, no scleral icterus  ENT: no complaints of ear pain, no hoarseness, no difficulty swallowing,  no tinnitus and no new masses in head, oral cavity, or neck  Cardiovascular: No complaints of chest pain, no palpitations, no ankle edema  Respiratory: No complaints of shortness of breath, no cough  Gastrointestinal: No complaints of jaundice, no bloody stools, no pale stools  Genitourinary: No complaints of dysuria, no hematuria, no nocturia, no frequent urination, no urethral discharge  Musculoskeletal: No complaints of weakness, paralysis, joint stiffness or arthralgias  Integumentary: No complaints of rash, no new lesions  Neurological: No complaints of convulsions, no seizures, no dizziness  Hematologic/Lymphatic: No complaints of easy bruising  No enlarged lymph nodes  Endocrine:  No hot or cold intolerance  No polydipsia, polyphagia, or polyuria  Allergy/immunology:  No environmental allergies  No food allergies  Not immunocompromised            Patient Active Problem List   Diagnosis    Actinic keratosis    Benign essential hypertension    BPH (benign prostatic hyperplasia)    Erectile dysfunction    GERD without esophagitis  Hyperlipidemia    Impaired fasting glucose    Personal history of malignant melanoma    Liver mass    Other specified soft tissue disorders    Multiple thyroid nodules    Encounter for follow-up examination after completed treatment for malignant neoplasm    Hoarseness of voice    Vocal cord paralysis    Fatigue    Vitamin D deficiency    Postoperative hypothyroidism    History of thyroid cancer    Hernia of abdominal cavity     Past Medical History:   Diagnosis Date    Arthritis     Cancer ([address] )     melanoma abdomen    GERD (gastroesophageal reflux disease)     diet controlled    Hyperlipidemia     Last Assessed:10/20/14    Hypertension     PONV (postoperative nausea and vomiting)      Past Surgical History:   Procedure Laterality Date    COLONOSCOPY      HERNIA REPAIR Right     Last Assessed:10/20/14 inguinal     LYMPH NODE BIOPSY N/A 12/14/2018    Procedure: SENTINEL LYMPH NODE BIOPSY; LYMPHOSCINTIGRAPHY; INTRAOPERATIVE LYMPHATIC MAPPING (INJECT AT 1100);   Surgeon: Boni Santillan MD;  [facility];  Service: Surgical Oncology    SKIN LESION EXCISION N/A 12/14/2018    Procedure: MEDIAL UPPER ABDOMEN MELANOMA WIDE EXCISION; BILATERAL AXILLA SLN;  Surgeon: Boni Santillan MD;  [facility];  Service: Surgical Oncology    THYROID LOBECTOMY Left 4/24/2019    Procedure: HEMITHYROIDECTOMY ISTHMUSECTOMY;  Surgeon: Boni Santillan MD;  [facility];  Service: Surgical Oncology    TONSILLECTOMY      US GUIDED THYROID BIOPSY  2/7/2019    US GUIDED THYROID BIOPSY  1/14/2021    WISDOM TOOTH EXTRACTION       Family History   Problem Relation Age of Onset    Hypertension Mother     Hypothyroidism Mother     Hypertension Father     Skin cancer Father     Colon cancer Maternal Aunt     Colon cancer Paternal Aunt         62s    Skin cancer Paternal Uncle      Social History     Socioeconomic History    Marital status: /Civil Union     Spouse name: Not on file    Number of children: Not on file    Years of education: Not on file    Highest education level: Not on file   Occupational History    Not on file   Tobacco Use    Smoking status: Never Smoker    Smokeless tobacco: Never Used   Vaping Use    Vaping Use: Never used   Substance and Sexual Activity    Alcohol use:  Yes     Alcohol/week: 2 0 standard drinks     Types: 1 Glasses of wine, 1 Cans of beer per week     Comment: rarely    Drug use: No    Sexual activity: Yes     Partners: Female   Other Topics Concern    Not on file   Social History Narrative    No advance directives     Social Determinants of Health     Financial Resource Strain: Not on file   Food Insecurity: Not on file   Transportation Needs: Not on file   Physical Activity: Inactive    Days of Exercise per Week: 0 days    Minutes of Exercise per Session: 0 min   Stress: No Stress Concern Present    Feeling of Stress : Not at all   Social Connections: Not on file   Intimate Partner Violence: Not on file   Housing Stability: Not on file       Current Outpatient Medications:     aspirin 81 MG tablet, Take 81 mg by mouth daily , Disp: , Rfl:     atenolol (TENORMIN) 50 mg tablet, TAKE ONE TABLET BY MOUTH EVERY DAY, Disp: 90 tablet, Rfl: 1    cholecalciferol (VITAMIN D3) 1,000 units tablet, Take 1,000 Units by mouth daily, Disp: , Rfl:     levothyroxine 175 mcg tablet, Take one tablet (175 mcg) daily on an empty stomach, Disp: 90 tablet, Rfl: 2    lovastatin (MEVACOR) 40 MG tablet, TAKE ONE TABLET BY MOUTH EVERY DAY AT BEDTIME, Disp: 90 tablet, Rfl: 2    multivitamin (THERAGRAN) TABS, Take 1 tablet by mouth daily at bedtime , Disp: , Rfl:     omeprazole (PriLOSEC) 20 mg delayed release capsule, Take 1 capsule (20 mg total) by mouth daily before breakfast (Patient taking differently: Take 20 mg by mouth as needed ), Disp: 30 capsule, Rfl: 5    sildenafil (VIAGRA) 50 MG tablet, TAKE ONE TABLET BY MOUTH EVERY DAY AS NEEDED FOR ERECTILE DYSFUNCTION, Disp: 30 tablet, Rfl: 3  No Known Allergies  Vitals:    02/03/22 1255   BP: 124/76   Pulse: 71   Resp: 16   Temp: 98 3 °F (36 8 °C)   SpO2: 98%       Physical Exam  Constitutional: General appearance: The Patient is well-developed and well-nourished who appears the stated age in no acute distress  Patient is pleasant and talkative  HEENT:  Normocephalic  Sclerae are anicteric  Mucous membranes are moist  Neck is supple without adenopathy  No JVD  Chest: The lungs are clear to auscultation  Cardiac: Heart is regular rate  Abdomen: Abdomen is soft, non-tender, non-distended and without masses  Extremities: There is no clubbing or cyanosis  There is no edema  Symmetric  Neuro: Grossly nonfocal  Gait is normal      Lymphatic: No evidence of cervical adenopathy bilaterally  No evidence of axillary adenopathy bilaterally  Skin: Warm, anicteric  Well-healed upper abdominal scar  No new pigmented lesions, and no evidence of in-transit lesions  Psych:  Patient is pleasant and talkative  Imaging  CT chest abdomen pelvis w contrast    Result Date: 2/3/2022  Narrative: CT CHEST, ABDOMEN AND PELVIS WITH IV CONTRAST INDICATION:   Z85 850: Personal history of malignant neoplasm of thyroid Z85 820: Personal history of malignant melanoma of skin  History of thyroid cancer and melanoma COMPARISON:  6/21/2021; 12/21/2020; 6/19/2020; 5/9/2019 TECHNIQUE: CT examination of the chest, abdomen and pelvis was performed  Axial, sagittal, and coronal 2D reformatted images were created from the source data and submitted for interpretation  Radiation dose length product (DLP) for this visit:  1706 mGy-cm   This examination, like all CT scans performed in the [facility], was performed utilizing techniques to minimize radiation dose exposure, including the use of iterative reconstruction and automated exposure control   IV Contrast:  100 mL of iohexol (OMNIPAQUE) Enteric Contrast: Enteric contrast was administered  FINDINGS: CHEST LUNGS:  Tiny 2 mm left apical nodule, image 9, series 5, stable  There is no tracheal or endobronchial lesion  PLEURA:  Unremarkable  HEART/GREAT VESSELS: Heart is unremarkable for patient's age  No thoracic aortic aneurysm  MEDIASTINUM AND JG:  Unremarkable  CHEST WALL AND LOWER NECK:   Unremarkable  ABDOMEN LIVER/BILIARY TREE:  Circumscribed hepatic hypodensity is stable in the left lobe as compared to Nikki although somewhat larger than the study of 2019  Currently the lesion measures 1 3 cm, 1 2 cm in June and approximately 0 9 cm in 2019 and measures fluid attenuation  One or 2 other tiny hypodensities are also well-circumscribed and stable  GALLBLADDER:  No calcified gallstones  No pericholecystic inflammatory change  SPLEEN:  Unremarkable  PANCREAS:  Unremarkable  ADRENAL GLANDS:  Unremarkable  KIDNEYS/URETERS:  No hydronephrosis  A few circumscribed hypodensities in the kidneys probably represent cysts although some are too small to characterize  STOMACH AND BOWEL:  Unremarkable  APPENDIX:  No findings to suggest appendicitis  ABDOMINOPELVIC CAVITY:  No ascites  No pneumoperitoneum  No lymphadenopathy  A few small periceliac nodes are stable  VESSELS:  Unremarkable for patient's age  PELVIS REPRODUCTIVE ORGANS:  The prostate is prominent  URINARY BLADDER:  Unremarkable  ABDOMINAL WALL/INGUINAL REGIONS:  Periumbilical hernia of fat is identified  Left inguinal hernia of fat is identified  OSSEOUS STRUCTURES:  No acute fracture or destructive osseous lesion  Stable sclerotic lesion in the posterior acetabulum on the right is likely represents a bone island  Impression: No evidence of metastatic disease/adenopathy  Workstation performed: [id number]     US head neck lymph node mapping    Result Date: 2/3/2022  Narrative: NECK ULTRASOUND INDICATION:     C73: Malignant neoplasm of thyroid gland    History of thyroid carcinoma with left hemithyroidectomy 3 years ago; history of negative biopsy on 1/14/2021 COMPARISON:  1/14/2021; 11/27/2020; 7/24/2020 FINDINGS: Ultrasound of the thyroidectomy bed and cervical lymph node chains was performed with a high frequency linear transducer  Small thyroid gland is noted on the right  There is a nodule within the right thyroid which was previously biopsied which measures 0 9 x 0 5 x 0 8 cm ,  Previously 1 1 x 0 6 x 0 8 cm  In the left thyroid bed there is a level 3 node with an echogenic center present  There is no suspicion of recurrent mass in the thyroidectomy bed on the left  Lymph nodes maintain normal morphologic contour, echogenicity and short axis dimensions of less than 0 7 cm  No evidence for microcalcification or focal nodularity  Impression: No evidence of recurrent or metastatic disease  Suggest continued surveillance as per oncologic protocol  Workstation performed: [id number]         US extremity soft tissue    Result Date: 2/3/2022  Narrative: RIGHT AXILLA ULTRASOUND INDICATION:   Z85 820: Personal history of malignant melanoma of skin M79 89: Other specified soft tissue disorders  History of malignant melanoma COMPARISON:  Ultrasound from 7/20/2021 TECHNIQUE:   Real-time ultrasound of the right axilla was performed with a linear transducer with both volumetric sweeps and still imaging techniques  FINDINGS: There is a normal morphologic lymph node in the right axilla measuring 1 7 x 0 8 x 2 1 cm, previously 1 4 x 0 9 x 2 cm without significant change in size or morphology  Comparison left axilla shows a similar node which has a similar appearance  to the study of 7/20/2021  Impression: Normal axillary node on the right without significant change Workstation performed: [id number]     I reviewed the above laboratory and imaging data  Discussion/Summary:   This is a pleasant 68-year-old male presenting to the office today for continued surveillance of melanoma and thyroid cancer  He continues to do well  He is SYLVAIN at 3 years from the melanoma, and SYLVAIN at over 2 1/2 years from the thyroid cancer  He continues regular follow-up with endocrinology and dermatology  He does not have a medical oncology appointment set up  Therefore, we will arrange for him to to see Dr Deep Chen in the near future  Also thyroid labs and imaging, as well as CT scan and blood work for melanoma surveillance are ordered by the other physicians  Therefore I will order only an ultrasound of both axillae for 6 months  He will see Dr Wilburn that time for a clinical exam   Patient is agreeable to plan, all questions have been answered

## 2022-02-07 ENCOUNTER — TELEPHONE (OUTPATIENT)
Dept: ENDOCRINOLOGY | Facility: CLINIC | Age: 72
End: 2022-02-07

## 2022-02-07 NOTE — TELEPHONE ENCOUNTER
----- Message from Malka Morales MD sent at 2/4/2022  8:40 AM EST -----  Stable nodule on the right side No evidence of recurrent or metastatic disease

## 2022-02-14 NOTE — PROGRESS NOTES
[facility]  ED  Emergency Department Encounter  EmergencyMedicine Resident     Pt Name:Edgar Corral  MRN: [id number]  [place] 1990  Date of evaluation: 2/14/22  PCP:  Corina Reyes MD    This patient was evaluated in the Emergency Department for symptoms described in the history of present illness. The patient was evaluated in the context of the global COVID-19 pandemic, which necessitated consideration that the patient might be at risk for infection with the SARS-CoV-2 virus that causes COVID-19. Institutional protocols and algorithms that pertain to the evaluation of patients at risk for COVID-19 are in a state of rapid change based on information released by regulatory bodies including the CDC and federal and state organizations. These policies and algorithms were followed during the patient's care in the ED. CHIEF COMPLAINT       No chief complaint on file. HISTORY OF PRESENT ILLNESS  (Location/Symptom, Timing/Onset, Context/Setting, Quality, Duration, Modifying Factors, Severity.)      Jade Parrish is a 29 y.o. female who presents with worsening sore throat. Patient was diagnosed with strep throat on 2/8/2022 and is taking penicillin, end date 2/18/2022. She says that her symptoms were improving, however she has had worsening neck and throat pain since last night. During her last EM visit, patient's symptoms prompted CT soft tissue of the neck, which showed no concern for abscess. She has taken ibuprofen with only minimal relief. No asymmetry noted. Neck is tender and swollen. Denies fevers at home, cough, chest pain, SOB. PAST MEDICAL / SURGICAL / SOCIAL / FAMILY HISTORY      has a past medical history of HLD (hyperlipidemia), Obesity, Psychiatric problem, Recurrent UTI, and Uncomplicated asthma. has a past surgical history that includes Dilation and curettage of uterus.       Social History     Socioeconomic History    Marital status: Single     Spouse [facility] DERMATOLOGY  [address] [place] [address]  [place] [address]  [phone]  [phone]     MRN: [id number] : 1950  Encounter: [id number]  Patient Information: Luiz Reyes  Chief complaint: 6 month check up    History of present illness:  42-year-old male presents for overall skin check previous history melanoma with positive sentinel node presents for overall checkup no specific concerns or changes noted  Patient is planning to relocate to [facility]  Past Medical History:   Diagnosis Date    Arthritis     Cancer ([address] )     melanoma abdomen    GERD (gastroesophageal reflux disease)     diet controlled    Hyperlipidemia     Last Assessed:10/20/14    Hypertension     PONV (postoperative nausea and vomiting)      Past Surgical History:   Procedure Laterality Date    COLONOSCOPY      HERNIA REPAIR Right     Last Assessed:10/20/14 inguinal     LYMPH NODE BIOPSY N/A 2018    Procedure: SENTINEL LYMPH NODE BIOPSY; LYMPHOSCINTIGRAPHY; INTRAOPERATIVE LYMPHATIC MAPPING (INJECT AT 1100);   Surgeon: Sunil Wells MD;  [facility];  Service: Surgical Oncology    SKIN LESION EXCISION N/A 2018    Procedure: MEDIAL UPPER ABDOMEN MELANOMA WIDE EXCISION; BILATERAL AXILLA SLN;  Surgeon: Sunil Wells MD;  [facility];  Service: Surgical Oncology    THYROID LOBECTOMY Left 2019    Procedure: HEMITHYROIDECTOMY ISTHMUSECTOMY;  Surgeon: Sunil Wells MD;  [facility];  Service: Surgical Oncology    TONSILLECTOMY      US GUIDED THYROID BIOPSY  2019    US GUIDED THYROID BIOPSY  2021    WISDOM TOOTH EXTRACTION       Social History   Social History     Substance and Sexual Activity   Alcohol Use Yes    Alcohol/week: 2 0 standard drinks    Types: 1 Glasses of wine, 1 Cans of beer per week    Comment: rarely     Social History     Substance and Sexual Activity   Drug Use No     Social History     Tobacco Use Smoking Status Never Smoker   Smokeless Tobacco Never Used     Family History   Problem Relation Age of Onset    Hypertension Mother     Hypothyroidism Mother     Hypertension Father     Skin cancer Father     Colon cancer Maternal Aunt     Colon cancer Paternal Aunt         62s    Skin cancer Paternal Uncle      Meds/Allergies   No Known Allergies    Meds:  Prior to Admission medications    Medication Sig Start Date End Date Taking?  Authorizing Provider   aspirin 81 MG tablet Take 81 mg by mouth daily    Yes Historical Provider, MD   atenolol (TENORMIN) 50 mg tablet TAKE ONE TABLET BY MOUTH EVERY DAY 3/11/21  Yes Marne Dancer, CRNP   cholecalciferol (VITAMIN D3) 1,000 units tablet Take 1,000 Units by mouth daily   Yes Historical Provider, MD   hydrochlorothiazide (HYDRODIURIL) 25 mg tablet TAKE ONE TABLET BY MOUTH EVERY DAY 3/12/21  Yes Marne Dancer, CRNP   levothyroxine 175 mcg tablet Take one tablet (175 mcg) daily on an empty stomach 3/25/21  Yes [facility]   lovastatin (MEVACOR) 40 MG tablet TAKE ONE TABLET BY MOUTH EVERY DAY AT BEDTIME 11/4/20  Yes Darcy Mirza MD   multivitamin (THERAGRAN) TABS Take 1 tablet by mouth daily at bedtime    Yes Historical Provider, MD   sildenafil (VIAGRA) 50 MG tablet TAKE ONE TABLET BY MOUTH EVERY DAY AS NEEDED FOR ERECTILE DYSFUNCTION 9/4/20  Yes Marne Dancer, CRNP       Subjective:     Review of Systems:    General: negative for - chills, fatigue, fever,  weight gain or weight loss  Psychological: negative for - anxiety, behavioral disorder, concentration difficulties, decreased libido, depression, irritability, memory difficulties, mood swings, sleep disturbances or suicidal ideation  ENT: negative for - hearing difficulties , nasal congestion, nasal discharge, oral lesions, sinus pain, sneezing, sore throat  Allergy and Immunology: negative for - hives, insect bite sensitivity,  Hematological and Lymphatic: negative for - bleeding problems, blood allergies. Home Medications:  Prior to Admission medications    Medication Sig Start Date End Date Taking? Authorizing Provider   methylPREDNISolone (MEDROL, DAVID,) 4 MG tablet Take by mouth. 2/14/22 2/20/22 Yes Yady Thompson DO   clindamycin (CLEOCIN) 150 MG capsule Take 3 capsules by mouth 3 times daily for 10 days 2/14/22 2/24/22 Yes Yady Thompson DO   Benzocaine-Menthol (CEPACOL) 15-2.3 MG LOZG Take 1 each by mouth every 2 hours as needed (sore throat) 2/8/22   Florian Justice DO   metroNIDAZOLE (FLAGYL) 500 MG tablet  12/30/21   Historical Provider, MD   ibuprofen (ADVIL;MOTRIN) 800 MG tablet Take 1 tablet by mouth 2 times daily as needed for Pain 1/18/22   Za Oliver MD   albuterol sulfate HFA (PROVENTIL HFA) 108 (90 Base) MCG/ACT inhaler Inhale 2 puffs into the lungs every 6 hours as needed for Wheezing 11/9/21   Za Oliver MD   clindamycin-benzoyl peroxide (BENZACLIN) 1-5 % gel Apply topically 2 times daily. Patient not taking: Reported on 4/28/2021 1/20/21   Za Oliver MD   medroxyPROGESTERone (PROVERA) 10 MG tablet TAKE ONE TABLET BY MOUTH DAILY FOR 7 DAYS  Patient not taking: Reported on 4/28/2021 10/1/20   Za Oliver MD   acetaminophen (TYLENOL) 500 MG tablet Take 2 tablets by mouth every 6 hours as needed for Pain  Patient not taking: Reported on 4/28/2021 8/23/20   Yady Thompson DO   ibuprofen (ADVIL;MOTRIN) 600 MG tablet Take 1 tablet by mouth every 6 hours as needed for Pain  Patient not taking: Reported on 9/24/2020 8/23/20   Yady Thompson DO   diphenhydrAMINE-zinc acetate (BENADRYL ITCH STOPPING) 1-0.1 % cream Apply topically 3 times daily as needed. Patient not taking: Reported on 4/28/2021 6/23/20   Za Oliver MD       REVIEW OF SYSTEMS    (2-9 systems for level 4, 10 or more for level 5)      Review of Systems   Constitutional: Negative for activity change, chills and fever. HENT: Positive for sore throat.  Negative for congestion, sinus pressure and sinus clots,bruising, swollen lymph nodes  Endocrine: negative for - hair pattern changes, hot flashes, malaise/lethargy, mood swings, palpitations, polydipsia/polyuria, skin changes, temperature intolerance or unexpected weight change  Respiratory: negative for - cough, hemoptysis, orthopnea, shortness of breath, or wheezing  Cardiovascular: negative for - chest pain, dyspnea on exertion, edema,  Gastrointestinal: negative for - abdominal pain, nausea/vomiting  Genito-Urinary: negative for - dysuria, incontinence, irregular/heavy menses or urinary frequency/urgency  Musculoskeletal: negative for - gait disturbance, joint pain, joint stiffness, joint swelling, muscle pain, muscular weakness  Dermatological:  As in HPI  Neurological: negative for confusion, dizziness, headaches, impaired coordination/balance, memory loss, numbness/tingling, seizures, speech problems, tremors or weakness       Objective: There were no vitals taken for this visit  Physical Exam:    General Appearance:    Alert, cooperative, no distress   Head:    Normocephalic, without obvious abnormality, atraumatic   Lymphatics:    No lymphadenopathy noted      Abdomen:   No hepatosplenomegaly   Skin:   A full skin exam was performed including scalp, head scalp, eyes, ears, nose, lips, neck, chest, axilla, abdomen, back, buttocks, bilateral upper extremities, bilateral lower extremities, hands, feet, fingers, toes, fingernails, and toenails Rach sites skin cancer well healed well without recurrence normal pigmented lesions regular shape and color normal keratotic papules greasy stuck appearance nothing else remarkable noted on exam     Assessment:     1  Nevus     2  Seborrheic keratosis     3  Screening for skin condition     4   History of melanoma           Plan:   Nevi reviewed the concept of ABCDE and ugly duckling nothing markedly atypical patient reassured  Seborrheic keratosis patient reassured these are normal growths we acquire with age no pain.    Eyes: Negative for pain and itching. Respiratory: Negative for cough and shortness of breath. Cardiovascular: Negative for chest pain. Gastrointestinal: Negative for abdominal distention, abdominal pain, constipation, diarrhea and nausea. Endocrine: Negative for polyuria. Genitourinary: Negative for dysuria and frequency. Musculoskeletal: Negative for arthralgias. Skin: Negative for rash. Neurological: Negative for light-headedness and headaches. PHYSICAL EXAM   (up to 7 for level 4, 8 or more for level 5)      INITIAL VITALS:   /80   Pulse 83   Temp 99 °F (37.2 °C) (Oral)   Resp 16   SpO2 98%     Physical Exam  Vitals reviewed. Constitutional:       General: She is not in acute distress. HENT:      Head: Normocephalic and atraumatic. Ears:      Comments: Hearing grossly normal     Nose: Nose normal.      Mouth/Throat:      Mouth: Mucous membranes are moist.      Pharynx: Oropharynx is clear. Posterior oropharyngeal erythema present. No oropharyngeal exudate. Comments: Pharyngeal erythema with moderately enlarged tonsils. Uvula is midline. No asymmetry. No exudates. Eyes:      General: No scleral icterus. Conjunctiva/sclera: Conjunctivae normal.      Pupils: Pupils are equal, round, and reactive to light. Cardiovascular:      Rate and Rhythm: Normal rate and regular rhythm. Pulses: Normal pulses. Pulmonary:      Effort: Pulmonary effort is normal. No respiratory distress. Breath sounds: Normal breath sounds. Abdominal:      General: There is no distension. Tenderness: There is no abdominal tenderness. There is no guarding. Musculoskeletal:      Cervical back: No muscular tenderness. Right lower leg: No edema. Left lower leg: No edema. Skin:     General: Skin is warm and dry. Capillary Refill: Capillary refill takes less than 2 seconds. Neurological:      General: No focal deficit present.       Mental Status: She treatment needed  History of melanoma no recurrence nothing else atypical sunblock recommended follow-up in 6 months  Screening for dermatologic disorders nothing else of concern noted on complete exam follow-up in 6 months    Mikel Schultz MD  7/22/2021,9:31 AM    Portions of the record may have been created with voice recognition software   Occasional wrong word or "sound a like" substitutions may have occurred due to the inherent limitations of voice recognition software   Read the chart carefully and recognize, using context, where substitutions have occurred  is alert and oriented to person, place, and time. Mental status is at baseline. DIFFERENTIAL  DIAGNOSIS     PLAN (LABS / IMAGING / EKG):  No orders of the defined types were placed in this encounter. MEDICATIONS ORDERED:  Orders Placed This Encounter   Medications    DISCONTD: methylPREDNISolone sodium (SOLU-MEDROL) injection 125 mg    methylPREDNISolone (MEDROL, DAVID,) 4 MG tablet     Sig: Take by mouth. Dispense:  1 kit     Refill:  0    clindamycin (CLEOCIN) 150 MG capsule     Sig: Take 3 capsules by mouth 3 times daily for 10 days     Dispense:  90 capsule     Refill:  0    dexamethasone (DECADRON) injection 10 mg         DIAGNOSTIC RESULTS / EMERGENCY DEPARTMENT COURSE / MDM   LAB RESULTS:  No results found for this visit on 02/14/22. IMPRESSION: George Portillo is a 29 y.o. woman presenting for recurrent sore throat. She was recently seen for sore throat and there was concern for muffled voice, CT soft tissue was performed and was negative for abscess. On her exam she does have moderately enlarged tonsils but there is no concern for airway compromise at this time. No stridor. Saturating 98% on room air and breathing comfortably. She is currently on penicillin for positive strep but experienced worsening of symptoms while taking antibiotics as directed. Low concern for abscess given recent imaging, stable vitals, normal sounding voice, and airway is not a concern at this time. Will broaden antibiotic coverage and redose steroids. RADIOLOGY:  CT SOFT TISSUE NECK W CONTRAST    Result Date: 2/8/2022  1. Findings suggestive of pharyngitis/tonsillitis without evidence of abscess. Marked narrowing of the oropharyngeal airway. 2. Mild cervical lymphadenopathy, likely reactive.       EKG  none    All EKG's are interpreted by the Emergency Department Physician who either signs or Co-signs this chart in the absence of a cardiologist.    EMERGENCY DEPARTMENT COURSE:  Patient seen and evaluated, VSS and nontoxic in appearance. Assessment as stated above. Patient was given steroids and a prescription for broader abx and recommended for DC. Patient understands to return to the emergency department for any new or worsening symptoms and to see their PCP regarding hospital follow up. PROCEDURES:  none    CONSULTS:  None    CRITICAL CARE:  See attending note    FINAL IMPRESSION      1.  Streptococcal sore throat          DISPOSITION / PLAN     DISPOSITION Decision To Discharge 02/14/2022 05:47:33 PM      PATIENT REFERRED TO:  Mile Pool MD  [address] [place] [address], [address]  [phone]      As needed, If symptoms worsen    [facility]  [address] [place] [address]  [phone]    As needed, If symptoms worsen      DISCHARGE MEDICATIONS:  Discharge Medication List as of 2/14/2022  5:49 PM      START taking these medications    Details   methylPREDNISolone (MEDROL, DAVID,) 4 MG tablet Take by mouth., Disp-1 kit, R-0Print      clindamycin (CLEOCIN) 150 MG capsule Take 3 capsules by mouth 3 times daily for 10 days, Disp-90 capsule, R-0Print             Court Messina DO  Emergency Medicine Resident    (Please note that portions of thisnote were completed with a voice recognition program.  Efforts were made to edit the dictations but occasionally words are mis-transcribed.)        Court Messina DO  Resident  02/14/22 [id number]

## 2022-02-16 ENCOUNTER — OFFICE VISIT (OUTPATIENT)
Dept: DERMATOLOGY | Facility: CLINIC | Age: 72
End: 2022-02-16
Payer: MEDICARE

## 2022-02-16 VITALS — BODY MASS INDEX: 29.4 KG/M2 | WEIGHT: 210 LBS | HEIGHT: 71 IN

## 2022-02-16 DIAGNOSIS — L82.1 SEBORRHEIC KERATOSIS: ICD-10-CM

## 2022-02-16 DIAGNOSIS — D22.9 NEVUS: Primary | ICD-10-CM

## 2022-02-16 DIAGNOSIS — Z13.89 SCREENING FOR SKIN CONDITION: ICD-10-CM

## 2022-02-16 DIAGNOSIS — Z85.820 HISTORY OF MELANOMA: ICD-10-CM

## 2022-02-16 PROCEDURE — 99213 OFFICE O/P EST LOW 20 MIN: CPT | Performed by: DERMATOLOGY

## 2022-02-16 NOTE — PROGRESS NOTES
[facility] DERMATOLOGY  [address] [place] [id number]  [phone]  [phone]     MRN: [id number] : 1950  Encounter: [id number]  Patient Information: Amanda Fragoso  Chief complaint: 6 month check up    History of present illness:  72-year-old male presents for overall skin check previous history of melanoma with positive sentinel node it has been over 3 years since his diagnosis  No specific concerns or changes noted continues to follow with Dr Kathleen Tarango  No specific concerns noted  Past Medical History:   Diagnosis Date    Arthritis     Cancer ([facility] [address] )     melanoma abdomen    GERD (gastroesophageal reflux disease)     diet controlled    Hyperlipidemia     Last Assessed:10/20/14    Hypertension     PONV (postoperative nausea and vomiting)      Past Surgical History:   Procedure Laterality Date    COLONOSCOPY      HERNIA REPAIR Right     Last Assessed:10/20/14 inguinal     LYMPH NODE BIOPSY N/A 2018    Procedure: SENTINEL LYMPH NODE BIOPSY; LYMPHOSCINTIGRAPHY; INTRAOPERATIVE LYMPHATIC MAPPING (INJECT AT 1100);   Surgeon: Amaury Brown MD;  [facility];  Service: Surgical Oncology    SKIN LESION EXCISION N/A 2018    Procedure: MEDIAL UPPER ABDOMEN MELANOMA WIDE EXCISION; BILATERAL AXILLA SLN;  Surgeon: Amaury Brown MD;  [facility];  Service: Surgical Oncology    THYROID LOBECTOMY Left 2019    Procedure: HEMITHYROIDECTOMY ISTHMUSECTOMY;  Surgeon: Amaury Brown MD;  [facility];  Service: Surgical Oncology    TONSILLECTOMY      US GUIDED THYROID BIOPSY  2019    US GUIDED THYROID BIOPSY  2021    WISDOM TOOTH EXTRACTION       Social History   Social History     Substance and Sexual Activity   Alcohol Use Yes    Alcohol/week: 2 0 standard drinks    Types: 1 Glasses of wine, 1 Cans of beer per week    Comment: rarely     Social History     Substance and Sexual Activity   Drug Use No     Social History     Tobacco Use   Smoking Status Never Smoker   Smokeless Tobacco Never Used     Family History   Problem Relation Age of Onset    Hypertension Mother     Hypothyroidism Mother     Hypertension Father     Skin cancer Father     Colon cancer Maternal Aunt     Colon cancer Paternal Aunt         62s    Skin cancer Paternal Uncle      Meds/Allergies   No Known Allergies    Meds:  Prior to Admission medications    Medication Sig Start Date End Date Taking?  Authorizing Provider   aspirin 81 MG tablet Take 81 mg by mouth daily    Yes Historical Provider, MD   atenolol (TENORMIN) 50 mg tablet TAKE ONE TABLET BY MOUTH EVERY DAY 9/16/21  Yes Richie Puri MD   Calcium-Magnesium-Vitamin D (CALCIUM 1200+D3 PO)    Yes Historical Provider, MD   cholecalciferol (VITAMIN D3) 1,000 units tablet Take 1,000 Units by mouth daily   Yes Historical Provider, MD   levothyroxine 175 mcg tablet Take one tablet (175 mcg) daily on an empty stomach 3/25/21  Yes Darnell Reynolds MD   lovastatin (MEVACOR) 40 MG tablet TAKE ONE TABLET BY MOUTH EVERY DAY AT BEDTIME 8/19/21  Yes Richie Puri MD   multivitamin [facility]) TABS Take 1 tablet by mouth daily at bedtime    Yes Historical Provider, MD   omeprazole (PriLOSEC) 20 mg delayed release capsule Take 1 capsule (20 mg total) by mouth daily before breakfast  Patient taking differently: Take 20 mg by mouth as needed  8/17/21  Yes Torri Trevizo MD   sildenafil (VIAGRA) 50 MG tablet TAKE ONE TABLET BY MOUTH EVERY DAY AS NEEDED FOR ERECTILE DYSFUNCTION 10/1/21   Richie Puri MD       Subjective:     Review of Systems:    General: negative for - chills, fatigue, fever,  weight gain or weight loss  Psychological: negative for - anxiety, behavioral disorder, concentration difficulties, decreased libido, depression, irritability, memory difficulties, mood swings, sleep disturbances or suicidal ideation  ENT: negative for - hearing difficulties , nasal congestion, nasal discharge, oral lesions, sinus pain, sneezing, sore throat  Allergy and Immunology: negative for - hives, insect bite sensitivity,  Hematological and Lymphatic: negative for - bleeding problems, blood clots,bruising, swollen lymph nodes  Endocrine: negative for - hair pattern changes, hot flashes, malaise/lethargy, mood swings, palpitations, polydipsia/polyuria, skin changes, temperature intolerance or unexpected weight change  Respiratory: negative for - cough, hemoptysis, orthopnea, shortness of breath, or wheezing  Cardiovascular: negative for - chest pain, dyspnea on exertion, edema,  Gastrointestinal: negative for - abdominal pain, nausea/vomiting  Genito-Urinary: negative for - dysuria, incontinence, irregular/heavy menses or urinary frequency/urgency  Musculoskeletal: negative for - gait disturbance, joint pain, joint stiffness, joint swelling, muscle pain, muscular weakness  Dermatological:  As in HPI  Neurological: negative for confusion, dizziness, headaches, impaired coordination/balance, memory loss, numbness/tingling, seizures, speech problems, tremors or weakness       Objective:   Ht 5' 11" (1 803 m)   Wt 95 3 kg (210 lb)   BMI 29 29 kg/m²     Physical Exam:    General Appearance:    Alert, cooperative, no distress   Head:    Normocephalic, without obvious abnormality, atraumatic   Lymphatics:    No lymphadenopathy noted      Abdomen:   No hepatosplenomegaly   Skin:   A full skin exam was performed including scalp, head scalp, eyes, ears, nose, lips, neck, chest, axilla, abdomen, back, buttocks, bilateral upper extremities, bilateral lower extremities, hands, feet, fingers, toes, fingernails, and toenails * previous site of melanoma excision well-healed without recurrence normal pigmented lesion regular shape color normal keratotic papules greasy stuck on appearance nothing else remarkable noted on complete exam     Assessment:     1  Nevus     2  Seborrheic keratosis     3  Screening for skin condition     4  History of melanoma           Plan:   Nevi reviewed the concept of ABCDE and ugly duckling nothing markedly atypical patient reassured  Seborrheic keratosis patient reassured these are normal growths we acquire with age no treatment needed  History of melanoma no recurrence nothing else atypical sunblock recommended follow-up in 6 months continue follow up with Dr Adama Sterling  Screening for dermatologic disorders nothing else of concern noted on complete exam follow-up in 6 months    Steph Wheeler MD  2/16/2022,11:03 AM    Portions of the record may have been created with voice recognition software   Occasional wrong word or "sound a like" substitutions may have occurred due to the inherent limitations of voice recognition software   Read the chart carefully and recognize, using context, where substitutions have occurred

## 2022-02-16 NOTE — PATIENT INSTRUCTIONS
Nevi reviewed the concept of ABCDE and ugly duckling nothing markedly atypical patient reassured  Seborrheic keratosis patient reassured these are normal growths we acquire with age no treatment needed  History of melanoma no recurrence nothing else atypical sunblock recommended follow-up in 6 months continue follow up with Dr Lia Dubon  Screening for dermatologic disorders nothing else of concern noted on complete exam follow-up in 6 months

## 2022-02-21 ENCOUNTER — TELEPHONE (OUTPATIENT)
Dept: OTHER | Facility: OTHER | Age: 72
End: 2022-02-21

## 2022-03-02 ENCOUNTER — CONSULT (OUTPATIENT)
Dept: HEMATOLOGY ONCOLOGY | Facility: CLINIC | Age: 72
End: 2022-03-02
Payer: MEDICARE

## 2022-03-02 VITALS
SYSTOLIC BLOOD PRESSURE: 118 MMHG | TEMPERATURE: 97.7 F | HEIGHT: 71 IN | DIASTOLIC BLOOD PRESSURE: 70 MMHG | WEIGHT: 215 LBS | BODY MASS INDEX: 30.1 KG/M2

## 2022-03-02 DIAGNOSIS — C43.9 MALIGNANT MELANOMA, UNSPECIFIED SITE (HCC): ICD-10-CM

## 2022-03-02 DIAGNOSIS — Z85.820 PERSONAL HISTORY OF MALIGNANT MELANOMA: Primary | ICD-10-CM

## 2022-03-02 DIAGNOSIS — Z85.850 HISTORY OF THYROID CANCER: ICD-10-CM

## 2022-03-02 PROCEDURE — 99215 OFFICE O/P EST HI 40 MIN: CPT | Performed by: INTERNAL MEDICINE

## 2022-03-03 NOTE — PROGRESS NOTES
[facility]  [address] [place] [address]  [phone]     Date of Visit: 3/2/2022  Name: Memory Canavan   YOB: 1950        Subjective    VISIT DIAGNOSIS:  Diagnoses and all orders for this visit:    Personal history of malignant melanoma    Malignant melanoma, unspecified site [facility])  -     MRI brain w wo contrast; Future  -     CBC and differential; Future  -     Comprehensive metabolic panel; Future  -     LD,Blood; Future    History of thyroid cancer        Oncology History   Personal history of malignant melanoma   10/16/2018 Initial Diagnosis    Malignant melanoma of skin of abdomen (HCC)  1 1 mm Breslow thickness, no ulceration, mitotic rate 1 per mm squared     12/14/2018 Surgery    Wide excision of abdomen with sentinel lymph node biopsies (right and left axillae)  No residual melanoma  He did have 1/4 lymph nodes positive with 2 mm of focal invasion into the lymph node  No extracapsular extension  Positive lymph node was in right axilla      BRAF [id number] +     12/14/2018 -  Cancer Staged    Staging form: Melanoma of the Skin, AJCC 8th Edition  - Pathologic stage from 12/14/2018: Stage IIIA (pT2a, pN1a, cM0) - Signed by Addis Rowan MD on 3/2/2022       History of thyroid cancer   4/24/2019 Initial Diagnosis    Papillary and follicular carcinoma ([facility] [address] )     4/24/2019 Surgery    Thyroid, left and isthmus, hemithyroidectomy:  -  Papillary carcinoma, follicular variant, infiltrative - [id number]  -  Follicular carcinoma, minimally invasive - [id number]     4/24/2019 -  Cancer Staged    Staging form: Thyroid - Differentiated and Anaplastic, AJCC 8th Edition  - Pathologic stage from 4/24/2019: Stage II (pT3a, pNX, cM0, Age at diagnosis: >= 55 years) - Signed by Addis Rowan MD on 3/2/2022  Laterality: Left  Solitary (s) or multifocal (m) tumors in the primary site: Solitary  Lymph node metastasis: Unknown          Cancer Staging  History of thyroid cancer  Staging form: Thyroid - Differentiated and Anaplastic, AJCC 8th Edition  - Pathologic stage from 4/24/2019: Stage II (pT3a, pNX, cM0, Age at diagnosis: >= 55 years) - Signed by Sonia Patiño MD on 3/2/2022    Personal history of malignant melanoma  Staging form: Melanoma of the Skin, AJCC 8th Edition  - Pathologic stage from 12/14/2018: Stage IIIA (pT2a, pN1a, cM0) - Signed by Sonia Patiño MD on 3/2/2022     [No treatment plan]     HISTORY OF PRESENT ILLNESS: Javier Enriquez is a 81 y o  male  who is seen as a transfer of care from Dr Bridgette Chen for a diagnosis of melanoma and thyroid cancer  The history is obtained from the patient and review of records  He first noted a little black spot in his mid abdomen  It started to get bigger over time about the size of a pencil eraser and borders become irregular  Denies itching, bleeding, or pain of the lesion  Nothing made it worse or better  He had a biopsy on 10/16/18 by dermatology and pathology demonstrated melanoma 1 1mm Breslow thickness, negative for ulceration and 1 mitoses/mm2  He then had a WLE and SLNBx with Dr Ramila Holt on 12/14/2018  Pathology demonstrated  no residual melanoma and 1 of 4 left axillary lymph nodes positive for melanoma with the largest tumor deposit measuring 2 mm  The positive lymph node was in the right axilla  He is BRAF V600E positive  He has undergone surveillance since his diagnosis and scans have been negative for recurrence or metastatic melanoma  On 2/7/2019, he was diagnosed with thyroid cancer, both papillary thyroid carcinoma, follicular variant  He is s/p left hemithyroidectomy  He continues to follow with Endocrinology for this as well who is adjusting his levothyroxine  He had occasional dermatology exams prior, but now follows with them regularly  He is doing well and feels good  He denies any other new, changing, or concerning lesions at this time    No new lymphadenopathy  Did have scans in January which demonstrated no evidence of melanoma recurrence or metastasis  He denies a previous history of melanoma and BCC/SCC  He describes having average sun exposure when younger  He describes having few number of sunburns  He describes having no blistering sunburns  He denies tanning bed use  He had dark brown hair when he was younger, has brie eyes, and  his ancestry is [facility] [place] [facility]  He has a family history of colon cancer in a paternal aunt  Denies family history of melanoma, breast, ovarian, or pancreatic cancer  He has 4 sons, mid 20s-44s  He is up to date on colonoscopy and PSA checks  Denies smoking, occasional alcohol, and no drug use  Rare cigar smoker    [place]  [facility]  REVIEW OF SYSTEMS:  Review of Systems   Constitutional: Positive for fatigue (improved with levothyroxine,but still not back to baseline)  Negative for appetite change, fever and unexpected weight change  HENT:   Negative for lump/mass  Eyes: Negative for icterus  Respiratory: Negative for cough, shortness of breath and wheezing  Cardiovascular: Negative for leg swelling  Gastrointestinal: Negative for abdominal pain, constipation, diarrhea, nausea and vomiting  Genitourinary: Negative for difficulty urinating and hematuria  Musculoskeletal: Negative for arthralgias, gait problem and myalgias  Skin: Negative for itching and rash  No new, changing, or concerning lesions  Neurological: Negative for extremity weakness, gait problem, headaches, light-headedness and numbness  Hematological: Negative for adenopathy          MEDICATIONS:    Current Outpatient Medications:     aspirin 81 MG tablet, Take 81 mg by mouth daily , Disp: , Rfl:     atenolol (TENORMIN) 50 mg tablet, TAKE ONE TABLET BY MOUTH EVERY DAY, Disp: 90 tablet, Rfl: 1    Calcium-Magnesium-Vitamin D (CALCIUM 1200+D3 PO), , Disp: , Rfl:     cholecalciferol (VITAMIN D3) 1,000 units tablet, Take 1,000 Units by mouth daily, Disp: , Rfl:     levothyroxine 175 mcg tablet, Take one tablet (175 mcg) daily on an empty stomach, Disp: 90 tablet, Rfl: 2    lovastatin (MEVACOR) 40 MG tablet, TAKE ONE TABLET BY MOUTH EVERY DAY AT BEDTIME, Disp: 90 tablet, Rfl: 2    multivitamin (THERAGRAN) TABS, Take 1 tablet by mouth daily at bedtime , Disp: , Rfl:     omeprazole (PriLOSEC) 20 mg delayed release capsule, Take 1 capsule (20 mg total) by mouth daily before breakfast (Patient taking differently: Take 20 mg by mouth as needed ), Disp: 30 capsule, Rfl: 5    sildenafil (VIAGRA) 50 MG tablet, TAKE ONE TABLET BY MOUTH EVERY DAY AS NEEDED FOR ERECTILE DYSFUNCTION, Disp: 30 tablet, Rfl: 3     ALLERGIES:  No Known Allergies     ACTIVE PROBLEMS:  Patient Active Problem List   Diagnosis    Actinic keratosis    Benign essential hypertension    BPH (benign prostatic hyperplasia)    Erectile dysfunction    GERD without esophagitis    Hyperlipidemia    Impaired fasting glucose    Personal history of malignant melanoma    Liver mass    Other specified soft tissue disorders    Multiple thyroid nodules    Encounter for follow-up examination after completed treatment for malignant neoplasm    Hoarseness of voice    Vocal cord paralysis    Fatigue    Vitamin D deficiency    Postoperative hypothyroidism    History of thyroid cancer    Hernia of abdominal cavity          PAST MEDICAL HISTORY:   Past Medical History:   Diagnosis Date    Arthritis     Cancer (HCC)     melanoma abdomen    GERD (gastroesophageal reflux disease)     diet controlled    Hyperlipidemia     Last Assessed:10/20/14    Hypertension     PONV (postoperative nausea and vomiting)         PAST SURGICAL HISTORY:  Past Surgical History:   Procedure Laterality Date    COLONOSCOPY      HERNIA REPAIR Right     Last Assessed:10/20/14 inguinal     LYMPH NODE BIOPSY N/A 12/14/2018    Procedure: SENTINEL LYMPH NODE BIOPSY; LYMPHOSCINTIGRAPHY; INTRAOPERATIVE LYMPHATIC MAPPING (INJECT AT 1100); Surgeon: Sueellen Habermann, MD;  [facility];  Service: Surgical Oncology    SKIN LESION EXCISION N/A 12/14/2018    Procedure: MEDIAL UPPER ABDOMEN MELANOMA WIDE EXCISION; BILATERAL AXILLA SLN;  Surgeon: Sueellen Habermann, MD;  [facility];  Service: Surgical Oncology    THYROID LOBECTOMY Left 4/24/2019    Procedure: HEMITHYROIDECTOMY ISTHMUSECTOMY;  Surgeon: Sueellen Habermann, MD;  [facility];  Service: Surgical Oncology    TONSILLECTOMY      US GUIDED THYROID BIOPSY  2/7/2019    US GUIDED THYROID BIOPSY  1/14/2021    WISDOM TOOTH EXTRACTION          SOCIAL HISTORY:  Social History     Socioeconomic History    Marital status: /Civil Union     Spouse name: None    Number of children: None    Years of education: None    Highest education level: None   Occupational History    None   Tobacco Use    Smoking status: Never Smoker    Smokeless tobacco: Never Used   Vaping Use    Vaping Use: Never used   Substance and Sexual Activity    Alcohol use:  Yes     Alcohol/week: 2 0 standard drinks     Types: 1 Glasses of wine, 1 Cans of beer per week     Comment: rarely    Drug use: No    Sexual activity: Yes     Partners: Female   Other Topics Concern    None   Social History Narrative    No advance directives     Social Determinants of Health     Financial Resource Strain: Not on file   Food Insecurity: Not on file   Transportation Needs: Not on file   Physical Activity: Not on file   Stress: Not on file   Social Connections: Not on file   Intimate Partner Violence: Not on file   Housing Stability: Not on file        FAMILY HISTORY:  Family History   Problem Relation Age of Onset    Hypertension Mother     Hypothyroidism Mother     Hypertension Father     Skin cancer Father     Colon cancer Maternal Aunt     Colon cancer Paternal Aunt         62s    Skin cancer Paternal Uncle         Objective    PHYSICAL EXAMINATION:   Blood pressure 118/70, temperature 97 7 °F (36 5 °C), temperature source Tympanic, height 5' 11" (1 803 m), weight 97 5 kg (215 lb)  ECOG Performance Status      Most Recent Value   ECOG Performance Status 0 - Fully active, able to carry on all pre-disease performance without restriction             Physical Exam  Constitutional:       General: He is not in acute distress  Appearance: Normal appearance  He is not toxic-appearing  HENT:      Mouth/Throat:      Mouth: Mucous membranes are moist       Pharynx: Oropharynx is clear  Eyes:      General: No scleral icterus  Cardiovascular:      Rate and Rhythm: Normal rate and regular rhythm  Pulses: Normal pulses  Heart sounds: No murmur heard  No friction rub  No gallop  Pulmonary:      Effort: Pulmonary effort is normal  No respiratory distress  Breath sounds: Normal breath sounds  No wheezing or rales  Chest:   Breasts:      Right: No axillary adenopathy or supraclavicular adenopathy  Left: No axillary adenopathy or supraclavicular adenopathy  Abdominal:      General: There is no distension  Palpations: There is no mass  Tenderness: There is no abdominal tenderness  There is no rebound  Musculoskeletal:         General: No swelling or tenderness  Right lower leg: No edema  Left lower leg: No edema  Lymphadenopathy:      Head:      Right side of head: No submandibular, preauricular or posterior auricular adenopathy  Left side of head: No submandibular, preauricular or posterior auricular adenopathy  Cervical: No cervical adenopathy  Right cervical: No superficial or posterior cervical adenopathy  Left cervical: No superficial or posterior cervical adenopathy  Upper Body:      Right upper body: No supraclavicular or axillary adenopathy  Left upper body: No supraclavicular or axillary adenopathy        Lower Body: No right inguinal adenopathy  No left inguinal adenopathy  Skin:     Findings: No rash  Comments: Well healed surgical scar  No evidence of recurrence at primary site  Neurological:      General: No focal deficit present  Mental Status: He is alert and oriented to person, place, and time  Psychiatric:         Mood and Affect: Mood normal          Behavior: Behavior normal          Thought Content: Thought content normal          Judgment: Judgment normal          I reviewed lab data in the chart      WBC   Date Value Ref Range Status   01/31/2022 5 93 4 31 - 10 16 Thousand/uL Final   08/17/2021 6 81 4 31 - 10 16 Thousand/uL Final   06/21/2021 6 62 4 31 - 10 16 Thousand/uL Final   05/29/2017 6 3 3 4 - 10 8 x10E3/uL Final   11/11/2016 7 7 3 4 - 10 8 x10E3/uL Final   04/28/2016 9 4 3 4 - 10 8 x10E3/uL Final     Hemoglobin   Date Value Ref Range Status   01/31/2022 15 1 12 0 - 17 0 g/dL Final   08/17/2021 16 3 12 0 - 17 0 g/dL Final   06/21/2021 16 1 12 0 - 17 0 g/dL Final   05/29/2017 15 9 g/dL Final     Comment:     Result Comment: No patient age and/or gender provided                              Age                Male          Female                           0 -  7 days       10 7 - 20 5    10 7 - 20 5                           8 - 30 days       10 5 - 18 7    10 5 - 18 7                          31 - 90 days        8 8 - 14 3     8 8 - 14 3                     91 days - 11 months     10 4 - 14 1    10 4 - 14 1                           1 -  7 years      10 9 - 14 8    10 9 - 14 8                           8 - 12 years      11 7 - 15 7    11 7 - 15 7                              >12 years      12 6 - 17 7    11 1 - 15 9     11/11/2016 16 0 12 6 - 17 7 g/dL Final   04/28/2016 17 0 12 6 - 17 7 g/dL Final     Platelets   Date Value Ref Range Status   01/31/2022 191 149 - 390 Thousands/uL Final   08/17/2021 179 149 - 390 Thousands/uL Final   06/21/2021 181 149 - 390 Thousands/uL Final   05/29/2017 168 150 - 379 x10E3/uL Final   11/11/2016 187 150 - 379 x10E3/uL Final   04/28/2016 186 150 - 379 x10E3/uL Final     MCV   Date Value Ref Range Status   01/31/2022 90 82 - 98 fL Final   08/17/2021 88 82 - 98 fL Final   06/21/2021 88 82 - 98 fL Final   05/29/2017 87 fL Final     Comment:     Result Comment: No patient age and/or gender provided                              Age                Male          Female                           0 -  7 days         78 - 110       78 - 110                           8 - 30 days         80 - 109       80 - 109                          32 - 90 days         80 -  97       80 -  97                     91 days - 11 months       73 -  87       73 -  87                           1 -  7 years        76 -  89       75 -  89                           8 - 12 years        68 -  91       77 -  91                              >12 years        78 -  97       78 -  97     11/11/2016 88 79 - 97 fL Final   04/28/2016 87 79 - 97 fL Final      Sodium   Date Value Ref Range Status   05/29/2017 144 134 - 144 mmol/L Final   11/11/2016 145 (H) 136 - 144 mmol/L Final   04/28/2016 141 134 - 144 mmol/L Final     Potassium   Date Value Ref Range Status   01/31/2022 4 3 3 5 - 5 3 mmol/L Final   08/17/2021 3 7 3 5 - 5 3 mmol/L Final   06/21/2021 3 7 3 5 - 5 3 mmol/L Final   05/29/2017 4 3 3 5 - 5 2 mmol/L Final   11/11/2016 4 5 3 5 - 5 2 mmol/L Final   04/28/2016 4 1 3 5 - 5 2 mmol/L Final     Chloride   Date Value Ref Range Status   01/31/2022 105 100 - 108 mmol/L Final   08/17/2021 102 100 - 108 mmol/L Final   06/21/2021 103 100 - 108 mmol/L Final   05/29/2017 103 96 - 106 mmol/L Final   11/11/2016 103 97 - 106 mmol/L Final   04/28/2016 99 97 - 108 mmol/L Final     CO2   Date Value Ref Range Status   01/31/2022 32 21 - 32 mmol/L Final   08/17/2021 31 21 - 32 mmol/L Final   06/21/2021 32 21 - 32 mmol/L Final   05/29/2017 23 18 - 29 mmol/L Final   11/11/2016 28 18 - 29 mmol/L Final   04/28/2016 28 18 - 29 mmol/L Final     BUN   Date Value Ref Range Status   01/31/2022 16 5 - 25 mg/dL Final   08/17/2021 18 5 - 25 mg/dL Final   06/21/2021 19 5 - 25 mg/dL Final   05/29/2017 20 mg/dL Final     Comment:     Result Comment: No patient age and/or gender provided                              Age                Male          Female                           0 - 11 months        3 - 25         3 - 25                           1 - 16 years         5 - 25         5 - 22                          21 - 44 years         6 - 21         6 - 21                          42 - 61 years         10 - 25         6 - 24                          61 - 80 years         8 - 32         8 - 32                              >89 years        8 - 39        10 - 36     11/11/2016 19 8 - 27 mg/dL Final   04/28/2016 17 8 - 27 mg/dL Final     Creatinine   Date Value Ref Range Status   01/31/2022 1 00 0 60 - 1 30 mg/dL Final     Comment:     Standardized to IDMS reference method   08/17/2021 1 02 0 60 - 1 30 mg/dL Final     Comment:     Standardized to IDMS reference method   06/21/2021 1 11 0 60 - 1 30 mg/dL Final     Comment:     Standardized to IDMS reference method   05/29/2017 1 10 mg/dL Final     Comment:     Result Comment: No patient age and/or gender provided                              Age                Male          Female                           0 - 60 days          44 - 1 19      44 - 1 19                     61 days - 11 months        17 - 1 18      17 - 1 18                           1 -  2 years         19 -   42      19 -   42                           3 -  4 years         26 -   51      26 -   51                           5 -  6 years         30 -   59      30 -   59                           7 -  8 years         37 -   62      37 -   62                           9 - 10 years         39 -   70      39 -   70                          11 - 12 years         42 -   75      42 -   75                          13 - 14 years         49 -   90      49 -   90 >14 years         76 - 1 27      57 - 1 00     11/11/2016 1 12 0 76 - 1 27 mg/dL Final   04/28/2016 1 15 0 76 - 1 27 mg/dL Final     Glucose   Date Value Ref Range Status   01/31/2022 93 65 - 140 mg/dL Final     Comment:     If the patient is fasting, the ADA then defines impaired fasting glucose as > 100 mg/dL and diabetes as > or equal to 123 mg/dL  Specimen collection should occur prior to Sulfasalazine administration due to the potential for falsely depressed results  Specimen collection should occur prior to Sulfapyridine administration due to the potential for falsely elevated results  06/17/2020 114 65 - 140 mg/dL Final     Comment:       If the patient is fasting, the ADA then defines impaired fasting glucose as > 100 mg/dL and diabetes as > or equal to 123 mg/dL  Specimen collection should occur prior to Sulfasalazine administration due to the potential for falsely depressed results  Specimen collection should occur prior to Sulfapyridine administration due to the potential for falsely elevated results  01/10/2020 117 65 - 140 mg/dL Final     Comment:       If the patient is fasting, the ADA then defines impaired fasting glucose as > 100 mg/dL and diabetes as > or equal to 123 mg/dL  Specimen collection should occur prior to Sulfasalazine administration due to the potential for falsely depressed results  Specimen collection should occur prior to Sulfapyridine administration due to the potential for falsely elevated results       Calcium   Date Value Ref Range Status   01/31/2022 9 2 8 3 - 10 1 mg/dL Final   08/17/2021 9 0 8 3 - 10 1 mg/dL Final   06/21/2021 9 2 8 3 - 10 1 mg/dL Final   05/29/2017 9 4 mg/dL Final     Comment:     Result Comment: No patient age and/or gender provided                              Age                Male          Female                           0 - 10 days        8 6 - 10 4     8 6 - 10 4                     11 days -  1 year        9 2 - 11 0 9 2 - 11 0                           2 - 11 years       9 1 - 10 5     9 1 - 10 5                          12 - 17 years       8 9 - 10 4     8 9 - 10 4                          18 - 59 years       8 7 - 10 2     8 7 - 10 2                              >59 years       8 6 - 10 2     8 7 - 10 3     11/11/2016 9 1 8 6 - 10 2 mg/dL Final   04/28/2016 9 5 8 6 - 10 2 mg/dL Final     Total Protein   Date Value Ref Range Status   05/29/2017 6 3 6 0 - 8 5 g/dL Final   11/11/2016 6 4 6 0 - 8 5 g/dL Final   04/28/2016 6 4 6 0 - 8 5 g/dL Final     Albumin   Date Value Ref Range Status   01/31/2022 3 5 3 5 - 5 0 g/dL Final   08/17/2021 3 6 3 5 - 5 0 g/dL Final   06/21/2021 3 7 3 5 - 5 0 g/dL Final   05/29/2017 4 2 g/dL Final     Comment:     Result Comment: No patient age and/or gender provided                              Age                Male          Female                           0 -  2 years       3 4 - 4 2      3 4 - 4 2                           3 - 59 years       3 5 - 5 5      3 5 - 5 5                          60 - 69 years       3 6 - 4 8      3 6 - 4 8                          70 - 79 years       3 5 - 4 8      3 5 - 4 8                          80 - 89 years       3 5 - 4 7      3 5 - 4 7                              >89 years       3 2 - 4 6      3 2 - 4 6     11/11/2016 4 2 3 6 - 4 8 g/dL Final   04/28/2016 4 4 3 6 - 4 8 g/dL Final     Total Bilirubin   Date Value Ref Range Status   01/31/2022 0 64 0 20 - 1 00 mg/dL Final     Comment:     Use of this assay is not recommended for patients undergoing treatment with eltrombopag due to the potential for falsely elevated results  08/17/2021 0 90 0 20 - 1 00 mg/dL Final     Comment:     Use of this assay is not recommended for patients undergoing treatment with eltrombopag due to the potential for falsely elevated results     06/21/2021 0 78 0 20 - 1 00 mg/dL Final     Comment:     Use of this assay is not recommended for patients undergoing treatment with eltrombopag due to the potential for falsely elevated results  Alkaline Phosphatase   Date Value Ref Range Status   01/31/2022 87 46 - 116 U/L Final   08/17/2021 69 46 - 116 U/L Final   06/21/2021 82 46 - 116 U/L Final   05/29/2017 62 IU/L Final     Comment:     Result Comment: No patient age and/or gender provided                              Age                Male          Female                           0 -  1 day          45 - 111       45 - 111                           2 -  5 days         55 - 119       55 - 119                           6 - 10 days         50 - 229       50 - 229                          6 - 30 days         61 - 414       61 - 414                           1 -  6 months       91 - 445       91 - 445                           7 - 12 months      124 - 341      124 - 341                           1 -  3 years       130 - 317      130 - 317                           4 -  6 years       133 - 309      133 - 309                           7 - 12 years       134 - 349      134 - 349                               13 years       143 - 396       76 - 209                               14 years       80 - 340       58 - 149                               15 years        80 - 254       47 - 121                               16 years        70 - 186       52 - 108                               17 years        64 - 146       39 - 101                               18 years        64 - 127       37 - 101                              >18 years        44 - 117       44 - 117     11/11/2016 65 39 - 117 IU/L Final   04/28/2016 65 39 - 117 IU/L Final     AST   Date Value Ref Range Status   01/31/2022 19 5 - 45 U/L Final     Comment:     Specimen collection should occur prior to Sulfasalazine administration due to the potential for falsely depressed results      08/17/2021 19 5 - 45 U/L Final     Comment:     Specimen collection should occur prior to Sulfasalazine administration due to the potential for falsely depressed results  06/21/2021 20 5 - 45 U/L Final     Comment:     Specimen collection should occur prior to Sulfasalazine administration due to the potential for falsely depressed results  05/29/2017 23 0 - 40 IU/L Final   11/11/2016 18 0 - 40 IU/L Final   04/28/2016 16 0 - 40 IU/L Final     ALT   Date Value Ref Range Status   01/31/2022 27 12 - 78 U/L Final     Comment:     Specimen collection should occur prior to Sulfasalazine administration due to the potential for falsely depressed results  08/17/2021 28 12 - 78 U/L Final     Comment:     Specimen collection should occur prior to Sulfasalazine administration due to the potential for falsely depressed results  06/21/2021 31 12 - 78 U/L Final     Comment:     Specimen collection should occur prior to Sulfasalazine administration due to the potential for falsely depressed results      05/29/2017 21 IU/L Final     Comment:     Result Comment: No patient age and/or gender provided                              Age                Male          Female                           0 - 11 years         0 - 34         0 - 28                          17 - 17 years         0 - 27         0 - 24                              >17 years         0 - 40         0 - 28  Performed at: [facility] , , [place], [place], [id number], [id number]  MD:  Rio Montanez  [address] [id number]     11/11/2016 18 0 - 44 IU/L Final     Comment:     Performed at: [facility], , [place], [place], [id number], [id number]  MD:  Rio Montanez  [address] [id number]     04/28/2016 16 0 - 44 IU/L Final     Comment:     Performed at: [facility], , [place], [place], [id number], [id number]  MD:  [id number] [facility] [id number]        LD   Date Value Ref Range Status   01/31/2022 154 81 - 234 U/L Final   06/21/2021 125 81 - 234 U/L Final   06/17/2020 129 81 - 234 U/L Final     No results found for: TSH  No results found for: [id number]   Free T4 Date Value Ref Range Status   08/17/2021 1 30 0 76 - 1 46 ng/dL Final     Comment:     Specimen collection should occur prior to Sulfasalazine administration due to the potential for falsely elevated results  08/17/2021 1 33 0 76 - 1 46 ng/dL Final     Comment:     Specimen collection should occur prior to Sulfasalazine administration due to the potential for falsely elevated results  03/29/2021 1 61 (H) 0 76 - 1 46 ng/dL Final     Comment:     Specimen collection should occur prior to Sulfasalazine administration due to the potential for falsely elevated results  RECENT IMAGING:  Procedure: CT chest abdomen pelvis w contrast    Result Date: 2/3/2022  Narrative: CT CHEST, ABDOMEN AND PELVIS WITH IV CONTRAST INDICATION:   Z85 850: Personal history of malignant neoplasm of thyroid Z85 820: Personal history of malignant melanoma of skin  History of thyroid cancer and melanoma COMPARISON:  6/21/2021; 12/21/2020; 6/19/2020; 5/9/2019 TECHNIQUE: CT examination of the chest, abdomen and pelvis was performed  Axial, sagittal, and coronal 2D reformatted images were created from the source data and submitted for interpretation  Radiation dose length product (DLP) for this visit:  1706 mGy-cm   This examination, like all CT scans performed in the [facility], was performed utilizing techniques to minimize radiation dose exposure, including the use of iterative reconstruction and automated exposure control  IV Contrast:  100 mL of iohexol (OMNIPAQUE) Enteric Contrast: Enteric contrast was administered  FINDINGS: CHEST LUNGS:  Tiny 2 mm left apical nodule, image 9, series 5, stable  There is no tracheal or endobronchial lesion  PLEURA:  Unremarkable  HEART/GREAT VESSELS: Heart is unremarkable for patient's age  No thoracic aortic aneurysm  MEDIASTINUM AND JG:  Unremarkable  CHEST WALL AND LOWER NECK:   Unremarkable   ABDOMEN LIVER/BILIARY TREE:  Circumscribed hepatic hypodensity is stable in the left lobe as compared to June although somewhat larger than the study of 2019  Currently the lesion measures 1 3 cm, 1 2 cm in Nikki and approximately 0 9 cm in 2019 and measures fluid attenuation  One or 2 other tiny hypodensities are also well-circumscribed and stable  GALLBLADDER:  No calcified gallstones  No pericholecystic inflammatory change  SPLEEN:  Unremarkable  PANCREAS:  Unremarkable  ADRENAL GLANDS:  Unremarkable  KIDNEYS/URETERS:  No hydronephrosis  A few circumscribed hypodensities in the kidneys probably represent cysts although some are too small to characterize  STOMACH AND BOWEL:  Unremarkable  APPENDIX:  No findings to suggest appendicitis  ABDOMINOPELVIC CAVITY:  No ascites  No pneumoperitoneum  No lymphadenopathy  A few small periceliac nodes are stable  VESSELS:  Unremarkable for patient's age  PELVIS REPRODUCTIVE ORGANS:  The prostate is prominent  URINARY BLADDER:  Unremarkable  ABDOMINAL WALL/INGUINAL REGIONS:  Periumbilical hernia of fat is identified  Left inguinal hernia of fat is identified  OSSEOUS STRUCTURES:  No acute fracture or destructive osseous lesion  Stable sclerotic lesion in the posterior acetabulum on the right is likely represents a bone island  Impression: No evidence of metastatic disease/adenopathy  Workstation performed: [id number]     Procedure: US head neck lymph node mapping    Result Date: 2/3/2022  Narrative: NECK ULTRASOUND INDICATION:     C73: Malignant neoplasm of thyroid gland  History of thyroid carcinoma with left hemithyroidectomy 3 years ago; history of negative biopsy on 1/14/2021 COMPARISON:  1/14/2021; 11/27/2020; 7/24/2020 FINDINGS: Ultrasound of the thyroidectomy bed and cervical lymph node chains was performed with a high frequency linear transducer  Small thyroid gland is noted on the right  There is a nodule within the right thyroid which was previously biopsied which measures 0 9 x 0 5 x 0 8 cm ,  Previously 1 1 x 0 6 x 0 8 cm   In the left thyroid bed there is a level 3 node with an echogenic center present  There is no suspicion of recurrent mass in the thyroidectomy bed on the left  Lymph nodes maintain normal morphologic contour, echogenicity and short axis dimensions of less than 0 7 cm  No evidence for microcalcification or focal nodularity  Impression: No evidence of recurrent or metastatic disease  Suggest continued surveillance as per oncologic protocol  Workstation performed: [id number]     Procedure: US extremity soft tissue    Result Date: 2/3/2022  Narrative: RIGHT AXILLA ULTRASOUND INDICATION:   Z85 820: Personal history of malignant melanoma of skin M79 89: Other specified soft tissue disorders  History of malignant melanoma COMPARISON:  Ultrasound from 7/20/2021 TECHNIQUE:   Real-time ultrasound of the right axilla was performed with a linear transducer with both volumetric sweeps and still imaging techniques  FINDINGS: There is a normal morphologic lymph node in the right axilla measuring 1 7 x 0 8 x 2 1 cm, previously 1 4 x 0 9 x 2 cm without significant change in size or morphology  Comparison left axilla shows a similar node which has a similar appearance  to the study of 7/20/2021  Impression: Normal axillary node on the right without significant change Workstation performed: [id number]             Assessment/Plan  Mr María Rothman is a 81 yr male with Stage IIIA melanoma on surveillance here for transfer of care an follow wp  1  Personal history of malignant melanoma  2  Malignant melanoma, unspecified site ([facility] [address] )  1  Melanoma: I had an extensive discussion with the patient regarding his  diagnosis, prognosis, and recommendations for further management  We reviewed his melanoma history, current findings, pathology and imaging tests  We discussed that he has stage IIIA melanoma and has been followed with close surveillance  He is almost 4 years out from his diagnosis    We discussed that he will require ongoing monitoring and surveillance, both for disease recurrence as well as a new primary melanoma as well as other nonmelanoma skin cancers  This includes physical exams  and labs, including a comprehensive metabolic panel, CBC with differential and LDH; periodic imaging studies with either CT chest, abdomen and pelvis or PET/CT scans every 6 months until 5 years our from diagnosis  After that, he will have yearly physical exams and blood work, but no additional scans unless clinically indicated  He has not had recent brain imaging so we will get a MRI brain at this time  Orders placed and scripts provided  We discussed the importance of regular cutaneous self examinations and reviewed the features of lesions that could be concerning for a primary melanoma  I did explain that he  is at risk for developing another primary melanoma as well  We also discussed avoidance of unnecessary sun exposure and use of sun protective clothing and sunscreen  I also recommended routine follow up and skin checks with dermatology  Family Screening: his  first-degree relatives, namely his siblings, parents, and children, have an increased risk of developing a melanoma over the general population given his  diagnosis of melanoma, and should have annual dermatologic screening  He will follow-up in 6 months with repeat imaging and blood work  All orders placed in scripts provided  - MRI brain w wo contrast; Future  - CBC and differential; Future  - Comprehensive metabolic panel; Future  - LD,Blood; Future    3  History of thyroid cancer  He will continue to follow with endocrinology  Has periodically imaging  Surveillance imaging for melanoma will also monitor thyroid cancer recurrence or metastasis  Mr Mario Hull had all his questions and concerns addressed and he knows to call with any additional issues  Thank you for allowing me to participate in   Lord Moreno kaylan Sibley MD, PhD

## 2022-03-15 DIAGNOSIS — I10 ESSENTIAL HYPERTENSION: ICD-10-CM

## 2022-03-15 RX ORDER — ATENOLOL 50 MG/1
TABLET ORAL
Qty: 90 TABLET | Refills: 1 | Status: SHIPPED | OUTPATIENT
Start: 2022-03-15

## 2022-03-18 ENCOUNTER — RA CDI HCC (OUTPATIENT)
Dept: OTHER | Facility: HOSPITAL | Age: 72
End: 2022-03-18

## 2022-03-18 NOTE — PROGRESS NOTES
[facility]  coding opportunities       Chart reviewed, no opportunity found:   [address]        Patients Insurance     Medicare Insurance: [employer]

## 2022-03-21 ENCOUNTER — OFFICE VISIT (OUTPATIENT)
Dept: ENDOCRINOLOGY | Facility: CLINIC | Age: 72
End: 2022-03-21
Payer: MEDICARE

## 2022-03-21 VITALS
HEART RATE: 54 BPM | WEIGHT: 215 LBS | BODY MASS INDEX: 30.1 KG/M2 | SYSTOLIC BLOOD PRESSURE: 120 MMHG | DIASTOLIC BLOOD PRESSURE: 68 MMHG | HEIGHT: 71 IN | TEMPERATURE: 97 F

## 2022-03-21 DIAGNOSIS — C73 THYROID CANCER (HCC): ICD-10-CM

## 2022-03-21 DIAGNOSIS — C73 FOLLICULAR THYROID CANCER (HCC): ICD-10-CM

## 2022-03-21 DIAGNOSIS — N52.9 ERECTILE DYSFUNCTION, UNSPECIFIED ERECTILE DYSFUNCTION TYPE: ICD-10-CM

## 2022-03-21 DIAGNOSIS — C73 PAPILLARY THYROID CARCINOMA (HCC): Primary | ICD-10-CM

## 2022-03-21 DIAGNOSIS — E55.9 VITAMIN D DEFICIENCY: ICD-10-CM

## 2022-03-21 PROCEDURE — 99214 OFFICE O/P EST MOD 30 MIN: CPT | Performed by: INTERNAL MEDICINE

## 2022-03-21 NOTE — PATIENT INSTRUCTIONS
Continue current dose of levothyroxine   Please have labs done as ordered before 10:00 a m  in the morning   Follow-up in 6 months

## 2022-03-21 NOTE — PROGRESS NOTES
Louann Landon 81 y o  male MRN: [id number]    Encounter: [id number]      Assessment/Plan     1  The carcinoma, minimally invasive  2  Papillary carcinoma, follicular variant, infiltrative  3  Thyroid nodule   Status post hemithyroidectomy   Continue current dose of levothyroxine   Check TSH, free T4, TG, TG antibody   -repeat surveillance neck ultrasound, to follow right-sided thyroid nodule around 02/2023     4  History of vitamin-D deficiency-continue supplementation, check levels  5  Erectile dysfunction-will check total, free a m  testosterone levels    CC:  Thyroid carcinoma    History of Present Illness     HPI:  Louann Landon is a 81 y o  male who is here for a follow-up of thyroid carcinoma    Last seen in 09/2021    Per my prior notes  "Incidentally found thyroid nodules during workup and management of malignant melanoma     FNA left thyroid nodule- AUS, affirma suspicious  BRAF, V600E - negative; RET/PTC - not detected  S/p left hemithyroidectomy on 04/24/2019     Final pathology  1)1 8 cm papillary carcinoma, follicular variant, infiltrative  4) 3 8 cm follicular carcinoma, minimally invasive  No lymph nodes submitted or found   Multinodular hyperplasia  Immuno stains positive for HBME-1 and CK19     Completion thyroidectomy was held off given complications of vocal cord paralysis and concerns of the risk of contralateral nerve being damaged if he were to go for total thyroidectomy"      FNA right mid pole thyroid nodule 1/14/2021-benign  US neck 11/2020 - no evidence of recurrent or metastatic disease "     Ultrasound neck 02/2022-no evidence of recurrent or metastatic disease  Stable right-sided thyroid nodule 0 9 X 0 5 X 0 8 cm   Left thyroid bed-level 3 node with an echogenic center present       Levothyroxine 175 mcg on 6 days of the week, 1/2 pill on the 7th day   Compliant, takes on an empty stomach     Occasional fatigue   With it becoming warmer, more energetic  No other symptoms of hypo or hyperthyroidism   Has ED , feels it is worsening  difficulty in maintaining  Libido is good     Taking D3 2000 IU daily      All other systems were reviewed and are negative  Review of Systems    Historical Information   Past Medical History:   Diagnosis Date    Arthritis     Cancer ([address] )     melanoma abdomen    GERD (gastroesophageal reflux disease)     diet controlled    Hyperlipidemia     Last Assessed:10/20/14    Hypertension     PONV (postoperative nausea and vomiting)      Past Surgical History:   Procedure Laterality Date    COLONOSCOPY      HERNIA REPAIR Right     Last Assessed:10/20/14 inguinal     LYMPH NODE BIOPSY N/A 12/14/2018    Procedure: SENTINEL LYMPH NODE BIOPSY; LYMPHOSCINTIGRAPHY; INTRAOPERATIVE LYMPHATIC MAPPING (INJECT AT 1100);   Surgeon: Cristal Hancock MD;  [facility];  Service: Surgical Oncology    SKIN LESION EXCISION N/A 12/14/2018    Procedure: MEDIAL UPPER ABDOMEN MELANOMA WIDE EXCISION; BILATERAL AXILLA SLN;  Surgeon: Cristal Hancock MD;  [facility];  Service: Surgical Oncology    THYROID LOBECTOMY Left 4/24/2019    Procedure: HEMITHYROIDECTOMY ISTHMUSECTOMY;  Surgeon: Cristal Hancock MD;  [facility];  Service: Surgical Oncology    TONSILLECTOMY      US GUIDED THYROID BIOPSY  2/7/2019    US GUIDED THYROID BIOPSY  1/14/2021    WISDOM TOOTH EXTRACTION       Social History   Social History     Substance and Sexual Activity   Alcohol Use Yes    Alcohol/week: 2 0 standard drinks    Types: 1 Glasses of wine, 1 Cans of beer per week    Comment: rarely     Social History     Substance and Sexual Activity   Drug Use No     Social History     Tobacco Use   Smoking Status Never Smoker   Smokeless Tobacco Never Used     Family History:   Family History   Problem Relation Age of Onset    Hypertension Mother     Hypothyroidism Mother     Hypertension Father     Skin cancer Father     Colon cancer Maternal Aunt     Colon cancer Paternal Aunt 62s    Skin cancer Paternal Uncle        Meds/Allergies   Current Outpatient Medications   Medication Sig Dispense Refill    aspirin 81 MG tablet Take 81 mg by mouth daily       atenolol (TENORMIN) 50 mg tablet TAKE ONE TABLET BY MOUTH EVERY DAY 90 tablet 1    Calcium-Magnesium-Vitamin D (CALCIUM 1200+D3 PO)       cholecalciferol (VITAMIN D3) 1,000 units tablet Take 1,000 Units by mouth daily      levothyroxine 175 mcg tablet Take one tablet (175 mcg) daily on an empty stomach 90 tablet 2    lovastatin (MEVACOR) 40 MG tablet TAKE ONE TABLET BY MOUTH EVERY DAY AT BEDTIME 90 tablet 2    multivitamin (THERAGRAN) TABS Take 1 tablet by mouth daily at bedtime       omeprazole (PriLOSEC) 20 mg delayed release capsule Take 1 capsule (20 mg total) by mouth daily before breakfast (Patient taking differently: Take 20 mg by mouth as needed ) 30 capsule 5    sildenafil (VIAGRA) 50 MG tablet TAKE ONE TABLET BY MOUTH EVERY DAY AS NEEDED FOR ERECTILE DYSFUNCTION 30 tablet 3     No current facility-administered medications for this visit  No Known Allergies    Objective   Vitals: Blood pressure 120/68, pulse (!) 54, temperature (!) 97 °F (36 1 °C), height 5' 11" (1 803 m), weight 97 5 kg (215 lb)  Physical Exam  Constitutional:       General: He is not in acute distress  Appearance: He is well-developed  He is not diaphoretic  HENT:      Head: Normocephalic and atraumatic  Eyes:      Conjunctiva/sclera: Conjunctivae normal       Pupils: Pupils are equal, round, and reactive to light  Neck:      Comments: Well healed anterior scare  No thyromegaly, nontender, no nodules appreciated on palpation    Cardiovascular:      Rate and Rhythm: Normal rate and regular rhythm  Heart sounds: Normal heart sounds  No murmur heard  Pulmonary:      Effort: Pulmonary effort is normal  No respiratory distress  Breath sounds: Normal breath sounds  No wheezing     Abdominal:      General: There is no distension  Palpations: Abdomen is soft  Tenderness: There is no abdominal tenderness  There is no guarding  Musculoskeletal:      Cervical back: Normal range of motion and neck supple  Lymphadenopathy:      Cervical: No cervical adenopathy  Skin:     General: Skin is warm and dry  Findings: No erythema or rash  Neurological:      Mental Status: He is alert and oriented to person, place, and time  Deep Tendon Reflexes: Reflexes normal       Comments: No tremors on the outstretched arms     Psychiatric:         Behavior: Behavior normal          Thought Content: Thought content normal          The history was obtained from the review of the chart, patient  Lab Results:   Lab Results   Component Value Date/Time    TSH 3RD GENERATON 0 010 (L) 08/17/2021 06:55 AM    TSH 3RD GENERATON <0 007 (L) 03/29/2021 08:42 AM    Free T4 1 30 08/17/2021 06:55 AM    Free T4 1 33 08/17/2021 06:55 AM    Free T4 1 61 (H) 03/29/2021 08:42 AM     Lab Results   Component Value Date    WBC 5 93 01/31/2022    HGB 15 1 01/31/2022    HCT 44 7 01/31/2022    MCV 90 01/31/2022     01/31/2022     Lab Results   Component Value Date    CREATININE 1 00 01/31/2022    BUN 16 01/31/2022     05/29/2017    K 4 3 01/31/2022     01/31/2022    CO2 32 01/31/2022     Lab Results   Component Value Date    HGBA1C 5 1 02/06/2021         Imaging Studies:   Results for orders placed during the hospital encounter of 07/24/20    US thyroid    Impression  Stable 1 2 cm right thyroid lobe nodule  Status post left hemithyroidectomy  Recommend continued surveillance according to the patient's oncologic imaging protocol  Reference: ACR Thyroid Imaging, Reporting and Data System (TI-RADS): White Paper of the [employer]   J AM Nikki Radiol 4435;11:282-278  (additional recommendations based on American Thyroid Association 2015 guidelines )    Workstation performed: [id number]      I have personally reviewed pertinent reports  Portions of the record may have been created with voice recognition software  Occasional wrong word or "sound a like" substitutions may have occurred due to the inherent limitations of voice recognition software  Read the chart carefully and recognize, using context, where substitutions have occurred

## 2022-03-28 ENCOUNTER — APPOINTMENT (OUTPATIENT)
Dept: LAB | Facility: HOSPITAL | Age: 72
End: 2022-03-28
Payer: MEDICARE

## 2022-03-28 DIAGNOSIS — C73 FOLLICULAR THYROID CANCER (HCC): ICD-10-CM

## 2022-03-28 DIAGNOSIS — R73.01 IMPAIRED FASTING GLUCOSE: ICD-10-CM

## 2022-03-28 DIAGNOSIS — C73 PAPILLARY THYROID CARCINOMA (HCC): ICD-10-CM

## 2022-03-28 DIAGNOSIS — E55.9 VITAMIN D DEFICIENCY: ICD-10-CM

## 2022-03-28 DIAGNOSIS — N40.0 BENIGN PROSTATIC HYPERPLASIA WITHOUT LOWER URINARY TRACT SYMPTOMS: ICD-10-CM

## 2022-03-28 DIAGNOSIS — E89.0 POSTOPERATIVE HYPOTHYROIDISM: ICD-10-CM

## 2022-03-28 DIAGNOSIS — C73 THYROID CANCER (HCC): ICD-10-CM

## 2022-03-28 DIAGNOSIS — Z85.850 HISTORY OF THYROID CANCER: ICD-10-CM

## 2022-03-28 DIAGNOSIS — I10 BENIGN ESSENTIAL HYPERTENSION: ICD-10-CM

## 2022-03-28 LAB
25(OH)D3 SERPL-MCNC: 30.3 NG/ML (ref 30–100)
ALBUMIN SERPL BCP-MCNC: 3.7 G/DL (ref 3.5–5)
ALP SERPL-CCNC: 72 U/L (ref 46–116)
ALT SERPL W P-5'-P-CCNC: 31 U/L (ref 12–78)
ANION GAP SERPL CALCULATED.3IONS-SCNC: 6 MMOL/L (ref 4–13)
AST SERPL W P-5'-P-CCNC: 24 U/L (ref 5–45)
BASOPHILS # BLD AUTO: 0.04 THOUSANDS/ΜL (ref 0–0.1)
BASOPHILS NFR BLD AUTO: 1 % (ref 0–1)
BILIRUB SERPL-MCNC: 0.9 MG/DL (ref 0.2–1)
BUN SERPL-MCNC: 15 MG/DL (ref 5–25)
CALCIUM SERPL-MCNC: 9.1 MG/DL (ref 8.3–10.1)
CHLORIDE SERPL-SCNC: 103 MMOL/L (ref 100–108)
CO2 SERPL-SCNC: 31 MMOL/L (ref 21–32)
CREAT SERPL-MCNC: 1.08 MG/DL (ref 0.6–1.3)
EOSINOPHIL # BLD AUTO: 0.06 THOUSAND/ΜL (ref 0–0.61)
EOSINOPHIL NFR BLD AUTO: 1 % (ref 0–6)
ERYTHROCYTE [DISTWIDTH] IN BLOOD BY AUTOMATED COUNT: 13.1 % (ref 11.6–15.1)
EST. AVERAGE GLUCOSE BLD GHB EST-MCNC: 103 MG/DL
GFR SERPL CREATININE-BSD FRML MDRD: 69 ML/MIN/1.73SQ M
GLUCOSE P FAST SERPL-MCNC: 95 MG/DL (ref 65–99)
HBA1C MFR BLD: 5.2 %
HCT VFR BLD AUTO: 48.3 % (ref 36.5–49.3)
HGB BLD-MCNC: 16.3 G/DL (ref 12–17)
IMM GRANULOCYTES # BLD AUTO: 0.02 THOUSAND/UL (ref 0–0.2)
IMM GRANULOCYTES NFR BLD AUTO: 0 % (ref 0–2)
LYMPHOCYTES # BLD AUTO: 1.08 THOUSANDS/ΜL (ref 0.6–4.47)
LYMPHOCYTES NFR BLD AUTO: 16 % (ref 14–44)
MCH RBC QN AUTO: 30.6 PG (ref 26.8–34.3)
MCHC RBC AUTO-ENTMCNC: 33.7 G/DL (ref 31.4–37.4)
MCV RBC AUTO: 91 FL (ref 82–98)
MONOCYTES # BLD AUTO: 0.52 THOUSAND/ΜL (ref 0.17–1.22)
MONOCYTES NFR BLD AUTO: 8 % (ref 4–12)
NEUTROPHILS # BLD AUTO: 5.02 THOUSANDS/ΜL (ref 1.85–7.62)
NEUTS SEG NFR BLD AUTO: 74 % (ref 43–75)
NRBC BLD AUTO-RTO: 0 /100 WBCS
PLATELET # BLD AUTO: 170 THOUSANDS/UL (ref 149–390)
PMV BLD AUTO: 11.3 FL (ref 8.9–12.7)
POTASSIUM SERPL-SCNC: 3.9 MMOL/L (ref 3.5–5.3)
PROT SERPL-MCNC: 7 G/DL (ref 6.4–8.2)
RBC # BLD AUTO: 5.32 MILLION/UL (ref 3.88–5.62)
SODIUM SERPL-SCNC: 140 MMOL/L (ref 136–145)
T4 FREE SERPL-MCNC: 1.32 NG/DL (ref 0.76–1.46)
TSH SERPL DL<=0.05 MIU/L-ACNC: 0.03 UIU/ML (ref 0.36–3.74)
WBC # BLD AUTO: 6.74 THOUSAND/UL (ref 4.31–10.16)

## 2022-03-28 PROCEDURE — 86800 THYROGLOBULIN ANTIBODY: CPT

## 2022-03-28 PROCEDURE — 83036 HEMOGLOBIN GLYCOSYLATED A1C: CPT

## 2022-03-28 PROCEDURE — 80053 COMPREHEN METABOLIC PANEL: CPT

## 2022-03-28 PROCEDURE — 84402 ASSAY OF FREE TESTOSTERONE: CPT

## 2022-03-28 PROCEDURE — 82306 VITAMIN D 25 HYDROXY: CPT

## 2022-03-28 PROCEDURE — 36415 COLL VENOUS BLD VENIPUNCTURE: CPT

## 2022-03-28 PROCEDURE — 85025 COMPLETE CBC W/AUTO DIFF WBC: CPT

## 2022-03-28 PROCEDURE — 84403 ASSAY OF TOTAL TESTOSTERONE: CPT

## 2022-03-28 PROCEDURE — 84439 ASSAY OF FREE THYROXINE: CPT

## 2022-03-28 PROCEDURE — 84443 ASSAY THYROID STIM HORMONE: CPT

## 2022-03-28 PROCEDURE — 84432 ASSAY OF THYROGLOBULIN: CPT

## 2022-03-29 LAB
TESTOST FREE SERPL-MCNC: 15.1 PG/ML (ref 6.6–18.1)
TESTOST SERPL-MCNC: 284 NG/DL (ref 264–916)
THYROGLOB AB SERPL-ACNC: <1 IU/ML (ref 0–0.9)
THYROGLOB SERPL-MCNC: 0.7 NG/ML (ref 1.4–29.2)

## 2022-03-30 ENCOUNTER — OFFICE VISIT (OUTPATIENT)
Dept: INTERNAL MEDICINE CLINIC | Facility: CLINIC | Age: 72
End: 2022-03-30
Payer: MEDICARE

## 2022-03-30 VITALS
DIASTOLIC BLOOD PRESSURE: 72 MMHG | WEIGHT: 217.6 LBS | SYSTOLIC BLOOD PRESSURE: 128 MMHG | BODY MASS INDEX: 30.46 KG/M2 | HEART RATE: 66 BPM | TEMPERATURE: 97.8 F | HEIGHT: 71 IN | OXYGEN SATURATION: 96 % | RESPIRATION RATE: 18 BRPM

## 2022-03-30 DIAGNOSIS — K42.9 UMBILICAL HERNIA WITHOUT OBSTRUCTION AND WITHOUT GANGRENE: ICD-10-CM

## 2022-03-30 DIAGNOSIS — K21.9 GERD WITHOUT ESOPHAGITIS: Primary | ICD-10-CM

## 2022-03-30 DIAGNOSIS — I10 BENIGN ESSENTIAL HYPERTENSION: ICD-10-CM

## 2022-03-30 DIAGNOSIS — Z85.820 PERSONAL HISTORY OF MALIGNANT MELANOMA: ICD-10-CM

## 2022-03-30 DIAGNOSIS — E78.2 MIXED HYPERLIPIDEMIA: ICD-10-CM

## 2022-03-30 DIAGNOSIS — C73 FOLLICULAR THYROID CANCER (HCC): ICD-10-CM

## 2022-03-30 DIAGNOSIS — R73.01 IMPAIRED FASTING GLUCOSE: ICD-10-CM

## 2022-03-30 DIAGNOSIS — E89.0 POSTOPERATIVE HYPOTHYROIDISM: ICD-10-CM

## 2022-03-30 PROCEDURE — G0439 PPPS, SUBSEQ VISIT: HCPCS | Performed by: INTERNAL MEDICINE

## 2022-03-30 PROCEDURE — 99214 OFFICE O/P EST MOD 30 MIN: CPT | Performed by: INTERNAL MEDICINE

## 2022-03-30 PROCEDURE — 1123F ACP DISCUSS/DSCN MKR DOCD: CPT | Performed by: INTERNAL MEDICINE

## 2022-03-30 NOTE — PROGRESS NOTES
Assessment/Plan:    Diagnoses and all orders for this visit:    GERD without esophagitis    Follicular thyroid cancer ([facility] [address] )    Impaired fasting glucose  -     Hemoglobin A1C; Future    Postoperative hypothyroidism    Benign essential hypertension    Mixed hyperlipidemia  -     CBC and differential; Future  -     Comprehensive metabolic panel; Future  -     Lipid panel; Future    Personal history of malignant melanoma    Umbilical hernia without obstruction and without gangrene  -     Ambulatory Referral to General Surgery; Future              Patient Instructions     Lab data reviewed in detail and compared prior    Hypertension hyperlipidemia stable on present regimen    History of thyroid carcinoma followed by endocrinology, stable on levothyroxine    GERD stable on omeprazole    History of melanoma-following closely with Dermatology    ED-continue with Viagra, consider increasing dose to 403 mg    Umbilical hernia-referral back to surgery    Routine follow-up after labs in 6 months, sooner as needed  Medicare Preventive Visit Patient Instructions  Thank you for completing your Welcome to Medicare Visit or Medicare Annual Wellness Visit today  Your next wellness visit will be due in one year (3/31/2023)  The screening/preventive services that you may require over the next 5-10 years are detailed below  Some tests may not apply to you based off risk factors and/or age  Screening tests ordered at today's visit but not completed yet may show as past due  Also, please note that scanned in results may not display below    Preventive Screenings:  Service Recommendations Previous Testing/Comments   Colorectal Cancer Screening  · Colonoscopy    · Fecal Occult Blood Test (FOBT)/Fecal Immunochemical Test (FIT)  · Fecal DNA/Cologuard Test  · Flexible Sigmoidoscopy Age: 54-65 years old   Colonoscopy: every 10 years (May be performed more frequently if at higher risk)  OR  FOBT/FIT: every 1 year  OR  Cologuard: every 3 years  OR  Sigmoidoscopy: every 5 years  Screening may be recommended earlier than age 48 if at higher risk for colorectal cancer  Also, an individualized decision between you and your healthcare provider will decide whether screening between the ages of 74-80 would be appropriate  Colonoscopy: 11/09/2016  FOBT/FIT: Not on file  Cologuard: Not on file  Sigmoidoscopy: Not on file    Screening Current     Prostate Cancer Screening Individualized decision between patient and health care provider in men between ages of 53-78   Medicare will cover every 12 months beginning on the day after your 50th birthday PSA: 0 4 ng/mL     Screening Current     Hepatitis C Screening Once for adults born between 1945 and 1965  More frequently in patients at high risk for Hepatitis C Hep C Antibody: 12/21/2018    Screening Current   Diabetes Screening 1-2 times per year if you're at risk for diabetes or have pre-diabetes Fasting glucose: 95 mg/dL   A1C: 5 2 %    Screening Current   Cholesterol Screening Once every 5 years if you don't have a lipid disorder  May order more often based on risk factors  Lipid panel: 08/17/2021    Screening Not Indicated  History Lipid Disorder      Other Preventive Screenings Covered by Medicare:  1  Abdominal Aortic Aneurysm (AAA) Screening: covered once if your at risk  You're considered to be at risk if you have a family history of AAA or a male between the age of 73-68 who smoking at least 100 cigarettes in your lifetime  2  Lung Cancer Screening: covers low dose CT scan once per year if you meet all of the following conditions: (1) Age 50-69; (2) No signs or symptoms of lung cancer; (3) Current smoker or have quit smoking within the last 15 years; (4) You have a tobacco smoking history of at least 30 pack years (packs per day x number of years you smoked); (5) You get a written order from a healthcare provider    3  Glaucoma Screening: covered annually if you're considered high risk: (1) You have diabetes OR (2) Family history of glaucoma OR (3)  aged 48 and older OR (4)  American aged 72 and older  3  Osteoporosis Screening: covered every 2 years if you meet one of the following conditions: (1) Have a vertebral abnormality; (2) On glucocorticoid therapy for more than 3 months; (3) Have primary hyperparathyroidism; (4) On osteoporosis medications and need to assess response to drug therapy  5  HIV Screening: covered annually if you're between the age of 12-76  Also covered annually if you are younger than 13 and older than 72 with risk factors for HIV infection  For pregnant patients, it is covered up to 3 times per pregnancy  Immunizations:  Immunization Recommendations   Influenza Vaccine Annual influenza vaccination during flu season is recommended for all persons aged >= 6 months who do not have contraindications   Pneumococcal Vaccine (Prevnar and Pneumovax)  * Prevnar = PCV13  * Pneumovax = PPSV23 Adults 25-60 years old: 1-3 doses may be recommended based on certain risk factors  Adults 72 years old: Prevnar (PCV13) vaccine recommended followed by Pneumovax (PPSV23) vaccine  If already received PPSV23 since turning 65, then PCV13 recommended at least one year after PPSV23 dose  Hepatitis B Vaccine 3 dose series if at intermediate or high risk (ex: diabetes, end stage renal disease, liver disease)   Tetanus (Td) Vaccine - COST NOT COVERED BY MEDICARE PART B Following completion of primary series, a booster dose should be given every 10 years to maintain immunity against tetanus  Td may also be given as tetanus wound prophylaxis  Tdap Vaccine - COST NOT COVERED BY MEDICARE PART B Recommended at least once for all adults  For pregnant patients, recommended with each pregnancy     Shingles Vaccine (Shingrix) - COST NOT COVERED BY MEDICARE PART B  2 shot series recommended in those aged 48 and above     Health Maintenance Due:      Topic Date Due    Colorectal Cancer Screening  11/09/2026    Hepatitis C Screening  Completed     Immunizations Due:      Topic Date Due    COVID-19 Vaccine (1) Never done    Influenza Vaccine (1) 09/01/2021     Advance Directives   What are advance directives? Advance directives are legal documents that state your wishes and plans for medical care  These plans are made ahead of time in case you lose your ability to make decisions for yourself  Advance directives can apply to any medical decision, such as the treatments you want, and if you want to donate organs  What are the types of advance directives? There are many types of advance directives, and each state has rules about how to use them  You may choose a combination of any of the following:  · Living will: This is a written record of the treatment you want  You can also choose which treatments you do not want, which to limit, and which to stop at a certain time  This includes surgery, medicine, IV fluid, and tube feedings  · Durable power of  for healthcare [facility]): This is a written record that states who you want to make healthcare choices for you when you are unable to make them for yourself  This person, called a proxy, is usually a family member or a friend  You may choose more than 1 proxy  · Do not resuscitate (DNR) order:  A DNR order is used in case your heart stops beating or you stop breathing  It is a request not to have certain forms of treatment, such as CPR  A DNR order may be included in other types of advance directives  · Medical directive: This covers the care that you want if you are in a coma, near death, or unable to make decisions for yourself  You can list the treatments you want for each condition  Treatment may include pain medicine, surgery, blood transfusions, dialysis, IV or tube feedings, and a ventilator (breathing machine)  · Values history: This document has questions about your views, beliefs, and how you feel and think about life   This information can help others choose the care that you would choose  Why are advance directives important? An advance directive helps you control your care  Although spoken wishes may be used, it is better to have your wishes written down  Spoken wishes can be misunderstood, or not followed  Treatments may be given even if you do not want them  An advance directive may make it easier for your family to make difficult choices about your care  Weight Management   Why it is important to manage your weight:  Being overweight increases your risk of health conditions such as heart disease, high blood pressure, type 2 diabetes, and certain types of cancer  It can also increase your risk for osteoarthritis, sleep apnea, and other respiratory problems  Aim for a slow, steady weight loss  Even a small amount of weight loss can lower your risk of health problems  How to lose weight safely:  A safe and healthy way to lose weight is to eat fewer calories and get regular exercise  You can lose up about 1 pound a week by decreasing the number of calories you eat by 500 calories each day  Healthy meal plan for weight management:  A healthy meal plan includes a variety of foods, contains fewer calories, and helps you stay healthy  A healthy meal plan includes the following:  · Eat whole-grain foods more often  A healthy meal plan should contain fiber  Fiber is the part of grains, fruits, and vegetables that is not broken down by your body  Whole-grain foods are healthy and provide extra fiber in your diet  Some examples of whole-grain foods are whole-wheat breads and pastas, oatmeal, brown rice, and bulgur  · Eat a variety of vegetables every day  Include dark, leafy greens such as spinach, kale, milvia greens, and mustard greens  Eat yellow and orange vegetables such as carrots, sweet potatoes, and winter squash  · Eat a variety of fruits every day    Choose fresh or canned fruit (canned in its own juice or light syrup) instead of juice  Fruit juice has very little or no fiber  · Eat low-fat dairy foods  Drink fat-free (skim) milk or 1% milk  Eat fat-free yogurt and low-fat cottage cheese  Try low-fat cheeses such as mozzarella and other reduced-fat cheeses  · Choose meat and other protein foods that are low in fat  Choose beans or other legumes such as split peas or lentils  Choose fish, skinless poultry (chicken or turkey), or lean cuts of red meat (beef or pork)  Before you cook meat or poultry, cut off any visible fat  · Use less fat and oil  Try baking foods instead of frying them  Add less fat, such as margarine, sour cream, regular salad dressing and mayonnaise to foods  Eat fewer high-fat foods  Some examples of high-fat foods include french fries, doughnuts, ice cream, and cakes  · Eat fewer sweets  Limit foods and drinks that are high in sugar  This includes candy, cookies, regular soda, and sweetened drinks  Exercise:  Exercise at least 30 minutes per day on most days of the week  Some examples of exercise include walking, biking, dancing, and swimming  You can also fit in more physical activity by taking the stairs instead of the elevator or parking farther away from stores  Ask your healthcare provider about the best exercise plan for you  © Copyright [employer] 2018 Information is for End User's use only and may not be sold, redistributed or otherwise used for commercial purposes  All illustrations and images included in CareNotes® are the copyrighted property of [employer]  or [address] [place] [address]      Subjective:      Patient ID: Javier Enriquez is a 81 y o  male    F/u mmp and review labs  HTN/HPL-taking rx as directed, no recent home bp's  Thyroid ca-seen by endo last week, stable  Post surgical hypothyroid on LT4 early am on empty stomach  Melanoma-f/b derm biy  ED-stable w/ viagra  GERD-stable on omeprazole, no melena or dysphagia  Was unable to come off     Umbilical hernia-he put off surgery d/t covid        Current Outpatient Medications:     aspirin 81 MG tablet, Take 81 mg by mouth daily , Disp: , Rfl:     atenolol (TENORMIN) 50 mg tablet, TAKE ONE TABLET BY MOUTH EVERY DAY, Disp: 90 tablet, Rfl: 1    Calcium-Magnesium-Vitamin D (CALCIUM 1200+D3 PO), , Disp: , Rfl:     cholecalciferol (VITAMIN D3) 1,000 units tablet, Take 1,000 Units by mouth daily, Disp: , Rfl:     levothyroxine 175 mcg tablet, Take one tablet (175 mcg) daily on an empty stomach, Disp: 90 tablet, Rfl: 2    lovastatin (MEVACOR) 40 MG tablet, TAKE ONE TABLET BY MOUTH EVERY DAY AT BEDTIME, Disp: 90 tablet, Rfl: 2    multivitamin (THERAGRAN) TABS, Take 1 tablet by mouth daily at bedtime , Disp: , Rfl:     omeprazole (PriLOSEC) 20 mg delayed release capsule, Take 1 capsule (20 mg total) by mouth daily before breakfast (Patient taking differently: Take 20 mg by mouth as needed ), Disp: 30 capsule, Rfl: 5    sildenafil (VIAGRA) 50 MG tablet, TAKE ONE TABLET BY MOUTH EVERY DAY AS NEEDED FOR ERECTILE DYSFUNCTION, Disp: 30 tablet, Rfl: 3    Recent Results (from the past 1008 hour(s))   T4, free Clinic Collect    Collection Time: 03/28/22  9:00 AM   Result Value Ref Range    Free T4 1 26 0 76 - 1 46 ng/dL   CBC and differential    Collection Time: 03/28/22  9:00 AM   Result Value Ref Range    WBC 6 74 4 31 - 10 16 Thousand/uL    RBC 5 32 3 88 - 5 62 Million/uL    Hemoglobin 16 3 12 0 - 17 0 g/dL    Hematocrit 48 3 36 5 - 49 3 %    MCV 91 82 - 98 fL    MCH 30 6 26 8 - 34 3 pg    MCHC 33 7 31 4 - 37 4 g/dL    RDW 13 1 11 6 - 15 1 %    MPV 11 3 8 9 - 12 7 fL    Platelets 454 032 - 213 Thousands/uL    nRBC 0 /100 WBCs    Neutrophils Relative 74 43 - 75 %    Immat GRANS % 0 0 - 2 %    Lymphocytes Relative 16 14 - 44 %    Monocytes Relative 8 4 - 12 %    Eosinophils Relative 1 0 - 6 %    Basophils Relative 1 0 - 1 %    Neutrophils Absolute 5 02 1 85 - 7 62 Thousands/µL    Immature Grans Absolute 0 02 0 00 - 0 20 Thousand/uL Lymphocytes Absolute 1 08 0 60 - 4 47 Thousands/µL    Monocytes Absolute 0 52 0 17 - 1 22 Thousand/µL    Eosinophils Absolute 0 06 0 00 - 0 61 Thousand/µL    Basophils Absolute 0 04 0 00 - 0 10 Thousands/µL   Comprehensive metabolic panel    Collection Time: 03/28/22  9:00 AM   Result Value Ref Range    Sodium 140 136 - 145 mmol/L    Potassium 3 9 3 5 - 5 3 mmol/L    Chloride 103 100 - 108 mmol/L    CO2 31 21 - 32 mmol/L    ANION GAP 6 4 - 13 mmol/L    BUN 15 5 - 25 mg/dL    Creatinine 1 08 0 60 - 1 30 mg/dL    Glucose, Fasting 95 65 - 99 mg/dL    Calcium 9 1 8 3 - 10 1 mg/dL    AST 24 5 - 45 U/L    ALT 31 12 - 78 U/L    Alkaline Phosphatase 72 46 - 116 U/L    Total Protein 7 0 6 4 - 8 2 g/dL    Albumin 3 7 3 5 - 5 0 g/dL    Total Bilirubin 0 90 0 20 - 1 00 mg/dL    eGFR 69 ml/min/1 73sq m   HEMOGLOBIN A1C W/ EAG ESTIMATION    Collection Time: 03/28/22  9:00 AM   Result Value Ref Range    Hemoglobin A1C 5 2 Normal 3 8-5 6%; PreDiabetic 5 7-6 4%;  Diabetic >=6 5%; Glycemic control for adults with diabetes <7 0% %     mg/dl   TSH, 3rd generation with Free T4 reflex    Collection Time: 03/28/22  9:00 AM   Result Value Ref Range    TSH 3RD GENERATON 0 031 (L) 0 358 - 3 740 uIU/mL   Vitamin D 25 hydroxy Lab Collect    Collection Time: 03/28/22  9:00 AM   Result Value Ref Range    Vit D, 25-Hydroxy 30 3 30 0 - 100 0 ng/mL   Testosterone, free, total Lab Collect    Collection Time: 03/28/22  9:00 AM   Result Value Ref Range    Testosterone, Free 15 1 6 6 - 18 1 pg/mL    TESTOSTERONE TOTAL 284 264 - 916 ng/dL   Thyroglobulin w/ab Lab Collect    Collection Time: 03/28/22  9:00 AM   Result Value Ref Range    Thyroglobulin Ab <1 0 0 0 - 0 9 IU/mL   T4, free    Collection Time: 03/28/22  9:00 AM   Result Value Ref Range    Free T4 1 32 0 76 - 1 46 ng/dL   Thyroglobulin by BURAK    Collection Time: 03/28/22  9:00 AM   Result Value Ref Range    Thyroglobulin-BURAK 0 7 (L) 1 4 - 29 2 ng/mL       The following portions of the patient's history were reviewed and updated as appropriate: allergies, current medications, past family history, past medical history, past social history, past surgical history and problem list      Review of Systems   Constitutional: Negative for appetite change, chills, diaphoresis, fatigue, fever and unexpected weight change  HENT: Negative for congestion, hearing loss and rhinorrhea  Eyes: Negative for visual disturbance  Respiratory: Negative for cough, chest tightness, shortness of breath and wheezing  Cardiovascular: Negative for chest pain, palpitations and leg swelling  Gastrointestinal: Negative for abdominal pain and blood in stool  Endocrine: Negative for cold intolerance, heat intolerance, polydipsia and polyuria  Genitourinary: Negative for difficulty urinating, dysuria, frequency and urgency  Musculoskeletal: Negative for arthralgias and myalgias  Skin: Negative for rash  Neurological: Negative for dizziness, weakness, light-headedness and headaches  Hematological: Does not bruise/bleed easily  Psychiatric/Behavioral: Negative for dysphoric mood and sleep disturbance  Objective:      Vitals:    03/30/22 1443   BP: 128/72   Pulse: 66   Resp: 18   Temp: 97 8 °F (36 6 °C)   SpO2: 96%          Physical Exam  Constitutional:       Appearance: He is well-developed  HENT:      Head: Normocephalic and atraumatic  Nose: Nose normal    Eyes:      General: No scleral icterus  Conjunctiva/sclera: Conjunctivae normal       Pupils: Pupils are equal, round, and reactive to light  Neck:      Thyroid: No thyromegaly  Vascular: No JVD  Trachea: No tracheal deviation  Cardiovascular:      Rate and Rhythm: Normal rate and regular rhythm  Heart sounds: No murmur heard  No friction rub  No gallop  Pulmonary:      Effort: Pulmonary effort is normal  No respiratory distress  Breath sounds: Normal breath sounds  No wheezing or rales     Musculoskeletal: General: No deformity  Cervical back: Normal range of motion and neck supple  Lymphadenopathy:      Cervical: No cervical adenopathy  Skin:     General: Skin is warm and dry  Coloration: Skin is not pale  Findings: No erythema or rash  Neurological:      Mental Status: He is alert and oriented to person, place, and time  Cranial Nerves: No cranial nerve deficit  Psychiatric:         Behavior: Behavior normal          Thought Content:  Thought content normal          Judgment: Judgment normal

## 2022-03-30 NOTE — PROGRESS NOTES
Assessment and Plan:     Problem List Items Addressed This Visit     None           Preventive health issues were discussed with patient, and age appropriate screening tests were ordered as noted in patient's After Visit Summary  Personalized health advice and appropriate referrals for health education or preventive services given if needed, as noted in patient's After Visit Summary       History of Present Illness:     Patient presents for Medicare Annual Wellness visit    Patient Care Team:  Allegra Rollins MD as PCP - General (Internal Medicine)  MD Cristal Weeks MD (Oncology)  Aden Lau MD (Endocrinology)  Maty Walker MD (Oncology)     Problem List:     Patient Active Problem List   Diagnosis    Actinic keratosis    Benign essential hypertension    BPH (benign prostatic hyperplasia)    Erectile dysfunction    GERD without esophagitis    Hyperlipidemia    Impaired fasting glucose    Personal history of malignant melanoma    Liver mass    Other specified soft tissue disorders    Multiple thyroid nodules    Encounter for follow-up examination after completed treatment for malignant neoplasm    Hoarseness of voice    Vocal cord paralysis    Fatigue    Vitamin D deficiency    Postoperative hypothyroidism    History of thyroid cancer    Hernia of abdominal cavity    Follicular thyroid cancer ([address] )    Papillary thyroid carcinoma ([address] )      Past Medical and Surgical History:     Past Medical History:   Diagnosis Date    Arthritis     Cancer ([address] )     melanoma abdomen    GERD (gastroesophageal reflux disease)     diet controlled    Hyperlipidemia     Last Assessed:10/20/14    Hypertension     PONV (postoperative nausea and vomiting)      Past Surgical History:   Procedure Laterality Date    COLONOSCOPY      HERNIA REPAIR Right     Last Assessed:10/20/14 inguinal     LYMPH NODE BIOPSY N/A 12/14/2018    Procedure: SENTINEL LYMPH NODE BIOPSY; LYMPHOSCINTIGRAPHY; INTRAOPERATIVE LYMPHATIC MAPPING (INJECT AT 1100); Surgeon: Boni Santillan MD;  [facility];  Service: Surgical Oncology    SKIN LESION EXCISION N/A 12/14/2018    Procedure: MEDIAL UPPER ABDOMEN MELANOMA WIDE EXCISION; BILATERAL AXILLA SLN;  Surgeon: Boni Santillan MD;  [facility];  Service: Surgical Oncology    THYROID LOBECTOMY Left 4/24/2019    Procedure: HEMITHYROIDECTOMY ISTHMUSECTOMY;  Surgeon: Boni Santillan MD;  [facility];  Service: Surgical Oncology    TONSILLECTOMY      US GUIDED THYROID BIOPSY  2/7/2019    US GUIDED THYROID BIOPSY  1/14/2021    WISDOM TOOTH EXTRACTION        Family History:     Family History   Problem Relation Age of Onset    Hypertension Mother     Hypothyroidism Mother     Hypertension Father     Skin cancer Father     Colon cancer Maternal Aunt     Colon cancer Paternal Aunt         62s    Skin cancer Paternal Uncle       Social History:     Social History     Socioeconomic History    Marital status: /Civil Union     Spouse name: None    Number of children: None    Years of education: None    Highest education level: None   Occupational History    None   Tobacco Use    Smoking status: Never Smoker    Smokeless tobacco: Never Used   Vaping Use    Vaping Use: Never used   Substance and Sexual Activity    Alcohol use:  Yes     Alcohol/week: 2 0 standard drinks     Types: 1 Glasses of wine, 1 Cans of beer per week     Comment: rarely    Drug use: No    Sexual activity: Yes     Partners: Female   Other Topics Concern    None   Social History Narrative    No advance directives     Social Determinants of Health     Financial Resource Strain: Not on file   Food Insecurity: Not on file   Transportation Needs: Not on file   Physical Activity: Not on file   Stress: Not on file   Social Connections: Not on file   Intimate Partner Violence: Not on file   Housing Stability: Not on file      Medications and Allergies:     Current Outpatient Medications   Medication Sig Dispense Refill    aspirin 81 MG tablet Take 81 mg by mouth daily       atenolol (TENORMIN) 50 mg tablet TAKE ONE TABLET BY MOUTH EVERY DAY 90 tablet 1    Calcium-Magnesium-Vitamin D (CALCIUM 1200+D3 PO)       cholecalciferol (VITAMIN D3) 1,000 units tablet Take 1,000 Units by mouth daily      levothyroxine 175 mcg tablet Take one tablet (175 mcg) daily on an empty stomach 90 tablet 2    lovastatin (MEVACOR) 40 MG tablet TAKE ONE TABLET BY MOUTH EVERY DAY AT BEDTIME 90 tablet 2    multivitamin (THERAGRAN) TABS Take 1 tablet by mouth daily at bedtime       omeprazole (PriLOSEC) 20 mg delayed release capsule Take 1 capsule (20 mg total) by mouth daily before breakfast (Patient taking differently: Take 20 mg by mouth as needed ) 30 capsule 5    sildenafil (VIAGRA) 50 MG tablet TAKE ONE TABLET BY MOUTH EVERY DAY AS NEEDED FOR ERECTILE DYSFUNCTION 30 tablet 3     No current facility-administered medications for this visit  No Known Allergies   Immunizations:     Immunization History   Administered Date(s) Administered    Influenza, high dose seasonal 0 7 mL 12/21/2018    Influenza, seasonal, injectable [birth year]    Pneumococcal Conjugate 13-Valent 06/02/2017    Pneumococcal Polysaccharide PPV23 06/24/2019    Tdap 1950, 08/22/2016      Health Maintenance:         Topic Date Due    Colorectal Cancer Screening  11/09/2026    Hepatitis C Screening  Completed         Topic Date Due    COVID-19 Vaccine (1) Never done    Influenza Vaccine (1) 09/01/2021      Medicare Health Risk Assessment:     /72 (BP Location: Left arm, Patient Position: Sitting, Cuff Size: Standard)   Pulse 66   Temp 97 8 °F (36 6 °C) (Tympanic)   Resp 18   Ht 5' 11" (1 803 m)   Wt 98 7 kg (217 lb 9 6 oz)   SpO2 96%   BMI 30 35 kg/m²      Megan Walsh is here for his Subsequent Wellness visit  Health Risk Assessment:   Patient rates overall health as good   Patient feels that their physical health rating is slightly worse  Patient is satisfied with their life  Eyesight was rated as slightly worse  Hearing was rated as same  Patient feels that their emotional and mental health rating is same  Patients states they are never, rarely angry  Patient states they are often unusually tired/fatigued  Pain experienced in the last 7 days has been none  Patient states that he has experienced no weight loss or gain in last 6 months  Depression Screening:   PHQ-2 Score: 0      Fall Risk Screening: In the past year, patient has experienced: no history of falling in past year      Home Safety:  Patient does not have trouble with stairs inside or outside of their home  Patient has working smoke alarms and has working carbon monoxide detector  Home safety hazards include: none  Nutrition:   Current diet is Regular  Medications:   Patient is currently taking over-the-counter supplements  OTC medications include: see medication list  Patient is able to manage medications  Activities of Daily Living (ADLs)/Instrumental Activities of Daily Living (IADLs):   Walk and transfer into and out of bed and chair?: Yes  Dress and groom yourself?: Yes    Bathe or shower yourself?: Yes    Feed yourself?  Yes  Do your laundry/housekeeping?: Yes  Manage your money, pay your bills and track your expenses?: Yes  Make your own meals?: Yes    Do your own shopping?: Yes    Previous Hospitalizations:   Any hospitalizations or ED visits within the last 12 months?: No      Advance Care Planning:     Advanced directive: No    Advanced directive counseling given: Yes    Five wishes given: Yes      Cognitive Screening:   Provider or family/friend/caregiver concerned regarding cognition?: No    PREVENTIVE SCREENINGS      Cardiovascular Screening:    General: Screening Not Indicated and History Lipid Disorder      Diabetes Screening:     General: Screening Current      Colorectal Cancer Screening:     General: Screening Current      Prostate Cancer Screening:    General: Screening Current      Osteoporosis Screening:    General: Screening Not Indicated      Abdominal Aortic Aneurysm (AAA) Screening:    Risk factors include: age between 73-67 yo        Lung Cancer Screening:     General: Screening Not Indicated      Hepatitis C Screening:    General: Screening Current    Screening, Brief Intervention, and Referral to Treatment (SBIRT)    Screening  Typical number of drinks in a day: 0  Typical number of drinks in a week: 0  Interpretation: Low risk drinking behavior      Single Item Drug Screening:  How often have you used an illegal drug (including marijuana) or a prescription medication for non-medical reasons in the past year? never    Single Item Drug Screen Score: 0  Interpretation: Negative screen for possible drug use disorder      Lesa Joyner MD

## 2022-03-30 NOTE — PATIENT INSTRUCTIONS
Lab data reviewed in detail and compared prior    Hypertension hyperlipidemia stable on present regimen    History of thyroid carcinoma followed by endocrinology, stable on levothyroxine    GERD stable on omeprazole    History of melanoma-following closely with Dermatology    ED-continue with Viagra, consider increasing dose to 209 mg    Umbilical hernia-referral back to surgery    Routine follow-up after labs in 6 months, sooner as needed  Medicare Preventive Visit Patient Instructions  Thank you for completing your Welcome to Medicare Visit or Medicare Annual Wellness Visit today  Your next wellness visit will be due in one year (3/31/2023)  The screening/preventive services that you may require over the next 5-10 years are detailed below  Some tests may not apply to you based off risk factors and/or age  Screening tests ordered at today's visit but not completed yet may show as past due  Also, please note that scanned in results may not display below  Preventive Screenings:  Service Recommendations Previous Testing/Comments   Colorectal Cancer Screening  · Colonoscopy    · Fecal Occult Blood Test (FOBT)/Fecal Immunochemical Test (FIT)  · Fecal DNA/Cologuard Test  · Flexible Sigmoidoscopy Age: 54-65 years old   Colonoscopy: every 10 years (May be performed more frequently if at higher risk)  OR  FOBT/FIT: every 1 year  OR  Cologuard: every 3 years  OR  Sigmoidoscopy: every 5 years  Screening may be recommended earlier than age 48 if at higher risk for colorectal cancer  Also, an individualized decision between you and your healthcare provider will decide whether screening between the ages of 74-80 would be appropriate   Colonoscopy: 11/09/2016  FOBT/FIT: Not on file  Cologuard: Not on file  Sigmoidoscopy: Not on file    Screening Current     Prostate Cancer Screening Individualized decision between patient and health care provider in men between ages of 53-78   Medicare will cover every 12 months beginning on the day after your 50th birthday PSA: 0 4 ng/mL     Screening Current     Hepatitis C Screening Once for adults born between 1945 and 1965  More frequently in patients at high risk for Hepatitis C Hep C Antibody: 12/21/2018    Screening Current   Diabetes Screening 1-2 times per year if you're at risk for diabetes or have pre-diabetes Fasting glucose: 95 mg/dL   A1C: 5 2 %    Screening Current   Cholesterol Screening Once every 5 years if you don't have a lipid disorder  May order more often based on risk factors  Lipid panel: 08/17/2021    Screening Not Indicated  History Lipid Disorder      Other Preventive Screenings Covered by Medicare:  1  Abdominal Aortic Aneurysm (AAA) Screening: covered once if your at risk  You're considered to be at risk if you have a family history of AAA or a male between the age of 73-68 who smoking at least 100 cigarettes in your lifetime  2  Lung Cancer Screening: covers low dose CT scan once per year if you meet all of the following conditions: (1) Age 50-69; (2) No signs or symptoms of lung cancer; (3) Current smoker or have quit smoking within the last 15 years; (4) You have a tobacco smoking history of at least 30 pack years (packs per day x number of years you smoked); (5) You get a written order from a healthcare provider  3  Glaucoma Screening: covered annually if you're considered high risk: (1) You have diabetes OR (2) Family history of glaucoma OR (3)  aged 48 and older OR (3)  American aged 72 and older  3  Osteoporosis Screening: covered every 2 years if you meet one of the following conditions: (1) Have a vertebral abnormality; (2) On glucocorticoid therapy for more than 3 months; (3) Have primary hyperparathyroidism; (4) On osteoporosis medications and need to assess response to drug therapy  5  HIV Screening: covered annually if you're between the age of 12-76   Also covered annually if you are younger than 13 and older than 72 with risk factors for HIV infection  For pregnant patients, it is covered up to 3 times per pregnancy  Immunizations:  Immunization Recommendations   Influenza Vaccine Annual influenza vaccination during flu season is recommended for all persons aged >= 6 months who do not have contraindications   Pneumococcal Vaccine (Prevnar and Pneumovax)  * Prevnar = PCV13  * Pneumovax = PPSV23 Adults 25-60 years old: 1-3 doses may be recommended based on certain risk factors  Adults 72 years old: Prevnar (PCV13) vaccine recommended followed by Pneumovax (PPSV23) vaccine  If already received PPSV23 since turning 65, then PCV13 recommended at least one year after PPSV23 dose  Hepatitis B Vaccine 3 dose series if at intermediate or high risk (ex: diabetes, end stage renal disease, liver disease)   Tetanus (Td) Vaccine - COST NOT COVERED BY MEDICARE PART B Following completion of primary series, a booster dose should be given every 10 years to maintain immunity against tetanus  Td may also be given as tetanus wound prophylaxis  Tdap Vaccine - COST NOT COVERED BY MEDICARE PART B Recommended at least once for all adults  For pregnant patients, recommended with each pregnancy  Shingles Vaccine (Shingrix) - COST NOT COVERED BY MEDICARE PART B  2 shot series recommended in those aged 48 and above     Health Maintenance Due:      Topic Date Due    Colorectal Cancer Screening  11/09/2026    Hepatitis C Screening  Completed     Immunizations Due:      Topic Date Due    COVID-19 Vaccine (1) Never done    Influenza Vaccine (1) 09/01/2021     Advance Directives   What are advance directives? Advance directives are legal documents that state your wishes and plans for medical care  These plans are made ahead of time in case you lose your ability to make decisions for yourself  Advance directives can apply to any medical decision, such as the treatments you want, and if you want to donate organs  What are the types of advance directives? There are many types of advance directives, and each state has rules about how to use them  You may choose a combination of any of the following:  · Living will: This is a written record of the treatment you want  You can also choose which treatments you do not want, which to limit, and which to stop at a certain time  This includes surgery, medicine, IV fluid, and tube feedings  · Durable power of  for healthcare [facility]): This is a written record that states who you want to make healthcare choices for you when you are unable to make them for yourself  This person, called a proxy, is usually a family member or a friend  You may choose more than 1 proxy  · Do not resuscitate (DNR) order:  A DNR order is used in case your heart stops beating or you stop breathing  It is a request not to have certain forms of treatment, such as CPR  A DNR order may be included in other types of advance directives  · Medical directive: This covers the care that you want if you are in a coma, near death, or unable to make decisions for yourself  You can list the treatments you want for each condition  Treatment may include pain medicine, surgery, blood transfusions, dialysis, IV or tube feedings, and a ventilator (breathing machine)  · Values history: This document has questions about your views, beliefs, and how you feel and think about life  This information can help others choose the care that you would choose  Why are advance directives important? An advance directive helps you control your care  Although spoken wishes may be used, it is better to have your wishes written down  Spoken wishes can be misunderstood, or not followed  Treatments may be given even if you do not want them  An advance directive may make it easier for your family to make difficult choices about your care     Weight Management   Why it is important to manage your weight:  Being overweight increases your risk of health conditions such as heart disease, high blood pressure, type 2 diabetes, and certain types of cancer  It can also increase your risk for osteoarthritis, sleep apnea, and other respiratory problems  Aim for a slow, steady weight loss  Even a small amount of weight loss can lower your risk of health problems  How to lose weight safely:  A safe and healthy way to lose weight is to eat fewer calories and get regular exercise  You can lose up about 1 pound a week by decreasing the number of calories you eat by 500 calories each day  Healthy meal plan for weight management:  A healthy meal plan includes a variety of foods, contains fewer calories, and helps you stay healthy  A healthy meal plan includes the following:  · Eat whole-grain foods more often  A healthy meal plan should contain fiber  Fiber is the part of grains, fruits, and vegetables that is not broken down by your body  Whole-grain foods are healthy and provide extra fiber in your diet  Some examples of whole-grain foods are whole-wheat breads and pastas, oatmeal, brown rice, and bulgur  · Eat a variety of vegetables every day  Include dark, leafy greens such as spinach, kale, milvia greens, and mustard greens  Eat yellow and orange vegetables such as carrots, sweet potatoes, and winter squash  · Eat a variety of fruits every day  Choose fresh or canned fruit (canned in its own juice or light syrup) instead of juice  Fruit juice has very little or no fiber  · Eat low-fat dairy foods  Drink fat-free (skim) milk or 1% milk  Eat fat-free yogurt and low-fat cottage cheese  Try low-fat cheeses such as mozzarella and other reduced-fat cheeses  · Choose meat and other protein foods that are low in fat  Choose beans or other legumes such as split peas or lentils  Choose fish, skinless poultry (chicken or turkey), or lean cuts of red meat (beef or pork)  Before you cook meat or poultry, cut off any visible fat  · Use less fat and oil  Try baking foods instead of frying them  Add less fat, such as margarine, sour cream, regular salad dressing and mayonnaise to foods  Eat fewer high-fat foods  Some examples of high-fat foods include french fries, doughnuts, ice cream, and cakes  · Eat fewer sweets  Limit foods and drinks that are high in sugar  This includes candy, cookies, regular soda, and sweetened drinks  Exercise:  Exercise at least 30 minutes per day on most days of the week  Some examples of exercise include walking, biking, dancing, and swimming  You can also fit in more physical activity by taking the stairs instead of the elevator or parking farther away from stores  Ask your healthcare provider about the best exercise plan for you  © Copyright [employer] 2018 Information is for End User's use only and may not be sold, redistributed or otherwise used for commercial purposes   All illustrations and images included in CareNotes® are the copyrighted property of [employer]  or [address] [place] [address]

## 2022-04-01 ENCOUNTER — HOSPITAL ENCOUNTER (OUTPATIENT)
Dept: RADIOLOGY | Facility: HOSPITAL | Age: 72
Discharge: HOME | End: 2022-04-01
Payer: MEDICARE

## 2022-04-01 DIAGNOSIS — M25.562 ACUTE PAIN OF LEFT KNEE: ICD-10-CM

## 2022-04-01 PROCEDURE — 73562 X-RAY EXAM OF KNEE 3: CPT

## 2022-04-05 ENCOUNTER — HOSPITAL ENCOUNTER (OUTPATIENT)
Dept: MRI IMAGING | Facility: HOSPITAL | Age: 72
Discharge: HOME | End: 2022-04-05
Payer: MEDICARE

## 2022-04-05 DIAGNOSIS — C43.9 MALIGNANT MELANOMA, UNSPECIFIED SITE (HCC): ICD-10-CM

## 2022-04-05 PROCEDURE — 70553 MRI BRAIN STEM W/O & W/DYE: CPT

## 2022-04-05 PROCEDURE — A9585 GADOBUTROL INJECTION: HCPCS

## 2022-04-05 RX ADMIN — GADOBUTROL 9 ML: 604.72 INJECTION INTRAVENOUS at 10:41

## 2022-04-07 ENCOUNTER — TELEPHONE (OUTPATIENT)
Dept: ENDOCRINOLOGY | Facility: CLINIC | Age: 72
End: 2022-04-07

## 2022-04-07 NOTE — TELEPHONE ENCOUNTER
----- Message from Junie Musa MD sent at 4/6/2022 11:37 PM EDT -----  Thyroglobulin stable at 0 7 TSH continues to be low, free T4 within normal limits Recommend decreasing levothyroxine to 175 mcg orally on 6 days of the week, repeat TSH, free T4 in 6-8 weeks Total, free testosterone within normal limits

## 2022-04-11 ENCOUNTER — CONSULT (OUTPATIENT)
Dept: SURGERY | Facility: CLINIC | Age: 72
End: 2022-04-11
Payer: MEDICARE

## 2022-04-11 VITALS
DIASTOLIC BLOOD PRESSURE: 88 MMHG | OXYGEN SATURATION: 99 % | WEIGHT: 209 LBS | BODY MASS INDEX: 29.26 KG/M2 | HEIGHT: 71 IN | SYSTOLIC BLOOD PRESSURE: 138 MMHG | HEART RATE: 63 BPM

## 2022-04-11 DIAGNOSIS — E86.0 DEHYDRATION: ICD-10-CM

## 2022-04-11 DIAGNOSIS — K42.9 UMBILICAL HERNIA WITHOUT OBSTRUCTION AND WITHOUT GANGRENE: ICD-10-CM

## 2022-04-11 PROCEDURE — 99203 OFFICE O/P NEW LOW 30 MIN: CPT | Performed by: SURGERY

## 2022-04-11 RX ORDER — HEPARIN SODIUM 5000 [USP'U]/ML
5000 INJECTION, SOLUTION INTRAVENOUS; SUBCUTANEOUS ONCE
Status: CANCELLED | OUTPATIENT
Start: 2022-04-11 | End: 2022-04-11

## 2022-04-11 RX ORDER — SODIUM CHLORIDE, SODIUM LACTATE, POTASSIUM CHLORIDE, CALCIUM CHLORIDE 600; 310; 30; 20 MG/100ML; MG/100ML; MG/100ML; MG/100ML
125 INJECTION, SOLUTION INTRAVENOUS
Status: CANCELLED | OUTPATIENT
Start: 2022-04-11 | End: 2022-04-12

## 2022-04-11 NOTE — PATIENT INSTRUCTIONS
Deep Vein Thrombosis Prevention   WHAT YOU NEED TO KNOW:   Deep vein thrombosis (DVT) is a blood clot that forms in a deep vein of the body  The deep veins in the legs, thighs, and hips are the most common sites for DVT  DVT can also occur in your arms  The clot prevents the normal flow of blood in the vein  The blood backs up and causes pain and swelling  The DVT can break into smaller pieces and travel to your lungs and cause a blockage called a pulmonary embolism  A pulmonary embolism can become life-threatening  DISCHARGE INSTRUCTIONS:   Call 911 for any of the following:   · You feel lightheaded, short of breath, and have chest pain  · You cough up blood  Return to the emergency department if:   · Your arm or leg feels warm, tender, and painful  It may look swollen and red  Contact your healthcare provider if:   · You have questions or concerns about your condition or care  Risk factors for DVT:  A DVT can happen to anybody, but certain things can increase your risk  You may be at higher risk if you have had DVT in the past  You may also be at risk if you have a family member who has had blood clots  The following conditions also increase your risk:  · Limited activity caused by bed rest, a leg cast, or sitting for long periods    · Injury to a deep vein, or surgery    · A blood disorder that makes your blood clot faster than normal, such as factor V Leiden mutation    · Age older than 60 years    · Use of hormone replacement therapy or some types of birth control medicine    · Pregnancy, and for 6 weeks after childbirth     · Cancer or heart failure     · A catheter placed in a large vein    · Smoking    · Obesity or varicose veins  Prevent DVT:   · Guidelines for everyone:      ¨ Maintain a healthy weight  Ask your healthcare provider how much you should weigh  Ask him to help you create a weight loss plan if you are overweight  ¨ Do not smoke    Nicotine and other chemicals in cigarettes and cigars can damage blood vessels and increase your risk for a DVT  Ask your healthcare provider for information if you currently smoke and need help to quit  E-cigarettes or smokeless tobacco still contain nicotine  Talk to your healthcare provider before you use these products  ¨ Move regularly if you sit for long periods of time  If you travel by car or work at a desk, move and stretch in your seat several times each hour  In an airplane, get up and walk every hour  Exercise your legs while sitting by raising and lowering your heels  Keep your toes on the floor while you do this  You can also raise and lower your toes while keeping your heels on the floor  Also tighten and release your leg muscles while sitting  ¨ Exercise regularly  to help increase your blood flow  Walking is a good low-impact exercise  Talk to your healthcare provider about the best exercise plan for you  · Guidelines for people at high risk for DVT:      ¨ Take blood thinner medicines as directed  Your healthcare provider may recommend blood thinners and other medicines to help prevent blood clots  ¨ Wear pressure stockings as directed  The stockings are tight and put pressure on your legs  This improves blood flow and helps prevent clots  Wear the stockings during the day  Do not wear them when you sleep  ¨ Elevate your legs  above the level of your heart as often as you can  This will help decrease swelling and pain  Prop your legs on pillows or blankets to keep them elevated comfortably  ¨ Get up and move as directed after surgery or an injury, or during an illness  Early and regular movement can help decrease your risk for DVT by helping to increase your blood flow  Ask your healthcare provider what type of activity you need and how often you should do it  ¨ Change body positions often if you are bedridden  Ask for help to change your position every 1 to 2 hours    Follow up with your healthcare provider as directed:  Write down your questions so you remember to ask them during your visits  © 2017 [employer] Information is for End User's use only and may not be sold, redistributed or otherwise used for commercial purposes  All illustrations and images included in CareNotes® are the copyrighted property of [employer]  or [employer]  The above information is an  only  It is not intended as medical advice for individual conditions or treatments  Talk to your doctor, nurse or pharmacist before following any medical regimen to see if it is safe and effective for you  Umbilical Hernia   WHAT YOU NEED TO KNOW:   An umbilical hernia is a bulge through the abdominal muscles in the area of the navel (belly button)  The hernia may contain tissue from the abdomen, part of an organ (such as the intestine), or fluid  Umbilical hernias usually happen because of a hole or a weak area in the muscles of the abdominal wall  DISCHARGE INSTRUCTIONS:   Medicines:  · Ibuprofen or acetaminophen:  These medicines decrease pain  They are available without a doctor's order  Ask your healthcare provider which medicine is right for you  Ask how much to take and how often to take it  Follow directions  These medicines can cause stomach bleeding if not taken correctly  Ibuprofen can cause kidney damage  Do not take ibuprofen if you have kidney disease, an ulcer, or allergies to aspirin  Acetaminophen can cause liver damage  Do not drink alcohol if you take acetaminophen  · Take your medicine as directed  Contact your healthcare provider if you think your medicine is not helping or if you have side effects  Tell him of her if you are allergic to any medicine  Keep a list of the medicines, vitamins, and herbs you take  Include the amounts, and when and why you take them  Bring the list or the pill bottles to follow-up visits   Carry your medicine list with you in case of an emergency  Follow up with your healthcare provider as directed:  Write down your questions so you remember to ask them during your visits  Self care:   · Activity:  Avoid lifting, bending, and straining  Try not to stand for long periods of time  If you have to cough, try to cough gently  Support the hernia by holding your hand or a pillow over it when coughing  Ask your healthcare provider if it is okay for you to have sexual intercourse  · Prevent constipation:  Straining to have a bowel movement may make your hernia worse  Eat foods that are high in fiber and drink at least 8 glasses of water a day  A high-fiber diet includes whole grains, bran, cereals, and uncooked fruit and vegetables  Walking or other exercise can also help  Your healthcare provider may give you fiber medicine or a stool softener to help make your bowel movements softer and more regular  Do not use an enema or a laxative unless your healthcare provider says it is okay  · What to wear:  Do not wear anything tight over your hernia  Ask your healthcare provider if you should wear support belt or girdle to keep the hernia in place  · Ask your healthcare provider how to reduce your hernia:  Sometimes your hernia will slip back into your abdomen if you lie flat for a while  Ask your healthcare provider if you should try to gently push your hernia back into place if lying flat does not work  If your hernia does not slide back into your abdomen easily, stop pushing on it and call your healthcare provider  Do not try to push the hernia back into place if it is painful or tender  Contact your healthcare provider if:   · You have nausea or vomiting  · You cannot gently push your hernia back into your abdomen  (Do this only if your healthcare provider has shown you how to do it )     · You are constipated or have blood in your bowel movements  · Your hernia is getting bigger      · You have questions or concerns about your condition or care   Return to the emergency department if:   · You have a fever  · Your hernia is stuck outside your abdomen and is painful, swollen, or feels hard  · You completely stop having bowel movements and stop passing gas  · Your abdominal pain is bad or getting worse  © 2017 [employer] Information is for End User's use only and may not be sold, redistributed or otherwise used for commercial purposes  All illustrations and images included in CareNotes® are the copyrighted property of [employer]  or Sven Hidalgo  The above information is an  only  It is not intended as medical advice for individual conditions or treatments  Talk to your doctor, nurse or pharmacist before following any medical regimen to see if it is safe and effective for you

## 2022-04-11 NOTE — PROGRESS NOTES
Consult- General Surgery   Matthew Arauz 81 y o  male MRN: [id number]  Unit/Bed#:  Encounter: [id number]    Assessment/Plan     Assessment:  Incarcerated umbilical hernia  History of melanoma  History of GERD  History of hyperlipidemia  History of arthritis  History hypertension  History of hypothyroidism  Plan:  The patient has significant size incarcerated umbilical hernia  I advised the patient to undergo laparoscopic umbilical hernia repair with mesh in the near future  I discussed the operative procedure, risks, benefits, alternatives and possible complications, he understood and agreed to proceed  History of Present Illness     HPI:  Matthew Arauz is a 81 y o  male who presents to my office for evaluation of umbilical hernia  The patient had had umbilical hernia for many years, he noticed that the bulge has increased in size  He denies having any significant discomfort but has become large and unpleasant to look at  The patient went to see his primary care physician and he was referred to us for surgical evaluation  He denied having any nausea, vomiting, diarrhea, constipation or any other constitutional symptoms  I reviewed the CT scan of the chest, abdomen and pelvis from February of 2022 revealing incarcerated fat containing umbilical hernia  Review of Systems    Historical Information   Past Medical History:   Diagnosis Date    Arthritis     Cancer ([address] )     melanoma abdomen    GERD (gastroesophageal reflux disease)     diet controlled    Hyperlipidemia     Last Assessed:10/20/14    Hypertension     PONV (postoperative nausea and vomiting)      Past Surgical History:   Procedure Laterality Date    COLONOSCOPY      HERNIA REPAIR Right     Last Assessed:10/20/14 inguinal     LYMPH NODE BIOPSY N/A 12/14/2018    Procedure: SENTINEL LYMPH NODE BIOPSY; LYMPHOSCINTIGRAPHY; INTRAOPERATIVE LYMPHATIC MAPPING (INJECT AT 1100);   Surgeon: Jose Luis Esteban MD;  [facility];  Service: Surgical Oncology    SKIN LESION EXCISION N/A 12/14/2018    Procedure: MEDIAL UPPER ABDOMEN MELANOMA WIDE EXCISION; BILATERAL AXILLA SLN;  Surgeon: Patricia Bill MD;  [facility];  Service: Surgical Oncology    THYROID LOBECTOMY Left 4/24/2019    Procedure: HEMITHYROIDECTOMY ISTHMUSECTOMY;  Surgeon: Patricia Bill MD;  [facility];  Service: Surgical Oncology    TONSILLECTOMY      US GUIDED THYROID BIOPSY  2/7/2019    US GUIDED THYROID BIOPSY  1/14/2021    WISDOM TOOTH EXTRACTION       Social History   Social History     Substance and Sexual Activity   Alcohol Use Yes    Alcohol/week: 2 0 standard drinks    Types: 1 Glasses of wine, 1 Cans of beer per week    Comment: rarely     Social History     Substance and Sexual Activity   Drug Use No     Social History     Tobacco Use   Smoking Status Never Smoker   Smokeless Tobacco Never Used     Family History: non-contributory    Meds/Allergies   all medications and allergies reviewed     Current Outpatient Medications:     aspirin 81 MG tablet, Take 81 mg by mouth daily , Disp: , Rfl:     atenolol (TENORMIN) 50 mg tablet, TAKE ONE TABLET BY MOUTH EVERY DAY, Disp: 90 tablet, Rfl: 1    Calcium-Magnesium-Vitamin D (CALCIUM 1200+D3 PO), , Disp: , Rfl:     cholecalciferol (VITAMIN D3) 1,000 units tablet, Take 1,000 Units by mouth daily, Disp: , Rfl:     levothyroxine 175 mcg tablet, Take one tablet (175 mcg) daily on an empty stomach, Disp: 90 tablet, Rfl: 2    lovastatin (MEVACOR) 40 MG tablet, TAKE ONE TABLET BY MOUTH EVERY DAY AT BEDTIME, Disp: 90 tablet, Rfl: 2    multivitamin (THERAGRAN) TABS, Take 1 tablet by mouth daily at bedtime , Disp: , Rfl:     omeprazole (PriLOSEC) 20 mg delayed release capsule, Take 1 capsule (20 mg total) by mouth daily before breakfast (Patient taking differently: Take 20 mg by mouth as needed ), Disp: 30 capsule, Rfl: 5    sildenafil (VIAGRA) 50 MG tablet, TAKE ONE TABLET BY MOUTH EVERY DAY AS NEEDED FOR ERECTILE DYSFUNCTION, Disp: 30 tablet, Rfl: 3  No Known Allergies    Objective     Current Vitals:   Blood Pressure: 138/88 (04/11/22 0844)  Pulse: 63 (04/11/22 0844)  Height: 5' 11" (180 3 cm) (04/11/22 0844)  Weight - Scale: 94 8 kg (209 lb) (04/11/22 0844)  SpO2: 99 % (04/11/22 0844)    Physical Exam  Vitals and nursing note reviewed  Constitutional:       General: He is not in acute distress  Cardiovascular:      Rate and Rhythm: Normal rate and regular rhythm  Heart sounds: No murmur heard  Pulmonary:      Effort: No respiratory distress  Breath sounds: Normal breath sounds  Abdominal:      Palpations: Abdomen is soft  There is no mass  Tenderness: There is no abdominal tenderness  Comments: There is an obvious incarcerated umbilical hernia, bulging above and at the umbilicus  There is no obvious visceromegaly or mass palpable  Skin:     General: Skin is warm  Coloration: Skin is not jaundiced  Findings: No erythema or rash  Neurological:      Mental Status: He is alert and oriented to person, place, and time  Cranial Nerves: No cranial nerve deficit     Psychiatric:         Mood and Affect: Mood normal          Behavior: Behavior normal

## 2022-04-11 NOTE — H&P (VIEW-ONLY)
Consult- General Surgery   Maya Lerma 81 y o  male MRN: [id number]  Unit/Bed#:  Encounter: [id number]    Assessment/Plan     Assessment:  Incarcerated umbilical hernia  History of melanoma  History of GERD  History of hyperlipidemia  History of arthritis  History hypertension  History of hypothyroidism  Plan:  The patient has significant size incarcerated umbilical hernia  I advised the patient to undergo laparoscopic umbilical hernia repair with mesh in the near future  I discussed the operative procedure, risks, benefits, alternatives and possible complications, he understood and agreed to proceed  History of Present Illness     HPI:  Maya Lerma is a 81 y o  male who presents to my office for evaluation of umbilical hernia  The patient had had umbilical hernia for many years, he noticed that the bulge has increased in size  He denies having any significant discomfort but has become large and unpleasant to look at  The patient went to see his primary care physician and he was referred to us for surgical evaluation  He denied having any nausea, vomiting, diarrhea, constipation or any other constitutional symptoms  I reviewed the CT scan of the chest, abdomen and pelvis from February of 2022 revealing incarcerated fat containing umbilical hernia  Review of Systems    Historical Information   Past Medical History:   Diagnosis Date    Arthritis     Cancer ([address] )     melanoma abdomen    GERD (gastroesophageal reflux disease)     diet controlled    Hyperlipidemia     Last Assessed:10/20/14    Hypertension     PONV (postoperative nausea and vomiting)      Past Surgical History:   Procedure Laterality Date    COLONOSCOPY      HERNIA REPAIR Right     Last Assessed:10/20/14 inguinal     LYMPH NODE BIOPSY N/A 12/14/2018    Procedure: SENTINEL LYMPH NODE BIOPSY; LYMPHOSCINTIGRAPHY; INTRAOPERATIVE LYMPHATIC MAPPING (INJECT AT 1100);   Surgeon: Lanny Kaplan MD;  [facility];  Service: Surgical Oncology    SKIN LESION EXCISION N/A 12/14/2018    Procedure: MEDIAL UPPER ABDOMEN MELANOMA WIDE EXCISION; BILATERAL AXILLA SLN;  Surgeon: Reena Barfield MD;  [facility];  Service: Surgical Oncology    THYROID LOBECTOMY Left 4/24/2019    Procedure: HEMITHYROIDECTOMY ISTHMUSECTOMY;  Surgeon: Reena Barfield MD;  [facility];  Service: Surgical Oncology    TONSILLECTOMY      US GUIDED THYROID BIOPSY  2/7/2019    US GUIDED THYROID BIOPSY  1/14/2021    WISDOM TOOTH EXTRACTION       Social History   Social History     Substance and Sexual Activity   Alcohol Use Yes    Alcohol/week: 2 0 standard drinks    Types: 1 Glasses of wine, 1 Cans of beer per week    Comment: rarely     Social History     Substance and Sexual Activity   Drug Use No     Social History     Tobacco Use   Smoking Status Never Smoker   Smokeless Tobacco Never Used     Family History: non-contributory    Meds/Allergies   all medications and allergies reviewed     Current Outpatient Medications:     aspirin 81 MG tablet, Take 81 mg by mouth daily , Disp: , Rfl:     atenolol (TENORMIN) 50 mg tablet, TAKE ONE TABLET BY MOUTH EVERY DAY, Disp: 90 tablet, Rfl: 1    Calcium-Magnesium-Vitamin D (CALCIUM 1200+D3 PO), , Disp: , Rfl:     cholecalciferol (VITAMIN D3) 1,000 units tablet, Take 1,000 Units by mouth daily, Disp: , Rfl:     levothyroxine 175 mcg tablet, Take one tablet (175 mcg) daily on an empty stomach, Disp: 90 tablet, Rfl: 2    lovastatin (MEVACOR) 40 MG tablet, TAKE ONE TABLET BY MOUTH EVERY DAY AT BEDTIME, Disp: 90 tablet, Rfl: 2    multivitamin (THERAGRAN) TABS, Take 1 tablet by mouth daily at bedtime , Disp: , Rfl:     omeprazole (PriLOSEC) 20 mg delayed release capsule, Take 1 capsule (20 mg total) by mouth daily before breakfast (Patient taking differently: Take 20 mg by mouth as needed ), Disp: 30 capsule, Rfl: 5    sildenafil (VIAGRA) 50 MG tablet, TAKE ONE TABLET BY MOUTH EVERY DAY AS NEEDED FOR ERECTILE DYSFUNCTION, Disp: 30 tablet, Rfl: 3  No Known Allergies    Objective     Current Vitals:   Blood Pressure: 138/88 (04/11/22 0844)  Pulse: 63 (04/11/22 0844)  Height: 5' 11" (180 3 cm) (04/11/22 0844)  Weight - Scale: 94 8 kg (209 lb) (04/11/22 0844)  SpO2: 99 % (04/11/22 0844)    Physical Exam  Vitals and nursing note reviewed  Constitutional:       General: He is not in acute distress  Cardiovascular:      Rate and Rhythm: Normal rate and regular rhythm  Heart sounds: No murmur heard  Pulmonary:      Effort: No respiratory distress  Breath sounds: Normal breath sounds  Abdominal:      Palpations: Abdomen is soft  There is no mass  Tenderness: There is no abdominal tenderness  Comments: There is an obvious incarcerated umbilical hernia, bulging above and at the umbilicus  There is no obvious visceromegaly or mass palpable  Skin:     General: Skin is warm  Coloration: Skin is not jaundiced  Findings: No erythema or rash  Neurological:      Mental Status: He is alert and oriented to person, place, and time  Cranial Nerves: No cranial nerve deficit     Psychiatric:         Mood and Affect: Mood normal          Behavior: Behavior normal

## 2022-04-12 ENCOUNTER — APPOINTMENT (OUTPATIENT)
Dept: RADIOLOGY | Facility: CLINIC | Age: 72
End: 2022-04-12
Payer: MEDICARE

## 2022-04-12 ENCOUNTER — OFFICE VISIT (OUTPATIENT)
Dept: OBGYN CLINIC | Facility: CLINIC | Age: 72
End: 2022-04-12
Payer: MEDICARE

## 2022-04-12 VITALS
DIASTOLIC BLOOD PRESSURE: 76 MMHG | WEIGHT: 217.6 LBS | SYSTOLIC BLOOD PRESSURE: 134 MMHG | HEART RATE: 57 BPM | HEIGHT: 71 IN | BODY MASS INDEX: 30.46 KG/M2

## 2022-04-12 DIAGNOSIS — M25.562 LEFT KNEE PAIN, UNSPECIFIED CHRONICITY: Primary | ICD-10-CM

## 2022-04-12 DIAGNOSIS — M25.562 LEFT KNEE PAIN, UNSPECIFIED CHRONICITY: ICD-10-CM

## 2022-04-12 DIAGNOSIS — E89.0 POSTOPERATIVE HYPOTHYROIDISM: ICD-10-CM

## 2022-04-12 DIAGNOSIS — M25.562 ACUTE PAIN OF LEFT KNEE: ICD-10-CM

## 2022-04-12 DIAGNOSIS — C73 PAPILLARY THYROID CARCINOMA (HCC): ICD-10-CM

## 2022-04-12 PROCEDURE — 73560 X-RAY EXAM OF KNEE 1 OR 2: CPT

## 2022-04-12 PROCEDURE — 99203 OFFICE O/P NEW LOW 30 MIN: CPT | Performed by: ORTHOPAEDIC SURGERY

## 2022-04-12 RX ORDER — LEVOTHYROXINE SODIUM 175 UG/1
TABLET ORAL
Qty: 90 TABLET | Refills: 2 | Status: SHIPPED | OUTPATIENT
Start: 2022-04-12 | End: 2022-07-21

## 2022-04-12 NOTE — PROGRESS NOTES
Patient Name:  Matthew Arauz  MRN:  [id number]    [address] [place], [place] [address]     1  Left knee pain, unspecified chronicity  -     XR knee 1 or 2 vw right; Future; Expected date: 04/12/2022  -     XR knee 1 or 2 vw left; Future; Expected date: 04/12/2022    2  Acute pain of left knee  -     Ambulatory Referral to Orthopedic Surgery        81 y o  male with Left knee pain, possible exacerbation of mild osteoarthritis  X-rays reviewed in office today with patient  Overall, patient with moderate improvement of knee pain from a couple weeks ago  At this time, advised patient to continue to monitor knee pain and exacerbating positions including stair ambulation and kneeling  He may utilize hinge knee brace as needed if he will be working on his house and expecting many trips up and down the stairs  No corticosteroid injection indicated at this time as patients pain is moderately improved  He may start riding bicycle as this activity is low impact on knees  Can administer OTC oral and topical analgesics and NSAIDs as needed for pain relief  Patient will follow up in office on an as needed basis  Chief Complaint     Left knee pain    History of the Present Illness     Matthew Arauz is a 81 y o  male with Left knee pain ongoing for a couple weeks without known injury  He admits he and his wife will be moving to a new house once she retires  He admits from constant stair ambulation with lifting/carrying heavy boxes  He admits to trying to twist his leg while changing shoes while holding heavy boxes  He did call PCP for placement of x-ray order for evaluation of knee pain  Today, patient reports moderate pain relief from a couple weeks ago  He locates his previous pain to medial aspect of the knee, again, exacerbated with twisting motions  He admits to cracking of bilateral knees since he was younger   Previous orthopaedic exam many years ago was performed at [facility] where he may have been diagnosed with a meniscus tear of Left knee  He used hinge knee brace at that time which provided support  Denies history of gout, swelling, or locking symptoms  Review of Systems     Review of Systems   Constitutional: Negative for chills and fever  HENT: Negative for ear pain and sore throat  Eyes: Negative for pain and visual disturbance  Respiratory: Negative for cough and shortness of breath  Cardiovascular: Negative for chest pain and palpitations  Gastrointestinal: Negative for abdominal pain and vomiting  Genitourinary: Negative for dysuria and hematuria  Musculoskeletal: Negative for arthralgias and back pain  Skin: Negative for color change and rash  Neurological: Negative for seizures and syncope  All other systems reviewed and are negative  Physical Exam     /76   Pulse 57   Ht 5' 11" (1 803 m)   Wt 98 7 kg (217 lb 9 6 oz)   BMI 30 35 kg/m²     Left Knee  Range of motion from 0 to 130-135  There is patellofemoral crepitus with range of motion  There is no effusion  There is tenderness over the medial joint line  There is 5/5 quadriceps strength and preserved tone  The patient is ableperform a straight leg raise  negative patellar grind test   Anterior drawer tests is negative  negative Lachman Test    Posterior drawer test is   negative   Varus stress testing reveals no pain or laxity at 0 and 30 degrees   Valgus stress testing reveals no pain or laxity at 0 and 30 degrees  Cheng's testing is negative   The patient is neurovascular intact distally  Right Knee  Range of motion from 0 to 130-135  There is patellofemoral crepitus with range of motion  There is no effusion  There is no tenderness over the medial or lateral joint line  There is 5/5 quadriceps strength and preserved tone  The patient is ableperform a straight leg raise        negative patellar grind test   Anterior drawer tests is negative  negative Lachman Test    Posterior drawer test is negative   Varus stress testing reveals no pain or laxity at 0 and 30 degrees   Valgus stress testing reveals no pain or laxity at 0 and 30 degrees  Cheng's testing is negative   The patient is neurovascular intact distally  Eyes:  Anicteric sclerae  Neck:  Supple  Lungs:  Normal respiratory effort  Cardiovascular:  Capillary refill is less than 2 seconds  Skin:  Intact without erythema  Neurologic:  Sensation grossly intact to light touch  Psychiatric:  Mood and affect are appropriate  Data Review     I have personally reviewed pertinent films in PACS, and my interpretation follows:    X-rays taken of Left knee demonstrates well maintained joint spaces, minimal medial compartment degenerative changes, no acute fracture or dislocation  Past Medical History:   Diagnosis Date    Arthritis     Cancer ([address] )     melanoma abdomen    GERD (gastroesophageal reflux disease)     diet controlled    Hyperlipidemia     Last Assessed:10/20/14    Hypertension     PONV (postoperative nausea and vomiting)        Past Surgical History:   Procedure Laterality Date    COLONOSCOPY      HERNIA REPAIR Right     Last Assessed:10/20/14 inguinal     LYMPH NODE BIOPSY N/A 12/14/2018    Procedure: SENTINEL LYMPH NODE BIOPSY; LYMPHOSCINTIGRAPHY; INTRAOPERATIVE LYMPHATIC MAPPING (INJECT AT 1100);   Surgeon: Darius Ivory MD;  [facility];  Service: Surgical Oncology    SKIN LESION EXCISION N/A 12/14/2018    Procedure: MEDIAL UPPER ABDOMEN MELANOMA WIDE EXCISION; BILATERAL AXILLA SLN;  Surgeon: Darius Ivory MD;  [facility];  Service: Surgical Oncology    THYROID LOBECTOMY Left 4/24/2019    Procedure: HEMITHYROIDECTOMY ISTHMUSECTOMY;  Surgeon: Darius Ivory MD;  [facility];  Service: Surgical Oncology    TONSILLECTOMY      US GUIDED THYROID BIOPSY  2/7/2019    US GUIDED THYROID BIOPSY  1/14/2021    WISDOM TOOTH EXTRACTION         No Known Allergies    Current Outpatient Medications on File Prior to Visit   Medication Sig Dispense Refill    aspirin 81 MG tablet Take 81 mg by mouth daily       atenolol (TENORMIN) 50 mg tablet TAKE ONE TABLET BY MOUTH EVERY DAY 90 tablet 1    Calcium-Magnesium-Vitamin D (CALCIUM 1200+D3 PO)       cholecalciferol (VITAMIN D3) 1,000 units tablet Take 1,000 Units by mouth daily      lovastatin (MEVACOR) 40 MG tablet TAKE ONE TABLET BY MOUTH EVERY DAY AT BEDTIME 90 tablet 2    multivitamin (THERAGRAN) TABS Take 1 tablet by mouth daily at bedtime       omeprazole (PriLOSEC) 20 mg delayed release capsule Take 1 capsule (20 mg total) by mouth daily before breakfast (Patient taking differently: Take 20 mg by mouth as needed ) 30 capsule 5    sildenafil (VIAGRA) 50 MG tablet TAKE ONE TABLET BY MOUTH EVERY DAY AS NEEDED FOR ERECTILE DYSFUNCTION 30 tablet 3    [DISCONTINUED] levothyroxine 175 mcg tablet Take one tablet (175 mcg) daily on an empty stomach 90 tablet 2     No current facility-administered medications on file prior to visit  Social History     Tobacco Use    Smoking status: Never Smoker    Smokeless tobacco: Never Used   Vaping Use    Vaping Use: Never used   Substance Use Topics    Alcohol use:  Yes     Alcohol/week: 2 0 standard drinks     Types: 1 Glasses of wine, 1 Cans of beer per week     Comment: rarely    Drug use: No       Family History   Problem Relation Age of Onset    Hypertension Mother     Hypothyroidism Mother     Hypertension Father     Skin cancer Father     Colon cancer Maternal Aunt     Colon cancer Paternal Aunt         62s    Skin cancer Paternal Uncle              Procedures Performed     Procedures  None       Kiah Conrad PA-C

## 2022-04-14 ENCOUNTER — APPOINTMENT (OUTPATIENT)
Dept: LAB | Facility: HOSPITAL | Age: 72
End: 2022-04-14
Payer: MEDICARE

## 2022-04-14 ENCOUNTER — OFFICE VISIT (OUTPATIENT)
Dept: LAB | Facility: HOSPITAL | Age: 72
End: 2022-04-14
Payer: MEDICARE

## 2022-04-14 DIAGNOSIS — K42.9 UMBILICAL HERNIA WITHOUT OBSTRUCTION AND WITHOUT GANGRENE: ICD-10-CM

## 2022-04-14 LAB
ALBUMIN SERPL BCP-MCNC: 3.9 G/DL (ref 3.5–5)
ALP SERPL-CCNC: 74 U/L (ref 46–116)
ALT SERPL W P-5'-P-CCNC: 33 U/L (ref 12–78)
ANION GAP SERPL CALCULATED.3IONS-SCNC: 6 MMOL/L (ref 4–13)
AST SERPL W P-5'-P-CCNC: 23 U/L (ref 5–45)
BILIRUB SERPL-MCNC: 0.75 MG/DL (ref 0.2–1)
BUN SERPL-MCNC: 19 MG/DL (ref 5–25)
CALCIUM SERPL-MCNC: 9.2 MG/DL (ref 8.3–10.1)
CHLORIDE SERPL-SCNC: 104 MMOL/L (ref 100–108)
CO2 SERPL-SCNC: 30 MMOL/L (ref 21–32)
CREAT SERPL-MCNC: 1.14 MG/DL (ref 0.6–1.3)
ERYTHROCYTE [DISTWIDTH] IN BLOOD BY AUTOMATED COUNT: 12.8 % (ref 11.6–15.1)
GFR SERPL CREATININE-BSD FRML MDRD: 64 ML/MIN/1.73SQ M
GLUCOSE SERPL-MCNC: 108 MG/DL (ref 65–140)
HCT VFR BLD AUTO: 48.6 % (ref 36.5–49.3)
HGB BLD-MCNC: 16.4 G/DL (ref 12–17)
MCH RBC QN AUTO: 30.4 PG (ref 26.8–34.3)
MCHC RBC AUTO-ENTMCNC: 33.7 G/DL (ref 31.4–37.4)
MCV RBC AUTO: 90 FL (ref 82–98)
PLATELET # BLD AUTO: 179 THOUSANDS/UL (ref 149–390)
PMV BLD AUTO: 10.6 FL (ref 8.9–12.7)
POTASSIUM SERPL-SCNC: 4.8 MMOL/L (ref 3.5–5.3)
PROT SERPL-MCNC: 7.2 G/DL (ref 6.4–8.2)
RBC # BLD AUTO: 5.39 MILLION/UL (ref 3.88–5.62)
SODIUM SERPL-SCNC: 140 MMOL/L (ref 136–145)
WBC # BLD AUTO: 6.22 THOUSAND/UL (ref 4.31–10.16)

## 2022-04-14 PROCEDURE — 93005 ELECTROCARDIOGRAM TRACING: CPT

## 2022-04-14 PROCEDURE — 80053 COMPREHEN METABOLIC PANEL: CPT

## 2022-04-14 PROCEDURE — 85027 COMPLETE CBC AUTOMATED: CPT

## 2022-04-14 PROCEDURE — 36415 COLL VENOUS BLD VENIPUNCTURE: CPT

## 2022-04-17 LAB
ATRIAL RATE: 54 BPM
P AXIS: 60 DEGREES
PR INTERVAL: 172 MS
QRS AXIS: 4 DEGREES
QRSD INTERVAL: 82 MS
QT INTERVAL: 412 MS
QTC INTERVAL: 390 MS
T WAVE AXIS: 17 DEGREES
VENTRICULAR RATE: 54 BPM

## 2022-04-17 PROCEDURE — 93010 ELECTROCARDIOGRAM REPORT: CPT | Performed by: INTERNAL MEDICINE

## 2022-04-28 NOTE — PRE-PROCEDURE INSTRUCTIONS
Pre-Surgery Instructions:   Medication Instructions    aspirin 81 MG tablet Stop taking 7 days prior to surgery   atenolol (TENORMIN) 50 mg tablet Take day of surgery   Calcium-Magnesium-Vitamin D (CALCIUM 1200+D3 PO) Stop taking 7 days prior to surgery   cholecalciferol (VITAMIN D3) 1,000 units tablet Stop taking 7 days prior to surgery   levothyroxine 175 mcg tablet Take day of surgery   lovastatin (MEVACOR) 40 MG tablet Take night before surgery    multivitamin (THERAGRAN) TABS Stop taking 7 days prior to surgery   omeprazole (PriLOSEC) 20 mg delayed release capsule Hold day of surgery   sildenafil (VIAGRA) 50 MG tablet Hold day of surgery  Covid screening negative as per patient  Fully vaccinated  Reviewed showering and medication instructions  Patient verbalized understanding  Advised NPO after MN and ASC will call with scheduled surgical time

## 2022-05-04 ENCOUNTER — ANESTHESIA (OUTPATIENT)
Dept: PERIOP | Facility: HOSPITAL | Age: 72
End: 2022-05-04
Payer: MEDICARE

## 2022-05-04 ENCOUNTER — HOSPITAL ENCOUNTER (OUTPATIENT)
Facility: HOSPITAL | Age: 72
Setting detail: OUTPATIENT SURGERY
Discharge: HOME | End: 2022-05-05
Attending: SURGERY | Admitting: SURGERY
Payer: MEDICARE

## 2022-05-04 ENCOUNTER — ANESTHESIA EVENT (OUTPATIENT)
Dept: PERIOP | Facility: HOSPITAL | Age: 72
End: 2022-05-04
Payer: MEDICARE

## 2022-05-04 DIAGNOSIS — E86.0 DEHYDRATION: ICD-10-CM

## 2022-05-04 DIAGNOSIS — K42.9 UMBILICAL HERNIA WITHOUT OBSTRUCTION AND WITHOUT GANGRENE: ICD-10-CM

## 2022-05-04 DIAGNOSIS — K43.6 INCARCERATED VENTRAL HERNIA: Chronic | ICD-10-CM

## 2022-05-04 DIAGNOSIS — K42.0 INCARCERATED UMBILICAL HERNIA: Primary | ICD-10-CM

## 2022-05-04 PROCEDURE — 49653 PR LAP, VENTRAL HERNIA REPAIR,INCARCERATED: CPT | Performed by: SURGERY

## 2022-05-04 PROCEDURE — C1781 MESH (IMPLANTABLE): HCPCS | Performed by: SURGERY

## 2022-05-04 PROCEDURE — NC001 PR NO CHARGE: Performed by: PHYSICIAN ASSISTANT

## 2022-05-04 PROCEDURE — 49653 PR LAP, VENTRAL HERNIA REPAIR,INCARCERATED: CPT | Performed by: PHYSICIAN ASSISTANT

## 2022-05-04 DEVICE — VENTRALIGHT ST MESH
Type: IMPLANTABLE DEVICE | Site: UMBILICAL | Status: FUNCTIONAL
Brand: VENTRALIGHT ST

## 2022-05-04 DEVICE — FIXATION SECURESTRAP 5 MM ABSORB DISP: Type: IMPLANTABLE DEVICE | Site: ABDOMEN | Status: FUNCTIONAL

## 2022-05-04 RX ORDER — FENTANYL CITRATE/PF 50 MCG/ML
25 SYRINGE (ML) INJECTION
Status: DISCONTINUED | OUTPATIENT
Start: 2022-05-04 | End: 2022-05-04 | Stop reason: HOSPADM

## 2022-05-04 RX ORDER — HYDROMORPHONE HCL/PF 1 MG/ML
0.5 SYRINGE (ML) INJECTION
Status: DISCONTINUED | OUTPATIENT
Start: 2022-05-04 | End: 2022-05-04

## 2022-05-04 RX ORDER — SODIUM CHLORIDE, SODIUM LACTATE, POTASSIUM CHLORIDE, CALCIUM CHLORIDE 600; 310; 30; 20 MG/100ML; MG/100ML; MG/100ML; MG/100ML
100 INJECTION, SOLUTION INTRAVENOUS CONTINUOUS
Status: DISCONTINUED | OUTPATIENT
Start: 2022-05-04 | End: 2022-05-05 | Stop reason: HOSPADM

## 2022-05-04 RX ORDER — SODIUM CHLORIDE, SODIUM LACTATE, POTASSIUM CHLORIDE, CALCIUM CHLORIDE 600; 310; 30; 20 MG/100ML; MG/100ML; MG/100ML; MG/100ML
125 INJECTION, SOLUTION INTRAVENOUS
Status: COMPLETED | OUTPATIENT
Start: 2022-05-04 | End: 2022-05-04

## 2022-05-04 RX ORDER — HEPARIN SODIUM 5000 [USP'U]/ML
5000 INJECTION, SOLUTION INTRAVENOUS; SUBCUTANEOUS EVERY 8 HOURS SCHEDULED
Status: DISCONTINUED | OUTPATIENT
Start: 2022-05-04 | End: 2022-05-05 | Stop reason: HOSPADM

## 2022-05-04 RX ORDER — ACETAMINOPHEN AND CODEINE PHOSPHATE 300; 30 MG/1; MG/1
1 TABLET ORAL EVERY 6 HOURS PRN
Qty: 20 TABLET | Refills: 0 | Status: SHIPPED | OUTPATIENT
Start: 2022-05-04 | End: 2022-05-14

## 2022-05-04 RX ORDER — HYDROMORPHONE HCL/PF 1 MG/ML
0.5 SYRINGE (ML) INJECTION
Status: DISCONTINUED | OUTPATIENT
Start: 2022-05-04 | End: 2022-05-04 | Stop reason: HOSPADM

## 2022-05-04 RX ORDER — SCOLOPAMINE TRANSDERMAL SYSTEM 1 MG/1
1 PATCH, EXTENDED RELEASE TRANSDERMAL
Status: DISCONTINUED | OUTPATIENT
Start: 2022-05-04 | End: 2022-05-05 | Stop reason: HOSPADM

## 2022-05-04 RX ORDER — METOCLOPRAMIDE HYDROCHLORIDE 5 MG/ML
10 INJECTION INTRAMUSCULAR; INTRAVENOUS ONCE
Status: COMPLETED | OUTPATIENT
Start: 2022-05-04 | End: 2022-05-04

## 2022-05-04 RX ORDER — ONDANSETRON 2 MG/ML
4 INJECTION INTRAMUSCULAR; INTRAVENOUS EVERY 8 HOURS PRN
Status: DISCONTINUED | OUTPATIENT
Start: 2022-05-04 | End: 2022-05-04

## 2022-05-04 RX ORDER — CEFAZOLIN SODIUM 2 G/50ML
SOLUTION INTRAVENOUS AS NEEDED
Status: DISCONTINUED | OUTPATIENT
Start: 2022-05-04 | End: 2022-05-04

## 2022-05-04 RX ORDER — ONDANSETRON 2 MG/ML
4 INJECTION INTRAMUSCULAR; INTRAVENOUS EVERY 6 HOURS PRN
Status: DISCONTINUED | OUTPATIENT
Start: 2022-05-04 | End: 2022-05-05 | Stop reason: HOSPADM

## 2022-05-04 RX ORDER — METOPROLOL TARTRATE 5 MG/5ML
5 INJECTION INTRAVENOUS EVERY 8 HOURS PRN
Status: DISCONTINUED | OUTPATIENT
Start: 2022-05-04 | End: 2022-05-05 | Stop reason: HOSPADM

## 2022-05-04 RX ORDER — DEXMEDETOMIDINE HYDROCHLORIDE 100 UG/ML
INJECTION, SOLUTION INTRAVENOUS AS NEEDED
Status: DISCONTINUED | OUTPATIENT
Start: 2022-05-04 | End: 2022-05-04

## 2022-05-04 RX ORDER — TRAMADOL HYDROCHLORIDE 50 MG/1
50 TABLET ORAL EVERY 6 HOURS PRN
Status: DISCONTINUED | OUTPATIENT
Start: 2022-05-04 | End: 2022-05-05 | Stop reason: HOSPADM

## 2022-05-04 RX ORDER — ATENOLOL 50 MG/1
50 TABLET ORAL DAILY
Status: DISCONTINUED | OUTPATIENT
Start: 2022-05-05 | End: 2022-05-05 | Stop reason: HOSPADM

## 2022-05-04 RX ORDER — METOCLOPRAMIDE HYDROCHLORIDE 5 MG/ML
10 INJECTION INTRAMUSCULAR; INTRAVENOUS EVERY 6 HOURS PRN
Status: DISCONTINUED | OUTPATIENT
Start: 2022-05-04 | End: 2022-05-05 | Stop reason: HOSPADM

## 2022-05-04 RX ORDER — ROCURONIUM BROMIDE 10 MG/ML
INJECTION, SOLUTION INTRAVENOUS AS NEEDED
Status: DISCONTINUED | OUTPATIENT
Start: 2022-05-04 | End: 2022-05-04

## 2022-05-04 RX ORDER — LEVOTHYROXINE SODIUM 175 UG/1
175 TABLET ORAL
Status: DISCONTINUED | OUTPATIENT
Start: 2022-05-05 | End: 2022-05-05 | Stop reason: HOSPADM

## 2022-05-04 RX ORDER — PROPOFOL 10 MG/ML
INJECTION, EMULSION INTRAVENOUS AS NEEDED
Status: DISCONTINUED | OUTPATIENT
Start: 2022-05-04 | End: 2022-05-04

## 2022-05-04 RX ORDER — ONDANSETRON 2 MG/ML
INJECTION INTRAMUSCULAR; INTRAVENOUS AS NEEDED
Status: DISCONTINUED | OUTPATIENT
Start: 2022-05-04 | End: 2022-05-04

## 2022-05-04 RX ORDER — BUPIVACAINE HYDROCHLORIDE 2.5 MG/ML
INJECTION, SOLUTION EPIDURAL; INFILTRATION; INTRACAUDAL AS NEEDED
Status: DISCONTINUED | OUTPATIENT
Start: 2022-05-04 | End: 2022-05-04 | Stop reason: HOSPADM

## 2022-05-04 RX ORDER — LIDOCAINE HYDROCHLORIDE 10 MG/ML
INJECTION, SOLUTION EPIDURAL; INFILTRATION; INTRACAUDAL; PERINEURAL AS NEEDED
Status: DISCONTINUED | OUTPATIENT
Start: 2022-05-04 | End: 2022-05-04

## 2022-05-04 RX ORDER — MAGNESIUM HYDROXIDE 1200 MG/15ML
LIQUID ORAL AS NEEDED
Status: DISCONTINUED | OUTPATIENT
Start: 2022-05-04 | End: 2022-05-04 | Stop reason: HOSPADM

## 2022-05-04 RX ORDER — CEFAZOLIN SODIUM 2 G/50ML
2000 SOLUTION INTRAVENOUS ONCE
Status: COMPLETED | OUTPATIENT
Start: 2022-05-04 | End: 2022-05-05

## 2022-05-04 RX ORDER — SODIUM CHLORIDE, SODIUM LACTATE, POTASSIUM CHLORIDE, CALCIUM CHLORIDE 600; 310; 30; 20 MG/100ML; MG/100ML; MG/100ML; MG/100ML
INJECTION, SOLUTION INTRAVENOUS CONTINUOUS PRN
Status: DISCONTINUED | OUTPATIENT
Start: 2022-05-04 | End: 2022-05-04

## 2022-05-04 RX ORDER — FENTANYL CITRATE 50 UG/ML
INJECTION, SOLUTION INTRAMUSCULAR; INTRAVENOUS AS NEEDED
Status: DISCONTINUED | OUTPATIENT
Start: 2022-05-04 | End: 2022-05-04

## 2022-05-04 RX ORDER — ONDANSETRON 2 MG/ML
4 INJECTION INTRAMUSCULAR; INTRAVENOUS ONCE AS NEEDED
Status: COMPLETED | OUTPATIENT
Start: 2022-05-04 | End: 2022-05-04

## 2022-05-04 RX ORDER — KETOROLAC TROMETHAMINE 30 MG/ML
15 INJECTION, SOLUTION INTRAMUSCULAR; INTRAVENOUS ONCE
Status: COMPLETED | OUTPATIENT
Start: 2022-05-04 | End: 2022-05-04

## 2022-05-04 RX ORDER — HEPARIN SODIUM 5000 [USP'U]/ML
5000 INJECTION, SOLUTION INTRAVENOUS; SUBCUTANEOUS ONCE
Status: COMPLETED | OUTPATIENT
Start: 2022-05-04 | End: 2022-05-04

## 2022-05-04 RX ORDER — ONDANSETRON 2 MG/ML
4 INJECTION INTRAMUSCULAR; INTRAVENOUS EVERY 6 HOURS PRN
Status: DISCONTINUED | OUTPATIENT
Start: 2022-05-04 | End: 2022-05-04

## 2022-05-04 RX ADMIN — FENTANYL CITRATE 25 MCG: 50 INJECTION, SOLUTION INTRAMUSCULAR; INTRAVENOUS at 12:35

## 2022-05-04 RX ADMIN — SODIUM CHLORIDE, SODIUM LACTATE, POTASSIUM CHLORIDE, AND CALCIUM CHLORIDE: .6; .31; .03; .02 INJECTION, SOLUTION INTRAVENOUS at 11:56

## 2022-05-04 RX ADMIN — LIDOCAINE HYDROCHLORIDE 50 MG: 10 INJECTION, SOLUTION EPIDURAL; INFILTRATION; INTRACAUDAL; PERINEURAL at 10:43

## 2022-05-04 RX ADMIN — FENTANYL CITRATE 100 MCG: 50 INJECTION, SOLUTION INTRAMUSCULAR; INTRAVENOUS at 11:24

## 2022-05-04 RX ADMIN — HYDROMORPHONE HYDROCHLORIDE 0.5 MG: 1 INJECTION, SOLUTION INTRAMUSCULAR; INTRAVENOUS; SUBCUTANEOUS at 13:38

## 2022-05-04 RX ADMIN — METOCLOPRAMIDE HYDROCHLORIDE 10 MG: 5 INJECTION INTRAMUSCULAR; INTRAVENOUS at 16:53

## 2022-05-04 RX ADMIN — HYDROMORPHONE HYDROCHLORIDE 0.5 MG: 1 INJECTION, SOLUTION INTRAMUSCULAR; INTRAVENOUS; SUBCUTANEOUS at 13:16

## 2022-05-04 RX ADMIN — SODIUM CHLORIDE, SODIUM LACTATE, POTASSIUM CHLORIDE, AND CALCIUM CHLORIDE 125 ML/HR: .6; .31; .03; .02 INJECTION, SOLUTION INTRAVENOUS at 09:06

## 2022-05-04 RX ADMIN — ROCURONIUM BROMIDE 50 MG: 10 INJECTION, SOLUTION INTRAVENOUS at 10:43

## 2022-05-04 RX ADMIN — SODIUM CHLORIDE, SODIUM LACTATE, POTASSIUM CHLORIDE, AND CALCIUM CHLORIDE: .6; .31; .03; .02 INJECTION, SOLUTION INTRAVENOUS at 10:37

## 2022-05-04 RX ADMIN — HEPARIN SODIUM 5000 UNITS: 5000 INJECTION INTRAVENOUS; SUBCUTANEOUS at 09:05

## 2022-05-04 RX ADMIN — PROPOFOL 200 MG: 10 INJECTION, EMULSION INTRAVENOUS at 10:43

## 2022-05-04 RX ADMIN — FENTANYL CITRATE 25 MCG: 50 INJECTION, SOLUTION INTRAMUSCULAR; INTRAVENOUS at 12:56

## 2022-05-04 RX ADMIN — DEXMEDETOMIDINE HCL 8 MCG: 100 INJECTION INTRAVENOUS at 10:39

## 2022-05-04 RX ADMIN — ROCURONIUM BROMIDE 10 MG: 10 INJECTION, SOLUTION INTRAVENOUS at 11:56

## 2022-05-04 RX ADMIN — HYDROMORPHONE HYDROCHLORIDE 0.5 MG: 1 INJECTION, SOLUTION INTRAMUSCULAR; INTRAVENOUS; SUBCUTANEOUS at 13:30

## 2022-05-04 RX ADMIN — CEFAZOLIN SODIUM 2000 MG: 2 SOLUTION INTRAVENOUS at 10:38

## 2022-05-04 RX ADMIN — SODIUM CHLORIDE, SODIUM LACTATE, POTASSIUM CHLORIDE, AND CALCIUM CHLORIDE 100 ML/HR: .6; .31; .03; .02 INJECTION, SOLUTION INTRAVENOUS at 20:01

## 2022-05-04 RX ADMIN — CEFAZOLIN SODIUM 2000 MG: 2 SOLUTION INTRAVENOUS at 10:16

## 2022-05-04 RX ADMIN — ONDANSETRON 4 MG: 2 INJECTION INTRAMUSCULAR; INTRAVENOUS at 12:06

## 2022-05-04 RX ADMIN — KETOROLAC TROMETHAMINE 15 MG: 30 INJECTION, SOLUTION INTRAMUSCULAR at 20:02

## 2022-05-04 RX ADMIN — SCOPALAMINE 1 PATCH: 1 PATCH, EXTENDED RELEASE TRANSDERMAL at 10:07

## 2022-05-04 RX ADMIN — SUGAMMADEX 200 MG: 100 INJECTION, SOLUTION INTRAVENOUS at 12:10

## 2022-05-04 RX ADMIN — FENTANYL CITRATE 25 MCG: 50 INJECTION, SOLUTION INTRAMUSCULAR; INTRAVENOUS at 12:45

## 2022-05-04 RX ADMIN — FENTANYL CITRATE 50 MCG: 50 INJECTION, SOLUTION INTRAMUSCULAR; INTRAVENOUS at 10:50

## 2022-05-04 RX ADMIN — ROCURONIUM BROMIDE 10 MG: 10 INJECTION, SOLUTION INTRAVENOUS at 11:24

## 2022-05-04 RX ADMIN — FENTANYL CITRATE 25 MCG: 50 INJECTION, SOLUTION INTRAMUSCULAR; INTRAVENOUS at 12:52

## 2022-05-04 RX ADMIN — FENTANYL CITRATE 50 MCG: 50 INJECTION, SOLUTION INTRAMUSCULAR; INTRAVENOUS at 10:43

## 2022-05-04 RX ADMIN — ONDANSETRON 4 MG: 2 INJECTION INTRAMUSCULAR; INTRAVENOUS at 13:51

## 2022-05-04 NOTE — DISCHARGE INSTRUCTIONS
SURGERY INSTRUCTIONS    No diet restriction for this surgery  May shower every day starting tomorrow  Remove plastic dressings in 3 days  Remove strips in 7 days  Nothing should be covering incisions 7 days after surgery  Call office to make an appointment in 2 weeks [phone]  Call office with any issues regarding the surgery  No driving, heavy lifting or strenuous exercise for one week  Resume home medications starting today  Resume blood thinners starting tomorrow  (Aspirin, Coumadin, Eliquis, Xarelto)  Apply ice to the incisions  10 minutes every hour for 2 days  May take Tylenol 3,regular Tylenol or ibuprofen for pain  Alternating narcotics with ibuprofen or Advil leave will have better pain control    May take Colace for constipation  If you experience urinary retention, please contact our office to be treated  After LAPAROSCOPIC surgery you may experience shoulder pain that usually resolves in 2-3 days or taking plain Tylenol  Please let us know how we did, fill out survey that you may receive via email or regular mail, thank you!!

## 2022-05-04 NOTE — ANESTHESIA PREPROCEDURE EVALUATION
Procedure: HERNIA REPAIR UMBILICAL LAPAROSCOPIC WITH MESH (N/A Abdomen)    Relevant Problems   CARDIO   (+) Benign essential hypertension   (+) Hyperlipidemia      ENDO   (+) Postoperative hypothyroidism      GI/HEPATIC   (+) GERD without esophagitis      /RENAL   (+) BPH (benign prostatic hyperplasia)      NEURO/PSYCH   (+) Encounter for follow-up examination after completed treatment for malignant neoplasm   (+) History of thyroid cancer   (+) Personal history of malignant melanoma        Physical Exam    Airway    Mallampati score: I  TM Distance: >3 FB  Neck ROM: full     Dental   No notable dental hx     Cardiovascular  Rhythm: regular, Rate: normal, Cardiovascular exam normal    Pulmonary  Pulmonary exam normal Breath sounds clear to auscultation,     Other Findings        Anesthesia Plan  ASA Score- 2     Anesthesia Type- general with ASA Monitors  Additional Monitors:   Airway Plan: ETT  Plan Factors-Exercise tolerance (METS): >4 METS  Chart reviewed  EKG reviewed  Imaging results reviewed  Existing labs reviewed  Patient summary reviewed  Patient is not a current smoker  Induction- intravenous  Postoperative Plan- Plan for postoperative opioid use  Informed Consent- Anesthetic plan and risks discussed with patient and spouse  I personally reviewed this patient with the CRNA  Discussed and agreed on the Anesthesia Plan with the CRNA  Ector Baltazar

## 2022-05-04 NOTE — QUICK NOTE
Patient POD0 umbilical hernia repair  Seen in PACU  He has had persistent nausea despite Zofran and Reglan and scopolamine patch  Plan to admit overnight for observation, IV fluids, antiemetics, and pain control  Tentative discharge 5/5/22 if patient improved and meeting discharge criteria       Darral Nissen, PA-C  5/4/2022

## 2022-05-04 NOTE — ANESTHESIA POSTPROCEDURE EVALUATION
Post-Op Assessment Note    CV Status:  Stable  Pain Score: 1    Pain management: adequate     Mental Status:  Alert and awake   Hydration Status:  Euvolemic   PONV Controlled:  Controlled   Airway Patency:  Patent      Post Op Vitals Reviewed: Yes      Staff: CRNA         No complications documented      BP   136/67   Temp   97   Pulse  78   Resp   14   SpO2   99

## 2022-05-04 NOTE — OP NOTE
OPERATIVE REPORT  PATIENT NAME: Glenn Thayer    :  1950  MRN: [id number]  Pt Location: [facility] ROOM 02    SURGERY DATE: 2022    Surgeon(s) and Role:     Rocío Horn MD - Primary     * Dali Quinteros PA-C - [place] - Assisting    Preop Diagnosis:  Umbilical hernia without obstruction and without gangrene [K42 9]    Post-Op Diagnosis Codes:  Incarcerated ventral hernia  Incarcerated umbilical hernia       Procedure(s) (LRB):  HERNIA REPAIR UMBILICAL LAPAROSCOPIC WITH MESH (N/A)    Specimen(s):  None    Estimated Blood Loss:   Minimal    Drains:  [REMOVED] NG/OG/Enteral Tube Orogastric 12 Fr Center mouth (Removed)   Number of days: 0   None    Anesthesia Type:   General    Operative Indications:  Umbilical hernia without obstruction and without gangrene [K42 9]    Operative Findings: There was incarceration of omentum within the ventral hernia which was approximately 2 cm above the umbilicus  The defect measured 2 5 cm  There was incarceration of preperitoneal fat the umbilical hernia, defect measuring 2 5 cm  Both defects repair with primary approximation with 2-0 the lock and mesh  The rest of the abdominal cavity showed no evidence of inflammatory or neoplastic process  Complications:   None    Procedure and Technique:  The patient was identified in the patient was placed in the operating table in a supine position  After adequate anesthesia induction and satisfactory endotracheal intubation the abdomen was prepped and draped in sterile usual fashion with ChloraPrep  Time-out was called the patient was identified as was surgical site  An incision was made on the right upper quadrant with scalpel, 5 mm trocar was introduced under direct vision with the help of the VC port  The abdomen was insufflated with CO2, after obtaining adequate pneumoperitoneum the scope was advanced and exploration was performed with above findings    11 mm trocar and 5 mm trocar were placed on the right side of the abdomen and 5 mm trocar in the left side of the abdomen under direct vision  The omentum was gently pulled and the hernia contents were reduced  At this point the patient had 2 defects, each measuring 2 5 cm and about 2 cm distance between each other  The hernia sac were dissected using cautery and hook  Once the hernia sacs were completely removed umbilical and ventral hernia were approximated using 2-0 be locked in a continuous fashion  11 5 cm Ventralight mesh was placed in the abdomen, secured at the 12 and 6 o'clock position with 0 Nurolon in a trans fashion U-stitch fashion  The rest of the mesh was tacked up to the anterior abdominal wall using the secure strap  CO2 was evacuated  Ports were removed under direct vision without evidence of bleeding from the abdominal wall  12 mm trocar site fascia was closed with 0 Vicryl in an interrupted figure-of-eight fashion  Subcutaneous tissue was infiltrated with 0 5% Marcaine the skin was closed with 4-0 Vicryl in an interrupted subcuticular fashion  At the end of the case instrument, needles, sponges counts were correct  Patient tolerated the procedure well       I was present for the entire procedure, A qualified resident physician was not available and A physician assistant was required during the procedure for retraction tissue handling,dissection and suturing    Patient Disposition:  PACU , hemodynamically stable and extubated and stable      SIGNATURE: Yasmin Whitaker MD  DATE: May 4, 2022  TIME: 12:13 PM

## 2022-05-05 VITALS
HEART RATE: 57 BPM | WEIGHT: 214.51 LBS | DIASTOLIC BLOOD PRESSURE: 63 MMHG | SYSTOLIC BLOOD PRESSURE: 119 MMHG | TEMPERATURE: 98.6 F | RESPIRATION RATE: 16 BRPM | HEIGHT: 70 IN | OXYGEN SATURATION: 94 % | BODY MASS INDEX: 30.71 KG/M2

## 2022-05-05 PROBLEM — K42.0 INCARCERATED UMBILICAL HERNIA: Status: RESOLVED | Noted: 2022-05-04 | Resolved: 2022-05-05

## 2022-05-05 PROBLEM — K43.6 INCARCERATED VENTRAL HERNIA: Chronic | Status: RESOLVED | Noted: 2022-05-04 | Resolved: 2022-05-05

## 2022-05-05 PROCEDURE — NC001 PR NO CHARGE: Performed by: PHYSICIAN ASSISTANT

## 2022-05-05 PROCEDURE — 99024 POSTOP FOLLOW-UP VISIT: CPT | Performed by: PHYSICIAN ASSISTANT

## 2022-05-05 RX ADMIN — TRAMADOL HYDROCHLORIDE 50 MG: 50 TABLET, COATED ORAL at 07:51

## 2022-05-05 RX ADMIN — ATENOLOL 50 MG: 50 TABLET ORAL at 08:34

## 2022-05-05 RX ADMIN — LEVOTHYROXINE SODIUM 175 MCG: 175 TABLET ORAL at 05:19

## 2022-05-05 RX ADMIN — HEPARIN SODIUM 5000 UNITS: 5000 INJECTION INTRAVENOUS; SUBCUTANEOUS at 05:19

## 2022-05-05 RX ADMIN — SODIUM CHLORIDE, SODIUM LACTATE, POTASSIUM CHLORIDE, AND CALCIUM CHLORIDE 100 ML/HR: .6; .31; .03; .02 INJECTION, SOLUTION INTRAVENOUS at 05:33

## 2022-05-05 RX ADMIN — MORPHINE SULFATE 2 MG: 2 INJECTION, SOLUTION INTRAMUSCULAR; INTRAVENOUS at 11:01

## 2022-05-05 NOTE — DISCHARGE SUMMARY
Discharge Summary - General Surgery   Sharlee Closs 81 y o  male MRN: [id number]  Unit/Bed#: -01 Encounter: [id number]    Admission Date: 5/4/2022     Discharge Date: 5/5/2022    Admitting Diagnosis: Umbilical hernia without obstruction and without gangrene [K42 9]    Discharge Diagnosis:   Incarcerated ventral hernia  Incarcerated umbilical hernia       Attending and Service: Thu Wolf MD    Consulting Physician(s): none    Procedures Performed: laparoscopic umbilical hernia repair with mesh    Imaging:  No results found  Hospital Course:   Sharlee Closs is a 81 y o  male who presented 5/4/2022 for planned elective umbilical hernia repair  The patient was taken to the operating room on 5/4/22  Intraoperative findings included: "There was incarceration of omentum within the ventral hernia which was approximately 2 cm above the umbilicus  The defect measured 2 5 cm  There was incarceration of preperitoneal fat the umbilical hernia, defect measuring 2 5 cm  Both defects repair with primary approximation with 2-0 the lock and mesh  The rest of the abdominal cavity showed no evidence of inflammatory or neoplastic process " The patient was kept overnight due to persistent nausea in the post-op period which resolved by the morning of discharge  Patient was discharged on POD#1  On the day of discharge, the patient was voiding spontaneously, ambulating at baseline, tolerating a regular diet, and pain was well controlled  The patient was sent home with medication for pain  He understood all instructions for discharge  He was also given the names and numbers of the providers as well as instructions for follow up appointments  Condition at Discharge: good     Discharge instructions/Information to patient and family:   See after visit summary for information provided to patient and family        Provisions for Follow-Up Care:  See after visit summary for information related to follow-up care and any pertinent home health orders  Disposition: Home    Planned Readmission: No    Discharge Statement   I spent 30 minutes discharging the patient  This time was spent on the day of discharge  I had direct contact with the patient on the day of discharge  Additional documentation is required if more than 30 minutes were spent on discharge  Discharge Medications:  See after visit summary for reconciled discharge medications provided to patient and family      Bentley Ralph PA-C  5/5/2022

## 2022-05-05 NOTE — PLAN OF CARE
Problem: PAIN - ADULT  Goal: Verbalizes/displays adequate comfort level or baseline comfort level  Description: Interventions:  - Encourage patient to monitor pain and request assistance  - Assess pain using appropriate pain scale  - Administer analgesics based on type and severity of pain and evaluate response  - Implement non-pharmacological measures as appropriate and evaluate response  - Consider cultural and social influences on pain and pain management  - Notify physician/advanced practitioner if interventions unsuccessful or patient reports new pain  Outcome: Progressing     Problem: INFECTION - ADULT  Goal: Absence or prevention of progression during hospitalization  Description: INTERVENTIONS:  - Assess and monitor for signs and symptoms of infection  - Monitor lab/diagnostic results  - Monitor all insertion sites, i e  indwelling lines, tubes, and drains  - Monitor endotracheal if appropriate and nasal secretions for changes in amount and color  - Frederick appropriate cooling/warming therapies per order  - Administer medications as ordered  - Instruct and encourage patient and family to use good hand hygiene technique  - Identify and instruct in appropriate isolation precautions for identified infection/condition  Outcome: Progressing  Goal: Absence of fever/infection during neutropenic period  Description: INTERVENTIONS:  - Monitor WBC    Outcome: Progressing     Problem: SAFETY ADULT  Goal: Patient will remain free of falls  Description: INTERVENTIONS:  - Educate patient/family on patient safety including physical limitations  - Instruct patient to call for assistance with activity   - Consult OT/PT to assist with strengthening/mobility   - Keep Call bell within reach  - Keep bed low and locked with side rails adjusted as appropriate  - Keep care items and personal belongings within reach  - Initiate and maintain comfort rounds  - Make Fall Risk Sign visible to staff  - Offer Toileting every 2 Hours, in advance of need  - Initiate/Maintain bed alarm  - Obtain necessary fall risk management equipment:   - Apply yellow socks and bracelet for high fall risk patients  - Consider moving patient to room near nurses station  Outcome: Progressing     Problem: DISCHARGE PLANNING  Goal: Discharge to home or other facility with appropriate resources  Description: INTERVENTIONS:  - Identify barriers to discharge w/patient and caregiver  - Arrange for needed discharge resources and transportation as appropriate  - Identify discharge learning needs (meds, wound care, etc )  - Arrange for interpretive services to assist at discharge as needed  - Refer to Case Management Department for coordinating discharge planning if the patient needs post-hospital services based on physician/advanced practitioner order or complex needs related to functional status, cognitive ability, or social support system  Outcome: Progressing     Problem: Knowledge Deficit  Goal: Patient/family/caregiver demonstrates understanding of disease process, treatment plan, medications, and discharge instructions  Description: Complete learning assessment and assess knowledge base    Interventions:  - Provide teaching at level of understanding  - Provide teaching via preferred learning methods  Outcome: Progressing no

## 2022-05-05 NOTE — PROGRESS NOTES
Progress Note - General Surgery   Emile Boyer 81 y o  male MRN: [id number]  Unit/Bed#: -01 Encounter: [id number]    Assessment/Plan    80 y/o male with incarcerated ventral and umbilical hernias, POD#1 s/p laparoscopic hernia repair with mesh  AFVSS on RA  No labs to review  Pt doing well clinically, reports feeling much better with no N/V today  Appropriate incisional tenderness on exam, incisions CDI  +BF  I: 3L  O: 630cc    Discharge to home today  Tylenol #3 as needed  Follow up with Dr Mindy Pitts scheduled for 5/17 - pt may need to reschedule and is aware to call the office to do so, phone number provided in discharge instructions      /63   Pulse 57   Temp 98 6 °F (37 °C)   Resp 16   Ht 5' 10" (1 778 m)   Wt 97 3 kg (214 lb 8 1 oz)   SpO2 94%   BMI 30 78 kg/m²         Intake/Output Summary (Last 24 hours) at 5/5/2022 0921  Last data filed at 5/5/2022 0533  Gross per 24 hour   Intake 3083 33 ml   Output 630 ml   Net 2453 33 ml           Subjective/Objective     Subjective: Pt seen and examined  He stayed overnight due to persistent nausea, but reports that now his nausea has subsided and he is feeling well  Reporting abdominal tenderness on palpation and with movement  Ambulating without difficulty  +BF  Denies CP, SOB, N/V, calf tenderness, fever/chills  Review of Systems   Constitutional: Negative for chills and fever  Respiratory: Negative for cough and shortness of breath  Cardiovascular: Negative for chest pain and leg swelling  Gastrointestinal: Positive for abdominal pain (incisional)  Negative for abdominal distention, constipation, diarrhea, nausea and vomiting  Genitourinary: Negative for difficulty urinating and dysuria  Neurological: Negative for dizziness and headaches  Objective:     Physical Exam  Vitals reviewed  Constitutional:       General: He is not in acute distress  Appearance: Normal appearance  He is not toxic-appearing     HENT: Head: Normocephalic and atraumatic  Eyes:      Extraocular Movements: Extraocular movements intact  Conjunctiva/sclera: Conjunctivae normal    Cardiovascular:      Rate and Rhythm: Normal rate and regular rhythm  Heart sounds: Normal heart sounds  No murmur heard  No friction rub  No gallop  Pulmonary:      Effort: Pulmonary effort is normal  No respiratory distress  Breath sounds: Normal breath sounds  No stridor  No wheezing, rhonchi or rales  Abdominal:      General: Bowel sounds are normal  There is no distension  Palpations: Abdomen is soft  Tenderness: There is abdominal tenderness (incisional)  There is no guarding  Comments: Incisions CDI   Musculoskeletal:         General: Normal range of motion  Cervical back: Normal range of motion  Right lower leg: No edema  Left lower leg: No edema  Skin:     General: Skin is warm and dry  Neurological:      Mental Status: He is alert and oriented to person, place, and time  Psychiatric:         Mood and Affect: Mood normal          Behavior: Behavior normal          Thought Content:  Thought content normal             Min Blake PA-C  5/5/2022

## 2022-05-27 ENCOUNTER — APPOINTMENT (OUTPATIENT)
Dept: LAB | Facility: HOSPITAL | Age: 72
End: 2022-05-27
Payer: MEDICARE

## 2022-05-27 ENCOUNTER — OFFICE VISIT (OUTPATIENT)
Dept: SURGERY | Facility: CLINIC | Age: 72
End: 2022-05-27

## 2022-05-27 VITALS
OXYGEN SATURATION: 99 % | WEIGHT: 214 LBS | SYSTOLIC BLOOD PRESSURE: 124 MMHG | DIASTOLIC BLOOD PRESSURE: 88 MMHG | HEIGHT: 70 IN | BODY MASS INDEX: 30.64 KG/M2 | HEART RATE: 66 BPM

## 2022-05-27 DIAGNOSIS — E89.0 POSTOPERATIVE HYPOTHYROIDISM: ICD-10-CM

## 2022-05-27 DIAGNOSIS — N52.9 ERECTILE DYSFUNCTION, UNSPECIFIED ERECTILE DYSFUNCTION TYPE: ICD-10-CM

## 2022-05-27 DIAGNOSIS — Z48.89 POSTOPERATIVE VISIT: ICD-10-CM

## 2022-05-27 DIAGNOSIS — R53.83 FATIGUE, UNSPECIFIED TYPE: ICD-10-CM

## 2022-05-27 DIAGNOSIS — K42.9 UMBILICAL HERNIA WITHOUT OBSTRUCTION AND WITHOUT GANGRENE: Primary | ICD-10-CM

## 2022-05-27 LAB
T4 FREE SERPL-MCNC: 1.44 NG/DL (ref 0.76–1.46)
TSH SERPL DL<=0.05 MIU/L-ACNC: 0.17 UIU/ML (ref 0.45–4.5)

## 2022-05-27 PROCEDURE — 84439 ASSAY OF FREE THYROXINE: CPT

## 2022-05-27 PROCEDURE — 36415 COLL VENOUS BLD VENIPUNCTURE: CPT

## 2022-05-27 PROCEDURE — 84443 ASSAY THYROID STIM HORMONE: CPT

## 2022-05-27 PROCEDURE — 99024 POSTOP FOLLOW-UP VISIT: CPT | Performed by: SURGERY

## 2022-05-27 NOTE — PROGRESS NOTES
Post-Op Follow Up- General Surgery   Svetlana Parker 81 y o  male MRN: [id number]  Unit/Bed#:  Encounter: [id number]    Assessment/Plan     Assessment:  Status post laparoscopic umbilical and ventral hernia repair with mesh, improved  Plan:  Patient is doing quite well from the surgical standpoint  He is discharged from my care and I will be glad to see him if any problem arises in the future  History of Present Illness     HPI:  Svetlana Parker is a 81 y o  male who presents to my office for 1st postop follow-up after laparoscopic umbilical and ventral hernia repair with mesh from May 4th  The patient stated doing well, tolerating diet and having regular bowel movement  The patient denies having any fever, chills, nausea, vomiting, diarrhea, constipation or abdominal pain  Historical Information   Past Medical History:   Diagnosis Date    Arthritis     Cancer ([address] )     melanoma abdomen, thyroid    Disease of thyroid gland     GERD (gastroesophageal reflux disease)     diet controlled    Hyperlipidemia     Last Assessed:10/20/14    Hypertension     PONV (postoperative nausea and vomiting)      Past Surgical History:   Procedure Laterality Date    COLONOSCOPY      HERNIA REPAIR Right     Last Assessed:10/20/14 inguinal     LYMPH NODE BIOPSY N/A 12/14/2018    Procedure: SENTINEL LYMPH NODE BIOPSY; LYMPHOSCINTIGRAPHY; INTRAOPERATIVE LYMPHATIC MAPPING (INJECT AT 1100);   Surgeon: Denver Romance, MD;  [facility];  Service: Surgical Oncology    SKIN LESION EXCISION N/A 12/14/2018    Procedure: MEDIAL UPPER ABDOMEN MELANOMA WIDE EXCISION; BILATERAL AXILLA SLN;  Surgeon: Denver Romance, MD;  [facility];  Service: Surgical Oncology    THYROID LOBECTOMY Left 04/24/2019    Procedure: HEMITHYROIDECTOMY ISTHMUSECTOMY;  Surgeon: Denver Romance, MD;  [facility];  Service: Surgical Oncology    TONSILLECTOMY      UMBILICAL HERNIA REPAIR LAPAROSCOPIC  05/04/2022    laparoscopic hernia repair w/mesh - Dr Yuridia Rollins N/A 5/4/2022    Procedure:  HERNIA REPAIR UMBILICAL LAPAROSCOPIC WITH MESH;  Surgeon: Michel Morgan MD;  [facility];  Service: General    US GUIDED THYROID BIOPSY  02/07/2019    US GUIDED THYROID BIOPSY  01/14/2021    WISDOM TOOTH EXTRACTION       Social History   Social History     Substance and Sexual Activity   Alcohol Use Yes    Alcohol/week: 2 0 standard drinks    Types: 1 Glasses of wine, 1 Cans of beer per week    Comment: Occasionally     Social History     Substance and Sexual Activity   Drug Use Never     Social History     Tobacco Use   Smoking Status Never Smoker   Smokeless Tobacco Never Used     Family History: non-contributory    Meds/Allergies   all medications and allergies reviewed     Current Outpatient Medications:     aspirin 81 MG tablet, Take 81 mg by mouth daily , Disp: , Rfl:     atenolol (TENORMIN) 50 mg tablet, TAKE ONE TABLET BY MOUTH EVERY DAY, Disp: 90 tablet, Rfl: 1    Calcium-Magnesium-Vitamin D (CALCIUM 1200+D3 PO), , Disp: , Rfl:     cholecalciferol (VITAMIN D3) 1,000 units tablet, Take 1,000 Units by mouth daily, Disp: , Rfl:     levothyroxine 175 mcg tablet, TAKE ONE TABLET BY MOUTH EVERY DAY ON AN EMPTY STOMACH (Patient taking differently: TAKE ONE TABLET BY MOUTH EVERY DAY ON AN EMPTY STOMACH-6x per week), Disp: 90 tablet, Rfl: 2    lovastatin (MEVACOR) 40 MG tablet, TAKE ONE TABLET BY MOUTH EVERY DAY AT BEDTIME, Disp: 90 tablet, Rfl: 2    multivitamin (THERAGRAN) TABS, Take 1 tablet by mouth daily at bedtime , Disp: , Rfl:     omeprazole (PriLOSEC) 20 mg delayed release capsule, Take 1 capsule (20 mg total) by mouth daily before breakfast (Patient taking differently: Take 20 mg by mouth daily), Disp: 30 capsule, Rfl: 5    sildenafil (VIAGRA) 50 MG tablet, TAKE ONE TABLET BY MOUTH EVERY DAY AS NEEDED FOR ERECTILE DYSFUNCTION, Disp: 30 tablet, Rfl: 3  No Known Allergies    Objective     Current Vitals:   Blood Pressure: 124/88 (05/27/22 1049)  Pulse: 66 (05/27/22 1049)  Height: 5' 10" (177 8 cm) (05/27/22 1049)  Weight - Scale: 97 1 kg (214 lb) (05/27/22 1049)  SpO2: 99 % (05/27/22 1049)    Physical Exam  Vitals and nursing note reviewed  Abdominal:      Comments: Abdomen is soft, nondistended and nontender  Incisions are well-healed without evidence of infection  There is no evidence of recurrence of the hernia, small seroma above the umbilicus, patient was reassured this will resolve in the next couple months

## 2022-05-31 DIAGNOSIS — E78.49 OTHER HYPERLIPIDEMIA: ICD-10-CM

## 2022-05-31 RX ORDER — LOVASTATIN 40 MG/1
TABLET ORAL
Qty: 90 TABLET | Refills: 2 | Status: SHIPPED | OUTPATIENT
Start: 2022-05-31

## 2022-06-03 DIAGNOSIS — C73 FOLLICULAR THYROID CANCER (HCC): ICD-10-CM

## 2022-06-03 DIAGNOSIS — C73 THYROID CANCER (HCC): ICD-10-CM

## 2022-06-03 DIAGNOSIS — C73 PAPILLARY THYROID CARCINOMA (HCC): Primary | ICD-10-CM

## 2022-06-03 DIAGNOSIS — E89.0 POSTOPERATIVE HYPOTHYROIDISM: ICD-10-CM

## 2022-08-15 ENCOUNTER — TELEPHONE (OUTPATIENT)
Dept: SURGICAL ONCOLOGY | Facility: CLINIC | Age: 72
End: 2022-08-15

## 2022-08-15 NOTE — TELEPHONE ENCOUNTER
----- Message from Abel Brown RN sent at 8/15/2022  2:06 PM EDT -----  Patient did not get US done for appt 8/18   Please helo him reschedule and change appt

## 2022-08-15 NOTE — TELEPHONE ENCOUNTER
Called and spoke with Cristo Dominguez  Rescheduled US to 8/22 11am at [facility]  Rescheduled appt with Dr Mcgarry to 9/1 at 1:45pm at [facility] office  He was agreeable and will call if he needs to reschedule due to potential trips/ selling his house  Confirmed [facility] number

## 2022-08-22 ENCOUNTER — HOSPITAL ENCOUNTER (OUTPATIENT)
Dept: ULTRASOUND IMAGING | Facility: HOSPITAL | Age: 72
Discharge: HOME | End: 2022-08-22
Payer: MEDICARE

## 2022-08-22 DIAGNOSIS — Z85.820 PERSONAL HISTORY OF MALIGNANT MELANOMA: ICD-10-CM

## 2022-08-22 DIAGNOSIS — M79.89 OTHER SPECIFIED SOFT TISSUE DISORDERS: ICD-10-CM

## 2022-08-22 PROCEDURE — 76882 US LMTD JT/FCL EVL NVASC XTR: CPT

## 2022-09-01 ENCOUNTER — OFFICE VISIT (OUTPATIENT)
Dept: SURGICAL ONCOLOGY | Facility: CLINIC | Age: 72
End: 2022-09-01
Payer: MEDICARE

## 2022-09-01 VITALS
HEART RATE: 84 BPM | HEIGHT: 70 IN | BODY MASS INDEX: 29.78 KG/M2 | TEMPERATURE: 97.4 F | RESPIRATION RATE: 16 BRPM | DIASTOLIC BLOOD PRESSURE: 68 MMHG | SYSTOLIC BLOOD PRESSURE: 116 MMHG | OXYGEN SATURATION: 98 % | WEIGHT: 208 LBS

## 2022-09-01 DIAGNOSIS — Z85.820 PERSONAL HISTORY OF MALIGNANT MELANOMA: ICD-10-CM

## 2022-09-01 DIAGNOSIS — D48.1 NEOPLASM OF UNCERTAIN BEHAVIOR OF CONNECTIVE AND OTHER SOFT TISSUE: ICD-10-CM

## 2022-09-01 DIAGNOSIS — Z08 ENCOUNTER FOR FOLLOW-UP EXAMINATION AFTER COMPLETED TREATMENT FOR MALIGNANT NEOPLASM: Primary | ICD-10-CM

## 2022-09-01 DIAGNOSIS — Z85.850 HISTORY OF THYROID CANCER: ICD-10-CM

## 2022-09-01 PROCEDURE — 99214 OFFICE O/P EST MOD 30 MIN: CPT | Performed by: SURGERY

## 2022-09-01 NOTE — PROGRESS NOTES
Surgical Oncology Follow Up       [facility]  [facility]  [address] [facility] [address]  [address] [place] [id number]  [address] 43  1950  [id number]  [facility]  [facility]  [address] [facility] [address]  [address]  [phone]    Diagnoses and all orders for this visit:    Encounter for follow-up examination after completed treatment for malignant neoplasm    Personal history of malignant melanoma  -     US extremity soft tissue; Future    History of thyroid cancer    Neoplasm of uncertain behavior of connective and other soft tissue   -     US extremity soft tissue; Future        No chief complaint on file  Return in about 6 months (around 3/1/2023) for Office Visit, Imaging - See orders, with Karyn  Oncology History   Personal history of malignant melanoma   10/16/2018 Initial Diagnosis    Malignant melanoma of skin of abdomen (HCC)  1 1 mm Breslow thickness, no ulceration, mitotic rate 1 per mm squared     12/14/2018 Surgery    Wide excision of abdomen with sentinel lymph node biopsies (right and left axillae)  No residual melanoma  He did have 1/4 lymph nodes positive with 2 mm of focal invasion into the lymph node  No extracapsular extension  Positive lymph node was in right axilla      BRAF [id number] +     12/14/2018 -  Cancer Staged    Staging form: Melanoma of the Skin, AJCC 8th Edition  - Pathologic stage from 12/14/2018: Stage IIIA (pT2a, pN1a, cM0) - Signed by Suresh Centeno MD on 3/2/2022       History of thyroid cancer   4/24/2019 Initial Diagnosis    Papillary and follicular carcinoma ([facility] [address] )     4/24/2019 Surgery    Thyroid, left and isthmus, hemithyroidectomy:  -  Papillary carcinoma, follicular variant, infiltrative - [id number]  -  Follicular carcinoma, minimally invasive - [id number]     4/24/2019 -  Cancer Staged Staging form: Thyroid - Differentiated and Anaplastic, AJCC 8th Edition  - Pathologic stage from 4/24/2019: Stage II (pT3a, pNX, cM0, Age at diagnosis: >= 55 years) - Signed by Addis Rowan MD on 3/2/2022  Laterality: Left  Solitary (s) or multifocal (m) tumors in the primary site: Solitary  Lymph node metastasis: Unknown           Staging: [id number] melanoma of the abdomen  December 2018  Positive sentinel node in the right axilla     [id number] WCIMAFQUY invasive follicular carcinoma of the thyroid, April 2019     Treatment history:   Wide excision with sentinel lymph node biopsy December 2018  Left thyroid lobectomy, April 2019  Current treatment:   Observation for the melanoma  TSH suppression for the thyroid cancer  Disease status:   SYLVAIN    History of Present Illness:  Patient returns in follow-up of his melanoma and thyroid cancer  He is doing well at this time with no complaints  He denies any abdominal pain, nausea or vomiting  He does have chronic back pain that comes and goes  His appetite is good  No unintentional weight loss  No dysphagia or hoarseness  ultrasound from August 22, 2022 reveals a stable 1 8 x 0 8 x 1 7 cm right axillary lymph node  Neck ultrasound with lymph node mapping from February 2022 was negative  I personally reviewed his films  Review of Systems  Complete ROS Surg Onc:   Complete ROS Surg Onc:   Constitutional: The patient denies new or recent history of general fatigue, no recent weight loss, no change in appetite  Eyes: No complaints of visual problems, no scleral icterus  ENT: no complaints of ear pain, no hoarseness, no difficulty swallowing,  no tinnitus and no new masses in head, oral cavity, or neck  Cardiovascular: No complaints of chest pain, no palpitations, no ankle edema  Respiratory: No complaints of shortness of breath, no cough  Gastrointestinal: No complaints of jaundice, no bloody stools, no pale stools     Genitourinary: No complaints of dysuria, no hematuria, no nocturia, no frequent urination, no urethral discharge  Musculoskeletal: No complaints of weakness, paralysis, joint stiffness or arthralgias  Integumentary: No complaints of rash, no new lesions  Neurological: No complaints of convulsions, no seizures, no dizziness  Hematologic/Lymphatic: No complaints of easy bruising  Endocrine:  No hot or cold intolerance  No polydipsia, polyphagia, or polyuria  Allergy/immunology:  No environmental allergies  No food allergies  Not immunocompromised  Skin:  No pallor or rash  No wound          Patient Active Problem List   Diagnosis    Actinic keratosis    Benign essential hypertension    BPH (benign prostatic hyperplasia)    Erectile dysfunction    GERD without esophagitis    Hyperlipidemia    Impaired fasting glucose    Personal history of malignant melanoma    Liver mass    Other specified soft tissue disorders    Multiple thyroid nodules    Encounter for follow-up examination after completed treatment for malignant neoplasm    Hoarseness of voice    Vocal cord paralysis    Fatigue    Vitamin D deficiency    Postoperative hypothyroidism    History of thyroid cancer    Hernia of abdominal cavity    Follicular thyroid [facility] [address] [address] )    Papillary thyroid carcinoma ([facility] [address] )    Umbilical hernia without obstruction and without gangrene    Postoperative visit     Past Medical History:   Diagnosis Date    Arthritis     Cancer ([facility] [address] )     melanoma abdomen, thyroid    Disease of thyroid gland     GERD (gastroesophageal reflux disease)     diet controlled    Hyperlipidemia     Last Assessed:10/20/14    Hypertension     PONV (postoperative nausea and vomiting)      Past Surgical History:   Procedure Laterality Date    COLONOSCOPY      HERNIA REPAIR Right     Last Assessed:10/20/14 inguinal     LYMPH NODE BIOPSY N/A 12/14/2018    Procedure: SENTINEL LYMPH NODE BIOPSY; LYMPHOSCINTIGRAPHY; INTRAOPERATIVE LYMPHATIC MAPPING (INJECT AT 1100); Surgeon: Trice Rowan MD;  [facility];  Service: Surgical Oncology    SKIN LESION EXCISION N/A 12/14/2018    Procedure: MEDIAL UPPER ABDOMEN MELANOMA WIDE EXCISION; BILATERAL AXILLA SLN;  Surgeon: Trice Rowan MD;  [facility];  Service: Surgical Oncology    THYROID LOBECTOMY Left 04/24/2019    Procedure: HEMITHYROIDECTOMY ISTHMUSECTOMY;  Surgeon: Trice Rowan MD;  [facility];  Service: Surgical Oncology    TONSILLECTOMY      UMBILICAL HERNIA REPAIR LAPAROSCOPIC  05/04/2022    laparoscopic hernia repair w/mesh - Dr Sal Alcala N/A 5/4/2022    Procedure: HERNIA REPAIR UMBILICAL LAPAROSCOPIC WITH MESH;  Surgeon: Tahmina Wolf MD;  [facility];  Service: General    US GUIDED THYROID BIOPSY  02/07/2019    US GUIDED THYROID BIOPSY  01/14/2021    WISDOM TOOTH EXTRACTION       Family History   Problem Relation Age of Onset    Hypertension Mother     Hypothyroidism Mother     Hypertension Father     Skin cancer Father     Colon cancer Maternal Aunt     Colon cancer Paternal Aunt         62s    Skin cancer Paternal Uncle      Social History     Socioeconomic History    Marital status: /Civil Union     Spouse name: Not on file    Number of children: Not on file    Years of education: Not on file    Highest education level: Not on file   Occupational History    Not on file   Tobacco Use    Smoking status: Never Smoker    Smokeless tobacco: Never Used   Vaping Use    Vaping Use: Never used   Substance and Sexual Activity    Alcohol use:  Yes     Alcohol/week: 2 0 standard drinks     Types: 1 Glasses of wine, 1 Cans of beer per week     Comment: Occasionally    Drug use: Never    Sexual activity: Yes     Partners: Female   Other Topics Concern    Not on file   Social History Narrative    No advance directives     Social Determinants of Health     Financial Resource Strain: Not on file   Food Insecurity: Not on file   Transportation Needs: Not on file   Physical Activity: Not on file   Stress: Not on file   Social Connections: Not on file   Intimate Partner Violence: Not on file   Housing Stability: Not on file       Current Outpatient Medications:     aspirin 81 MG tablet, Take 81 mg by mouth daily , Disp: , Rfl:     atenolol (TENORMIN) 50 mg tablet, TAKE ONE TABLET BY MOUTH EVERY DAY, Disp: 90 tablet, Rfl: 1    Calcium-Magnesium-Vitamin D (CALCIUM 1200+D3 PO), , Disp: , Rfl:     cholecalciferol (VITAMIN D3) 1,000 units tablet, Take 1,000 Units by mouth daily, Disp: , Rfl:     levothyroxine 175 mcg tablet, Take 1 tablet orally on an empty stomach 6 days of the week, Disp: 90 tablet, Rfl: 2    lovastatin (MEVACOR) 40 MG tablet, TAKE ONE TABLET BY MOUTH EVERY DAY AT BEDTIME, Disp: 90 tablet, Rfl: 2    multivitamin (THERAGRAN) TABS, Take 1 tablet by mouth daily at bedtime , Disp: , Rfl:     omeprazole (PriLOSEC) 20 mg delayed release capsule, Take 1 capsule (20 mg total) by mouth daily before breakfast (Patient taking differently: Take 20 mg by mouth daily), Disp: 30 capsule, Rfl: 5    sildenafil (VIAGRA) 50 MG tablet, TAKE ONE TABLET BY MOUTH EVERY DAY AS NEEDED FOR ERECTILE DYSFUNCTION, Disp: 30 tablet, Rfl: 3  No Known Allergies  Vitals:    09/01/22 1349   BP: 116/68   Pulse: 84   Resp: 16   Temp: (!) 97 4 °F (36 3 °C)   SpO2: 98%       Physical Exam  Constitutional: General appearance: The Patient is well-developed and well-nourished who appears the stated age in no acute distress  Patient is pleasant and talkative  HEENT:  Normocephalic  Sclerae are anicteric  Mucous membranes are moist  Neck is supple without adenopathy  No JVD  I do not appreciate any new or recurrent neck masses  Chest: The lungs are clear to auscultation  Cardiac: Heart is regular rate  Abdomen: Abdomen is soft, non-tender, non-distended and without masses  Extremities:  There is no clubbing or cyanosis  There is no edema  Symmetric  Neuro: Grossly nonfocal  Gait is normal      Lymphatic: No evidence of cervical adenopathy bilaterally  No evidence of axillary adenopathy bilaterally  No evidence of inguinal adenopathy bilaterally  Skin: Warm, anicteric  Wide excision site on the abdomen is healed well  No evidence of local recurrence or in-transit disease  Psych:  Patient is pleasant and talkative  Breasts:        Pathology:  [unfilled]    Labs:  Lab Results   Component Value Date    [id number] 0 169 (L) 05/27/2022       Imaging  US extremity soft tissue    Result Date: 8/26/2022  Narrative: SUPERFICIAL SOFT TISSUE ULTRASOUND INDICATION:   Z85 [id number]: Personal history of malignant melanoma of skin M79 89: Other specified soft tissue disorders  COMPARISON:  February 2, 2022 TECHNIQUE:   Real-time ultrasound of the right axilla was performed with a linear transducer with both volumetric sweeps and still imaging techniques  FINDINGS:  1 8 x 0 8 x 1 7 cm lymph node in the right axilla which demonstrates no significant change from the prior  Comparison views of the left show a similar appearing lymph node  Impression: Stable, normal-appearing right axillary lymph node Workstation performed: [id number]     I reviewed the above laboratory and imaging data  Discussion/Summary: 81year-old male with a [id number] papillary carcinoma of the thyroid and a [id number] invasive follicular carcinoma of the thyroid   He also has a Mike Cheese the melanoma standpoint he is clinically SYLVAIN at nearly 4  years  Ervin Dingff is no evidence of recurrence by physical exam, imaging or symptomatology   His last CT and axillary ultrasound are negative   I will see him again in 6 months with a repeat right axillary ultrasound    For the thyroid cancer, completion thyroidectomy with radioactive iodine was recommended, but because of his postoperative hoarseness which has resolved, he did not have completion thyroidectomy and radioactive Maira Yeager is going with TSH suppression at this time  His TSH is adequately suppressed  He is having follow-up with his endocrinologist in the next month     I will see him back in 6 months  Augustus Guerrero is agreeable to this    All of his questions were answered

## 2022-09-01 NOTE — LETTER
September 1, 2022     Red Isaac MD  [address], [address] [address]    Patient: Rin Tompkins   YOB: 1950   Date of Visit: 9/1/2022       Dear Dr Mellisa Landon: Thank you for referring Rin Tompkins to me for evaluation  Below are my notes for this consultation  If you have questions, please do not hesitate to call me  I look forward to following your patient along with you  Sincerely,        Brayan Alcala MD        CC: MD Fadi Pérez MD Cedric Baize, MD Dasie Novak, MD  9/1/2022  2:14 PM  Incomplete               Surgical Oncology Follow Up       [facility]  [facility] [place]  [address] [facility]    [address] [place] [address]  1950  [id number]  [facility] [address] [place] [address]  [address] [facility]    [place] [address] [place]  [phone]    Diagnoses and all orders for this visit:    Encounter for follow-up examination after completed treatment for malignant neoplasm    Personal history of malignant melanoma  -     US extremity soft tissue; Future    History of thyroid cancer    Neoplasm of uncertain behavior of connective and other soft tissue   -     US extremity soft tissue; Future        No chief complaint on file  Return in about 6 months (around 3/1/2023) for Office Visit, Imaging - See orders, with Karyn  Oncology History   Personal history of malignant melanoma   10/16/2018 Initial Diagnosis    Malignant melanoma of skin of abdomen (HCC)  1 1 mm Breslow thickness, no ulceration, mitotic rate 1 per mm squared     12/14/2018 Surgery    Wide excision of abdomen with sentinel lymph node biopsies (right and left axillae)  No residual melanoma  He did have 1/4 lymph nodes positive with 2 mm of focal invasion into the lymph node    No extracapsular extension  Positive lymph node was in right axilla  BRAF V600E +     12/14/2018 -  Cancer Staged    Staging form: Melanoma of the Skin, AJCC 8th Edition  - Pathologic stage from 12/14/2018: Stage IIIA (pT2a, pN1a, cM0) - Signed by Mouna Fields MD on 3/2/2022       History of thyroid cancer   4/24/2019 Initial Diagnosis    Papillary and follicular carcinoma ([facility] [address] )     4/24/2019 Surgery    Thyroid, left and isthmus, hemithyroidectomy:  -  Papillary carcinoma, follicular variant, infiltrative - [id number]  -  Follicular carcinoma, minimally invasive - [id number]     4/24/2019 -  Cancer Staged    Staging form: Thyroid - Differentiated and Anaplastic, AJCC 8th Edition  - Pathologic stage from 4/24/2019: Stage II (pT3a, pNX, cM0, Age at diagnosis: >= 54 years) - Signed by Mouna Fields MD on 3/2/2022  Laterality: Left  Solitary (s) or multifocal (m) tumors in the primary site: Solitary  Lymph node metastasis: Unknown           Staging: [id number] melanoma of the abdomen  December 2018  Positive sentinel node in the right axilla     [id number] invasive follicular carcinoma of the thyroid, April 2019     Treatment history:   Wide excision with sentinel lymph node biopsy December 2018  Left thyroid lobectomy, April 2019  Current treatment:   Observation for the melanoma  TSH suppression for the thyroid cancer  Disease status:   SYLVAIN    History of Present Illness:  Patient returns in follow-up of his melanoma and thyroid cancer  He is doing well at this time with no complaints  He denies any abdominal pain, nausea or vomiting  He does have chronic back pain that comes and goes  His appetite is good  No unintentional weight loss  No dysphagia or hoarseness  ultrasound from August 22, 2022 reveals a stable 1 8 x 0 8 x 1 7 cm right axillary lymph node  Neck ultrasound with lymph node mapping from February 2022 was negative  I personally reviewed his films      Review of Systems  Complete ROS Surg Onc:   Complete ROS Surg Onc:   Constitutional: The patient denies new or recent history of general fatigue, no recent weight loss, no change in appetite  Eyes: No complaints of visual problems, no scleral icterus  ENT: no complaints of ear pain, no hoarseness, no difficulty swallowing,  no tinnitus and no new masses in head, oral cavity, or neck  Cardiovascular: No complaints of chest pain, no palpitations, no ankle edema  Respiratory: No complaints of shortness of breath, no cough  Gastrointestinal: No complaints of jaundice, no bloody stools, no pale stools  Genitourinary: No complaints of dysuria, no hematuria, no nocturia, no frequent urination, no urethral discharge  Musculoskeletal: No complaints of weakness, paralysis, joint stiffness or arthralgias  Integumentary: No complaints of rash, no new lesions  Neurological: No complaints of convulsions, no seizures, no dizziness  Hematologic/Lymphatic: No complaints of easy bruising  Endocrine:  No hot or cold intolerance  No polydipsia, polyphagia, or polyuria  Allergy/immunology:  No environmental allergies  No food allergies  Not immunocompromised  Skin:  No pallor or rash  No wound          Patient Active Problem List   Diagnosis    Actinic keratosis    Benign essential hypertension    BPH (benign prostatic hyperplasia)    Erectile dysfunction    GERD without esophagitis    Hyperlipidemia    Impaired fasting glucose    Personal history of malignant melanoma    Liver mass    Other specified soft tissue disorders    Multiple thyroid nodules    Encounter for follow-up examination after completed treatment for malignant neoplasm    Hoarseness of voice    Vocal cord paralysis    Fatigue    Vitamin D deficiency    Postoperative hypothyroidism    History of thyroid cancer    Hernia of abdominal cavity    Follicular thyroid cancer ([address] )    Papillary thyroid carcinoma ([address] )    Umbilical hernia without obstruction and without gangrene    Postoperative visit     Past Medical History:   Diagnosis Date    Arthritis     Cancer ([address] )     melanoma abdomen, thyroid    Disease of thyroid gland     GERD (gastroesophageal reflux disease)     diet controlled    Hyperlipidemia     Last Assessed:10/20/14    Hypertension     PONV (postoperative nausea and vomiting)      Past Surgical History:   Procedure Laterality Date    COLONOSCOPY      HERNIA REPAIR Right     Last Assessed:10/20/14 inguinal     LYMPH NODE BIOPSY N/A 12/14/2018    Procedure: SENTINEL LYMPH NODE BIOPSY; LYMPHOSCINTIGRAPHY; INTRAOPERATIVE LYMPHATIC MAPPING (INJECT AT 1100); Surgeon: Trice Rowan MD;  [facility];  Service: Surgical Oncology    SKIN LESION EXCISION N/A 12/14/2018    Procedure: MEDIAL UPPER ABDOMEN MELANOMA WIDE EXCISION; BILATERAL AXILLA SLN;  Surgeon: Trice Rowan MD;  [facility];  Service: Surgical Oncology    THYROID LOBECTOMY Left 04/24/2019    Procedure: HEMITHYROIDECTOMY ISTHMUSECTOMY;  Surgeon: Trice Rowan MD;  [facility];  Service: Surgical Oncology    TONSILLECTOMY      UMBILICAL HERNIA REPAIR LAPAROSCOPIC  05/04/2022    laparoscopic hernia repair w/mesh - Dr Sal Alcala N/A 5/4/2022    Procedure:  HERNIA REPAIR UMBILICAL LAPAROSCOPIC WITH MESH;  Surgeon: Tahmina Wolf MD;  [facility];  Service: T[address] THYROID BIOPSY  02/07/2019    US GUIDED THYROID BIOPSY  01/14/2021    WISDOM TOOTH EXTRACTION       Family History   Problem Relation Age of Onset    Hypertension Mother     Hypothyroidism Mother     Hypertension Father     Skin cancer Father     Colon cancer Maternal Aunt     Colon cancer Paternal Aunt         62s    Skin cancer Paternal Uncle      Social History     Socioeconomic History    Marital status: /Civil Union     Spouse name: Not on file    Number of children: Not on file    Years of education: Not on file   Nidia Mare Highest education level: Not on file   Occupational History    Not on file   Tobacco Use    Smoking status: Never Smoker    Smokeless tobacco: Never Used   Vaping Use    Vaping Use: Never used   Substance and Sexual Activity    Alcohol use:  Yes     Alcohol/week: 2 0 standard drinks     Types: 1 Glasses of wine, 1 Cans of beer per week     Comment: Occasionally    Drug use: Never    Sexual activity: Yes     Partners: Female   Other Topics Concern    Not on file   Social History Narrative    No advance directives     Social Determinants of Health     Financial Resource Strain: Not on file   Food Insecurity: Not on file   Transportation Needs: Not on file   Physical Activity: Not on file   Stress: Not on file   Social Connections: Not on file   Intimate Partner Violence: Not on file   Housing Stability: Not on file       Current Outpatient Medications:     aspirin 81 MG tablet, Take 81 mg by mouth daily , Disp: , Rfl:     atenolol (TENORMIN) 50 mg tablet, TAKE ONE TABLET BY MOUTH EVERY DAY, Disp: 90 tablet, Rfl: 1    Calcium-Magnesium-Vitamin D (CALCIUM 1200+D3 PO), , Disp: , Rfl:     cholecalciferol (VITAMIN D3) 1,000 units tablet, Take 1,000 Units by mouth daily, Disp: , Rfl:     levothyroxine 175 mcg tablet, Take 1 tablet orally on an empty stomach 6 days of the week, Disp: 90 tablet, Rfl: 2    lovastatin (MEVACOR) 40 MG tablet, TAKE ONE TABLET BY MOUTH EVERY DAY AT BEDTIME, Disp: 90 tablet, Rfl: 2    multivitamin (THERAGRAN) TABS, Take 1 tablet by mouth daily at bedtime , Disp: , Rfl:     omeprazole (PriLOSEC) 20 mg delayed release capsule, Take 1 capsule (20 mg total) by mouth daily before breakfast (Patient taking differently: Take 20 mg by mouth daily), Disp: 30 capsule, Rfl: 5    sildenafil (VIAGRA) 50 MG tablet, TAKE ONE TABLET BY MOUTH EVERY DAY AS NEEDED FOR ERECTILE DYSFUNCTION, Disp: 30 tablet, Rfl: 3  No Known Allergies  Vitals:    09/01/22 1349   BP: 116/68   Pulse: 84   Resp: 16   Temp: Amanda  ) 97 4 °F (36 3 °C)   SpO2: 98%       Physical Exam  Constitutional: General appearance: The Patient is well-developed and well-nourished who appears the stated age in no acute distress  Patient is pleasant and talkative  HEENT:  Normocephalic  Sclerae are anicteric  Mucous membranes are moist  Neck is supple without adenopathy  No JVD  I do not appreciate any new or recurrent neck masses  Chest: The lungs are clear to auscultation  Cardiac: Heart is regular rate  Abdomen: Abdomen is soft, non-tender, non-distended and without masses  Extremities: There is no clubbing or cyanosis  There is no edema  Symmetric  Neuro: Grossly nonfocal  Gait is normal      Lymphatic: No evidence of cervical adenopathy bilaterally  No evidence of axillary adenopathy bilaterally  No evidence of inguinal adenopathy bilaterally  Skin: Warm, anicteric  Wide excision site on the abdomen is healed well  No evidence of local recurrence or in-transit disease  Psych:  Patient is pleasant and talkative  Breasts:        Pathology:  [unfilled]    Labs:  Lab Results   Component Value Date    [id number] 0 169 (L) 05/27/2022       Imaging  US extremity soft tissue    Result Date: 8/26/2022  Narrative: SUPERFICIAL SOFT TISSUE ULTRASOUND INDICATION:   Z85 820: Personal history of malignant melanoma of skin M79 89: Other specified soft tissue disorders  COMPARISON:  February 2, 2022 TECHNIQUE:   Real-time ultrasound of the right axilla was performed with a linear transducer with both volumetric sweeps and still imaging techniques  FINDINGS:  1 8 x 0 8 x 1 7 cm lymph node in the right axilla which demonstrates no significant change from the prior  Comparison views of the left show a similar appearing lymph node  Impression: Stable, normal-appearing right axillary lymph node Workstation performed: [id number]     I reviewed the above laboratory and imaging data      Discussion/Summary: 81year-old male with a X8oCtA9 papillary carcinoma of the thyroid and a [id number] [id number] invasive follicular carcinoma of the thyroid   He also has a Orpha Laclede the melanoma standpoint he is clinically SYLVAIN at nearly 4  years  Tiffanie Reid is no evidence of recurrence by physical exam, imaging or symptomatology   His last CT and axillary ultrasound are negative   I will see him again in 6 months with a repeat right axillary ultrasound  For the thyroid cancer, completion thyroidectomy with radioactive iodine was recommended, but because of his postoperative hoarseness which has resolved, he did not have completion thyroidectomy and radioactive iodine   He is going with TSH suppression at this time  His TSH is adequately suppressed  He is having follow-up with his endocrinologist in the next month     I will see him back in 6 months  Justus Andrade is agreeable to this    All of his questions were answered

## 2022-09-12 ENCOUNTER — TELEPHONE (OUTPATIENT)
Dept: HEMATOLOGY ONCOLOGY | Facility: CLINIC | Age: 72
End: 2022-09-12

## 2022-09-13 ENCOUNTER — APPOINTMENT (OUTPATIENT)
Dept: LAB | Facility: HOSPITAL | Age: 72
End: 2022-09-13
Payer: MEDICARE

## 2022-09-13 ENCOUNTER — TELEPHONE (OUTPATIENT)
Dept: HEMATOLOGY ONCOLOGY | Facility: CLINIC | Age: 72
End: 2022-09-13

## 2022-09-13 DIAGNOSIS — C73 PAPILLARY THYROID CARCINOMA (HCC): ICD-10-CM

## 2022-09-13 DIAGNOSIS — C73 FOLLICULAR THYROID CANCER (HCC): ICD-10-CM

## 2022-09-13 DIAGNOSIS — E89.0 POSTOPERATIVE HYPOTHYROIDISM: ICD-10-CM

## 2022-09-13 DIAGNOSIS — E78.2 MIXED HYPERLIPIDEMIA: ICD-10-CM

## 2022-09-13 DIAGNOSIS — C73 THYROID CANCER (HCC): ICD-10-CM

## 2022-09-13 DIAGNOSIS — R73.01 IMPAIRED FASTING GLUCOSE: ICD-10-CM

## 2022-09-13 DIAGNOSIS — C43.9 MALIGNANT MELANOMA, UNSPECIFIED SITE (HCC): ICD-10-CM

## 2022-09-13 LAB
BASOPHILS # BLD AUTO: 0.04 THOUSANDS/ΜL (ref 0–0.1)
BASOPHILS NFR BLD AUTO: 1 % (ref 0–1)
CHOLEST SERPL-MCNC: 191 MG/DL
EOSINOPHIL # BLD AUTO: 0.05 THOUSAND/ΜL (ref 0–0.61)
EOSINOPHIL NFR BLD AUTO: 1 % (ref 0–6)
ERYTHROCYTE [DISTWIDTH] IN BLOOD BY AUTOMATED COUNT: 12.8 % (ref 11.6–15.1)
HCT VFR BLD AUTO: 47.2 % (ref 36.5–49.3)
HDLC SERPL-MCNC: 48 MG/DL
HGB BLD-MCNC: 16.2 G/DL (ref 12–17)
IMM GRANULOCYTES # BLD AUTO: 0.01 THOUSAND/UL (ref 0–0.2)
IMM GRANULOCYTES NFR BLD AUTO: 0 % (ref 0–2)
LDLC SERPL CALC-MCNC: 127 MG/DL (ref 0–100)
LYMPHOCYTES # BLD AUTO: 1.18 THOUSANDS/ΜL (ref 0.6–4.47)
LYMPHOCYTES NFR BLD AUTO: 19 % (ref 14–44)
MCH RBC QN AUTO: 30.3 PG (ref 26.8–34.3)
MCHC RBC AUTO-ENTMCNC: 34.3 G/DL (ref 31.4–37.4)
MCV RBC AUTO: 88 FL (ref 82–98)
MONOCYTES # BLD AUTO: 0.52 THOUSAND/ΜL (ref 0.17–1.22)
MONOCYTES NFR BLD AUTO: 9 % (ref 4–12)
NEUTROPHILS # BLD AUTO: 4.33 THOUSANDS/ΜL (ref 1.85–7.62)
NEUTS SEG NFR BLD AUTO: 70 % (ref 43–75)
NONHDLC SERPL-MCNC: 143 MG/DL
NRBC BLD AUTO-RTO: 0 /100 WBCS
PLATELET # BLD AUTO: 177 THOUSANDS/UL (ref 149–390)
PMV BLD AUTO: 11 FL (ref 8.9–12.7)
RBC # BLD AUTO: 5.34 MILLION/UL (ref 3.88–5.62)
T4 FREE SERPL-MCNC: 1.27 NG/DL (ref 0.76–1.46)
TRIGL SERPL-MCNC: 80 MG/DL
TSH SERPL DL<=0.05 MIU/L-ACNC: 0.05 UIU/ML (ref 0.45–4.5)
WBC # BLD AUTO: 6.13 THOUSAND/UL (ref 4.31–10.16)

## 2022-09-13 PROCEDURE — 80061 LIPID PANEL: CPT

## 2022-09-13 PROCEDURE — 84439 ASSAY OF FREE THYROXINE: CPT

## 2022-09-13 PROCEDURE — 84443 ASSAY THYROID STIM HORMONE: CPT

## 2022-09-13 PROCEDURE — 85025 COMPLETE CBC W/AUTO DIFF WBC: CPT

## 2022-09-13 PROCEDURE — 83036 HEMOGLOBIN GLYCOSYLATED A1C: CPT

## 2022-09-13 NOTE — TELEPHONE ENCOUNTER
Appointment Confirmation (to confirm pre existing appointments - ONLY)  No need to route   Appointment with  Dr Winsome Boyce   Appointment date & time 09/15 9:20AM   Location [facility]   Patient verbilized Understanding Yes

## 2022-09-14 ENCOUNTER — OFFICE VISIT (OUTPATIENT)
Dept: ENDOCRINOLOGY | Facility: CLINIC | Age: 72
End: 2022-09-14
Payer: MEDICARE

## 2022-09-14 VITALS
DIASTOLIC BLOOD PRESSURE: 70 MMHG | BODY MASS INDEX: 30.06 KG/M2 | WEIGHT: 210 LBS | HEIGHT: 70 IN | HEART RATE: 53 BPM | SYSTOLIC BLOOD PRESSURE: 120 MMHG

## 2022-09-14 DIAGNOSIS — C73 PAPILLARY THYROID CARCINOMA (HCC): Primary | ICD-10-CM

## 2022-09-14 DIAGNOSIS — E04.2 MULTIPLE THYROID NODULES: ICD-10-CM

## 2022-09-14 DIAGNOSIS — C73 THYROID CANCER (HCC): ICD-10-CM

## 2022-09-14 DIAGNOSIS — C73 FOLLICULAR THYROID CANCER (HCC): ICD-10-CM

## 2022-09-14 DIAGNOSIS — E89.0 POSTOPERATIVE HYPOTHYROIDISM: ICD-10-CM

## 2022-09-14 LAB
EST. AVERAGE GLUCOSE BLD GHB EST-MCNC: 108 MG/DL
HBA1C MFR BLD: 5.4 %

## 2022-09-14 PROCEDURE — 99214 OFFICE O/P EST MOD 30 MIN: CPT | Performed by: INTERNAL MEDICINE

## 2022-09-14 NOTE — PROGRESS NOTES
Mario Hull 81 y o  male MRN: [id number]    Encounter: [id number]      Assessment/Plan     1  Thyroid carcinoma;  Papillary thyroid carcinoma, follicular variant infiltrative, follicular carcinoma  2  s/p hemithyroidectomy   3  Thyroid nodule  Continue levothyroxine 175 mcg orally daily on 5 days of the week, decrease to half a tablet on the 6th day   Repeat thyroid function tests in 6-8 weeks, will also check TG, TG antibody   Surveillance neck ultrasound, to follow thyroid nodule 02/2023-ordered    4  History of vitamin-D deficiency-continue supplementation    CC: thyroid cancer     History of Present Illness     HPI:  Mario Hull is a 81 y o  male who is here for a follow-up of thyroid carcinoma    Last seen in 3/2022  Per my prior notes   "Incidentally found thyroid nodules during workup and management of malignant melanoma     FNA left thyroid nodule- AUS, affirma suspicious  BRAF, V600E - negative; RET/PTC - not detected  S/p left hemithyroidectomy on 04/24/2019     Final pathology  1)1 8 cm papillary carcinoma, follicular variant, infiltrative  2) 3 0 cm follicular carcinoma, minimally invasive  No lymph nodes submitted or found   Multinodular hyperplasia  Immuno stains positive for HBME-1 and CK19     Completion thyroidectomy was held off given complications of vocal cord paralysis and concerns of the risk of contralateral nerve being damaged if he were to go for total thyroidectomy"      FNA right mid pole thyroid nodule 1/14/2021-benign  US neck 11/2020 - no evidence of recurrent or metastatic disease "      Ultrasound neck 02/2022-no evidence of recurrent or metastatic disease  Stable right-sided thyroid nodule 0 9 X 0 5 X 0 8 cm   Left thyroid bed-level 3 node with an echogenic center present"     levothyroxine to 175 mcg orally on 6 days of the week    Occasional fatigue   Gets tired on exertion - has been doing a lot of house repairs and walks up and down stairs   No CP/SOB   Lost 5 lbs since last visit   Overall has been eating better   No shakes/ tremors/ palpitations   Occasional loose stools, not more often   No anxiety  Sleep is ok     taking D3 2000 IU daily        All other systems were reviewed and are negative  Review of Systems    Historical Information   Past Medical History:   Diagnosis Date    Arthritis     Cancer ([address] )     melanoma abdomen, thyroid    Disease of thyroid gland     GERD (gastroesophageal reflux disease)     diet controlled    Hyperlipidemia     Last Assessed:10/20/14    Hypertension     PONV (postoperative nausea and vomiting)      Past Surgical History:   Procedure Laterality Date    COLONOSCOPY      HERNIA REPAIR Right     Last Assessed:10/20/14 inguinal     LYMPH NODE BIOPSY N/A 12/14/2018    Procedure: SENTINEL LYMPH NODE BIOPSY; LYMPHOSCINTIGRAPHY; INTRAOPERATIVE LYMPHATIC MAPPING (INJECT AT 1100); Surgeon: Gerardo Kimbrough MD;  [facility];  Service: Surgical Oncology    SKIN LESION EXCISION N/A 12/14/2018    Procedure: MEDIAL UPPER ABDOMEN MELANOMA WIDE EXCISION; BILATERAL AXILLA SLN;  Surgeon: Gerardo Kimbrough MD;  [facility];  Service: Surgical Oncology    THYROID LOBECTOMY Left 04/24/2019    Procedure: HEMITHYROIDECTOMY ISTHMUSECTOMY;  Surgeon: Gerardo Kimbrough MD;  [facility];  Service: Surgical Oncology    TONSILLECTOMY      UMBILICAL HERNIA REPAIR LAPAROSCOPIC  05/04/2022    laparoscopic hernia repair w/mesh - Dr Iveth Orellana N/A 5/4/2022    Procedure:  HERNIA REPAIR UMBILICAL LAPAROSCOPIC WITH MESH;  Surgeon: Saleem Gonzales MD;  [facility];  Service: General    US GUIDED THYROID BIOPSY  02/07/2019    US GUIDED THYROID BIOPSY  01/14/2021    WISDOM TOOTH EXTRACTION       Social History   Social History     Substance and Sexual Activity   Alcohol Use Yes    Alcohol/week: 2 0 standard drinks    Types: 1 Glasses of wine, 1 Cans of beer per week    Comment: Occasionally Social History     Substance and Sexual Activity   Drug Use Never     Social History     Tobacco Use   Smoking Status Never Smoker   Smokeless Tobacco Never Used     Family History:   Family History   Problem Relation Age of Onset    Hypertension Mother     Hypothyroidism Mother     Hypertension Father     Skin cancer Father     Colon cancer Maternal Aunt     Colon cancer Paternal Aunt         62s    Skin cancer Paternal Uncle        Meds/Allergies   Current Outpatient Medications   Medication Sig Dispense Refill    aspirin 81 MG tablet Take 81 mg by mouth daily       atenolol (TENORMIN) 50 mg tablet TAKE ONE TABLET BY MOUTH EVERY DAY 90 tablet 1    Calcium-Magnesium-Vitamin D (CALCIUM 1200+D3 PO)       cholecalciferol (VITAMIN D3) 1,000 units tablet Take 1,000 Units by mouth daily      levothyroxine 175 mcg tablet Take 1 tablet orally on an empty stomach 6 days of the week 90 tablet 2    lovastatin (MEVACOR) 40 MG tablet TAKE ONE TABLET BY MOUTH EVERY DAY AT BEDTIME 90 tablet 2    multivitamin (THERAGRAN) TABS Take 1 tablet by mouth daily at bedtime       omeprazole (PriLOSEC) 20 mg delayed release capsule Take 1 capsule (20 mg total) by mouth daily before breakfast (Patient taking differently: Take 20 mg by mouth daily) 30 capsule 5    sildenafil (VIAGRA) 50 MG tablet TAKE ONE TABLET BY MOUTH EVERY DAY AS NEEDED FOR ERECTILE DYSFUNCTION 30 tablet 3     No current facility-administered medications for this visit  No Known Allergies    Objective   Vitals: Blood pressure 120/70, pulse (!) 53, height 5' 10" (1 778 m), weight 95 3 kg (210 lb)  Physical Exam  Constitutional:       General: He is not in acute distress  Appearance: He is well-developed  He is not diaphoretic  HENT:      Head: Normocephalic and atraumatic  Eyes:      Conjunctiva/sclera: Conjunctivae normal       Pupils: Pupils are equal, round, and reactive to light     Cardiovascular:      Rate and Rhythm: Normal rate and regular rhythm  Heart sounds: Normal heart sounds  No murmur heard  Pulmonary:      Effort: Pulmonary effort is normal  No respiratory distress  Breath sounds: Normal breath sounds  No wheezing  Abdominal:      General: There is no distension  Palpations: Abdomen is soft  Tenderness: There is no abdominal tenderness  There is no guarding  Musculoskeletal:      Cervical back: Normal range of motion and neck supple  Lymphadenopathy:      Cervical: No cervical adenopathy  Skin:     General: Skin is warm and dry  Findings: No erythema or rash  Neurological:      Mental Status: He is alert and oriented to person, place, and time  Deep Tendon Reflexes: Reflexes normal       Comments: No tremors on the outstretched arms     Psychiatric:         Behavior: Behavior normal          Thought Content: Thought content normal          The history was obtained from the review of the chart, patient      Lab Results:     Component      Latest Ref Rng & Units 3/28/2022 5/27/2022 9/13/2022               Free T4      0 76 - 1 46 ng/dL 1 32 1 44 1 27   TSH 3RD GENERATON      0 450 - 4 500 uIU/mL 0 031 (L) 0 169 (L) 0 048 (L)   THYROGLOBULIN AB      0 0 - 0 9 IU/mL <1 0     Thyroglobulin-BURAK      1 4 - 29 2 ng/mL 0 7 (L)       Lab Results   Component Value Date/Time    TSH 3RD GENERATON 0 048 (L) 09/13/2022 09:40 AM    TSH 3RD GENERATON 0 169 (L) 05/27/2022 08:53 AM    TSH 3RD GENERATON 0 031 (L) 03/28/2022 09:00 AM    Free T4 1 27 09/13/2022 09:40 AM    Free T4 1 44 05/27/2022 08:53 AM    Free T4 1 26 03/28/2022 09:00 AM    Free T4 1 32 03/28/2022 09:00 AM     Lab Results   Component Value Date    WBC 6 13 09/13/2022    HGB 16 2 09/13/2022    HCT 47 2 09/13/2022    MCV 88 09/13/2022     09/13/2022     Lab Results   Component Value Date    CREATININE 1 12 09/13/2022    BUN 21 09/13/2022     05/29/2017    K 4 5 09/13/2022     09/13/2022    CO2 30 09/13/2022     Lab Results Component Value Date    HGBA1C 5 4 09/13/2022         Imaging Studies:   Results for orders placed during the hospital encounter of 07/24/20    US thyroid    Impression  Stable 1 2 cm right thyroid lobe nodule  Status post left hemithyroidectomy  Recommend continued surveillance according to the patient's oncologic imaging protocol  Reference: ACR Thyroid Imaging, Reporting and Data System (TI-RADS): White Paper of the FL3XX  J AM Nikki Radiol 9579;23:994-103  (additional recommendations based on American Thyroid Association 2015 guidelines )    Workstation performed: [id number]      I have personally reviewed pertinent reports  Portions of the record may have been created with voice recognition software  Occasional wrong word or "sound a like" substitutions may have occurred due to the inherent limitations of voice recognition software  Read the chart carefully and recognize, using context, where substitutions have occurred

## 2022-09-14 NOTE — PATIENT INSTRUCTIONS
Continue levothyroxine 175 mcg orally daily on 5 days of the week, decrease to half a tablet on the 6th day   Repeat thyroid function tests in 6-8 weeks   Follow up in 4 months  Follow up ultrasound around 2/2023

## 2022-09-15 ENCOUNTER — OFFICE VISIT (OUTPATIENT)
Dept: HEMATOLOGY ONCOLOGY | Facility: CLINIC | Age: 72
End: 2022-09-15
Payer: MEDICARE

## 2022-09-15 VITALS
SYSTOLIC BLOOD PRESSURE: 122 MMHG | RESPIRATION RATE: 16 BRPM | DIASTOLIC BLOOD PRESSURE: 82 MMHG | BODY MASS INDEX: 30.06 KG/M2 | WEIGHT: 210 LBS | TEMPERATURE: 96.9 F | HEIGHT: 70 IN | OXYGEN SATURATION: 96 % | HEART RATE: 55 BPM

## 2022-09-15 DIAGNOSIS — Z85.850 HISTORY OF THYROID CANCER: ICD-10-CM

## 2022-09-15 DIAGNOSIS — Z85.820 PERSONAL HISTORY OF MALIGNANT MELANOMA: ICD-10-CM

## 2022-09-15 DIAGNOSIS — C43.9 MALIGNANT MELANOMA, UNSPECIFIED SITE (HCC): Primary | ICD-10-CM

## 2022-09-15 PROCEDURE — 99213 OFFICE O/P EST LOW 20 MIN: CPT | Performed by: INTERNAL MEDICINE

## 2022-09-15 NOTE — LETTER
September 22, 2022     Shanti Michael MD  [address],  [facility] [place] [address]    Patient: Alfredo Valles   YOB: 1950   Date of Visit: 9/15/2022       Dear Dr Shahid Santillan: Thank you for referring Alfredo Valles to me for evaluation  Below are my notes for this consultation  If you have questions, please do not hesitate to call me  I look forward to following your patient along with you  Sincerely,        Phyllis Muñiz MD        CC: MD Yao Christian MD Aisha Shiver, MD Nikki Asp, MD  9/22/2022 12:06 PM  Sign when Signing Visit  [address] [facility] 3  [address] [place] [address] [facility]  [phone]  [phone]     Date of Visit: 9/15/2022  Name: Alfredo Valles   YOB: 1950        Subjective    VISIT DIAGNOSIS:  Diagnoses and all orders for this visit:    Malignant melanoma, unspecified site [facility])  -     CT chest abdomen pelvis w contrast; Future  -     CT chest abdomen pelvis w contrast; Future  -     CBC and differential; Future  -     Comprehensive metabolic panel; Future  -     LD,Blood; Future    Personal history of malignant melanoma  -     CT chest abdomen pelvis w contrast; Future  -     CT chest abdomen pelvis w contrast; Future  -     CBC and differential; Future  -     Comprehensive metabolic panel; Future  -     LD,Blood; Future    History of thyroid cancer  -     CT chest abdomen pelvis w contrast; Future  -     CT chest abdomen pelvis w contrast; Future  -     CBC and differential; Future  -     Comprehensive metabolic panel; Future  -     LD,Blood;  Future        Oncology History   Personal history of malignant melanoma   10/16/2018 Initial Diagnosis    Malignant melanoma of skin of abdomen (HCC)  1 1 mm Breslow thickness, no ulceration, mitotic rate 1 per mm squared     12/14/2018 Surgery    Wide excision of abdomen with sentinel lymph node biopsies (right and left axillae)  No residual melanoma  He did have 1/4 lymph nodes positive with 2 mm of focal invasion into the lymph node  No extracapsular extension  Positive lymph node was in right axilla  BRAF [id number] +     12/14/2018 -  Cancer Staged    Staging form: Melanoma of the Skin, AJCC 8th Edition  - Pathologic stage from 12/14/2018: Stage IIIA (pT2a, pN1a, cM0) - Signed by Greg Lei MD on 3/2/2022       History of thyroid cancer   4/24/2019 Initial Diagnosis    Papillary and follicular carcinoma ([facility] )     4/24/2019 Surgery    Thyroid, left and isthmus, hemithyroidectomy:  -  Papillary carcinoma, follicular variant, infiltrative - [id number]  -  Follicular carcinoma, minimally invasive - [id number]     4/24/2019 -  Cancer Staged    Staging form: Thyroid - Differentiated and Anaplastic, AJCC 8th Edition  - Pathologic stage from 4/24/2019: Stage II (pT3a, pNX, cM0, Age at diagnosis: >= 55 years) - Signed by Greg Lei MD on 3/2/2022  Laterality: Left  Solitary (s) or multifocal (m) tumors in the primary site: Solitary  Lymph node metastasis: Unknown          Cancer Staging  History of thyroid cancer  Staging form: Thyroid - Differentiated and Anaplastic, AJCC 8th Edition  - Pathologic stage from 4/24/2019: Stage II (pT3a, pNX, cM0, Age at diagnosis: >= 54 years) - Signed by Greg Lei MD on 3/2/2022    Personal history of malignant melanoma  Staging form: Melanoma of the Skin, AJCC 8th Edition  - Pathologic stage from 12/14/2018: Stage IIIA (pT2a, pN1a, cM0) - Signed by Greg Lei MD on 3/2/2022       HISTORY OF PRESENT ILLNESS: Simba Tirado is a 81 y o  male  who has stage IIIA melanoma approximately 4 years out from diagnosis here for follow-up and monitoring  He also has history of thyroid cancer being followed by endocrinology  He is doing well and feels good  He has no issues or concerns or complaints at this time  He does continue to follow with dermatology    He denies any new, changing, concerning skin lesions at this time  He denies any lymphadenopathy  REVIEW OF SYSTEMS:  Review of Systems   Constitutional: Negative for appetite change, fatigue, fever and unexpected weight change  HENT:   Negative for lump/mass  Eyes: Negative for icterus  Respiratory: Negative for cough, shortness of breath and wheezing  Cardiovascular: Negative for leg swelling  Gastrointestinal: Negative for abdominal pain, constipation, diarrhea, nausea and vomiting  Genitourinary: Negative for difficulty urinating and hematuria  Musculoskeletal: Negative for arthralgias, gait problem and myalgias  Skin: Negative for itching and rash  No new, changing, or concerning lesions  Neurological: Negative for extremity weakness, gait problem, headaches, light-headedness and numbness  Hematological: Negative for adenopathy          MEDICATIONS:    Current Outpatient Medications:     aspirin 81 MG tablet, Take 81 mg by mouth daily , Disp: , Rfl:     Calcium-Magnesium-Vitamin D (CALCIUM 1200+D3 PO), , Disp: , Rfl:     cholecalciferol (VITAMIN D3) 1,000 units tablet, Take 1,000 Units by mouth daily, Disp: , Rfl:     levothyroxine 175 mcg tablet, Take 1 tablet orally on an empty stomach 6 days of the week, Disp: 90 tablet, Rfl: 2    lovastatin (MEVACOR) 40 MG tablet, TAKE ONE TABLET BY MOUTH EVERY DAY AT BEDTIME, Disp: 90 tablet, Rfl: 2    multivitamin (THERAGRAN) TABS, Take 1 tablet by mouth daily at bedtime , Disp: , Rfl:     omeprazole (PriLOSEC) 20 mg delayed release capsule, Take 1 capsule (20 mg total) by mouth daily before breakfast (Patient taking differently: Take 20 mg by mouth daily), Disp: 30 capsule, Rfl: 5    sildenafil (VIAGRA) 50 MG tablet, TAKE ONE TABLET BY MOUTH EVERY DAY AS NEEDED FOR ERECTILE DYSFUNCTION, Disp: 30 tablet, Rfl: 3    atenolol (TENORMIN) 50 mg tablet, TAKE ONE TABLET BY MOUTH EVERY DAY, Disp: 90 tablet, Rfl: 1     ALLERGIES:  No Known Allergies ACTIVE PROBLEMS:  Patient Active Problem List   Diagnosis    Actinic keratosis    Benign essential hypertension    BPH (benign prostatic hyperplasia)    Erectile dysfunction    GERD without esophagitis    Hyperlipidemia    Impaired fasting glucose    Personal history of malignant melanoma    Liver mass    Other specified soft tissue disorders    Multiple thyroid nodules    Encounter for follow-up examination after completed treatment for malignant neoplasm    Hoarseness of voice    Vocal cord paralysis    Fatigue    Vitamin D deficiency    Postoperative hypothyroidism    History of thyroid cancer    Hernia of abdominal cavity    Follicular thyroid cancer (HCC)    [facility] )    Umbilical hernia without obstruction and without gangrene    Postoperative visit          PAST MEDICAL HISTORY:   Past Medical History:   Diagnosis Date    Arthritis     [facility]     melanoma abdomen, thyroid    Disease of thyroid gland     GERD (gastroesophageal reflux disease)     diet controlled    Hyperlipidemia     Last Assessed:10/20/14    Hypertension     PONV (postoperative nausea and vomiting)         PAST SURGICAL HISTORY:  Past Surgical History:   Procedure Laterality Date    COLONOSCOPY      HERNIA REPAIR Right     Last Assessed:10/20/14 inguinal     LYMPH NODE BIOPSY N/A 12/14/2018    Procedure: SENTINEL LYMPH NODE BIOPSY; LYMPHOSCINTIGRAPHY; INTRAOPERATIVE LYMPHATIC MAPPING (INJECT AT 1100);   Surgeon: Brit Owens MD;  [facility];  Service: Surgical Oncology    SKIN LESION EXCISION N/A 12/14/2018    Procedure: MEDIAL UPPER ABDOMEN MELANOMA WIDE EXCISION; BILATERAL AXILLA SLN;  Surgeon: Brit Owens MD;  [facility];  Service: Surgical Oncology    THYROID LOBECTOMY Left 04/24/2019    Procedure: HEMITHYROIDECTOMY ISTHMUSECTOMY;  Surgeon: Brit Owens MD;  [facility];  Service: Surgical Oncology    TONSILLECTOMY      UMBILICAL HERNIA REPAIR LAPAROSCOPIC  05/04/2022    laparoscopic hernia repair w/mesh - Dr Lakshmi Connolly N/A 5/4/2022    Procedure: HERNIA REPAIR UMBILICAL LAPAROSCOPIC WITH MESH;  Surgeon: Pastor Rowland MD;  [facility];  Service: General    US GUIDED THYROID BIOPSY  02/07/2019    US GUIDED THYROID BIOPSY  01/14/2021    WISDOM TOOTH EXTRACTION          SOCIAL HISTORY:  Social History     Socioeconomic History    Marital status: /Civil Union     Spouse name: None    Number of children: None    Years of education: None    Highest education level: None   Occupational History    None   Tobacco Use    Smoking status: Never Smoker    Smokeless tobacco: Never Used   Vaping Use    Vaping Use: Never used   Substance and Sexual Activity    Alcohol use: Yes     Alcohol/week: 2 0 standard drinks     Types: 1 Glasses of wine, 1 Cans of beer per week     Comment: Occasionally    Drug use: Never    Sexual activity: Yes     Partners: Female   Other Topics Concern    None   Social History Narrative    No advance directives     Social Determinants of Health     Financial Resource Strain: Not on file   Food Insecurity: Not on file   Transportation Needs: Not on file   Physical Activity: Not on file   Stress: Not on file   Social Connections: Not on file   Intimate Partner Violence: Not on file   Housing Stability: Not on file        FAMILY HISTORY:  Family History   Problem Relation Age of Onset    Hypertension Mother     Hypothyroidism Mother     Hypertension Father     Skin cancer Father     Colon cancer Maternal Aunt     Colon cancer Paternal Aunt         62s    Skin cancer Paternal Uncle            Objective    PHYSICAL EXAMINATION:   Blood pressure 122/82, pulse 55, temperature (!) 96 9 °F (36 1 °C), temperature source Tympanic, resp  rate 16, height 5' 10" (1 778 m), weight 95 3 kg (210 lb), SpO2 96 %       Pain Score: 0-No pain     ECOG Performance Status    Flowsheet Row Most Recent Value   ECOG Performance Status 0 - Fully active, able to carry on all pre-disease performance without restriction             Physical Exam  Constitutional:       General: He is not in acute distress  Appearance: Normal appearance  He is not toxic-appearing  HENT:      Mouth/Throat:      Mouth: Mucous membranes are moist       Pharynx: Oropharynx is clear  Eyes:      General: No scleral icterus  Cardiovascular:      Rate and Rhythm: Normal rate and regular rhythm  Pulses: Normal pulses  Heart sounds: No murmur heard  No friction rub  No gallop  Pulmonary:      Effort: Pulmonary effort is normal  No respiratory distress  Breath sounds: Normal breath sounds  No wheezing or rales  Chest:   Breasts:      Right: No axillary adenopathy or supraclavicular adenopathy  Left: No axillary adenopathy or supraclavicular adenopathy  Abdominal:      General: There is no distension  Palpations: There is no mass  Tenderness: There is no abdominal tenderness  There is no rebound  Musculoskeletal:         General: No swelling or tenderness  Right lower leg: No edema  Left lower leg: No edema  Lymphadenopathy:      Head:      Right side of head: No submandibular, preauricular or posterior auricular adenopathy  Left side of head: No submandibular, preauricular or posterior auricular adenopathy  Cervical: No cervical adenopathy  Right cervical: No superficial or posterior cervical adenopathy  Left cervical: No superficial or posterior cervical adenopathy  Upper Body:      Right upper body: No supraclavicular or axillary adenopathy  Left upper body: No supraclavicular or axillary adenopathy  Lower Body: No right inguinal adenopathy  No left inguinal adenopathy  Skin:     Findings: No rash  Comments: Well healed surgical scar  No evidence of recurrence at primary site  Neurological:      General: No focal deficit present  Mental Status: He is alert and oriented to person, place, and time  Psychiatric:         Mood and Affect: Mood normal          Behavior: Behavior normal          Thought Content: Thought content normal          Judgment: Judgment normal          I reviewed lab data in the chart      WBC   Date Value Ref Range Status   09/13/2022 6 13 4 31 - 10 16 Thousand/uL Final   04/14/2022 6 22 4 31 - 10 16 Thousand/uL Final   03/28/2022 6 74 4 31 - 10 16 Thousand/uL Final   05/29/2017 6 3 3 4 - 10 8 x10E3/uL Final   11/11/2016 7 7 3 4 - 10 8 x10E3/uL Final   04/28/2016 9 4 3 4 - 10 8 x10E3/uL Final     Hemoglobin   Date Value Ref Range Status   09/13/2022 16 2 12 0 - 17 0 g/dL Final   04/14/2022 16 4 12 0 - 17 0 g/dL Final   03/28/2022 16 3 12 0 - 17 0 g/dL Final   05/29/2017 15 9 g/dL Final     Comment:     Result Comment: No patient age and/or gender provided                              Age                Male          Female                           0 -  7 days       10 7 - 20 5    10 7 - 20 5                           8 - 30 days       10 5 - 18 7    10 5 - 18 7                          31 - 90 days        8 8 - 14 3     8 8 - 14 3                     91 days - 11 months     10 4 - 14 1    10 4 - 14 1                           1 -  7 years      10 9 - 14 8    10 9 - 14 8                           8 - 12 years      11 7 - 15 7    11 7 - 15 7                              >12 years      12 6 - 17 7    11 1 - 15 9     11/11/2016 16 0 12 6 - 17 7 g/dL Final   04/28/2016 17 0 12 6 - 17 7 g/dL Final     Platelets   Date Value Ref Range Status   09/13/2022 177 149 - 390 Thousands/uL Final   04/14/2022 179 149 - 390 Thousands/uL Final   03/28/2022 170 149 - 390 Thousands/uL Final   05/29/2017 168 150 - 379 x10E3/uL Final   11/11/2016 187 150 - 379 x10E3/uL Final   04/28/2016 186 150 - 379 x10E3/uL Final     MCV   Date Value Ref Range Status   09/13/2022 88 82 - 98 fL Final   04/14/2022 90 82 - 98 fL Final   03/28/2022 91 82 - 98 fL Final   05/29/2017 87 fL Final     Comment:     Result Comment: No patient age and/or gender provided                              Age                Male          Female                           0 -  7 days         78 - 110       78 - 110                           8 - 27 days         80 - 109       80 - 109                          32 - 90 days         80 -  97       80 -  97                     91 days - 11 months       73 -  87       73 -  87                           1 -  7 years        75 -  89       75 -  89                           8 - 12 years        68 -  91       77 -  91                              >12 years        78 -  97       79 -  97     11/11/2016 88 79 - 97 fL Final   04/28/2016 87 79 - 97 fL Final      Sodium   Date Value Ref Range Status   05/29/2017 144 134 - 144 mmol/L Final   11/11/2016 145 (H) 136 - 144 mmol/L Final   04/28/2016 141 134 - 144 mmol/L Final     Potassium   Date Value Ref Range Status   09/13/2022 4 5 3 5 - 5 3 mmol/L Final   04/14/2022 4 8 3 5 - 5 3 mmol/L Final   03/28/2022 3 9 3 5 - 5 3 mmol/L Final   05/29/2017 4 3 3 5 - 5 2 mmol/L Final   11/11/2016 4 5 3 5 - 5 2 mmol/L Final   04/28/2016 4 1 3 5 - 5 2 mmol/L Final     Chloride   Date Value Ref Range Status   09/13/2022 103 96 - 108 mmol/L Final   04/14/2022 104 100 - 108 mmol/L Final   03/28/2022 103 100 - 108 mmol/L Final   05/29/2017 103 96 - 106 mmol/L Final   11/11/2016 103 97 - 106 mmol/L Final   04/28/2016 99 97 - 108 mmol/L Final     CO2   Date Value Ref Range Status   09/13/2022 30 21 - 32 mmol/L Final   04/14/2022 30 21 - 32 mmol/L Final   03/28/2022 31 21 - 32 mmol/L Final   05/29/2017 23 18 - 29 mmol/L Final   11/11/2016 28 18 - 29 mmol/L Final   04/28/2016 28 18 - 29 mmol/L Final     BUN   Date Value Ref Range Status   09/13/2022 21 5 - 25 mg/dL Final   04/14/2022 19 5 - 25 mg/dL Final   03/28/2022 15 5 - 25 mg/dL Final   05/29/2017 20 mg/dL Final     Comment:     Result Comment: No patient age and/or gender provided                              Age                Male          Female                           0 - 11 months        3 - 25         3 - 25                           1 - 17 years         5 - 25         5 - 22                          21 - 44 years         6 - 21         6 - 21                          42 - 61 years         10 - 23         10 - 21                          42 - 80 years         6 - 32         8 - 32                              >89 years        8 - 38        11 - 36     11/11/2016 19 8 - 27 mg/dL Final   04/28/2016 17 8 - 27 mg/dL Final     Creatinine   Date Value Ref Range Status   09/13/2022 1 12 0 60 - 1 30 mg/dL Final     Comment:     Standardized to IDMS reference method   04/14/2022 1 14 0 60 - 1 30 mg/dL Final     Comment:     Standardized to IDMS reference method   03/28/2022 1 08 0 60 - 1 30 mg/dL Final     Comment:     Standardized to IDMS reference method   05/29/2017 1 10 mg/dL Final     Comment:     Result Comment: No patient age and/or gender provided                              Age                Male          Female                           0 - 60 days          44 - 1 19      44 - 1 19                     61 days - 11 months        17 - 1 18      17 - 1 18                           1 -  2 years         19 -   42      19 -   42                           3 -  4 years         26 -   51      26 -   51                           5 -  6 years         30 -   59      30 -   59                           7 -  8 years         37 -   62      37 -   62                           9 - 10 years         39 -   70      39 -   70                          11 - 12 years         42 -   75      42 -   75                          13 - 14 years         49 -   90      49 -   90                              >14 years         76 - 1 27      57 - 1 00     11/11/2016 1 12 0 76 - 1 27 mg/dL Final   04/28/2016 1 15 0 76 - 1 27 mg/dL Final     Glucose   Date Value Ref Range Status   04/14/2022 108 65 - 140 mg/dL Final     Comment:     If the patient is fasting, the ADA then defines impaired fasting glucose as > 100 mg/dL and diabetes as > or equal to 123 mg/dL  Specimen collection should occur prior to Sulfasalazine administration due to the potential for falsely depressed results  Specimen collection should occur prior to Sulfapyridine administration due to the potential for falsely elevated results  01/31/2022 93 65 - 140 mg/dL Final     Comment:     If the patient is fasting, the ADA then defines impaired fasting glucose as > 100 mg/dL and diabetes as > or equal to 123 mg/dL  Specimen collection should occur prior to Sulfasalazine administration due to the potential for falsely depressed results  Specimen collection should occur prior to Sulfapyridine administration due to the potential for falsely elevated results  06/17/2020 114 65 - 140 mg/dL Final     Comment:       If the patient is fasting, the ADA then defines impaired fasting glucose as > 100 mg/dL and diabetes as > or equal to 123 mg/dL  Specimen collection should occur prior to Sulfasalazine administration due to the potential for falsely depressed results  Specimen collection should occur prior to Sulfapyridine administration due to the potential for falsely elevated results       Calcium   Date Value Ref Range Status   09/13/2022 9 3 8 3 - 10 1 mg/dL Final   04/14/2022 9 2 8 3 - 10 1 mg/dL Final   03/28/2022 9 1 8 3 - 10 1 mg/dL Final   05/29/2017 9 4 mg/dL Final     Comment:     Result Comment: No patient age and/or gender provided                              Age                Male          Female                           0 - 10 days        8 6 - 10 4     8 6 - 10 4                     11 days -  1 year        9 2 - 11 0     9 2 - 11 0                           2 - 11 years       9 1 - 10 5     9 1 - 10 5                          12 - 17 years       8 9 - 10 4     8 9 - 10 4                          18 - 59 years       8 7 - 10 2     8 7 - 10 2                              >59 years       8 6 - 10 2     8 7 - 10 3     11/11/2016 9 1 8 6 - 10 2 mg/dL Final   04/28/2016 9 5 8 6 - 10 2 mg/dL Final     Total Protein   Date Value Ref Range Status   05/29/2017 6 3 6 0 - 8 5 g/dL Final   11/11/2016 6 4 6 0 - 8 5 g/dL Final   04/28/2016 6 4 6 0 - 8 5 g/dL Final     Albumin   Date Value Ref Range Status   09/13/2022 3 8 3 5 - 5 0 g/dL Final   04/14/2022 3 9 3 5 - 5 0 g/dL Final   03/28/2022 3 7 3 5 - 5 0 g/dL Final   05/29/2017 4 2 g/dL Final     Comment:     Result Comment: No patient age and/or gender provided                              Age                Male          Female                           0 -  2 years       3 4 - 4 2      3 4 - 4 2                           3 - 59 years       3 5 - 5 5      3 5 - 5 5                          60 - 69 years       3 6 - 4 8      3 6 - 4 8                          70 - 79 years       3 5 - 4 8      3 5 - 4 8                          80 - 89 years       3 5 - 4 7      3 5 - 4 7                              >89 years       3 2 - 4 6      3 2 - 4 6     11/11/2016 4 2 3 6 - 4 8 g/dL Final   04/28/2016 4 4 3 6 - 4 8 g/dL Final     Total Bilirubin   Date Value Ref Range Status   09/13/2022 0 78 0 20 - 1 00 mg/dL Final     Comment:     Use of this assay is not recommended for patients undergoing treatment with eltrombopag due to the potential for falsely elevated results  04/14/2022 0 75 0 20 - 1 00 mg/dL Final     Comment:     Use of this assay is not recommended for patients undergoing treatment with eltrombopag due to the potential for falsely elevated results  03/28/2022 0 90 0 20 - 1 00 mg/dL Final     Comment:     Use of this assay is not recommended for patients undergoing treatment with eltrombopag due to the potential for falsely elevated results       Alkaline Phosphatase   Date Value Ref Range Status   09/13/2022 72 46 - 116 U/L Final   04/14/2022 74 46 - 116 U/L Final   03/28/2022 72 46 - 116 U/L Final 05/29/2017 62 IU/L Final     Comment:     Result Comment: No patient age and/or gender provided                              Age                Male          Female                           0 -  1 day          45 - 111       45 - 111                           2 -  5 days         55 - 119       55 - 119                           6 - 10 days         50 - 229       50 - 229                          6 - 30 days         61 - 414       61 - 414                           1 -  6 months       91 - 445       91 - 445                           7 - 12 months      124 - 341      124 - 341                           1 -  3 years       130 - 317      130 - 317                           4 -  6 years       133 - 309      133 - 309                           7 - 12 years       134 - 349      134 - 349                               13 years       143 - 396       76 - 209                               14 years       80 - 340       58 - 149                               15 years        80 - 254       47 - 121                               16 years        70 - 186       52 - 108                               17 years        64 - 146       39 - 101                               18 years        64 - 127       37 - 101                              >18 years        44 - 117       44 - 117     11/11/2016 65 39 - 117 IU/L Final   04/28/2016 65 39 - 117 IU/L Final     AST   Date Value Ref Range Status   09/13/2022 19 5 - 45 U/L Final     Comment:     Specimen collection should occur prior to Sulfasalazine administration due to the potential for falsely depressed results  04/14/2022 23 5 - 45 U/L Final     Comment:     Specimen collection should occur prior to Sulfasalazine administration due to the potential for falsely depressed results  03/28/2022 24 5 - 45 U/L Final     Comment:     Specimen collection should occur prior to Sulfasalazine administration due to the potential for falsely depressed results      05/29/2017 23 0 - 40 IU/L Final   11/11/2016 18 0 - 40 IU/L Final   04/28/2016 16 0 - 40 IU/L Final     ALT   Date Value Ref Range Status   09/13/2022 33 12 - 78 U/L Final     Comment:     Specimen collection should occur prior to Sulfasalazine administration due to the potential for falsely depressed results  04/14/2022 33 12 - 78 U/L Final     Comment:     Specimen collection should occur prior to Sulfasalazine administration due to the potential for falsely depressed results  03/28/2022 31 12 - 78 U/L Final     Comment:     Specimen collection should occur prior to Sulfasalazine administration due to the potential for falsely depressed results  05/29/2017 21 IU/L Final     Comment:     Result Comment: No patient age and/or gender provided                              Age                Male          Female                           0 - 11 years         0 - 34         0 - 28                          17 - 17 years         0 - 27         0 - 24                              >17 years         0 - 40         0 - 28  Performed at: [facility], , [place], [place], [id number], [id number]  MD:  Rio Montanez  [address] [facility] [id number]     11/11/2016 18 0 - 44 IU/L Final     Comment:     Performed at: [facility], , [place], [place], [id number], [id number]  MD:  Rio Montanez  [id number] [facility] [id number]     04/28/2016 16 0 - 44 IU/L Final     Comment:     Performed at: [facility], , [place], [place], [id number], [id number]  MD:  [id number] [id number]        LD   Date Value Ref Range Status   09/13/2022 149 81 - 234 U/L Final   01/31/2022 154 81 - 234 U/L Final   06/21/2021 125 81 - 234 U/L Final     No results found for: TSH  No results found for: [id number]   Free T4   Date Value Ref Range Status   09/13/2022 1 27 0 76 - 1 46 ng/dL Final     Comment:     Specimen collection should occur prior to Sulfasalazine administration due to the potential for falsely elevated results  05/27/2022 1 44 0 76 - 1 46 ng/dL Final     Comment:     Specimen collection should occur prior to Sulfasalazine administration due to the potential for falsely elevated results  03/28/2022 1 26 0 76 - 1 46 ng/dL Final     Comment:     Specimen collection should occur prior to Sulfasalazine administration due to the potential for falsely elevated results  03/28/2022 1 32 0 76 - 1 46 ng/dL Final     Comment:     Specimen collection should occur prior to Sulfasalazine administration due to the potential for falsely elevated results  RECENT IMAGING:  Procedure: US extremity soft tissue    Result Date: 8/26/2022  Narrative: SUPERFICIAL SOFT TISSUE ULTRASOUND INDICATION:   Z85 [id number]: Personal history of malignant melanoma of skin M79 89: Other specified soft tissue disorders  COMPARISON:  February 2, 2022 TECHNIQUE:   Real-time ultrasound of the right axilla was performed with a linear transducer with both volumetric sweeps and still imaging techniques  FINDINGS:  1 8 x 0 8 x 1 7 cm lymph node in the right axilla which demonstrates no significant change from the prior  Comparison views of the left show a similar appearing lymph node  Impression: Stable, normal-appearing right axillary lymph node Workstation performed: [id number]             Assessment/Plan  Mr Killian Kohler is a 81 yr male with stage IIIA melanoma here for monitoring, follow-up, and surveillance  1  Malignant melanoma, unspecified site ([facility] [address] )  2  Personal history of malignant melanoma  Is doing well and has no clinical evidence of recurrence or metastasis  Labs reviewed  He is due for scans now as well as in 6 months  Orders placed in scripts provided  I will call him with results from his imaging at this time  He will continue follow-up and surveillance every 6 months until 5 years out from diagnosis  In 6 months he is due for physical exams, labs, and scans  All orders placed in scripts provided    He knows to call with any issues or concerns prior to his next visit  He will continue with Dermatology follow-up  - CT chest abdomen pelvis w contrast; Future  - CT chest abdomen pelvis w contrast; Future  - CBC and differential; Future  - Comprehensive metabolic panel; Future  - LD,Blood; Future          3  History of thyroid cancer  Followed by endocrinology  We routinely scan him for his melanoma surveillance which will detect any abnormalities on imaging     - CT chest abdomen pelvis w contrast; Future  - CT chest abdomen pelvis w contrast; Future  - CBC and differential; Future  - Comprehensive metabolic panel; Future  - LD,Blood; Future        Return in about 6 months (around 3/15/2023) for Office Visit, Imaging - See orders, Labs - See Treatment Plan       Kavon Short MD, PhD

## 2022-09-17 DIAGNOSIS — I10 ESSENTIAL HYPERTENSION: ICD-10-CM

## 2022-09-17 RX ORDER — ATENOLOL 50 MG/1
TABLET ORAL
Qty: 90 TABLET | Refills: 1 | Status: SHIPPED | OUTPATIENT
Start: 2022-09-17

## 2022-09-21 ENCOUNTER — OFFICE VISIT (OUTPATIENT)
Dept: DERMATOLOGY | Facility: CLINIC | Age: 72
End: 2022-09-21
Payer: MEDICARE

## 2022-09-21 VITALS — HEIGHT: 70 IN | BODY MASS INDEX: 30.06 KG/M2 | WEIGHT: 210 LBS

## 2022-09-21 DIAGNOSIS — D22.9 NEVUS: Primary | ICD-10-CM

## 2022-09-21 DIAGNOSIS — Z13.89 SCREENING FOR SKIN CONDITION: ICD-10-CM

## 2022-09-21 DIAGNOSIS — Z85.820 HISTORY OF MELANOMA: ICD-10-CM

## 2022-09-21 DIAGNOSIS — L82.1 SEBORRHEIC KERATOSIS: ICD-10-CM

## 2022-09-21 PROCEDURE — 99213 OFFICE O/P EST LOW 20 MIN: CPT | Performed by: DERMATOLOGY

## 2022-09-21 NOTE — PROGRESS NOTES
[address]  [facility]  [address] [address]  [phone]  [phone]     MRN: [id number] : 1950  Encounter: [id number]  Patient Information: Amanda Fargoso  Chief complaint:  Six-month checkup    History of present illness:  81-year-old male presents for overall skin check previous history melanoma with sentinel node no specific concerns at this time patient has seen Dr Deandra Lema  Past Medical History:   Diagnosis Date    Arthritis     Cancer ([address] )     melanoma abdomen, thyroid    Disease of thyroid gland     GERD (gastroesophageal reflux disease)     diet controlled    Hyperlipidemia     Last Assessed:10/20/14    Hypertension     PONV (postoperative nausea and vomiting)      Past Surgical History:   Procedure Laterality Date    COLONOSCOPY      HERNIA REPAIR Right     Last Assessed:10/20/14 inguinal     LYMPH NODE BIOPSY N/A 2018    Procedure: SENTINEL LYMPH NODE BIOPSY; LYMPHOSCINTIGRAPHY; INTRAOPERATIVE LYMPHATIC MAPPING (INJECT AT 1100); Surgeon: Amaury Brown MD;  [facility];  Service: Surgical Oncology    SKIN LESION EXCISION N/A 2018    Procedure: MEDIAL UPPER ABDOMEN MELANOMA WIDE EXCISION; BILATERAL AXILLA SLN;  Surgeon: Amaury Brown MD;  [facility];  Service: Surgical Oncology    THYROID LOBECTOMY Left 2019    Procedure: HEMITHYROIDECTOMY ISTHMUSECTOMY;  Surgeon: Amaury Brown MD;  [facility];  Service: Surgical Oncology    TONSILLECTOMY      UMBILICAL HERNIA REPAIR LAPAROSCOPIC  2022    laparoscopic hernia repair w/mesh - Dr Shubham Middleton N/A 2022    Procedure:  HERNIA REPAIR UMBILICAL LAPAROSCOPIC WITH MESH;  Surgeon: Juan Jaimes MD;  [facility];  Service: [address] THYROID BIOPSY  2019    US GUIDED THYROID BIOPSY  2021    WISDOM TOOTH EXTRACTION       Social History   Social History Substance and Sexual Activity   Alcohol Use Yes    Alcohol/week: 2 0 standard drinks    Types: 1 Glasses of wine, 1 Cans of beer per week    Comment: Occasionally     Social History     Substance and Sexual Activity   Drug Use Never     Social History     Tobacco Use   Smoking Status Never Smoker   Smokeless Tobacco Never Used     Family History   Problem Relation Age of Onset    Hypertension Mother     Hypothyroidism Mother     Hypertension Father     Skin cancer Father     Colon cancer Maternal Aunt     Colon cancer Paternal Aunt         62s    Skin cancer Paternal Uncle      Meds/Allergies   No Known Allergies    Meds:  Prior to Admission medications    Medication Sig Start Date End Date Taking?  Authorizing Provider   aspirin 81 MG tablet Take 81 mg by mouth daily    Yes Historical Provider, MD   atenolol (TENORMIN) 50 mg tablet TAKE ONE TABLET BY MOUTH EVERY DAY 9/17/22  Yes Yas hSetty MD   Calcium-Magnesium-Vitamin D (CALCIUM 1200+D3 PO)    Yes Historical Provider, MD   cholecalciferol (VITAMIN D3) 1,000 units tablet Take 1,000 Units by mouth daily   Yes Historical Provider, MD   levothyroxine 175 mcg tablet Take 1 tablet orally on an empty stomach 6 days of the week 7/21/22  Yes Malka Morales MD   lovastatin (MEVACOR) 40 MG tablet TAKE ONE TABLET BY MOUTH EVERY DAY AT BEDTIME 5/31/22  Yes Yas Shetty MD   multivitamin [facility]) TABS Take 1 tablet by mouth daily at bedtime    Yes Historical Provider, MD   omeprazole (PriLOSEC) 20 mg delayed release capsule Take 1 capsule (20 mg total) by mouth daily before breakfast  Patient taking differently: Take 20 mg by mouth daily 8/17/21  Yes Rickey Bailey MD   sildenafil (VIAGRA) 50 MG tablet TAKE ONE TABLET BY MOUTH EVERY DAY AS NEEDED FOR ERECTILE DYSFUNCTION 10/1/21  Yes Yas Shetty MD       Subjective:     Review of Systems:    General: negative for - chills, fatigue, fever,  weight gain or weight loss  Psychological: negative for - anxiety, behavioral disorder, concentration difficulties, decreased libido, depression, irritability, memory difficulties, mood swings, sleep disturbances or suicidal ideation  ENT: negative for - hearing difficulties , nasal congestion, nasal discharge, oral lesions, sinus pain, sneezing, sore throat  Allergy and Immunology: negative for - hives, insect bite sensitivity,  Hematological and Lymphatic: negative for - bleeding problems, blood clots,bruising, swollen lymph nodes  Endocrine: negative for - hair pattern changes, hot flashes, malaise/lethargy, mood swings, palpitations, polydipsia/polyuria, skin changes, temperature intolerance or unexpected weight change  Respiratory: negative for - cough, hemoptysis, orthopnea, shortness of breath, or wheezing  Cardiovascular: negative for - chest pain, dyspnea on exertion, edema,  Gastrointestinal: negative for - abdominal pain, nausea/vomiting  Genito-Urinary: negative for - dysuria, incontinence, irregular/heavy menses or urinary frequency/urgency  Musculoskeletal: negative for - gait disturbance, joint pain, joint stiffness, joint swelling, muscle pain, muscular weakness  Dermatological:  As in HPI  Neurological: negative for confusion, dizziness, headaches, impaired coordination/balance, memory loss, numbness/tingling, seizures, speech problems, tremors or weakness       Objective:   Ht 5' 10" (1 778 m)   Wt 95 3 kg (210 lb)   BMI 30 13 kg/m²     Physical Exam:    General Appearance:    Alert, cooperative, no distress   Head:    Normocephalic, without obvious abnormality, atraumatic           Skin:   A full skin exam was performed including scalp, head scalp, eyes, ears, nose, lips, neck, chest, axilla, abdomen, back, buttocks, bilateral upper extremities, bilateral lower extremities, hands, feet, fingers, toes, fingernails, and toenails normal pigmented lesions with regular shape and color normal keratotic papules greasy stuck appearance previous sites skin cancer well healed without recurrence no lymphadenopathy or hepatosplenomegaly noted     Assessment:     1  Nevus     2  Seborrheic keratosis     3  Screening for skin condition     4  History of melanoma           Plan:   Nevi reviewed the concept of ABCDE and ugly duckling nothing markedly atypical patient reassured  Seborrheic keratosis patient reassured these are normal growths we acquire with age no treatment needed  History of melanoma no recurrence nothing else atypical sunblock recommended follow-up in 6 months  Screening for dermatologic disorders nothing else of concern noted on complete exam follow-up in 6 months    Brynn Ervin MD  9/21/2022,10:37 AM    Portions of the record may have been created with voice recognition software   Occasional wrong word or "sound a like" substitutions may have occurred due to the inherent limitations of voice recognition software   Read the chart carefully and recognize, using context, where substitutions have occurred

## 2022-09-22 NOTE — PROGRESS NOTES
[facility]  [address] [address]  [phone]     Date of Visit: 9/15/2022  Name: Cheyanne Anguiano   YOB: 1950        Subjective    VISIT DIAGNOSIS:  Diagnoses and all orders for this visit:    Malignant melanoma, unspecified site [facility])  -     CT chest abdomen pelvis w contrast; Future  -     CT chest abdomen pelvis w contrast; Future  -     CBC and differential; Future  -     Comprehensive metabolic panel; Future  -     LD,Blood; Future    Personal history of malignant melanoma  -     CT chest abdomen pelvis w contrast; Future  -     CT chest abdomen pelvis w contrast; Future  -     CBC and differential; Future  -     Comprehensive metabolic panel; Future  -     LD,Blood; Future    History of thyroid cancer  -     CT chest abdomen pelvis w contrast; Future  -     CT chest abdomen pelvis w contrast; Future  -     CBC and differential; Future  -     Comprehensive metabolic panel; Future  -     LD,Blood; Future        Oncology History   Personal history of malignant melanoma   10/16/2018 Initial Diagnosis    Malignant melanoma of skin of abdomen (HCC)  1 1 mm Breslow thickness, no ulceration, mitotic rate 1 per mm squared     12/14/2018 Surgery    Wide excision of abdomen with sentinel lymph node biopsies (right and left axillae)  No residual melanoma  He did have 1/4 lymph nodes positive with 2 mm of focal invasion into the lymph node  No extracapsular extension  Positive lymph node was in right axilla      BRAF [id number] +     12/14/2018 -  Cancer Staged    Staging form: Melanoma of the Skin, AJCC 8th Edition  - Pathologic stage from 12/14/2018: Stage IIIA (pT2a, pN1a, cM0) - Signed by Claudia Levi MD on 3/2/2022       History of thyroid cancer   4/24/2019 Initial Diagnosis    Papillary and follicular carcinoma ([facility] [address] )     4/24/2019 Surgery    Thyroid, left and isthmus, hemithyroidectomy:  -  Papillary carcinoma, follicular variant, infiltrative - [id number]  -  Follicular carcinoma, minimally invasive - [id number]     4/24/2019 -  Cancer Staged    Staging form: Thyroid - Differentiated and Anaplastic, AJCC 8th Edition  - Pathologic stage from 4/24/2019: Stage II (pT3a, pNX, cM0, Age at diagnosis: >= 55 years) - Signed by Sonia Patiño MD on 3/2/2022  Laterality: Left  Solitary (s) or multifocal (m) tumors in the primary site: Solitary  Lymph node metastasis: Unknown          Cancer Staging  History of thyroid cancer  Staging form: Thyroid - Differentiated and Anaplastic, AJCC 8th Edition  - Pathologic stage from 4/24/2019: Stage II (pT3a, pNX, cM0, Age at diagnosis: >= 54 years) - Signed by Sonia Patiño MD on 3/2/2022    Personal history of malignant melanoma  Staging form: Melanoma of the Skin, AJCC 8th Edition  - Pathologic stage from 12/14/2018: Stage IIIA (pT2a, pN1a, cM0) - Signed by Sonia Patiño MD on 3/2/2022       HISTORY OF PRESENT ILLNESS: Javier Enriquez is a 81 y o  male  who has stage IIIA melanoma approximately 4 years out from diagnosis here for follow-up and monitoring  He also has history of thyroid cancer being followed by endocrinology  He is doing well and feels good  He has no issues or concerns or complaints at this time  He does continue to follow with dermatology  He denies any new, changing, concerning skin lesions at this time  He denies any lymphadenopathy  REVIEW OF SYSTEMS:  Review of Systems   Constitutional: Negative for appetite change, fatigue, fever and unexpected weight change  HENT:   Negative for lump/mass  Eyes: Negative for icterus  Respiratory: Negative for cough, shortness of breath and wheezing  Cardiovascular: Negative for leg swelling  Gastrointestinal: Negative for abdominal pain, constipation, diarrhea, nausea and vomiting  Genitourinary: Negative for difficulty urinating and hematuria      Musculoskeletal: Negative for arthralgias, gait problem and myalgias  Skin: Negative for itching and rash  No new, changing, or concerning lesions  Neurological: Negative for extremity weakness, gait problem, headaches, light-headedness and numbness  Hematological: Negative for adenopathy          MEDICATIONS:    Current Outpatient Medications:     aspirin 81 MG tablet, Take 81 mg by mouth daily , Disp: , Rfl:     Calcium-Magnesium-Vitamin D (CALCIUM 1200+D3 PO), , Disp: , Rfl:     cholecalciferol (VITAMIN D3) 1,000 units tablet, Take 1,000 Units by mouth daily, Disp: , Rfl:     levothyroxine 175 mcg tablet, Take 1 tablet orally on an empty stomach 6 days of the week, Disp: 90 tablet, Rfl: 2    lovastatin (MEVACOR) 40 MG tablet, TAKE ONE TABLET BY MOUTH EVERY DAY AT BEDTIME, Disp: 90 tablet, Rfl: 2    multivitamin (THERAGRAN) TABS, Take 1 tablet by mouth daily at bedtime , Disp: , Rfl:     omeprazole (PriLOSEC) 20 mg delayed release capsule, Take 1 capsule (20 mg total) by mouth daily before breakfast (Patient taking differently: Take 20 mg by mouth daily), Disp: 30 capsule, Rfl: 5    sildenafil (VIAGRA) 50 MG tablet, TAKE ONE TABLET BY MOUTH EVERY DAY AS NEEDED FOR ERECTILE DYSFUNCTION, Disp: 30 tablet, Rfl: 3    atenolol (TENORMIN) 50 mg tablet, TAKE ONE TABLET BY MOUTH EVERY DAY, Disp: 90 tablet, Rfl: 1     ALLERGIES:  No Known Allergies     ACTIVE PROBLEMS:  Patient Active Problem List   Diagnosis    Actinic keratosis    Benign essential hypertension    BPH (benign prostatic hyperplasia)    Erectile dysfunction    GERD without esophagitis    Hyperlipidemia    Impaired fasting glucose    Personal history of malignant melanoma    Liver mass    Other specified soft tissue disorders    Multiple thyroid nodules    Encounter for follow-up examination after completed treatment for malignant neoplasm    Hoarseness of voice    Vocal cord paralysis    Fatigue    Vitamin D deficiency    Postoperative hypothyroidism    History of thyroid cancer  Hernia of abdominal cavity    Follicular thyroid cancer (HCC)    [facility]roid [facility] ([facility] [address] )    Umbilical hernia without obstruction and without gangrene    Postoperative visit          PAST MEDICAL HISTORY:   Past Medical History:   Diagnosis Date    Arthritis     Cancer ([facility] [address] )     melanoma abdomen, thyroid    Disease of thyroid gland     GERD (gastroesophageal reflux disease)     diet controlled    Hyperlipidemia     Last Assessed:10/20/14    Hypertension     PONV (postoperative nausea and vomiting)         PAST SURGICAL HISTORY:  Past Surgical History:   Procedure Laterality Date    COLONOSCOPY      HERNIA REPAIR Right     Last Assessed:10/20/14 inguinal     LYMPH NODE BIOPSY N/A 12/14/2018    Procedure: SENTINEL LYMPH NODE BIOPSY; LYMPHOSCINTIGRAPHY; INTRAOPERATIVE LYMPHATIC MAPPING (INJECT AT 1100); Surgeon: Daryl Harding MD;  [facility];  Service: Surgical Oncology    SKIN LESION EXCISION N/A 12/14/2018    Procedure: MEDIAL UPPER ABDOMEN MELANOMA WIDE EXCISION; BILATERAL AXILLA SLN;  Surgeon: Daryl Harding MD;  [facility];  Service: Surgical Oncology    THYROID LOBECTOMY Left 04/24/2019    Procedure: HEMITHYROIDECTOMY ISTHMUSECTOMY;  Surgeon: Daryl Harding MD;  [facility];  Service: Surgical Oncology    TONSILLECTOMY      UMBILICAL HERNIA REPAIR LAPAROSCOPIC  05/04/2022    laparoscopic hernia repair w/mesh - Dr Che Oacarson N/A 5/4/2022    Procedure:  HERNIA REPAIR UMBILICAL LAPAROSCOPIC WITH MESH;  Surgeon: Constance Ortega MD;  [facility]: [facility];  Service: General    US GUIDED THYROID BIOPSY  02/07/2019    US GUIDED THYROID BIOPSY  01/14/2021    WISDOM TOOTH EXTRACTION          SOCIAL HISTORY:  Social History     Socioeconomic History    Marital status: /Civil Union     Spouse name: None    Number of children: None    Years of education: None    Highest education level: None   Occupational History    None Tobacco Use    Smoking status: Never Smoker    Smokeless tobacco: Never Used   Vaping Use    Vaping Use: Never used   Substance and Sexual Activity    Alcohol use: Yes     Alcohol/week: 2 0 standard drinks     Types: 1 Glasses of wine, 1 Cans of beer per week     Comment: Occasionally    Drug use: Never    Sexual activity: Yes     Partners: Female   Other Topics Concern    None   Social History Narrative    No advance directives     Social Determinants of Health     Financial Resource Strain: Not on file   Food Insecurity: Not on file   Transportation Needs: Not on file   Physical Activity: Not on file   Stress: Not on file   Social Connections: Not on file   Intimate Partner Violence: Not on file   Housing Stability: Not on file        FAMILY HISTORY:  Family History   Problem Relation Age of Onset    Hypertension Mother     Hypothyroidism Mother     Hypertension Father     Skin cancer Father     Colon cancer Maternal Aunt     Colon cancer Paternal Aunt         62s    Skin cancer Paternal Uncle            Objective    PHYSICAL EXAMINATION:   Blood pressure 122/82, pulse 55, temperature (!) 96 9 °F (36 1 °C), temperature source Tympanic, resp  rate 16, height 5' 10" (1 778 m), weight 95 3 kg (210 lb), SpO2 96 %  Pain Score: 0-No pain     ECOG Performance Status    Flowsheet Row Most Recent Value   ECOG Performance Status 0 - Fully active, able to carry on all pre-disease performance without restriction             Physical Exam  Constitutional:       General: He is not in acute distress  Appearance: Normal appearance  He is not toxic-appearing  HENT:      Mouth/Throat:      Mouth: Mucous membranes are moist       Pharynx: Oropharynx is clear  Eyes:      General: No scleral icterus  Cardiovascular:      Rate and Rhythm: Normal rate and regular rhythm  Pulses: Normal pulses  Heart sounds: No murmur heard  No friction rub  No gallop     Pulmonary:      Effort: Pulmonary effort is normal  No respiratory distress  Breath sounds: Normal breath sounds  No wheezing or rales  Chest:   Breasts:      Right: No axillary adenopathy or supraclavicular adenopathy  Left: No axillary adenopathy or supraclavicular adenopathy  Abdominal:      General: There is no distension  Palpations: There is no mass  Tenderness: There is no abdominal tenderness  There is no rebound  Musculoskeletal:         General: No swelling or tenderness  Right lower leg: No edema  Left lower leg: No edema  Lymphadenopathy:      Head:      Right side of head: No submandibular, preauricular or posterior auricular adenopathy  Left side of head: No submandibular, preauricular or posterior auricular adenopathy  Cervical: No cervical adenopathy  Right cervical: No superficial or posterior cervical adenopathy  Left cervical: No superficial or posterior cervical adenopathy  Upper Body:      Right upper body: No supraclavicular or axillary adenopathy  Left upper body: No supraclavicular or axillary adenopathy  Lower Body: No right inguinal adenopathy  No left inguinal adenopathy  Skin:     Findings: No rash  Comments: Well healed surgical scar  No evidence of recurrence at primary site  Neurological:      General: No focal deficit present  Mental Status: He is alert and oriented to person, place, and time  Psychiatric:         Mood and Affect: Mood normal          Behavior: Behavior normal          Thought Content: Thought content normal          Judgment: Judgment normal          I reviewed lab data in the chart      WBC   Date Value Ref Range Status   09/13/2022 6 13 4 31 - 10 16 Thousand/uL Final   04/14/2022 6 22 4 31 - 10 16 Thousand/uL Final   03/28/2022 6 74 4 31 - 10 16 Thousand/uL Final   05/29/2017 6 3 3 4 - 10 8 x10E3/uL Final   11/11/2016 7 7 3 4 - 10 8 x10E3/uL Final   04/28/2016 9 4 3 4 - 10 8 x10E3/uL Final     Hemoglobin   Date Value Ref Range Status   09/13/2022 16 2 12 0 - 17 0 g/dL Final   04/14/2022 16 4 12 0 - 17 0 g/dL Final   03/28/2022 16 3 12 0 - 17 0 g/dL Final   05/29/2017 15 9 g/dL Final     Comment:     Result Comment: No patient age and/or gender provided                              Age                Male          Female                           0 -  7 days       10 7 - 20 5    10 7 - 20 5                           8 - 30 days       10 5 - 18 7    10 5 - 18 7                          31 - 90 days        8 8 - 14 3     8 8 - 14 3                     91 days - 11 months     10 4 - 14 1    10 4 - 14 1                           1 -  7 years      10 9 - 14 8    10 9 - 14 8                           8 - 12 years      11 7 - 15 7    11 7 - 15 7                              >12 years      12 6 - 17 7    11 1 - 15 9     11/11/2016 16 0 12 6 - 17 7 g/dL Final   04/28/2016 17 0 12 6 - 17 7 g/dL Final     Platelets   Date Value Ref Range Status   09/13/2022 177 149 - 390 Thousands/uL Final   04/14/2022 179 149 - 390 Thousands/uL Final   03/28/2022 170 149 - 390 Thousands/uL Final   05/29/2017 168 150 - 379 x10E3/uL Final   11/11/2016 187 150 - 379 x10E3/uL Final   04/28/2016 186 150 - 379 x10E3/uL Final     MCV   Date Value Ref Range Status   09/13/2022 88 82 - 98 fL Final   04/14/2022 90 82 - 98 fL Final   03/28/2022 91 82 - 98 fL Final   05/29/2017 87 fL Final     Comment:     Result Comment: No patient age and/or gender provided                              Age                Male          Female                           0 -  7 days         78 - 110       78 - 110                           8 - 30 days         80 - 109       80 - 109                          32 - 90 days         81 -  97       81 -  97                     91 days - 11 months       73 -  87       73 -  87                           1 -  7 years        76 -  89       75 -  89                           8 - 12 years        68 -  91       77 -  80 >12 years        79 -  97       79 -  97     11/11/2016 88 79 - 97 fL Final   04/28/2016 87 79 - 97 fL Final      Sodium   Date Value Ref Range Status   05/29/2017 144 134 - 144 mmol/L Final   11/11/2016 145 (H) 136 - 144 mmol/L Final   04/28/2016 141 134 - 144 mmol/L Final     Potassium   Date Value Ref Range Status   09/13/2022 4 5 3 5 - 5 3 mmol/L Final   04/14/2022 4 8 3 5 - 5 3 mmol/L Final   03/28/2022 3 9 3 5 - 5 3 mmol/L Final   05/29/2017 4 3 3 5 - 5 2 mmol/L Final   11/11/2016 4 5 3 5 - 5 2 mmol/L Final   04/28/2016 4 1 3 5 - 5 2 mmol/L Final     Chloride   Date Value Ref Range Status   09/13/2022 103 96 - 108 mmol/L Final   04/14/2022 104 100 - 108 mmol/L Final   03/28/2022 103 100 - 108 mmol/L Final   05/29/2017 103 96 - 106 mmol/L Final   11/11/2016 103 97 - 106 mmol/L Final   04/28/2016 99 97 - 108 mmol/L Final     CO2   Date Value Ref Range Status   09/13/2022 30 21 - 32 mmol/L Final   04/14/2022 30 21 - 32 mmol/L Final   03/28/2022 31 21 - 32 mmol/L Final   05/29/2017 23 18 - 29 mmol/L Final   11/11/2016 28 18 - 29 mmol/L Final   04/28/2016 28 18 - 29 mmol/L Final     BUN   Date Value Ref Range Status   09/13/2022 21 5 - 25 mg/dL Final   04/14/2022 19 5 - 25 mg/dL Final   03/28/2022 15 5 - 25 mg/dL Final   05/29/2017 20 mg/dL Final     Comment:     Result Comment: No patient age and/or gender provided                              Age                Male          Female                           0 - 11 months        3 - 25         3 - 25                           1 - 17 years         5 - 25         5 - 25                          18 - 44 years         6 - 21         6 - 21                          42 - 61 years         6 - 25         6 - 24                          61 - 80 years         8 - 32         8 - 32                              >89 years        10 - 39        10 - 36     11/11/2016 19 8 - 27 mg/dL Final   04/28/2016 17 8 - 27 mg/dL Final     Creatinine   Date Value Ref Range Status 09/13/2022 1 12 0 60 - 1 30 mg/dL Final     Comment:     Standardized to IDMS reference method   04/14/2022 1 14 0 60 - 1 30 mg/dL Final     Comment:     Standardized to IDMS reference method   03/28/2022 1 08 0 60 - 1 30 mg/dL Final     Comment:     Standardized to IDMS reference method   05/29/2017 1 10 mg/dL Final     Comment:     Result Comment: No patient age and/or gender provided                              Age                Male          Female                           0 - 60 days          44 - 1 19      44 - 1 19                     61 days - 11 months        17 - 1 18      17 - 1 18                           1 -  2 years         19 -   42      19 -   42                           3 -  4 years         26 -   51      26 -   51                           5 -  6 years         30 -   59      30 -   59                           7 -  8 years         37 -   62      37 -   62                           9 - 10 years         39 -   70      39 -   70                          11 - 12 years         42 -   75      42 -   75                          13 - 14 years         49 -   90      49 -   90                              >14 years         76 - 1 27      57 - 1 00     11/11/2016 1 12 0 76 - 1 27 mg/dL Final   04/28/2016 1 15 0 76 - 1 27 mg/dL Final     Glucose   Date Value Ref Range Status   04/14/2022 108 65 - 140 mg/dL Final     Comment:     If the patient is fasting, the ADA then defines impaired fasting glucose as > 100 mg/dL and diabetes as > or equal to 123 mg/dL  Specimen collection should occur prior to Sulfasalazine administration due to the potential for falsely depressed results  Specimen collection should occur prior to Sulfapyridine administration due to the potential for falsely elevated results  01/31/2022 93 65 - 140 mg/dL Final     Comment:     If the patient is fasting, the ADA then defines impaired fasting glucose as > 100 mg/dL and diabetes as > or equal to 123 mg/dL    Specimen collection should occur prior to Sulfasalazine administration due to the potential for falsely depressed results  Specimen collection should occur prior to Sulfapyridine administration due to the potential for falsely elevated results  06/17/2020 114 65 - 140 mg/dL Final     Comment:       If the patient is fasting, the ADA then defines impaired fasting glucose as > 100 mg/dL and diabetes as > or equal to 123 mg/dL  Specimen collection should occur prior to Sulfasalazine administration due to the potential for falsely depressed results  Specimen collection should occur prior to Sulfapyridine administration due to the potential for falsely elevated results       Calcium   Date Value Ref Range Status   09/13/2022 9 3 8 3 - 10 1 mg/dL Final   04/14/2022 9 2 8 3 - 10 1 mg/dL Final   03/28/2022 9 1 8 3 - 10 1 mg/dL Final   05/29/2017 9 4 mg/dL Final     Comment:     Result Comment: No patient age and/or gender provided                              Age                Male          Female                           0 - 10 days        8 6 - 10 4     8 6 - 10 4                     11 days -  1 year        9 2 - 11 0     9 2 - 11 0                           2 - 11 years       9 1 - 10 5     9 1 - 10 5                          12 - 17 years       8 9 - 10 4     8 9 - 10 4                          18 - 59 years       8 7 - 10 2     8 7 - 10 2                              >59 years       8 6 - 10 2     8 7 - 10 3     11/11/2016 9 1 8 6 - 10 2 mg/dL Final   04/28/2016 9 5 8 6 - 10 2 mg/dL Final     Total Protein   Date Value Ref Range Status   05/29/2017 6 3 6 0 - 8 5 g/dL Final   11/11/2016 6 4 6 0 - 8 5 g/dL Final   04/28/2016 6 4 6 0 - 8 5 g/dL Final     Albumin   Date Value Ref Range Status   09/13/2022 3 8 3 5 - 5 0 g/dL Final   04/14/2022 3 9 3 5 - 5 0 g/dL Final   03/28/2022 3 7 3 5 - 5 0 g/dL Final   05/29/2017 4 2 g/dL Final     Comment:     Result Comment: No patient age and/or gender provided                              Age Male          Female                           0 -  2 years       3 4 - 4 2      3 4 - 4 2                           3 - 59 years       3 5 - 5 5      3 5 - 5 5                          60 - 69 years       3 6 - 4 8      3 6 - 4 8                          70 - 79 years       3 5 - 4 8      3 5 - 4 8                          80 - 89 years       3 5 - 4 7      3 5 - 4 7                              >89 years       3 2 - 4 6      3 2 - 4 6     11/11/2016 4 2 3 6 - 4 8 g/dL Final   04/28/2016 4 4 3 6 - 4 8 g/dL Final     Total Bilirubin   Date Value Ref Range Status   09/13/2022 0 78 0 20 - 1 00 mg/dL Final     Comment:     Use of this assay is not recommended for patients undergoing treatment with eltrombopag due to the potential for falsely elevated results  04/14/2022 0 75 0 20 - 1 00 mg/dL Final     Comment:     Use of this assay is not recommended for patients undergoing treatment with eltrombopag due to the potential for falsely elevated results  03/28/2022 0 90 0 20 - 1 00 mg/dL Final     Comment:     Use of this assay is not recommended for patients undergoing treatment with eltrombopag due to the potential for falsely elevated results       Alkaline Phosphatase   Date Value Ref Range Status   09/13/2022 72 46 - 116 U/L Final   04/14/2022 74 46 - 116 U/L Final   03/28/2022 72 46 - 116 U/L Final   05/29/2017 62 IU/L Final     Comment:     Result Comment: No patient age and/or gender provided                              Age                Male          Female                           0 -  1 day          45 - 111       45 - 111                           2 -  5 days         55 - 119       55 - 119                           6 - 10 days         50 - 229       50 - 229                          11 - 30 days         61 - 414       61 - 414                           1 -  6 months       91 - 445       91 - 445                           7 - 12 months      124 - 341      124 - 341                           1 -  3 years 130 - 317      130 - 317                           4 -  6 years       133 - 309      133 - 309                           7 - 12 years       134 - 349      134 - 349                               13 years       143 - 396       76 - 209                               14 years       107 - 340       58 - 149                               15 years        80 - 254       47 - 121                               16 years        70 - 186       52 - 108                               17 years        64 - 146       39 - 101                               18 years        64 - 127       37 - 101                              >18 years        44 - 117       44 - 117     11/11/2016 65 39 - 117 IU/L Final   04/28/2016 65 39 - 117 IU/L Final     AST   Date Value Ref Range Status   09/13/2022 19 5 - 45 U/L Final     Comment:     Specimen collection should occur prior to Sulfasalazine administration due to the potential for falsely depressed results  04/14/2022 23 5 - 45 U/L Final     Comment:     Specimen collection should occur prior to Sulfasalazine administration due to the potential for falsely depressed results  03/28/2022 24 5 - 45 U/L Final     Comment:     Specimen collection should occur prior to Sulfasalazine administration due to the potential for falsely depressed results  05/29/2017 23 0 - 40 IU/L Final   11/11/2016 18 0 - 40 IU/L Final   04/28/2016 16 0 - 40 IU/L Final     ALT   Date Value Ref Range Status   09/13/2022 33 12 - 78 U/L Final     Comment:     Specimen collection should occur prior to Sulfasalazine administration due to the potential for falsely depressed results  04/14/2022 33 12 - 78 U/L Final     Comment:     Specimen collection should occur prior to Sulfasalazine administration due to the potential for falsely depressed results      03/28/2022 31 12 - 78 U/L Final     Comment:     Specimen collection should occur prior to Sulfasalazine administration due to the potential for falsely depressed results  05/29/2017 21 IU/L Final     Comment:     Result Comment: No patient age and/or gender provided                              Age                Male          Female                           0 - 11 years         0 - 34         0 - 28                          17 - 17 years         0 - 27         0 - 24                              >17 years         0 - 40         0 - 28  Performed at: [facility], , [place], [place], [id number], [id number]  MD:  Rio Montanez  [address] [id number]     11/11/2016 18 0 - 44 IU/L Final     Comment:     Performed at: [facility], , [place], [place], [id number], [id number]  MD:  Rio Montanez  [id number] [facility] [address] [id number]     04/28/2016 16 0 - 44 IU/L Final     Comment:     Performed at: [facility], , [place], [place], [id number], [id number]  MD:  [id number] [facility] [address]        LD   Date Value Ref Range Status   09/13/2022 149 81 - 234 U/L Final   01/31/2022 154 81 - 234 U/L Final   06/21/2021 125 81 - 234 U/L Final     No results found for: TSH  No results found for: [id number]   Free T4   Date Value Ref Range Status   09/13/2022 1 27 0 76 - 1 46 ng/dL Final     Comment:     Specimen collection should occur prior to Sulfasalazine administration due to the potential for falsely elevated results  05/27/2022 1 44 0 76 - 1 46 ng/dL Final     Comment:     Specimen collection should occur prior to Sulfasalazine administration due to the potential for falsely elevated results  03/28/2022 1 26 0 76 - 1 46 ng/dL Final     Comment:     Specimen collection should occur prior to Sulfasalazine administration due to the potential for falsely elevated results  03/28/2022 1 32 0 76 - 1 46 ng/dL Final     Comment:     Specimen collection should occur prior to Sulfasalazine administration due to the potential for falsely elevated results           RECENT IMAGING:  Procedure: US extremity soft tissue    Result Date: 8/26/2022  Narrative: SUPERFICIAL SOFT TISSUE ULTRASOUND INDICATION:   Z85 [id number]: Personal history of malignant melanoma of skin M79 89: Other specified soft tissue disorders  COMPARISON:  February 2, 2022 TECHNIQUE:   Real-time ultrasound of the right axilla was performed with a linear transducer with both volumetric sweeps and still imaging techniques  FINDINGS:  1 8 x 0 8 x 1 7 cm lymph node in the right axilla which demonstrates no significant change from the prior  Comparison views of the left show a similar appearing lymph node  Impression: Stable, normal-appearing right axillary lymph node Workstation performed: [id number]             Assessment/Plan  Mr Katheryn Azevedo is a 81 yr male with stage IIIA melanoma here for monitoring, follow-up, and surveillance  1  Malignant melanoma, unspecified site ([facility] [address] )  2  Personal history of malignant melanoma  Is doing well and has no clinical evidence of recurrence or metastasis  Labs reviewed  He is due for scans now as well as in 6 months  Orders placed in scripts provided  I will call him with results from his imaging at this time  He will continue follow-up and surveillance every 6 months until 5 years out from diagnosis  In 6 months he is due for physical exams, labs, and scans  All orders placed in scripts provided  He knows to call with any issues or concerns prior to his next visit  He will continue with Dermatology follow-up  - CT chest abdomen pelvis w contrast; Future  - CT chest abdomen pelvis w contrast; Future  - CBC and differential; Future  - Comprehensive metabolic panel; Future  - LD,Blood; Future          3  History of thyroid cancer  Followed by endocrinology  We routinely scan him for his melanoma surveillance which will detect any abnormalities on imaging     - CT chest abdomen pelvis w contrast; Future  - CT chest abdomen pelvis w contrast; Future  - CBC and differential; Future  - Comprehensive metabolic panel;  Future  - LD,Blood; Future        Return in about 6 months (around 3/15/2023) for Office Visit, Imaging - See orders, Labs - See Treatment Plan       Jreel Win MD, PhD

## 2022-09-29 ENCOUNTER — RA CDI HCC (OUTPATIENT)
Dept: OTHER | Facility: HOSPITAL | Age: 72
End: 2022-09-29

## 2022-09-29 NOTE — PROGRESS NOTES
[address]  coding opportunities       Chart reviewed, no opportunity found: CHART REVIEWED, NO OPPORTUNITY FOUND        Patients Insurance     Medicare Insurance: Medicare

## 2022-10-06 ENCOUNTER — OFFICE VISIT (OUTPATIENT)
Dept: INTERNAL MEDICINE CLINIC | Facility: CLINIC | Age: 72
End: 2022-10-06
Payer: MEDICARE

## 2022-10-06 VITALS
BODY MASS INDEX: 30.58 KG/M2 | OXYGEN SATURATION: 96 % | TEMPERATURE: 97.5 F | HEART RATE: 65 BPM | SYSTOLIC BLOOD PRESSURE: 116 MMHG | HEIGHT: 70 IN | WEIGHT: 213.6 LBS | RESPIRATION RATE: 17 BRPM | DIASTOLIC BLOOD PRESSURE: 72 MMHG

## 2022-10-06 DIAGNOSIS — R73.01 IMPAIRED FASTING GLUCOSE: ICD-10-CM

## 2022-10-06 DIAGNOSIS — E89.0 POSTOPERATIVE HYPOTHYROIDISM: ICD-10-CM

## 2022-10-06 DIAGNOSIS — E78.2 MIXED HYPERLIPIDEMIA: ICD-10-CM

## 2022-10-06 DIAGNOSIS — J38.00 VOCAL CORD PARALYSIS: ICD-10-CM

## 2022-10-06 DIAGNOSIS — C73 FOLLICULAR THYROID CANCER (HCC): ICD-10-CM

## 2022-10-06 DIAGNOSIS — Z85.820 PERSONAL HISTORY OF MALIGNANT MELANOMA: ICD-10-CM

## 2022-10-06 DIAGNOSIS — C73 THYROID CANCER (HCC): ICD-10-CM

## 2022-10-06 DIAGNOSIS — K21.9 GERD WITHOUT ESOPHAGITIS: Primary | ICD-10-CM

## 2022-10-06 DIAGNOSIS — I10 BENIGN ESSENTIAL HYPERTENSION: ICD-10-CM

## 2022-10-06 PROCEDURE — 99214 OFFICE O/P EST MOD 30 MIN: CPT | Performed by: INTERNAL MEDICINE

## 2022-10-06 RX ORDER — ROSUVASTATIN CALCIUM 10 MG/1
10 TABLET, COATED ORAL DAILY
Qty: 30 TABLET | Refills: 5 | Status: SHIPPED | OUTPATIENT
Start: 2022-10-06

## 2022-10-06 NOTE — PROGRESS NOTES
Assessment/Plan:    Diagnoses and all orders for this visit:    GERD without esophagitis    Follicular thyroid cancer ([facility] [address] )    Impaired fasting glucose  -     Hemoglobin A1C; Future    Postoperative hypothyroidism    Thyroid cancer (HCC)    Benign essential hypertension    Vocal cord paralysis    Mixed hyperlipidemia  -     CBC and differential; Future  -     Comprehensive metabolic panel; Future  -     Lipid panel; Future  -     rosuvastatin (CRESTOR) 10 MG tablet; Take 1 tablet (10 mg total) by mouth daily    Personal history of malignant melanoma            Patient Instructions   Lab data reviewed in detail and compared prior    Hypertension stable on present regimen    Hyperlipidemia-sequential increase despite daily Mevacor, will transition to rosuvastatin    Impaired fasting glucose stable with A1c in the normal range     History of follicular variant papillary thyroid cancer under care of endocrinology and Medical Oncology     History of melanoma stable without evidence for recurrent disease following with Medical, Surgical Oncology and Dermatology     GERD stable on omeprazole    Routine follow-up after labs in 6 months, sooner as needed  Subjective:      Patient ID: Rin Tompkins is a 81 y o  male    F/u mmp and review labs  Planning to move to [place] as soon as this house sells  HTN/HPL-taking rx as directed, no recent home bp's  Thyroid ca-seen by endo last month, undergoing suppressive treatment with levothyroxine  Post surgical hypothyroid on LT4 early am on empty stomach  Melanoma-followed by dermatology, surgical and medical oncology, no evidence of active disease 4 years out   ED-stable w/ viagra  GERD-stable on omeprazole, no melena or dysphagia  Was unable to come off     Umbilical hernia-stable status post repair        Current Outpatient Medications:     aspirin 81 MG tablet, Take 81 mg by mouth daily , Disp: , Rfl:     atenolol (TENORMIN) 50 mg tablet, TAKE ONE TABLET BY MOUTH EVERY DAY, Disp: 90 tablet, Rfl: 1    Calcium-Magnesium-Vitamin D (CALCIUM 1200+D3 PO), , Disp: , Rfl:     cholecalciferol (VITAMIN D3) 1,000 units tablet, Take 1,000 Units by mouth daily, Disp: , Rfl:     levothyroxine 175 mcg tablet, Take 1 tablet orally on an empty stomach 6 days of the week, Disp: 90 tablet, Rfl: 2    lovastatin (MEVACOR) 40 MG tablet, TAKE ONE TABLET BY MOUTH EVERY DAY AT BEDTIME, Disp: 90 tablet, Rfl: 2    multivitamin (THERAGRAN) TABS, Take 1 tablet by mouth daily at bedtime , Disp: , Rfl:     omeprazole (PriLOSEC) 20 mg delayed release capsule, Take 1 capsule (20 mg total) by mouth daily before breakfast (Patient taking differently: Take 20 mg by mouth if needed), Disp: 30 capsule, Rfl: 5    rosuvastatin (CRESTOR) 10 MG tablet, Take 1 tablet (10 mg total) by mouth daily, Disp: 30 tablet, Rfl: 5    sildenafil (VIAGRA) 50 MG tablet, TAKE ONE TABLET BY MOUTH EVERY DAY AS NEEDED FOR ERECTILE DYSFUNCTION, Disp: 30 tablet, Rfl: 3    Recent Results (from the past 1008 hour(s))   LD,Blood    Collection Time: 09/13/22  9:40 AM   Result Value Ref Range     81 - 234 U/L   Comprehensive metabolic panel    Collection Time: 09/13/22  9:40 AM   Result Value Ref Range    Sodium 140 135 - 147 mmol/L    Potassium 4 5 3 5 - 5 3 mmol/L    Chloride 103 96 - 108 mmol/L    CO2 30 21 - 32 mmol/L    ANION GAP 7 4 - 13 mmol/L    BUN 21 5 - 25 mg/dL    Creatinine 1 12 0 60 - 1 30 mg/dL    Glucose, Fasting 108 (H) 65 - 99 mg/dL    Calcium 9 3 8 3 - 10 1 mg/dL    AST 19 5 - 45 U/L    ALT 33 12 - 78 U/L    Alkaline Phosphatase 72 46 - 116 U/L    Total Protein 7 2 6 4 - 8 4 g/dL    Albumin 3 8 3 5 - 5 0 g/dL    Total Bilirubin 0 78 0 20 - 1 00 mg/dL    eGFR 66 ml/min/1 73sq m   CBC and differential    Collection Time: 09/13/22  9:40 AM   Result Value Ref Range    WBC 6 13 4 31 - 10 16 Thousand/uL    RBC 5 34 3 88 - 5 62 Million/uL    Hemoglobin 16 2 12 0 - 17 0 g/dL    Hematocrit 47 2 36 5 - 49 3 %    MCV 88 82 - 98 fL    MCH 30 3 26 8 - 34 3 pg    MCHC 34 3 31 4 - 37 4 g/dL    RDW 12 8 11 6 - 15 1 %    MPV 11 0 8 9 - 12 7 fL    Platelets 137 829 - 407 Thousands/uL    nRBC 0 /100 WBCs    Neutrophils Relative 70 43 - 75 %    Immat GRANS % 0 0 - 2 %    Lymphocytes Relative 19 14 - 44 %    Monocytes Relative 9 4 - 12 %    Eosinophils Relative 1 0 - 6 %    Basophils Relative 1 0 - 1 %    Neutrophils Absolute 4 33 1 85 - 7 62 Thousands/µL    Immature Grans Absolute 0 01 0 00 - 0 20 Thousand/uL    Lymphocytes Absolute 1 18 0 60 - 4 47 Thousands/µL    Monocytes Absolute 0 52 0 17 - 1 22 Thousand/µL    Eosinophils Absolute 0 05 0 00 - 0 61 Thousand/µL    Basophils Absolute 0 04 0 00 - 0 10 Thousands/µL   Lipid panel    Collection Time: 09/13/22  9:40 AM   Result Value Ref Range    Cholesterol 191 See Comment mg/dL    Triglycerides 80 See Comment mg/dL    HDL, Direct 48 >=40 mg/dL    LDL Calculated 127 (H) 0 - 100 mg/dL    Non-HDL-Chol (CHOL-HDL) 143 mg/dl   Hemoglobin A1C    Collection Time: 09/13/22  9:40 AM   Result Value Ref Range    Hemoglobin A1C 5 4 Normal 3 8-5 6%; PreDiabetic 5 7-6 4%; Diabetic >=6 5%; Glycemic control for adults with diabetes <7 0% %     mg/dl   T4, free Lab Collect    Collection Time: 09/13/22  9:40 AM   Result Value Ref Range    Free T4 1 27 0 76 - 1 46 ng/dL   TSH, 3rd generation Lab Collect    Collection Time: 09/13/22  9:40 AM   Result Value Ref Range    TSH 3RD GENERATON 0 048 (L) 0 450 - 4 500 uIU/mL       The following portions of the patient's history were reviewed and updated as appropriate: allergies, current medications, past family history, past medical history, past social history, past surgical history and problem list      Review of Systems   Constitutional: Negative for appetite change, chills, diaphoresis, fatigue, fever and unexpected weight change  HENT: Negative for congestion, hearing loss and rhinorrhea  Eyes: Negative for visual disturbance     Respiratory: Negative for cough, chest tightness, shortness of breath and wheezing  Cardiovascular: Negative for chest pain, palpitations and leg swelling  Gastrointestinal: Negative for abdominal pain and blood in stool  Endocrine: Negative for cold intolerance, heat intolerance, polydipsia and polyuria  Genitourinary: Negative for difficulty urinating, dysuria, frequency and urgency  Musculoskeletal: Negative for arthralgias and myalgias  Skin: Negative for rash  Neurological: Negative for dizziness, weakness, light-headedness and headaches  Hematological: Does not bruise/bleed easily  Psychiatric/Behavioral: Negative for dysphoric mood and sleep disturbance  Objective:      Vitals:    10/06/22 1051   BP: 116/72   Pulse: 65   Resp: 17   Temp: 97 5 °F (36 4 °C)   SpO2: 96%          Physical Exam  Constitutional:       Appearance: He is well-developed  HENT:      Head: Normocephalic and atraumatic  Nose: Nose normal    Eyes:      General: No scleral icterus  Conjunctiva/sclera: Conjunctivae normal       Pupils: Pupils are equal, round, and reactive to light  Neck:      Thyroid: No thyromegaly  Vascular: No JVD  Trachea: No tracheal deviation  Cardiovascular:      Rate and Rhythm: Normal rate and regular rhythm  Heart sounds: No murmur heard  No friction rub  No gallop  Pulmonary:      Effort: Pulmonary effort is normal  No respiratory distress  Breath sounds: Normal breath sounds  No wheezing or rales  Musculoskeletal:         General: No deformity  Cervical back: Normal range of motion and neck supple  Lymphadenopathy:      Cervical: No cervical adenopathy  Skin:     General: Skin is warm and dry  Coloration: Skin is not pale  Findings: No erythema or rash  Neurological:      Mental Status: He is alert and oriented to person, place, and time  Cranial Nerves: No cranial nerve deficit     Psychiatric:         Behavior: Behavior normal  Thought Content:  Thought content normal          Judgment: Judgment normal

## 2022-10-06 NOTE — PATIENT INSTRUCTIONS
Lab data reviewed in detail and compared prior    Hypertension stable on present regimen    Hyperlipidemia-sequential increase despite daily Mevacor, will transition to rosuvastatin    Impaired fasting glucose stable with A1c in the normal range     History of follicular variant papillary thyroid cancer under care of endocrinology and Medical Oncology     History of melanoma stable without evidence for recurrent disease following with Medical, Surgical Oncology and Dermatology     GERD stable on omeprazole    Routine follow-up after labs in 6 months, sooner as needed

## 2022-10-12 ENCOUNTER — HOSPITAL ENCOUNTER (OUTPATIENT)
Dept: CT IMAGING | Facility: CLINIC | Age: 72
Discharge: HOME | End: 2022-10-12
Payer: MEDICARE

## 2022-10-12 DIAGNOSIS — Z85.820 PERSONAL HISTORY OF MALIGNANT MELANOMA: ICD-10-CM

## 2022-10-12 DIAGNOSIS — Z85.850 HISTORY OF THYROID CANCER: ICD-10-CM

## 2022-10-12 DIAGNOSIS — C43.9 MALIGNANT MELANOMA, UNSPECIFIED SITE (HCC): ICD-10-CM

## 2022-10-12 PROCEDURE — 74177 CT ABD & PELVIS W/CONTRAST: CPT

## 2022-10-12 PROCEDURE — 71260 CT THORAX DX C+: CPT

## 2022-10-12 RX ADMIN — IOHEXOL 100 ML: 350 INJECTION, SOLUTION INTRAVENOUS at 09:06

## 2023-01-17 ENCOUNTER — PATIENT MESSAGE (OUTPATIENT)
Dept: INTERNAL MEDICINE CLINIC | Facility: CLINIC | Age: 73
End: 2023-01-17

## 2023-01-17 DIAGNOSIS — K21.9 GERD WITHOUT ESOPHAGITIS: ICD-10-CM

## 2023-01-17 DIAGNOSIS — N52.9 ERECTILE DYSFUNCTION, UNSPECIFIED ERECTILE DYSFUNCTION TYPE: ICD-10-CM

## 2023-01-17 RX ORDER — SILDENAFIL 50 MG/1
50 TABLET, FILM COATED ORAL DAILY PRN
Qty: 30 TABLET | Refills: 3 | Status: SHIPPED | OUTPATIENT
Start: 2023-01-17

## 2023-01-17 RX ORDER — OMEPRAZOLE 20 MG/1
20 CAPSULE, DELAYED RELEASE ORAL
Qty: 30 CAPSULE | Refills: 5 | Status: SHIPPED | OUTPATIENT
Start: 2023-01-17

## 2023-02-07 ENCOUNTER — TELEPHONE (OUTPATIENT)
Dept: SURGICAL ONCOLOGY | Facility: CLINIC | Age: 73
End: 2023-02-07

## 2023-02-07 DIAGNOSIS — C73 FOLLICULAR THYROID CANCER (HCC): Primary | ICD-10-CM

## 2023-02-07 NOTE — TELEPHONE ENCOUNTER
Left message for patient to call central scheduling if he wanted to set up his own US head and neck prior to his appointment with Karyn 3/13/23 , and if he had problems or wanted Surg Onc to make the appointment call hopeline and get a message for Surg onc to make the appointment for the patient hope line number given x4  I will call one more time this pm to get ahold of patient to expedite this appointment  Called Central Scheduling to see if I could get both US  In same day and wonderful  said yes we can, and called patient to confirm 3/1 at 12 and 12:30 pm ok and patient was very happy he could take of both tests in the same day   Both US scheduled prior to 3/13/23 Donavan Moser f/u apointment

## 2023-03-01 ENCOUNTER — HOSPITAL ENCOUNTER (OUTPATIENT)
Dept: ULTRASOUND IMAGING | Facility: HOSPITAL | Age: 73
Discharge: HOME | End: 2023-03-01
Attending: SURGERY

## 2023-03-01 ENCOUNTER — APPOINTMENT (OUTPATIENT)
Dept: LAB | Facility: HOSPITAL | Age: 73
End: 2023-03-01

## 2023-03-01 ENCOUNTER — HOSPITAL ENCOUNTER (OUTPATIENT)
Dept: ULTRASOUND IMAGING | Facility: HOSPITAL | Age: 73
Discharge: HOME | End: 2023-03-01

## 2023-03-01 DIAGNOSIS — C73 FOLLICULAR THYROID CANCER (HCC): ICD-10-CM

## 2023-03-01 DIAGNOSIS — C73 THYROID CANCER (HCC): ICD-10-CM

## 2023-03-01 DIAGNOSIS — E04.2 MULTIPLE THYROID NODULES: ICD-10-CM

## 2023-03-01 DIAGNOSIS — E89.0 POSTOPERATIVE HYPOTHYROIDISM: ICD-10-CM

## 2023-03-01 DIAGNOSIS — Z85.820 PERSONAL HISTORY OF MALIGNANT MELANOMA: ICD-10-CM

## 2023-03-01 DIAGNOSIS — C73 PAPILLARY THYROID CARCINOMA (HCC): ICD-10-CM

## 2023-03-01 DIAGNOSIS — C43.9 MALIGNANT MELANOMA, UNSPECIFIED SITE (HCC): ICD-10-CM

## 2023-03-01 DIAGNOSIS — E78.2 MIXED HYPERLIPIDEMIA: ICD-10-CM

## 2023-03-01 DIAGNOSIS — D48.1 NEOPLASM OF UNCERTAIN BEHAVIOR OF CONNECTIVE AND OTHER SOFT TISSUE: ICD-10-CM

## 2023-03-01 DIAGNOSIS — Z85.850 HISTORY OF THYROID CANCER: ICD-10-CM

## 2023-03-01 DIAGNOSIS — R73.01 IMPAIRED FASTING GLUCOSE: ICD-10-CM

## 2023-03-01 LAB
ALBUMIN SERPL BCP-MCNC: 4.3 G/DL (ref 3.5–5)
ALP SERPL-CCNC: 66 U/L (ref 34–104)
ALT SERPL W P-5'-P-CCNC: 19 U/L (ref 7–52)
ANION GAP SERPL CALCULATED.3IONS-SCNC: 4 MMOL/L (ref 4–13)
AST SERPL W P-5'-P-CCNC: 20 U/L (ref 13–39)
BASOPHILS # BLD AUTO: 0.05 THOUSANDS/ÂΜL (ref 0–0.1)
BASOPHILS NFR BLD AUTO: 1 % (ref 0–1)
BILIRUB SERPL-MCNC: 0.85 MG/DL (ref 0.2–1)
BUN SERPL-MCNC: 17 MG/DL (ref 5–25)
CALCIUM SERPL-MCNC: 9.5 MG/DL (ref 8.4–10.2)
CHLORIDE SERPL-SCNC: 105 MMOL/L (ref 96–108)
CHOLEST SERPL-MCNC: 167 MG/DL
CO2 SERPL-SCNC: 30 MMOL/L (ref 21–32)
CREAT SERPL-MCNC: 1.18 MG/DL (ref 0.6–1.3)
EOSINOPHIL # BLD AUTO: 0.09 THOUSAND/ÂΜL (ref 0–0.61)
EOSINOPHIL NFR BLD AUTO: 1 % (ref 0–6)
ERYTHROCYTE [DISTWIDTH] IN BLOOD BY AUTOMATED COUNT: 13 % (ref 11.6–15.1)
GFR SERPL CREATININE-BSD FRML MDRD: 61 ML/MIN/1.73SQ M
GLUCOSE P FAST SERPL-MCNC: 107 MG/DL (ref 65–99)
HCT VFR BLD AUTO: 47.1 % (ref 36.5–49.3)
HDLC SERPL-MCNC: 49 MG/DL
HGB BLD-MCNC: 15.9 G/DL (ref 12–17)
IMM GRANULOCYTES # BLD AUTO: 0.02 THOUSAND/UL (ref 0–0.2)
IMM GRANULOCYTES NFR BLD AUTO: 0 % (ref 0–2)
LDH SERPL-CCNC: 146 U/L (ref 140–271)
LDLC SERPL CALC-MCNC: 101 MG/DL (ref 0–100)
LYMPHOCYTES # BLD AUTO: 1.29 THOUSANDS/ÂΜL (ref 0.6–4.47)
LYMPHOCYTES NFR BLD AUTO: 20 % (ref 14–44)
MCH RBC QN AUTO: 29.9 PG (ref 26.8–34.3)
MCHC RBC AUTO-ENTMCNC: 33.8 G/DL (ref 31.4–37.4)
MCV RBC AUTO: 89 FL (ref 82–98)
MONOCYTES # BLD AUTO: 0.46 THOUSAND/ÂΜL (ref 0.17–1.22)
MONOCYTES NFR BLD AUTO: 7 % (ref 4–12)
NEUTROPHILS # BLD AUTO: 4.43 THOUSANDS/ÂΜL (ref 1.85–7.62)
NEUTS SEG NFR BLD AUTO: 71 % (ref 43–75)
NONHDLC SERPL-MCNC: 118 MG/DL
NRBC BLD AUTO-RTO: 0 /100 WBCS
PLATELET # BLD AUTO: 175 THOUSANDS/UL (ref 149–390)
PMV BLD AUTO: 11 FL (ref 8.9–12.7)
POTASSIUM SERPL-SCNC: 4.1 MMOL/L (ref 3.5–5.3)
PROT SERPL-MCNC: 6.8 G/DL (ref 6.4–8.4)
RBC # BLD AUTO: 5.31 MILLION/UL (ref 3.88–5.62)
SODIUM SERPL-SCNC: 139 MMOL/L (ref 135–147)
T4 FREE SERPL-MCNC: 1.19 NG/DL (ref 0.76–1.46)
TRIGL SERPL-MCNC: 83 MG/DL
TSH SERPL DL<=0.05 MIU/L-ACNC: 0.45 UIU/ML (ref 0.45–4.5)
WBC # BLD AUTO: 6.34 THOUSAND/UL (ref 4.31–10.16)

## 2023-03-02 ENCOUNTER — TELEPHONE (OUTPATIENT)
Dept: INTERNAL MEDICINE CLINIC | Facility: CLINIC | Age: 73
End: 2023-03-02

## 2023-03-02 LAB
EST. AVERAGE GLUCOSE BLD GHB EST-MCNC: 108 MG/DL
HBA1C MFR BLD: 5.4 %
THYROGLOB AB SERPL-ACNC: <1 IU/ML (ref 0–0.9)
THYROGLOB SERPL-MCNC: 1.3 NG/ML (ref 1.4–29.2)

## 2023-03-02 NOTE — TELEPHONE ENCOUNTER
----- Message from Carlos Rodrigues MD sent at 3/2/2023 12:13 PM EST -----  Needs routine lab follow-up

## 2023-03-13 ENCOUNTER — TELEPHONE (OUTPATIENT)
Dept: SURGICAL ONCOLOGY | Facility: CLINIC | Age: 73
End: 2023-03-13

## 2023-03-14 ENCOUNTER — TELEPHONE (OUTPATIENT)
Dept: HEMATOLOGY ONCOLOGY | Facility: CLINIC | Age: 73
End: 2023-03-14

## 2023-03-31 DIAGNOSIS — I10 ESSENTIAL HYPERTENSION: ICD-10-CM

## 2023-03-31 RX ORDER — ATENOLOL 50 MG/1
TABLET ORAL
Qty: 90 TABLET | Refills: 1 | Status: SHIPPED | OUTPATIENT
Start: 2023-03-31

## 2023-04-06 ENCOUNTER — OFFICE VISIT (OUTPATIENT)
Dept: DERMATOLOGY | Facility: CLINIC | Age: 73
End: 2023-04-06

## 2023-04-06 VITALS — BODY MASS INDEX: 30.06 KG/M2 | HEIGHT: 70 IN | WEIGHT: 210 LBS

## 2023-04-06 DIAGNOSIS — Z13.89 SCREENING FOR SKIN CONDITION: ICD-10-CM

## 2023-04-06 DIAGNOSIS — Z85.820 HISTORY OF MELANOMA: ICD-10-CM

## 2023-04-06 DIAGNOSIS — D22.9 NEVUS: Primary | ICD-10-CM

## 2023-04-06 DIAGNOSIS — L82.1 SEBORRHEIC KERATOSIS: ICD-10-CM

## 2023-04-06 NOTE — PROGRESS NOTES
[facility] DERMATOLOGY  [address] [address]  [phone]  [phone]     MRN: [id number] : 1950  Encounter: [id number]  Patient Information: Britton Haile  Chief complaint: 6 month check up    History of present illness: 44-year-old male presents for overall skin check previous history of melanoma it has been 4-1/2 years since his melanoma has been excised no problems noted no specific complaints  Patient will be relocating to [place]  Past Medical History:   Diagnosis Date   • Arthritis    • Cancer ([address] )     melanoma abdomen, thyroid   • Disease of thyroid gland    • GERD (gastroesophageal reflux disease)     diet controlled   • Hyperlipidemia     Last Assessed:10/20/14   • Hypertension    • PONV (postoperative nausea and vomiting)      Past Surgical History:   Procedure Laterality Date   • COLONOSCOPY     • HERNIA REPAIR Right     Last Assessed:10/20/14 inguinal    • LYMPH NODE BIOPSY N/A 2018    Procedure: SENTINEL LYMPH NODE BIOPSY; LYMPHOSCINTIGRAPHY; INTRAOPERATIVE LYMPHATIC MAPPING (INJECT AT 1100); Surgeon: Kylee Crenshaw MD;  [facility];  Service: Surgical Oncology   • SKIN LESION EXCISION N/A 2018    Procedure: MEDIAL UPPER ABDOMEN MELANOMA WIDE EXCISION; BILATERAL AXILLA SLN;  Surgeon: Kylee Crenshaw MD;  [facility];  Service: Surgical Oncology   • THYROID LOBECTOMY Left 2019    Procedure: HEMITHYROIDECTOMY ISTHMUSECTOMY;  Surgeon: Kylee Crenshaw MD;  [facility]: [facility];  Service: Surgical Oncology   • TONSILLECTOMY     • UMBILICAL HERNIA REPAIR LAPAROSCOPIC  2022    laparoscopic hernia repair w/mesh - Dr Alicia Morales   • [address] N/A 2022    Procedure:  HERNIA REPAIR UMBILICAL LAPAROSCOPIC WITH MESH;  Surgeon: Jason Ulloa MD;  Location: [facility];  Service: General   • US GUIDED THYROID BIOPSY  2019   • US GUIDED THYROID BIOPSY  2021   • GEREMIAS TOOTH EXTRACTION       Social History   Social History     Substance and Sexual Activity   Alcohol Use Yes   • Alcohol/week: 2 0 standard drinks   • Types: 1 Glasses of wine, 1 Cans of beer per week    Comment: Occasionally     Social History     Substance and Sexual Activity   Drug Use Never     Social History     Tobacco Use   Smoking Status Never   Smokeless Tobacco Never     Family History   Problem Relation Age of Onset   • Hypertension Mother    • Hypothyroidism Mother    • Hypertension Father    • Skin cancer Father    • Colon cancer Maternal Aunt    • Colon cancer Paternal Aunt         62s   • Skin cancer Paternal Uncle      Meds/Allergies   No Known Allergies    Meds:  Prior to Admission medications    Medication Sig Start Date End Date Taking?  Authorizing Provider   aspirin 81 MG tablet Take 81 mg by mouth daily    Yes Historical Provider, MD   atenolol (TENORMIN) 50 mg tablet TAKE ONE TABLET BY MOUTH EVERY DAY 3/31/23  Yes Thalia Garcia MD   Calcium-Magnesium-Vitamin D (CALCIUM 1200+D3 PO)    Yes Historical Provider, MD   cholecalciferol (VITAMIN D3) 1,000 units tablet Take 1,000 Units by mouth daily   Yes Historical Provider, MD   levothyroxine 175 mcg tablet Take 1 tablet orally on an empty stomach 6 days of the week 7/21/22  Yes Amanda Casas MD   multivitamin [facility]) TABS Take 1 tablet by mouth daily at bedtime    Yes Historical Provider, MD   omeprazole (PriLOSEC) 20 mg delayed release capsule Take 1 capsule (20 mg total) by mouth daily before breakfast 1/17/23  Yes Thalia Garcia MD   rosuvastatin (CRESTOR) 10 MG tablet Take 1 tablet (10 mg total) by mouth daily 10/6/22  Yes Thalia Garcia MD   sildenafil (VIAGRA) 50 MG tablet Take 1 tablet (50 mg total) by mouth daily as needed for erectile dysfunction 1/17/23  Yes Thalia Garcia MD   lovastatin (MEVACOR) 40 MG tablet TAKE ONE TABLET BY MOUTH EVERY DAY AT BEDTIME 5/31/22   Thalia Garcia MD       Subjective:     Review of "Systems:    General: negative for - chills, fatigue, fever,  weight gain or weight loss  Psychological: negative for - anxiety, behavioral disorder, concentration difficulties, decreased libido, depression, irritability, memory difficulties, mood swings, sleep disturbances or suicidal ideation  ENT: negative for - hearing difficulties , nasal congestion, nasal discharge, oral lesions, sinus pain, sneezing, sore throat  Allergy and Immunology: negative for - hives, insect bite sensitivity,  Hematological and Lymphatic: negative for - bleeding problems, blood clots,bruising, swollen lymph nodes  Endocrine: negative for - hair pattern changes, hot flashes, malaise/lethargy, mood swings, palpitations, polydipsia/polyuria, skin changes, temperature intolerance or unexpected weight change  Respiratory: negative for - cough, hemoptysis, orthopnea, shortness of breath, or wheezing  Cardiovascular: negative for - chest pain, dyspnea on exertion, edema,  Gastrointestinal: negative for - abdominal pain, nausea/vomiting  Genito-Urinary: negative for - dysuria, incontinence, irregular/heavy menses or urinary frequency/urgency  Musculoskeletal: negative for - gait disturbance, joint pain, joint stiffness, joint swelling, muscle pain, muscular weakness  Dermatological:  As in HPI  Neurological: negative for confusion, dizziness, headaches, impaired coordination/balance, memory loss, numbness/tingling, seizures, speech problems, tremors or weakness       Objective:   Ht 5' 10\" (1 778 m)   Wt 95 3 kg (210 lb)   BMI 30 13 kg/m²     Physical Exam:    General Appearance:    Alert, cooperative, no distress   Head:    Normocephalic, without obvious abnormality, atraumatic   Lymphatics:    No lymphadenopathy noted      Abdomen:   No hepatosplenomegaly   Skin:   A full skin exam was performed including scalp, head scalp, eyes, ears, nose, lips, neck, chest, axilla, abdomen, back, buttocks, bilateral upper extremities, bilateral lower " "extremities, hands, feet, fingers, toes, fingernails, and toenails the site of melanoma well-healed without recurrence normal pigmented lesion regular shape and color normal keratotic papules greasy stuck on appearance nothing else markedly atypical noted on complete exam     Assessment:     1  Nevus        2  Screening for skin condition        3  Seborrheic keratosis        4  History of melanoma              Plan:   Nevi reviewed the concept of ABCDE and ugly duckling nothing markedly atypical patient reassured    Seborrheic keratosis patient reassured these are normal growths we acquire with age no treatment needed  History of melanoma no recurrence nothing else atypical sunblock recommended follow-up in 1 year  Screening for dermatologic disorders nothing else of concern noted on complete exam follow-up in 1 year      Danica Olsen MD  4/6/2023,9:44 AM    Portions of the record may have been created with voice recognition software   Occasional wrong word or \"sound a like\" substitutions may have occurred due to the inherent limitations of voice recognition software   Read the chart carefully and recognize, using context, where substitutions have occurred    "

## 2023-04-06 NOTE — PATIENT INSTRUCTIONS
Nevi reviewed the concept of ABCDE and ugly duckling nothing markedly atypical patient reassured    Seborrheic keratosis patient reassured these are normal growths we acquire with age no treatment needed  History of melanoma no recurrence nothing else atypical sunblock recommended follow-up in 1 year  Screening for dermatologic disorders nothing else of concern noted on complete exam follow-up in 1 year

## 2023-04-06 NOTE — PROGRESS NOTES
"[facility] Note     Patient Name: Marigene Cabot  Encounter Date: April 6, 2023     Have you been cared for by a [place] [address] Dermatologist in the last 3 years and, if so, which description applies to you? Yes  I have been here within the last 3 years, and my medical history has NOT changed since that time  I am MALE/not capable of bearing children  REVIEW OF SYSTEMS:  Have you recently had or currently have any of the following? · No changes in my recent health  PAST MEDICAL HISTORY:  Have you personally ever had or currently have any of the following? If \"YES,\" then please provide more detail  · No changes in my medical history  FAMILY HISTORY:  Any \"first degree relatives\" (parent, brother, sister, or child) with the following? • No changes in my family's known health  PATIENT EXPERIENCE:    • Do you want the Dermatologist to perform a COMPLETE skin exam today including a clinical examination under the \"bra and underwear\" areas? Yes  • If necessary, do we have your permission to call and leave a detailed message on your Preferred Phone number that includes your specific medical information?   Yes      No Known Allergies   Current Outpatient Medications:   •  aspirin 81 MG tablet, Take 81 mg by mouth daily , Disp: , Rfl:   •  atenolol (TENORMIN) 50 mg tablet, TAKE ONE TABLET BY MOUTH EVERY DAY, Disp: 90 tablet, Rfl: 1  •  Calcium-Magnesium-Vitamin D (CALCIUM 1200+D3 PO), , Disp: , Rfl:   •  cholecalciferol (VITAMIN D3) 1,000 units tablet, Take 1,000 Units by mouth daily, Disp: , Rfl:   •  levothyroxine 175 mcg tablet, Take 1 tablet orally on an empty stomach 6 days of the week, Disp: 90 tablet, Rfl: 2  •  lovastatin (MEVACOR) 40 MG tablet, TAKE ONE TABLET BY MOUTH EVERY DAY AT BEDTIME, Disp: 90 tablet, Rfl: 2  •  multivitamin (THERAGRAN) TABS, Take 1 tablet by mouth daily at bedtime , Disp: , Rfl:   •  omeprazole (PriLOSEC) 20 mg delayed release capsule, Take 1 capsule (20 mg " total) by mouth daily before breakfast, Disp: 30 capsule, Rfl: 5  •  rosuvastatin (CRESTOR) 10 MG tablet, Take 1 tablet (10 mg total) by mouth daily, Disp: 30 tablet, Rfl: 5  •  sildenafil (VIAGRA) 50 MG tablet, Take 1 tablet (50 mg total) by mouth daily as needed for erectile dysfunction, Disp: 30 tablet, Rfl: 3          • Whom besides the patient is providing clinical information about today's encounter?   o NO ADDITIONAL HISTORIAN (patient alone provided history)    Physical Exam and Assessment/Plan by Diagnosis:

## 2023-04-11 PROBLEM — C73 THYROID CANCER (HCC): Status: RESOLVED | Noted: 2020-07-09 | Resolved: 2023-04-11

## 2023-04-29 DIAGNOSIS — E78.2 MIXED HYPERLIPIDEMIA: ICD-10-CM

## 2023-04-29 RX ORDER — ROSUVASTATIN CALCIUM 10 MG/1
TABLET, COATED ORAL
Qty: 30 TABLET | Refills: 5 | Status: SHIPPED | OUTPATIENT
Start: 2023-04-29

## 2023-05-04 ENCOUNTER — TELEPHONE (OUTPATIENT)
Dept: HEMATOLOGY ONCOLOGY | Facility: CLINIC | Age: 73
End: 2023-05-04

## 2023-05-04 DIAGNOSIS — Z85.820 PERSONAL HISTORY OF MALIGNANT MELANOMA: ICD-10-CM

## 2023-05-04 DIAGNOSIS — Z85.850 HISTORY OF THYROID CANCER: ICD-10-CM

## 2023-05-04 DIAGNOSIS — C43.9 MALIGNANT MELANOMA, UNSPECIFIED SITE (HCC): Primary | ICD-10-CM

## 2023-05-08 ENCOUNTER — APPOINTMENT (OUTPATIENT)
Dept: LAB | Facility: HOSPITAL | Age: 73
End: 2023-05-08
Attending: INTERNAL MEDICINE

## 2023-05-08 ENCOUNTER — HOSPITAL ENCOUNTER (OUTPATIENT)
Dept: CT IMAGING | Facility: HOSPITAL | Age: 73
Discharge: HOME | End: 2023-05-08
Attending: INTERNAL MEDICINE

## 2023-05-08 DIAGNOSIS — C43.9 MALIGNANT MELANOMA, UNSPECIFIED SITE (HCC): ICD-10-CM

## 2023-05-08 DIAGNOSIS — Z85.820 PERSONAL HISTORY OF MALIGNANT MELANOMA: ICD-10-CM

## 2023-05-08 DIAGNOSIS — Z85.850 HISTORY OF THYROID CANCER: ICD-10-CM

## 2023-05-08 LAB
ALBUMIN SERPL BCP-MCNC: 4.2 G/DL (ref 3.5–5)
ALP SERPL-CCNC: 73 U/L (ref 34–104)
ALT SERPL W P-5'-P-CCNC: 20 U/L (ref 7–52)
ANION GAP SERPL CALCULATED.3IONS-SCNC: 4 MMOL/L (ref 4–13)
AST SERPL W P-5'-P-CCNC: 19 U/L (ref 13–39)
BASOPHILS # BLD AUTO: 0.04 THOUSANDS/ÂΜL (ref 0–0.1)
BASOPHILS NFR BLD AUTO: 1 % (ref 0–1)
BILIRUB SERPL-MCNC: 0.71 MG/DL (ref 0.2–1)
BUN SERPL-MCNC: 17 MG/DL (ref 5–25)
CALCIUM SERPL-MCNC: 9.8 MG/DL (ref 8.4–10.2)
CHLORIDE SERPL-SCNC: 103 MMOL/L (ref 96–108)
CO2 SERPL-SCNC: 31 MMOL/L (ref 21–32)
CREAT SERPL-MCNC: 1.14 MG/DL (ref 0.6–1.3)
EOSINOPHIL # BLD AUTO: 0.07 THOUSAND/ÂΜL (ref 0–0.61)
EOSINOPHIL NFR BLD AUTO: 1 % (ref 0–6)
ERYTHROCYTE [DISTWIDTH] IN BLOOD BY AUTOMATED COUNT: 12.4 % (ref 11.6–15.1)
GFR SERPL CREATININE-BSD FRML MDRD: 64 ML/MIN/1.73SQ M
GLUCOSE P FAST SERPL-MCNC: 129 MG/DL (ref 65–99)
HCT VFR BLD AUTO: 46.3 % (ref 36.5–49.3)
HGB BLD-MCNC: 15.6 G/DL (ref 12–17)
IMM GRANULOCYTES # BLD AUTO: 0.02 THOUSAND/UL (ref 0–0.2)
IMM GRANULOCYTES NFR BLD AUTO: 0 % (ref 0–2)
LDH SERPL-CCNC: 131 U/L (ref 140–271)
LYMPHOCYTES # BLD AUTO: 1.33 THOUSANDS/ÂΜL (ref 0.6–4.47)
LYMPHOCYTES NFR BLD AUTO: 19 % (ref 14–44)
MCH RBC QN AUTO: 30.1 PG (ref 26.8–34.3)
MCHC RBC AUTO-ENTMCNC: 33.7 G/DL (ref 31.4–37.4)
MCV RBC AUTO: 89 FL (ref 82–98)
MONOCYTES # BLD AUTO: 0.52 THOUSAND/ÂΜL (ref 0.17–1.22)
MONOCYTES NFR BLD AUTO: 8 % (ref 4–12)
NEUTROPHILS # BLD AUTO: 4.88 THOUSANDS/ÂΜL (ref 1.85–7.62)
NEUTS SEG NFR BLD AUTO: 71 % (ref 43–75)
NRBC BLD AUTO-RTO: 0 /100 WBCS
PLATELET # BLD AUTO: 180 THOUSANDS/UL (ref 149–390)
PMV BLD AUTO: 10.4 FL (ref 8.9–12.7)
POTASSIUM SERPL-SCNC: 4.2 MMOL/L (ref 3.5–5.3)
PROT SERPL-MCNC: 6.9 G/DL (ref 6.4–8.4)
RBC # BLD AUTO: 5.18 MILLION/UL (ref 3.88–5.62)
SODIUM SERPL-SCNC: 138 MMOL/L (ref 135–147)
T3FREE SERPL-MCNC: 2.58 PG/ML (ref 2.3–4.2)
T4 FREE SERPL-MCNC: 1.15 NG/DL (ref 0.76–1.46)
T4 FREE SERPL-MCNC: 1.18 NG/DL (ref 0.76–1.46)
TSH SERPL DL<=0.05 MIU/L-ACNC: 0.24 UIU/ML (ref 0.45–4.5)
WBC # BLD AUTO: 6.86 THOUSAND/UL (ref 4.31–10.16)

## 2023-05-08 RX ADMIN — IOHEXOL 100 ML: 350 INJECTION, SOLUTION INTRAVENOUS at 15:25

## 2023-05-10 ENCOUNTER — OFFICE VISIT (OUTPATIENT)
Dept: HEMATOLOGY ONCOLOGY | Facility: CLINIC | Age: 73
End: 2023-05-10

## 2023-05-10 ENCOUNTER — TELEPHONE (OUTPATIENT)
Dept: HEMATOLOGY ONCOLOGY | Facility: CLINIC | Age: 73
End: 2023-05-10

## 2023-05-10 VITALS
TEMPERATURE: 98 F | DIASTOLIC BLOOD PRESSURE: 82 MMHG | HEART RATE: 66 BPM | BODY MASS INDEX: 31.35 KG/M2 | SYSTOLIC BLOOD PRESSURE: 142 MMHG | OXYGEN SATURATION: 96 % | RESPIRATION RATE: 16 BRPM | WEIGHT: 219 LBS | HEIGHT: 70 IN

## 2023-05-10 DIAGNOSIS — Z08 ENCOUNTER FOR FOLLOW-UP EXAMINATION AFTER COMPLETED TREATMENT FOR MALIGNANT NEOPLASM: ICD-10-CM

## 2023-05-10 DIAGNOSIS — Z85.820 PERSONAL HISTORY OF MALIGNANT MELANOMA: Primary | ICD-10-CM

## 2023-05-10 DIAGNOSIS — C77.3 MALIGNANT NEOPLASM METASTATIC TO LYMPH NODE OF AXILLA (HCC): ICD-10-CM

## 2023-05-10 DIAGNOSIS — Z85.850 HISTORY OF THYROID CANCER: ICD-10-CM

## 2023-05-10 DIAGNOSIS — C73 FOLLICULAR THYROID CANCER (HCC): ICD-10-CM

## 2023-05-10 NOTE — LETTER
May 12, 2023     Veronica Xie MD  [address] [facility] [address]    Patient: Francois Henley   YOB: 1950   Date of Visit: 5/10/2023       Dear Dr Lauren Mandujano: Thank you for referring Francois Henley to me for evaluation  Below are my notes for this consultation  If you have questions, please do not hesitate to call me  I look forward to following your patient along with you  Sincerely,        Darrick Myers MD        CC: MD Kitty Gastelum MD Silvana Portela, MD Caldwell Angles, MD  5/12/2023 10:31 AM  Sign when Signing Visit  [address] [place] [address]  [phone]  [phone]     Date of Visit: 5/10/2023  Name: Francois Henley   YOB: 1950        Subjective    VISIT DIAGNOSIS:  Diagnoses and all orders for this visit:    Personal history of malignant melanoma  -     CT chest abdomen pelvis w contrast; Future  -     CBC and differential; Future  -     Comprehensive metabolic panel; Future  -     LD,Blood; Future  -     T3, free; Future  -     T4, free; Future  -     TSH, 3rd generation; Future    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)  -     CT chest abdomen pelvis w contrast; Future  -     CBC and differential; Future  -     Comprehensive metabolic panel; Future  -     LD,Blood; Future  -     T3, free; Future  -     T4, free; Future  -     TSH, 3rd generation; Future    Encounter for follow-up examination after completed treatment for malignant neoplasm  -     CT chest abdomen pelvis w contrast; Future  -     CBC and differential; Future  -     Comprehensive metabolic panel; Future  -     LD,Blood; Future  -     T3, free; Future  -     T4, free; Future  -     TSH, 3rd generation;  Future    History of thyroid cancer  -     CT chest abdomen pelvis w contrast; Future  -     CBC and differential; Future  -     Comprehensive metabolic panel; Future  -     LD,Blood; Future  -     T3, free; Future  -     T4, free; Future  -     TSH, 3rd generation; Future    Follicular thyroid cancer ([facility] [address] )  -     CT chest abdomen pelvis w contrast; Future  -     CBC and differential; Future  -     Comprehensive metabolic panel; Future  -     LD,Blood; Future  -     T3, free; Future  -     T4, free; Future  -     TSH, 3rd generation; Future        Oncology History   Personal history of malignant melanoma   10/16/2018 Initial Diagnosis    Malignant melanoma of skin of abdomen (HCC)  1 1 mm Breslow thickness, no ulceration, mitotic rate 1 per mm squared     12/14/2018 Surgery    Wide excision of abdomen with sentinel lymph node biopsies (right and left axillae)  No residual melanoma  He did have 1/4 lymph nodes positive with 2 mm of focal invasion into the lymph node  No extracapsular extension  Positive lymph node was in right axilla      BRAF [id number] +     12/14/2018 -  Cancer Staged    Staging form: Melanoma of the Skin, AJCC 8th Edition  - Pathologic stage from 12/14/2018: Stage IIIA (pT2a, pN1a, cM0) - Signed by Jesus Maddox MD on 3/2/2022       History of thyroid cancer   4/24/2019 Initial Diagnosis    Papillary and follicular carcinoma ([facility] [address] )     4/24/2019 Surgery    Thyroid, left and isthmus, hemithyroidectomy:  -  Papillary carcinoma, follicular variant, infiltrative - [id number]  -  Follicular carcinoma, minimally invasive - [id number]     4/24/2019 -  Cancer Staged    Staging form: Thyroid - Differentiated and Anaplastic, AJCC 8th Edition  - Pathologic stage from 4/24/2019: Stage II (pT3a, pNX, cM0, Age at diagnosis: >= 54 years) - Signed by Jesus Maddox MD on 3/2/2022  Laterality: Left  Solitary (s) or multifocal (m) tumors in the primary site: Solitary  Lymph node metastasis: Unknown          Cancer Staging   History of thyroid cancer  Staging form: Thyroid - Differentiated and Anaplastic, AJCC 8th Edition  - Pathologic stage from 4/24/2019: Stage II (pT3a, pNX, cM0, Age at diagnosis: >= 54 years) - Signed by Haily Velez MD on 3/2/2022    Personal history of malignant melanoma  Staging form: Melanoma of the Skin, AJCC 8th Edition  - Pathologic stage from 12/14/2018: Stage IIIA (pT2a, pN1a, cM0) - Signed by Haily Velez MD on 3/2/2022          HISTORY OF PRESENT ILLNESS: Navid Alex is a 72 y o  male  who has stage IIIa melanoma here for follow-up, monitoring, and surveillance  He also has a history previously of thyroid cancer diagnosed in 2019  He is doing well and feels good  Denies any new, changing, concerning skin lesions  Denies any lymphadenopathy  He continues to follow with endocrinology for his thyroid cancer  Energy is okay  We discussed his recent imaging from 5/8/2023 CT chest, abdomen, and pelvis  There is no evidence of metastatic disease either melanoma or thyroid  He has stable most likely renal cysts in the right kidney  Has stable hepatic cyst in the left lobe and areas consistent with focal steatosis  REVIEW OF SYSTEMS:  Review of Systems   Constitutional: Negative for appetite change, fatigue, fever and unexpected weight change  HENT:   Negative for lump/mass  Eyes: Negative for icterus  Respiratory: Negative for cough, shortness of breath and wheezing  Cardiovascular: Negative for leg swelling  Gastrointestinal: Negative for abdominal pain, constipation, diarrhea, nausea and vomiting  Genitourinary: Negative for difficulty urinating and hematuria  Musculoskeletal: Negative for arthralgias, gait problem and myalgias  Skin: Negative for itching and rash  No new, changing, or concerning lesions  Neurological: Negative for extremity weakness, gait problem, headaches, light-headedness and numbness  Hematological: Negative for adenopathy          MEDICATIONS:    Current Outpatient Medications:   •  aspirin 81 MG tablet, Take 81 mg by mouth daily , Disp: , Rfl:   •  atenolol (TENORMIN) 50 mg tablet, TAKE ONE TABLET BY MOUTH EVERY DAY, Disp: 90 tablet, Rfl: 1  •  Calcium-Magnesium-Vitamin D (CALCIUM 1200+D3 PO), , Disp: , Rfl:   •  cholecalciferol (VITAMIN D3) 1,000 units tablet, Take 1,000 Units by mouth daily, Disp: , Rfl:   •  levothyroxine 175 mcg tablet, Take 1 tablet orally on an empty stomach 6 days of the week, Disp: 90 tablet, Rfl: 2  •  multivitamin (THERAGRAN) TABS, Take 1 tablet by mouth daily at bedtime , Disp: , Rfl:   •  omeprazole (PriLOSEC) 20 mg delayed release capsule, Take 1 capsule (20 mg total) by mouth daily before breakfast, Disp: 30 capsule, Rfl: 5  •  rosuvastatin (CRESTOR) 10 MG tablet, TAKE ONE TABLET BY MOUTH EVERY DAY, Disp: 30 tablet, Rfl: 5  •  sildenafil (VIAGRA) 50 MG tablet, Take 1 tablet (50 mg total) by mouth daily as needed for erectile dysfunction, Disp: 30 tablet, Rfl: 3  No current facility-administered medications for this visit       ALLERGIES:  No Known Allergies     ACTIVE PROBLEMS:  Patient Active Problem List   Diagnosis   • Actinic keratosis   • Benign essential hypertension   • BPH (benign prostatic hyperplasia)   • Erectile dysfunction   • GERD without esophagitis   • Hyperlipidemia   • Impaired fasting glucose   • Personal history of malignant melanoma   • Liver mass   • Other specified soft tissue disorders   • Multiple thyroid nodules   • Encounter for follow-up examination after completed treatment for malignant neoplasm   • Hoarseness of voice   • Vocal cord paralysis   • Fatigue   • Vitamin D deficiency   • Postoperative hypothyroidism   • History of thyroid cancer   • Hernia of abdominal cavity   • Follicular thyroid cancer (HCC)   • Umbilical hernia without obstruction and without gangrene   • Postoperative visit          PAST MEDICAL HISTORY:   Past Medical History:   Diagnosis Date   • Arthritis    • Cancer ([facility] [address] )     melanoma abdomen, thyroid   • Disease of thyroid gland    • GERD (gastroesophageal reflux disease)     diet controlled   • Hyperlipidemia     Last Assessed:10/20/14   • Hypertension    • PONV (postoperative nausea and vomiting)         PAST SURGICAL HISTORY:  Past Surgical History:   Procedure Laterality Date   • COLONOSCOPY     • HERNIA REPAIR Right     Last Assessed:10/20/14 inguinal    • LYMPH NODE BIOPSY N/A 12/14/2018    Procedure: SENTINEL LYMPH NODE BIOPSY; LYMPHOSCINTIGRAPHY; INTRAOPERATIVE LYMPHATIC MAPPING (INJECT AT 1100); Surgeon: Missy Marie MD;  [facility];  Service: Surgical Oncology   • SKIN LESION EXCISION N/A 12/14/2018    Procedure: MEDIAL UPPER ABDOMEN MELANOMA WIDE EXCISION; BILATERAL AXILLA SLN;  Surgeon: Missy Marie MD;  [facility];  Service: Surgical Oncology   • THYROID LOBECTOMY Left 04/24/2019    Procedure: HEMITHYROIDECTOMY ISTHMUSECTOMY;  Surgeon: Missy Marie MD;  [facility];  Service: Surgical Oncology   • TONSILLECTOMY     • UMBILICAL HERNIA REPAIR LAPAROSCOPIC  05/04/2022    laparoscopic hernia repair w/mesh - Dr Serina Lopez   • [address] N/A 5/4/2022    Procedure: HERNIA REPAIR UMBILICAL LAPAROSCOPIC WITH MESH;  Surgeon: Seun Guzman MD;  Location: [facility];  Service: General   • US GUIDED THYROID BIOPSY  02/07/2019   • US GUIDED THYROID BIOPSY  01/14/2021   • WISDOM TOOTH EXTRACTION          SOCIAL HISTORY:  Social History     Socioeconomic History   • Marital status: /Civil Union     Spouse name: None   • Number of children: None   • Years of education: None   • Highest education level: None   Occupational History   • None   Tobacco Use   • Smoking status: Never   • Smokeless tobacco: Never   Vaping Use   • Vaping Use: Never used   Substance and Sexual Activity   • Alcohol use:  Yes     Alcohol/week: 2 0 standard drinks     Types: 1 Glasses of wine, 1 Cans of beer per week     Comment: Occasionally   • Drug use: Never   • Sexual activity: Yes     Partners: Female   Other Topics Concern   • None   Social History Narrative    No "advance directives     Social Determinants of Health     Financial Resource Strain: Low Risk    • Difficulty of Paying Living Expenses: Not very hard   Food Insecurity: Not on file   Transportation Needs: No Transportation Needs   • Lack of Transportation (Medical): No   • Lack of Transportation (Non-Medical): No   Physical Activity: Not on file   Stress: No Stress Concern Present   • Feeling of Stress : Only a little   Social Connections: Not on file   Intimate Partner Violence: Not on file   Housing Stability: Not on file        FAMILY HISTORY:  Family History   Problem Relation Age of Onset   • Hypertension Mother    • Hypothyroidism Mother    • Hypertension Father    • Skin cancer Father    • Colon cancer Maternal Aunt    • Colon cancer Paternal Aunt         62s   • Skin cancer Paternal Uncle            Objective    PHYSICAL EXAMINATION:   Blood pressure 142/82, pulse 66, temperature 98 °F (36 7 °C), temperature source Temporal, resp  rate 16, height 5' 10\" (1 778 m), weight 99 3 kg (219 lb), SpO2 96 %  Pain Score: 0-No pain     ECOG Performance Status    Flowsheet Row Most Recent Value   ECOG Performance Status 0 - Fully active, able to carry on all pre-disease performance without restriction             Physical Exam  Constitutional:       General: He is not in acute distress  Appearance: Normal appearance  He is not toxic-appearing  HENT:      Mouth/Throat:      Mouth: Mucous membranes are moist       Pharynx: Oropharynx is clear  Eyes:      General: No scleral icterus  Cardiovascular:      Rate and Rhythm: Normal rate and regular rhythm  Pulses: Normal pulses  Heart sounds: No murmur heard  No friction rub  No gallop  Pulmonary:      Effort: Pulmonary effort is normal  No respiratory distress  Breath sounds: Normal breath sounds  No wheezing or rales  Abdominal:      General: There is no distension  Palpations: There is no mass  Tenderness:  There is no abdominal " tenderness  There is no rebound  Musculoskeletal:         General: No swelling or tenderness  Right lower leg: No edema  Left lower leg: No edema  Lymphadenopathy:      Head:      Right side of head: No submandibular, preauricular or posterior auricular adenopathy  Left side of head: No submandibular, preauricular or posterior auricular adenopathy  Cervical: No cervical adenopathy  Right cervical: No superficial or posterior cervical adenopathy  Left cervical: No superficial or posterior cervical adenopathy  Upper Body:      Right upper body: No supraclavicular or axillary adenopathy  Left upper body: No supraclavicular or axillary adenopathy  Lower Body: No right inguinal adenopathy  No left inguinal adenopathy  Skin:     Findings: No rash  Comments: Well healed surgical scar  No evidence of recurrence at primary site  Neurological:      General: No focal deficit present  Mental Status: He is alert and oriented to person, place, and time  Psychiatric:         Mood and Affect: Mood normal          Behavior: Behavior normal          Thought Content: Thought content normal          Judgment: Judgment normal          I reviewed lab data in the chart      WBC   Date Value Ref Range Status   05/08/2023 6 86 4 31 - 10 16 Thousand/uL Final   03/01/2023 6 34 4 31 - 10 16 Thousand/uL Final   09/13/2022 6 13 4 31 - 10 16 Thousand/uL Final   05/29/2017 6 3 3 4 - 10 8 x10E3/uL Final   11/11/2016 7 7 3 4 - 10 8 x10E3/uL Final   04/28/2016 9 4 3 4 - 10 8 x10E3/uL Final     Hemoglobin   Date Value Ref Range Status   05/08/2023 15 6 12 0 - 17 0 g/dL Final   03/01/2023 15 9 12 0 - 17 0 g/dL Final   09/13/2022 16 2 12 0 - 17 0 g/dL Final   05/29/2017 15 9 g/dL Final     Comment:     Result Comment: No patient age and/or gender provided                              Age                Male          Female                           0 -  7 days       10 7 - 20 5    10 7 - 20 5                           8 - 30 days       10 5 - 18 7    10 5 - 18 7                          31 - 90 days        8 8 - 14 3     8 8 - 14 3                     91 days - 11 months     10 4 - 14 1    10 4 - 14 1                           1 -  7 years      10 9 - 14 8    10 9 - 14 8                           8 - 12 years      11 7 - 15 7    11 7 - 15 7                              >12 years      12 6 - 17 7    11 1 - 15 9     11/11/2016 16 0 12 6 - 17 7 g/dL Final   04/28/2016 17 0 12 6 - 17 7 g/dL Final     Platelets   Date Value Ref Range Status   05/08/2023 180 149 - 390 Thousands/uL Final   03/01/2023 175 149 - 390 Thousands/uL Final   09/13/2022 177 149 - 390 Thousands/uL Final   05/29/2017 168 150 - 379 x10E3/uL Final   11/11/2016 187 150 - 379 x10E3/uL Final   04/28/2016 186 150 - 379 x10E3/uL Final     MCV   Date Value Ref Range Status   05/08/2023 89 82 - 98 fL Final   03/01/2023 89 82 - 98 fL Final   09/13/2022 88 82 - 98 fL Final   05/29/2017 87 fL Final     Comment:     Result Comment: No patient age and/or gender provided                              Age                Male          Female                           0 -  7 days         78 - 110       78 - 110                           8 - 30 days         80 - 109       80 - 109                          32 - 90 days         80 -  97       81 -  97                     91 days - 11 months       73 -  87       73 -  87                           1 -  7 years        76 -  89       75 -  89                           8 - 12 years        68 -  91       77 -  91                              >12 years        78 -  97       79 -  97     11/11/2016 88 79 - 97 fL Final   04/28/2016 87 79 - 97 fL Final      Sodium   Date Value Ref Range Status   05/29/2017 144 134 - 144 mmol/L Final   11/11/2016 145 (H) 136 - 144 mmol/L Final   04/28/2016 141 134 - 144 mmol/L Final     Potassium   Date Value Ref Range Status   05/08/2023 4 2 3 5 - 5 3 mmol/L Final   03/01/2023 4 1 3 5 - 5 3 mmol/L Final   09/13/2022 4 5 3 5 - 5 3 mmol/L Final   05/29/2017 4 3 3 5 - 5 2 mmol/L Final   11/11/2016 4 5 3 5 - 5 2 mmol/L Final   04/28/2016 4 1 3 5 - 5 2 mmol/L Final     Chloride   Date Value Ref Range Status   05/08/2023 103 96 - 108 mmol/L Final   03/01/2023 105 96 - 108 mmol/L Final   09/13/2022 103 96 - 108 mmol/L Final   05/29/2017 103 96 - 106 mmol/L Final   11/11/2016 103 97 - 106 mmol/L Final   04/28/2016 99 97 - 108 mmol/L Final     CO2   Date Value Ref Range Status   05/08/2023 31 21 - 32 mmol/L Final   03/01/2023 30 21 - 32 mmol/L Final   09/13/2022 30 21 - 32 mmol/L Final   05/29/2017 23 18 - 29 mmol/L Final   11/11/2016 28 18 - 29 mmol/L Final   04/28/2016 28 18 - 29 mmol/L Final     BUN   Date Value Ref Range Status   05/08/2023 17 5 - 25 mg/dL Final   03/01/2023 17 5 - 25 mg/dL Final   09/13/2022 21 5 - 25 mg/dL Final   05/29/2017 20 mg/dL Final     Comment:     Result Comment: No patient age and/or gender provided                              Age                Male          Female                           0 - 11 months        3 - 25         3 - 25                           1 - 17 years         5 - 25         5 - 25                          18 - 44 years         6 - 21         6 - 21                          42 - 61 years         10 - 25         6 - 21                          42 - 80 years         8 - 32         8 - 32                              >89 years        8 - 39        10 - 36     11/11/2016 19 8 - 27 mg/dL Final   04/28/2016 17 8 - 27 mg/dL Final     Creatinine   Date Value Ref Range Status   05/08/2023 1 14 0 60 - 1 30 mg/dL Final     Comment:     Standardized to IDMS reference method   03/01/2023 1 18 0 60 - 1 30 mg/dL Final     Comment:     Standardized to IDMS reference method   09/13/2022 1 12 0 60 - 1 30 mg/dL Final     Comment:     Standardized to IDMS reference method   05/29/2017 1 10 mg/dL Final     Comment:     Result Comment: No patient age and/or gender provided                              Age                Male          Female                           0 - 60 days          44 - 1 19      44 - 1 19                     61 days - 11 months        17 - 1 18      17 - 1 18                           1 -  2 years         19 -   42      19 -   42                           3 -  4 years         26 -   51      26 -   51                           5 -  6 years         30 -   59      30 -   59                           7 -  8 years         37 -   62      37 -   62                           9 - 10 years         39 -   70      39 -   70                          11 - 12 years         42 -   75      42 -   75                          13 - 14 years         49 -   90      49 -   90                              >14 years         76 - 1 27      57 - 1 00     11/11/2016 1 12 0 76 - 1 27 mg/dL Final   04/28/2016 1 15 0 76 - 1 27 mg/dL Final     Glucose   Date Value Ref Range Status   04/14/2022 108 65 - 140 mg/dL Final     Comment:     If the patient is fasting, the ADA then defines impaired fasting glucose as > 100 mg/dL and diabetes as > or equal to 123 mg/dL  Specimen collection should occur prior to Sulfasalazine administration due to the potential for falsely depressed results  Specimen collection should occur prior to Sulfapyridine administration due to the potential for falsely elevated results  01/31/2022 93 65 - 140 mg/dL Final     Comment:     If the patient is fasting, the ADA then defines impaired fasting glucose as > 100 mg/dL and diabetes as > or equal to 123 mg/dL  Specimen collection should occur prior to Sulfasalazine administration due to the potential for falsely depressed results  Specimen collection should occur prior to Sulfapyridine administration due to the potential for falsely elevated results     06/17/2020 114 65 - 140 mg/dL Final     Comment:       If the patient is fasting, the ADA then defines impaired fasting glucose as > 100 mg/dL and diabetes as > or equal to 123 mg/dL  Specimen collection should occur prior to Sulfasalazine administration due to the potential for falsely depressed results  Specimen collection should occur prior to Sulfapyridine administration due to the potential for falsely elevated results       Calcium   Date Value Ref Range Status   05/08/2023 9 8 8 4 - 10 2 mg/dL Final   03/01/2023 9 5 8 4 - 10 2 mg/dL Final   09/13/2022 9 3 8 3 - 10 1 mg/dL Final   05/29/2017 9 4 mg/dL Final     Comment:     Result Comment: No patient age and/or gender provided                              Age                Male          Female                           0 - 10 days        8 6 - 10 4     8 6 - 10 4                     11 days -  1 year        9 2 - 11 0     9 2 - 11 0                           2 - 11 years       9 1 - 10 5     9 1 - 10 5                          12 - 17 years       8 9 - 10 4     8 9 - 10 4                          18 - 59 years       8 7 - 10 2     8 7 - 10 2                              >59 years       8 6 - 10 2     8 7 - 10 3     11/11/2016 9 1 8 6 - 10 2 mg/dL Final   04/28/2016 9 5 8 6 - 10 2 mg/dL Final     Total Protein   Date Value Ref Range Status   05/29/2017 6 3 6 0 - 8 5 g/dL Final   11/11/2016 6 4 6 0 - 8 5 g/dL Final   04/28/2016 6 4 6 0 - 8 5 g/dL Final     Albumin   Date Value Ref Range Status   05/08/2023 4 2 3 5 - 5 0 g/dL Final   03/01/2023 4 3 3 5 - 5 0 g/dL Final   09/13/2022 3 8 3 5 - 5 0 g/dL Final   05/29/2017 4 2 g/dL Final     Comment:     Result Comment: No patient age and/or gender provided                              Age                Male          Female                           0 -  2 years       3 4 - 4 2      3 4 - 4 2                           3 - 59 years       3 5 - 5 5      3 5 - 5 5                          60 - 69 years       3 6 - 4 8      3 6 - 4 8                          70 - 79 years       3 5 - 4 8      3 5 - 4 8                          80 - 89 years       3 5 - 4 7      3 5 - 4 7 >89 years       3 2 - 4 6      3 2 - 4 6     11/11/2016 4 2 3 6 - 4 8 g/dL Final   04/28/2016 4 4 3 6 - 4 8 g/dL Final     Total Bilirubin   Date Value Ref Range Status   05/08/2023 0 71 0 20 - 1 00 mg/dL Final     Comment:     Use of this assay is not recommended for patients undergoing treatment with eltrombopag due to the potential for falsely elevated results  N-acetyl-p-benzoquinone imine (metabolite of Acetaminophen) will generate erroneously low results in samples for patients that have taken an overdose of Acetaminophen    03/01/2023 0 85 0 20 - 1 00 mg/dL Final   09/13/2022 0 78 0 20 - 1 00 mg/dL Final     Comment:     Use of this assay is not recommended for patients undergoing treatment with eltrombopag due to the potential for falsely elevated results       Alkaline Phosphatase   Date Value Ref Range Status   05/08/2023 73 34 - 104 U/L Final   03/01/2023 66 34 - 104 U/L Final   09/13/2022 72 46 - 116 U/L Final   05/29/2017 62 IU/L Final     Comment:     Result Comment: No patient age and/or gender provided                              Age                Male          Female                           0 -  1 day          45 - 111       45 - 111                           2 -  5 days         55 - 119       55 - 119                           6 - 10 days         50 - 229       50 - 229                          11 - 30 days         61 - 414       61 - 414                           1 -  6 months       91 - 445       91 - 445                           7 - 12 months      124 - 341      124 - 341                           1 -  3 years       130 - 317      130 - 317                           4 -  6 years       133 - 309      133 - 309                           7 - 12 years       134 - 349      134 - 349                               13 years       143 - 396       76 - 209                               14 years       80 - 340       58 - 149                               15 years        80 - 254       47 - 121                               16 years        71 - 186       49 - 108                               17 years        64 - 146       45 - 101                               18 years        64 - 127       37 - 101                              >18 years        44 - 117       44 - 117     11/11/2016 65 39 - 117 IU/L Final   04/28/2016 65 39 - 117 IU/L Final     AST   Date Value Ref Range Status   05/08/2023 19 13 - 39 U/L Final   03/01/2023 20 13 - 39 U/L Final     Comment:     Specimen collection should occur prior to Sulfasalazine administration due to the potential for falsely depressed results  09/13/2022 19 5 - 45 U/L Final     Comment:     Specimen collection should occur prior to Sulfasalazine administration due to the potential for falsely depressed results  05/29/2017 23 0 - 40 IU/L Final   11/11/2016 18 0 - 40 IU/L Final   04/28/2016 16 0 - 40 IU/L Final     ALT   Date Value Ref Range Status   05/08/2023 20 7 - 52 U/L Final     Comment:     Specimen collection should occur prior to Sulfasalazine administration due to the potential for falsely depressed results  03/01/2023 19 7 - 52 U/L Final     Comment:     Specimen collection should occur prior to Sulfasalazine administration due to the potential for falsely depressed results  09/13/2022 33 12 - 78 U/L Final     Comment:     Specimen collection should occur prior to Sulfasalazine administration due to the potential for falsely depressed results      05/29/2017 21 IU/L Final     Comment:     Result Comment: No patient age and/or gender provided                              Age                Male          Female                           0 - 11 years         0 - 34         0 - 29                          12 - 17 years         0 - 27         0 - 24                              >17 years         0 - 40         0 - 30  Performed at: [facility] [address], , [place], [place], [id number], [id number]  MD:  Rio Montanez  [place], [place] [id number] 2016 18 0 - 44 IU/L Final     Comment:     Performed at: [facility], , [place], [place], [id number], [id number]  MD:  Rio Montanez  [id number] [facility] [id number]     2016 16 0 - 44 IU/L Final     Comment:     Performed at: [facility] [address], , [place], [place], [id number], [id number]  MD:  [id number] [facility] [id number]        LD   Date Value Ref Range Status   2023 131 (L) 140 - 271 U/L Final   2023 146 140 - 271 U/L Final   2022 149 81 - 234 U/L Final     No results found for: TSH  No results found for: [id number]   Free T4   Date Value Ref Range Status   2023 1 15 0 76 - 1 46 ng/dL Final     Comment:     Specimen collection should occur prior to Sulfasalazine administration due to the potential for falsely elevated results  2023 1 18 0 76 - 1 46 ng/dL Final     Comment:     Specimen collection should occur prior to Sulfasalazine administration due to the potential for falsely elevated results  2023 1 19 0 76 - 1 46 ng/dL Final     Comment:     Specimen collection should occur prior to Sulfasalazine administration due to the potential for falsely elevated results  RECENT IMAGIN2023 CT Chest, Abdomen, and Pelvis    No findings to suspect metastatic disease in the chest, abdomen and pelvis  Chronic findings  Assessment/Plan  Mr Espinoza Castaneda is a 72 yr male with stage IIIa melanoma here for continued follow-up, monitoring, and surveillance  1  Personal history of malignant melanoma  2  Malignant neoplasm metastatic to lymph node of axilla ([facility] [address] )  3  Encounter for follow-up examination after completed treatment for malignant neoplasm  He continues in close follow-up monitoring and surveillance for his stage IIIa melanoma  Denies any new, changing, concerning skin lesions and no lymphadenopathy  Continues to follow with dermatology    We discussed that his imaging is negative for evidence of disease recurrence or metastasis  And labs reviewed  He will continue to follow-up with us at least in 6 months until 5 years out from diagnosis  Orders placed for repeat scans and blood work at his follow-up visit in 6 months  He knows to call with issues or concerns prior to his next visit  - CT chest abdomen pelvis w contrast; Future  - CBC and differential; Future  - Comprehensive metabolic panel; Future  - LD,Blood; Future  - T3, free; Future  - T4, free; Future  - TSH, 3rd generation; Future    4  History of thyroid cancer  5  Follicular thyroid cancer ([facility] [address] )  Doing all no evidence of disease  Continues to follow with endocrinology  Thyroid function tests evaluated and are appropriate at this time  He will continue to follow with endocrine, but we continue to monitor with surveillance imaging for his melanoma and will capture any concerning findings related to thyroid as well  Orders placed for follow up in six months and scripts provided  - CT chest abdomen pelvis w contrast; Future  - CBC and differential; Future  - Comprehensive metabolic panel; Future  - LD,Blood; Future  - T3, free; Future  - T4, free; Future  - TSH, 3rd generation; Future        Return in about 6 months (around 11/10/2023) for Office Visit, labs, scans       Norvell Bamberger, MD, PhD

## 2023-05-12 NOTE — PROGRESS NOTES
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)  -     CT chest abdomen pelvis w contrast; Future  -     CBC and differential; Future  -     Comprehensive metabolic panel; Future  -     LD,Blood; Future  -     T3, free; Future  -     T4, free; Future  -     TSH, 3rd generation; Future    Encounter for follow-up examination after completed treatment for malignant neoplasm  -     CT chest abdomen pelvis w contrast; Future  -     CBC and differential; Future  -     Comprehensive metabolic panel; Future  -     LD,Blood; Future  -     T3, free; Future  -     T4, free; Future  -     TSH, 3rd generation; Future    History of thyroid cancer  -     CT chest abdomen pelvis w contrast; Future  -     CBC and differential; Future  -     Comprehensive metabolic panel; Future  -     LD,Blood; Future  -     T3, free; Future  -     T4, free; Future  -     TSH, 3rd generation; Future    Follicular thyroid cancer ([facility] [address] )  -     CT chest abdomen pelvis w contrast; Future  -     CBC and differential; Future  -     Comprehensive metabolic panel; Future  -     LD,Blood; Future  -     T3, free; Future  -     T4, free; Future  -     TSH, 3rd generation;  Future        Oncology History   Personal history of malignant melanoma   10/16/2018 Initial Diagnosis    Malignant melanoma of skin of abdomen (HCC)  1 1 mm Breslow thickness, no ulceration, mitotic rate 1 per mm squared 12/14/2018 Surgery    Wide excision of abdomen with sentinel lymph node biopsies (right and left axillae)  No residual melanoma  He did have 1/4 lymph nodes positive with 2 mm of focal invasion into the lymph node  No extracapsular extension  Positive lymph node was in right axilla  BRAF [id number] +     12/14/2018 -  Cancer Staged    Staging form: Melanoma of the Skin, AJCC 8th Edition  - Pathologic stage from 12/14/2018: Stage IIIA (pT2a, pN1a, cM0) - Signed by Divya Giron MD on 3/2/2022       History of thyroid cancer   4/24/2019 Initial Diagnosis    Papillary and follicular carcinoma ([facility] [address] )     4/24/2019 Surgery    Thyroid, left and isthmus, hemithyroidectomy:  -  Papillary carcinoma, follicular variant, infiltrative - [id number]  -  Follicular carcinoma, minimally invasive - [id number]     4/24/2019 -  Cancer Staged    Staging form: Thyroid - Differentiated and Anaplastic, AJCC 8th Edition  - Pathologic stage from 4/24/2019: Stage II (pT3a, pNX, cM0, Age at diagnosis: >= 55 years) - Signed by Divya Giron MD on 3/2/2022  Laterality: Left  Solitary (s) or multifocal (m) tumors in the primary site: Solitary  Lymph node metastasis: Unknown          Cancer Staging   History of thyroid cancer  Staging form: Thyroid - Differentiated and Anaplastic, AJCC 8th Edition  - Pathologic stage from 4/24/2019: Stage II (pT3a, pNX, cM0, Age at diagnosis: >= 54 years) - Signed by Divya Giron MD on 3/2/2022    Personal history of malignant melanoma  Staging form: Melanoma of the Skin, AJCC 8th Edition  - Pathologic stage from 12/14/2018: Stage IIIA (pT2a, pN1a, cM0) - Signed by Divya Giron MD on 3/2/2022          HISTORY OF PRESENT ILLNESS: Travis Parks is a 72 y o  male  who has stage IIIa melanoma here for follow-up, monitoring, and surveillance  He also has a history previously of thyroid cancer diagnosed in 2019  He is doing well and feels good  Denies any new, changing, concerning skin lesions  Denies any lymphadenopathy  He continues to follow with endocrinology for his thyroid cancer  Energy is okay  We discussed his recent imaging from 5/8/2023 CT chest, abdomen, and pelvis  There is no evidence of metastatic disease either melanoma or thyroid  He has stable most likely renal cysts in the right kidney  Has stable hepatic cyst in the left lobe and areas consistent with focal steatosis  REVIEW OF SYSTEMS:  Review of Systems   Constitutional: Negative for appetite change, fatigue, fever and unexpected weight change  HENT:   Negative for lump/mass  Eyes: Negative for icterus  Respiratory: Negative for cough, shortness of breath and wheezing  Cardiovascular: Negative for leg swelling  Gastrointestinal: Negative for abdominal pain, constipation, diarrhea, nausea and vomiting  Genitourinary: Negative for difficulty urinating and hematuria  Musculoskeletal: Negative for arthralgias, gait problem and myalgias  Skin: Negative for itching and rash  No new, changing, or concerning lesions  Neurological: Negative for extremity weakness, gait problem, headaches, light-headedness and numbness  Hematological: Negative for adenopathy          MEDICATIONS:    Current Outpatient Medications:   •  aspirin 81 MG tablet, Take 81 mg by mouth daily , Disp: , Rfl:   •  atenolol (TENORMIN) 50 mg tablet, TAKE ONE TABLET BY MOUTH EVERY DAY, Disp: 90 tablet, Rfl: 1  •  Calcium-Magnesium-Vitamin D (CALCIUM 1200+D3 PO), , Disp: , Rfl:   •  cholecalciferol (VITAMIN D3) 1,000 units tablet, Take 1,000 Units by mouth daily, Disp: , Rfl:   •  levothyroxine 175 mcg tablet, Take 1 tablet orally on an empty stomach 6 days of the week, Disp: 90 tablet, Rfl: 2  •  multivitamin (THERAGRAN) TABS, Take 1 tablet by mouth daily at bedtime , Disp: , Rfl:   •  omeprazole (PriLOSEC) 20 mg delayed release capsule, Take 1 capsule (20 mg total) by mouth daily before breakfast, Disp: 30 capsule, Rfl: 5  • rosuvastatin (CRESTOR) 10 MG tablet, TAKE ONE TABLET BY MOUTH EVERY DAY, Disp: 30 tablet, Rfl: 5  •  sildenafil (VIAGRA) 50 MG tablet, Take 1 tablet (50 mg total) by mouth daily as needed for erectile dysfunction, Disp: 30 tablet, Rfl: 3  No current facility-administered medications for this visit  ALLERGIES:  No Known Allergies     ACTIVE PROBLEMS:  Patient Active Problem List   Diagnosis   • Actinic keratosis   • Benign essential hypertension   • BPH (benign prostatic hyperplasia)   • Erectile dysfunction   • GERD without esophagitis   • Hyperlipidemia   • Impaired fasting glucose   • Personal history of malignant melanoma   • Liver mass   • Other specified soft tissue disorders   • Multiple thyroid nodules   • Encounter for follow-up examination after completed treatment for malignant neoplasm   • Hoarseness of voice   • Vocal cord paralysis   • Fatigue   • Vitamin D deficiency   • Postoperative hypothyroidism   • History of thyroid cancer   • Hernia of abdominal cavity   • Follicular thyroid cancer (HCC)   • Umbilical hernia without obstruction and without gangrene   • Postoperative visit          PAST MEDICAL HISTORY:   Past Medical History:   Diagnosis Date   • Arthritis    • Cancer ([facility] [address] )     melanoma abdomen, thyroid   • Disease of thyroid gland    • GERD (gastroesophageal reflux disease)     diet controlled   • Hyperlipidemia     Last Assessed:10/20/14   • Hypertension    • PONV (postoperative nausea and vomiting)         PAST SURGICAL HISTORY:  Past Surgical History:   Procedure Laterality Date   • COLONOSCOPY     • HERNIA REPAIR Right     Last Assessed:10/20/14 inguinal    • LYMPH NODE BIOPSY N/A 12/14/2018    Procedure: SENTINEL LYMPH NODE BIOPSY; LYMPHOSCINTIGRAPHY; INTRAOPERATIVE LYMPHATIC MAPPING (INJECT AT 1100);   Surgeon: Urban Purvis MD;  [facility];  Service: Surgical Oncology   • SKIN LESION EXCISION N/A 12/14/2018    Procedure: MEDIAL UPPER ABDOMEN MELANOMA WIDE EXCISION; BILATERAL AXILLA SLN;  Surgeon: Raymundo Garcia MD;  [facility];  Service: Surgical Oncology   • THYROID LOBECTOMY Left 04/24/2019    Procedure: HEMITHYROIDECTOMY ISTHMUSECTOMY;  Surgeon: Raymundo Garcia MD;  [facility];  Service: Surgical Oncology   • TONSILLECTOMY     • UMBILICAL HERNIA REPAIR LAPAROSCOPIC  05/04/2022    laparoscopic hernia repair w/mesh - Dr Olivier Harry   • [address] N/A 5/4/2022    Procedure: HERNIA REPAIR UMBILICAL LAPAROSCOPIC WITH MESH;  Surgeon: Magalys Guadalupe MD;  Location: [facility];  Service: General   • US GUIDED THYROID BIOPSY  02/07/2019   • US GUIDED THYROID BIOPSY  01/14/2021   • WISDOM TOOTH EXTRACTION          SOCIAL HISTORY:  Social History     Socioeconomic History   • Marital status: /Civil Union     Spouse name: None   • Number of children: None   • Years of education: None   • Highest education level: None   Occupational History   • None   Tobacco Use   • Smoking status: Never   • Smokeless tobacco: Never   Vaping Use   • Vaping Use: Never used   Substance and Sexual Activity   • Alcohol use: Yes     Alcohol/week: 2 0 standard drinks     Types: 1 Glasses of wine, 1 Cans of beer per week     Comment: Occasionally   • Drug use: Never   • Sexual activity: Yes     Partners: Female   Other Topics Concern   • None   Social History Narrative    No advance directives     Social Determinants of Health     Financial Resource Strain: Low Risk    • Difficulty of Paying Living Expenses: Not very hard   Food Insecurity: Not on file   Transportation Needs: No Transportation Needs   • Lack of Transportation (Medical): No   • Lack of Transportation (Non-Medical): No   Physical Activity: Not on file   Stress: No Stress Concern Present   • Feeling of Stress :  Only a little   Social Connections: Not on file   Intimate Partner Violence: Not on file   Housing Stability: Not on file        FAMILY HISTORY:  Family History   Problem Relation Age of Onset   • "Hypertension Mother    • Hypothyroidism Mother    • Hypertension Father    • Skin cancer Father    • Colon cancer Maternal Aunt    • Colon cancer Paternal Aunt         62s   • Skin cancer Paternal Uncle            Objective    PHYSICAL EXAMINATION:   Blood pressure 142/82, pulse 66, temperature 98 °F (36 7 °C), temperature source Temporal, resp  rate 16, height 5' 10\" (1 778 m), weight 99 3 kg (219 lb), SpO2 96 %  Pain Score: 0-No pain     ECOG Performance Status    Flowsheet Row Most Recent Value   ECOG Performance Status 0 - Fully active, able to carry on all pre-disease performance without restriction             Physical Exam  Constitutional:       General: He is not in acute distress  Appearance: Normal appearance  He is not toxic-appearing  HENT:      Mouth/Throat:      Mouth: Mucous membranes are moist       Pharynx: Oropharynx is clear  Eyes:      General: No scleral icterus  Cardiovascular:      Rate and Rhythm: Normal rate and regular rhythm  Pulses: Normal pulses  Heart sounds: No murmur heard  No friction rub  No gallop  Pulmonary:      Effort: Pulmonary effort is normal  No respiratory distress  Breath sounds: Normal breath sounds  No wheezing or rales  Abdominal:      General: There is no distension  Palpations: There is no mass  Tenderness: There is no abdominal tenderness  There is no rebound  Musculoskeletal:         General: No swelling or tenderness  Right lower leg: No edema  Left lower leg: No edema  Lymphadenopathy:      Head:      Right side of head: No submandibular, preauricular or posterior auricular adenopathy  Left side of head: No submandibular, preauricular or posterior auricular adenopathy  Cervical: No cervical adenopathy  Right cervical: No superficial or posterior cervical adenopathy  Left cervical: No superficial or posterior cervical adenopathy        Upper Body:      Right upper body: No supraclavicular " or axillary adenopathy  Left upper body: No supraclavicular or axillary adenopathy  Lower Body: No right inguinal adenopathy  No left inguinal adenopathy  Skin:     Findings: No rash  Comments: Well healed surgical scar  No evidence of recurrence at primary site  Neurological:      General: No focal deficit present  Mental Status: He is alert and oriented to person, place, and time  Psychiatric:         Mood and Affect: Mood normal          Behavior: Behavior normal          Thought Content: Thought content normal          Judgment: Judgment normal          I reviewed lab data in the chart      WBC   Date Value Ref Range Status   05/08/2023 6 86 4 31 - 10 16 Thousand/uL Final   03/01/2023 6 34 4 31 - 10 16 Thousand/uL Final   09/13/2022 6 13 4 31 - 10 16 Thousand/uL Final   05/29/2017 6 3 3 4 - 10 8 x10E3/uL Final   11/11/2016 7 7 3 4 - 10 8 x10E3/uL Final   04/28/2016 9 4 3 4 - 10 8 x10E3/uL Final     Hemoglobin   Date Value Ref Range Status   05/08/2023 15 6 12 0 - 17 0 g/dL Final   03/01/2023 15 9 12 0 - 17 0 g/dL Final   09/13/2022 16 2 12 0 - 17 0 g/dL Final   05/29/2017 15 9 g/dL Final     Comment:     Result Comment: No patient age and/or gender provided                              Age                Male          Female                           0 -  7 days       10 7 - 20 5    10 7 - 20 5                           8 - 30 days       10 5 - 18 7    10 5 - 18 7                          31 - 90 days        8 8 - 14 3     8 8 - 14 3                     91 days - 11 months     10 4 - 14 1    10 4 - 14 1                           1 -  7 years      10 9 - 14 8    10 9 - 14 8                           8 - 12 years      11 7 - 15 7    11 7 - 15 7                              >12 years      12 6 - 17 7    11 1 - 15 9     11/11/2016 16 0 12 6 - 17 7 g/dL Final   04/28/2016 17 0 12 6 - 17 7 g/dL Final     Platelets   Date Value Ref Range Status   05/08/2023 180 149 - 390 Thousands/uL Final 03/01/2023 175 149 - 390 Thousands/uL Final   09/13/2022 177 149 - 390 Thousands/uL Final   05/29/2017 168 150 - 379 x10E3/uL Final   11/11/2016 187 150 - 379 x10E3/uL Final   04/28/2016 186 150 - 379 x10E3/uL Final     MCV   Date Value Ref Range Status   05/08/2023 89 82 - 98 fL Final   03/01/2023 89 82 - 98 fL Final   09/13/2022 88 82 - 98 fL Final   05/29/2017 87 fL Final     Comment:     Result Comment: No patient age and/or gender provided                              Age                Male          Female                           0 -  7 days         78 - 110       78 - 110                           8 - 27 days         80 - 109       80 - 109                          32 - 90 days         80 -  97       80 -  97                     91 days - 11 months       73 -  87       73 -  87                           1 -  7 years        76 -  89       75 -  89                           8 - 12 years        68 -  91       77 -  91                              >12 years        78 -  97       79 -  97     11/11/2016 88 79 - 97 fL Final   04/28/2016 87 79 - 97 fL Final      Sodium   Date Value Ref Range Status   05/29/2017 144 134 - 144 mmol/L Final   11/11/2016 145 (H) 136 - 144 mmol/L Final   04/28/2016 141 134 - 144 mmol/L Final     Potassium   Date Value Ref Range Status   05/08/2023 4 2 3 5 - 5 3 mmol/L Final   03/01/2023 4 1 3 5 - 5 3 mmol/L Final   09/13/2022 4 5 3 5 - 5 3 mmol/L Final   05/29/2017 4 3 3 5 - 5 2 mmol/L Final   11/11/2016 4 5 3 5 - 5 2 mmol/L Final   04/28/2016 4 1 3 5 - 5 2 mmol/L Final     Chloride   Date Value Ref Range Status   05/08/2023 103 96 - 108 mmol/L Final   03/01/2023 105 96 - 108 mmol/L Final   09/13/2022 103 96 - 108 mmol/L Final   05/29/2017 103 96 - 106 mmol/L Final   11/11/2016 103 97 - 106 mmol/L Final   04/28/2016 99 97 - 108 mmol/L Final     CO2   Date Value Ref Range Status   05/08/2023 31 21 - 32 mmol/L Final   03/01/2023 30 21 - 32 mmol/L Final   09/13/2022 30 21 - 32 mmol/L Final   05/29/2017 23 18 - 29 mmol/L Final   11/11/2016 28 18 - 29 mmol/L Final   04/28/2016 28 18 - 29 mmol/L Final     BUN   Date Value Ref Range Status   05/08/2023 17 5 - 25 mg/dL Final   03/01/2023 17 5 - 25 mg/dL Final   09/13/2022 21 5 - 25 mg/dL Final   05/29/2017 20 mg/dL Final     Comment:     Result Comment: No patient age and/or gender provided                              Age                Male          Female                           0 - 11 months        3 - 25         3 - 25                           1 - 17 years         5 - 25         5 - 22                          21 - 44 years         6 - 21         6 - 21                          42 - 61 years         10 - 25         6 - 21                          42 - 80 years         8 - 32         8 - 32                              >89 years        8 - 39        10 - 36     11/11/2016 19 8 - 27 mg/dL Final   04/28/2016 17 8 - 27 mg/dL Final     Creatinine   Date Value Ref Range Status   05/08/2023 1 14 0 60 - 1 30 mg/dL Final     Comment:     Standardized to IDMS reference method   03/01/2023 1 18 0 60 - 1 30 mg/dL Final     Comment:     Standardized to IDMS reference method   09/13/2022 1 12 0 60 - 1 30 mg/dL Final     Comment:     Standardized to IDMS reference method   05/29/2017 1 10 mg/dL Final     Comment:     Result Comment: No patient age and/or gender provided                              Age                Male          Female                           0 - 60 days          44 - 1 19      44 - 1 19                     61 days - 11 months        17 - 1 18      17 - 1 18                           1 -  2 years         19 -   42      19 -   42                           3 -  4 years         26 -   51      26 -   51                           5 -  6 years         30 -   59      30 -   59                           7 -  8 years         37 -   62      37 -   62                           9 - 10 years         39 -   70      39 -   70                          11 - 12 years        43 -   76      43 -   75                          13 - 14 years         49 -   90      49 -   90                              >14 years         76 - 1 27      57 - 1 00     11/11/2016 1 12 0 76 - 1 27 mg/dL Final   04/28/2016 1 15 0 76 - 1 27 mg/dL Final     Glucose   Date Value Ref Range Status   04/14/2022 108 65 - 140 mg/dL Final     Comment:     If the patient is fasting, the ADA then defines impaired fasting glucose as > 100 mg/dL and diabetes as > or equal to 123 mg/dL  Specimen collection should occur prior to Sulfasalazine administration due to the potential for falsely depressed results  Specimen collection should occur prior to Sulfapyridine administration due to the potential for falsely elevated results  01/31/2022 93 65 - 140 mg/dL Final     Comment:     If the patient is fasting, the ADA then defines impaired fasting glucose as > 100 mg/dL and diabetes as > or equal to 123 mg/dL  Specimen collection should occur prior to Sulfasalazine administration due to the potential for falsely depressed results  Specimen collection should occur prior to Sulfapyridine administration due to the potential for falsely elevated results  06/17/2020 114 65 - 140 mg/dL Final     Comment:       If the patient is fasting, the ADA then defines impaired fasting glucose as > 100 mg/dL and diabetes as > or equal to 123 mg/dL  Specimen collection should occur prior to Sulfasalazine administration due to the potential for falsely depressed results  Specimen collection should occur prior to Sulfapyridine administration due to the potential for falsely elevated results       Calcium   Date Value Ref Range Status   05/08/2023 9 8 8 4 - 10 2 mg/dL Final   03/01/2023 9 5 8 4 - 10 2 mg/dL Final   09/13/2022 9 3 8 3 - 10 1 mg/dL Final   05/29/2017 9 4 mg/dL Final     Comment:     Result Comment: No patient age and/or gender provided                              Age                Male          Female 0 - 10 days        8 6 - 10 4     8 6 - 10 4                     11 days -  1 year        9 2 - 11 0     9 2 - 11 0                           2 - 11 years       9 1 - 10 5     9 1 - 10 5                          12 - 17 years       8 9 - 10 4     8 9 - 10 4                          18 - 59 years       8 7 - 10 2     8 7 - 10 2                              >59 years       8 6 - 10 2     8 7 - 10 3     11/11/2016 9 1 8 6 - 10 2 mg/dL Final   04/28/2016 9 5 8 6 - 10 2 mg/dL Final     Total Protein   Date Value Ref Range Status   05/29/2017 6 3 6 0 - 8 5 g/dL Final   11/11/2016 6 4 6 0 - 8 5 g/dL Final   04/28/2016 6 4 6 0 - 8 5 g/dL Final     Albumin   Date Value Ref Range Status   05/08/2023 4 2 3 5 - 5 0 g/dL Final   03/01/2023 4 3 3 5 - 5 0 g/dL Final   09/13/2022 3 8 3 5 - 5 0 g/dL Final   05/29/2017 4 2 g/dL Final     Comment:     Result Comment: No patient age and/or gender provided                              Age                Male          Female                           0 -  2 years       3 4 - 4 2      3 4 - 4 2                           3 - 59 years       3 5 - 5 5      3 5 - 5 5                          60 - 69 years       3 6 - 4 8      3 6 - 4 8                          70 - 79 years       3 5 - 4 8      3 5 - 4 8                          80 - 89 years       3 5 - 4 7      3 5 - 4 7                              >89 years       3 2 - 4 6      3 2 - 4 6     11/11/2016 4 2 3 6 - 4 8 g/dL Final   04/28/2016 4 4 3 6 - 4 8 g/dL Final     Total Bilirubin   Date Value Ref Range Status   05/08/2023 0 71 0 20 - 1 00 mg/dL Final     Comment:     Use of this assay is not recommended for patients undergoing treatment with eltrombopag due to the potential for falsely elevated results    N-acetyl-p-benzoquinone imine (metabolite of Acetaminophen) will generate erroneously low results in samples for patients that have taken an overdose of Acetaminophen    03/01/2023 0 85 0 20 - 1 00 mg/dL Final   09/13/2022 0 78 0 20 - 1 00 mg/dL Final     Comment:     Use of this assay is not recommended for patients undergoing treatment with eltrombopag due to the potential for falsely elevated results  Alkaline Phosphatase   Date Value Ref Range Status   05/08/2023 73 34 - 104 U/L Final   03/01/2023 66 34 - 104 U/L Final   09/13/2022 72 46 - 116 U/L Final   05/29/2017 62 IU/L Final     Comment:     Result Comment: No patient age and/or gender provided                              Age                Male          Female                           0 -  1 day          45 - 111       45 - 111                           2 -  5 days         55 - 119       55 - 119                           6 - 10 days         50 - 229       50 - 229                          6 - 30 days         61 - 414       61 - 414                           1 -  6 months       91 - 445       91 - 445                           7 - 12 months      124 - 341      124 - 341                           1 -  3 years       130 - 317      130 - 317                           4 -  6 years       133 - 309      133 - 309                           7 - 12 years       134 - 349      134 - 349                               13 years       143 - 396       76 - 209                               14 years       80 - 340       58 - 149                               15 years        80 - 254       47 - 121                               16 years        70 - 186       52 - 108                               17 years        64 - 146       39 - 101                               18 years        64 - 127       37 - 101                              >18 years        44 - 117       44 - 117     11/11/2016 65 39 - 117 IU/L Final   04/28/2016 65 39 - 117 IU/L Final     AST   Date Value Ref Range Status   05/08/2023 19 13 - 39 U/L Final   03/01/2023 20 13 - 39 U/L Final     Comment:     Specimen collection should occur prior to Sulfasalazine administration due to the potential for falsely depressed results  09/13/2022 19 5 - 45 U/L Final     Comment:     Specimen collection should occur prior to Sulfasalazine administration due to the potential for falsely depressed results  05/29/2017 23 0 - 40 IU/L Final   11/11/2016 18 0 - 40 IU/L Final   04/28/2016 16 0 - 40 IU/L Final     ALT   Date Value Ref Range Status   05/08/2023 20 7 - 52 U/L Final     Comment:     Specimen collection should occur prior to Sulfasalazine administration due to the potential for falsely depressed results  03/01/2023 19 7 - 52 U/L Final     Comment:     Specimen collection should occur prior to Sulfasalazine administration due to the potential for falsely depressed results  09/13/2022 33 12 - 78 U/L Final     Comment:     Specimen collection should occur prior to Sulfasalazine administration due to the potential for falsely depressed results      05/29/2017 21 IU/L Final     Comment:     Result Comment: No patient age and/or gender provided                              Age                Male          Female                           0 - 11 years         0 - 34         0 - 28                          17 - 17 years         0 - 27         0 - 24                              >17 years         0 - 40         0 - 28  Performed at: [facility], , [facility], [id number], [id number]  MD:  [id number] [phone]     11/11/2016 18 0 - 44 IU/L Final     Comment:     Performed at: [facility], , [facility], [id number], [id number]  MD:  [place]ane[place]  [address] [facility] [phone]     04/28/2016 16 0 - 44 IU/L Final     Comment:     Performed at: [facility], , [facility], [id number], [id number]  MD:  [id number] [phone]        LD   Date Value Ref Range Status   05/08/2023 131 (L) 140 - 271 U/L Final   03/01/2023 146 140 - 271 U/L Final   09/13/2022 149 81 - 234 U/L Final     No results found for: TSH  No results found for: [id number]   Free T4   Date Value Ref Range Status   2023 1 15 0 76 - 1 46 ng/dL Final     Comment:     Specimen collection should occur prior to Sulfasalazine administration due to the potential for falsely elevated results  2023 1 18 0 76 - 1 46 ng/dL Final     Comment:     Specimen collection should occur prior to Sulfasalazine administration due to the potential for falsely elevated results  2023 1 19 0 76 - 1 46 ng/dL Final     Comment:     Specimen collection should occur prior to Sulfasalazine administration due to the potential for falsely elevated results  RECENT IMAGIN2023 CT Chest, Abdomen, and Pelvis    No findings to suspect metastatic disease in the chest, abdomen and pelvis  Chronic findings  Assessment/Plan  Mr Nelda Hernandez is a 72 yr male with stage IIIa melanoma here for continued follow-up, monitoring, and surveillance  1  Personal history of malignant melanoma  2  Malignant neoplasm metastatic to lymph node of axilla ([facility] [address] )  3  Encounter for follow-up examination after completed treatment for malignant neoplasm  He continues in close follow-up monitoring and surveillance for his stage IIIa melanoma  Denies any new, changing, concerning skin lesions and no lymphadenopathy  Continues to follow with dermatology  We discussed that his imaging is negative for evidence of disease recurrence or metastasis  And labs reviewed  He will continue to follow-up with us at least in 6 months until 5 years out from diagnosis  Orders placed for repeat scans and blood work at his follow-up visit in 6 months  He knows to call with issues or concerns prior to his next visit  - CT chest abdomen pelvis w contrast; Future  - CBC and differential; Future  - Comprehensive metabolic panel; Future  - LD,Blood; Future  - T3, free; Future  - T4, free; Future  - TSH, 3rd generation; Future    4  History of thyroid cancer  5  Follicular thyroid cancer ([facility] [address] )  Doing all no evidence of disease    Continues to follow with endocrinology  Thyroid function tests evaluated and are appropriate at this time  He will continue to follow with endocrine, but we continue to monitor with surveillance imaging for his melanoma and will capture any concerning findings related to thyroid as well  Orders placed for follow up in six months and scripts provided  - CT chest abdomen pelvis w contrast; Future  - CBC and differential; Future  - Comprehensive metabolic panel; Future  - LD,Blood; Future  - T3, free; Future  - T4, free; Future  - TSH, 3rd generation; Future        Return in about 6 months (around 11/10/2023) for Office Visit, labs, scans       Lucas Gardiner MD, PhD

## 2023-05-17 ENCOUNTER — OFFICE VISIT (OUTPATIENT)
Dept: SURGICAL ONCOLOGY | Facility: CLINIC | Age: 73
End: 2023-05-17

## 2023-05-17 VITALS
HEART RATE: 66 BPM | SYSTOLIC BLOOD PRESSURE: 140 MMHG | BODY MASS INDEX: 31.21 KG/M2 | WEIGHT: 218 LBS | TEMPERATURE: 97.1 F | DIASTOLIC BLOOD PRESSURE: 80 MMHG | RESPIRATION RATE: 20 BRPM | OXYGEN SATURATION: 98 % | HEIGHT: 70 IN

## 2023-05-17 DIAGNOSIS — Z85.850 HISTORY OF THYROID CANCER: ICD-10-CM

## 2023-05-17 DIAGNOSIS — Z08 ENCOUNTER FOR FOLLOW-UP EXAMINATION AFTER COMPLETED TREATMENT FOR MALIGNANT NEOPLASM: Primary | ICD-10-CM

## 2023-05-17 DIAGNOSIS — Z85.820 PERSONAL HISTORY OF MALIGNANT MELANOMA: ICD-10-CM

## 2023-05-17 DIAGNOSIS — M79.89 OTHER SPECIFIED SOFT TISSUE DISORDERS: ICD-10-CM

## 2023-05-17 NOTE — PROGRESS NOTES
Surgical Oncology Follow Up       [address] [facility]  [facility] [place]  [address]   [place] [address]    [facility]  1950  [id number]  [address] [facility]  [facility] [place]  [address] [facility] [address]  [place] [address]    Diagnoses and all orders for this visit:    Encounter for follow-up examination after completed treatment for malignant neoplasm    History of thyroid cancer  -     US head neck lymph node mapping; Future    Personal history of malignant melanoma  -     US extremity soft tissue; Future    Other specified soft tissue disorders  -     US extremity soft tissue; Future        Chief Complaint   Patient presents with   • Follow-up       Return in about 6 months (around 11/17/2023) for Imaging - See orders  Oncology History   Personal history of malignant melanoma   10/16/2018 Initial Diagnosis    Malignant melanoma of skin of abdomen (HCC)  1 1 mm Breslow thickness, no ulceration, mitotic rate 1 per mm squared     12/14/2018 Surgery    Wide excision of abdomen with sentinel lymph node biopsies (right and left axillae)  No residual melanoma  He did have 1/4 lymph nodes positive with 2 mm of focal invasion into the lymph node  No extracapsular extension  Positive lymph node was in right axilla      BRAF [id number] +     12/14/2018 -  Cancer Staged    Staging form: Melanoma of the Skin, AJCC 8th Edition  - Pathologic stage from 12/14/2018: Stage IIIA (pT2a, pN1a, cM0) - Signed by Casi Duncan MD on 3/2/2022       History of thyroid cancer   4/24/2019 Initial Diagnosis    Papillary and follicular carcinoma ([facility] [address] )     4/24/2019 Surgery    Thyroid, left and isthmus, hemithyroidectomy:  -  Papillary carcinoma, follicular variant, infiltrative - [id number]  -  Follicular carcinoma, minimally invasive - [id number]     4/24/2019 -  Cancer Staged    Staging form: Thyroid - Differentiated and Anaplastic, AJCC 8th Edition  - Pathologic stage from 4/24/2019: Stage II (pT3a, pNX, cM0, Age at diagnosis: >= 55 years) - Signed by Darrick Myers MD on 3/2/2022  Laterality: Left  Solitary (s) or multifocal (m) tumors in the primary site: Solitary  Lymph node metastasis: Unknown           Staging: [id number] melanoma of the abdomen  December 2018  Positive sentinel node in the right axilla     [id number] invasive follicular carcinoma of the thyroid, April 2019     Treatment history:   Wide excision with sentinel lymph node biopsy December 2018  Left thyroid lobectomy, April 2019  Current treatment:   Observation for the melanoma  TSH suppression for the thyroid cancer  Disease status:   SYLVAIN    History of Present Illness: The patient returns to the office today in follow-up for his history of melanoma and thyroid cancer  He is currently SYLVAIN at 4 years from a C3C papillary-follicular thyroid cancer, and 4-1/2 years SYLVAIN from a T2AN1 melanoma of the abdomen  He reports he is doing very well  He denies any new shortness of breath, cough, difficulty swallowing, changes in his voice, headaches or dizziness  He does complain of a small lump on his head which is painful  He is currently in the process of moving to [place], and is looking to establish care in that area  Ultrasound of the head and neck with lymph node mapping as well as ultrasound of the right axilla were performed on March 1, 2023  In addition he has recently had a CT scan of the chest abdomen and pelvis performed for his medical oncologist   I have reviewed all of these results myself and discussed them with the patient today  Review of Systems   Constitutional: Negative for activity change, appetite change, fatigue and unexpected weight change  HENT: Negative  Negative for trouble swallowing and voice change  Respiratory: Negative  Negative for cough, chest tightness and shortness of breath  Cardiovascular: Negative      Gastrointestinal: Negative  Negative for abdominal distention and abdominal pain  Musculoskeletal: Negative  Skin: Negative  Negative for color change  Neurological: Negative  Negative for dizziness and headaches  Hematological: Negative  Negative for adenopathy  Psychiatric/Behavioral: Negative  Patient Active Problem List   Diagnosis   • Actinic keratosis   • Benign essential hypertension   • BPH (benign prostatic hyperplasia)   • Erectile dysfunction   • GERD without esophagitis   • Hyperlipidemia   • Impaired fasting glucose   • Personal history of malignant melanoma   • Liver mass   • Other specified soft tissue disorders   • Multiple thyroid nodules   • Encounter for follow-up examination after completed treatment for malignant neoplasm   • Hoarseness of voice   • Vocal cord paralysis   • Fatigue   • Vitamin D deficiency   • Postoperative hypothyroidism   • History of thyroid cancer   • Hernia of abdominal cavity   • Follicular thyroid cancer ([facility] [address] )   • Umbilical hernia without obstruction and without gangrene   • Postoperative visit     Past Medical History:   Diagnosis Date   • Arthritis    • Cancer ([facility] [address] )     melanoma abdomen, thyroid   • Disease of thyroid gland    • GERD (gastroesophageal reflux disease)     diet controlled   • Hyperlipidemia     Last Assessed:10/20/14   • Hypertension    • PONV (postoperative nausea and vomiting)      Past Surgical History:   Procedure Laterality Date   • COLONOSCOPY     • HERNIA REPAIR Right     Last Assessed:10/20/14 inguinal    • LYMPH NODE BIOPSY N/A 12/14/2018    Procedure: SENTINEL LYMPH NODE BIOPSY; LYMPHOSCINTIGRAPHY; INTRAOPERATIVE LYMPHATIC MAPPING (INJECT AT 1100);   Surgeon: Laverne Theodore MD;  [facility]: [facility];  Service: Surgical Oncology   • SKIN LESION EXCISION N/A 12/14/2018    Procedure: MEDIAL UPPER ABDOMEN MELANOMA WIDE EXCISION; BILATERAL AXILLA SLN;  Surgeon: Laverne Theodore MD;  Location: [facility];  Service: Surgical Oncology   • THYROID LOBECTOMY Left 04/24/2019    Procedure: HEMITHYROIDECTOMY ISTHMUSECTOMY;  Surgeon: Alyssa Nieves MD;  Location: [facility];  Service: Surgical Oncology   • TONSILLECTOMY     • UMBILICAL HERNIA REPAIR LAPAROSCOPIC  05/04/2022    laparoscopic hernia repair w/mesh - Dr Jaylene Ash   • [address] N/A 5/4/2022    Procedure: HERNIA REPAIR UMBILICAL LAPAROSCOPIC WITH MESH;  Surgeon: Lyna Mohs, MD;  Location: [facility];  Service: General   • US GUIDED THYROID BIOPSY  02/07/2019   • US GUIDED THYROID BIOPSY  01/14/2021   • WISDOM TOOTH EXTRACTION       Family History   Problem Relation Age of Onset   • Hypertension Mother    • Hypothyroidism Mother    • Hypertension Father    • Skin cancer Father    • Colon cancer Maternal Aunt    • Colon cancer Paternal Aunt         62s   • Skin cancer Paternal Uncle      Social History     Socioeconomic History   • Marital status: /Civil Union     Spouse name: Not on file   • Number of children: Not on file   • Years of education: Not on file   • Highest education level: Not on file   Occupational History   • Not on file   Tobacco Use   • Smoking status: Never   • Smokeless tobacco: Never   Vaping Use   • Vaping Use: Never used   Substance and Sexual Activity   • Alcohol use: Yes     Alcohol/week: 2 0 standard drinks     Types: 1 Glasses of wine, 1 Cans of beer per week     Comment: Occasionally   • Drug use: Never   • Sexual activity: Yes     Partners: Female   Other Topics Concern   • Not on file   Social History Narrative    No advance directives     Social Determinants of Health     Financial Resource Strain: Low Risk    • Difficulty of Paying Living Expenses: Not very hard   Food Insecurity: Not on file   Transportation Needs: No Transportation Needs   • Lack of Transportation (Medical): No   • Lack of Transportation (Non-Medical): No   Physical Activity: Not on file   Stress: No Stress Concern Present   • Feeling of Stress :  Only a little   Social Connections: Not on file   Intimate Partner Violence: Not on file   Housing Stability: Not on file       Current Outpatient Medications:   •  aspirin 81 MG tablet, Take 81 mg by mouth daily , Disp: , Rfl:   •  atenolol (TENORMIN) 50 mg tablet, TAKE ONE TABLET BY MOUTH EVERY DAY, Disp: 90 tablet, Rfl: 1  •  Calcium-Magnesium-Vitamin D (CALCIUM 1200+D3 PO), , Disp: , Rfl:   •  cholecalciferol (VITAMIN D3) 1,000 units tablet, Take 1,000 Units by mouth daily, Disp: , Rfl:   •  levothyroxine 175 mcg tablet, Take 1 tablet orally on an empty stomach 6 days of the week, Disp: 90 tablet, Rfl: 2  •  multivitamin (THERAGRAN) TABS, Take 1 tablet by mouth daily at bedtime , Disp: , Rfl:   •  omeprazole (PriLOSEC) 20 mg delayed release capsule, Take 1 capsule (20 mg total) by mouth daily before breakfast, Disp: 30 capsule, Rfl: 5  •  rosuvastatin (CRESTOR) 10 MG tablet, TAKE ONE TABLET BY MOUTH EVERY DAY, Disp: 30 tablet, Rfl: 5  •  sildenafil (VIAGRA) 50 MG tablet, Take 1 tablet (50 mg total) by mouth daily as needed for erectile dysfunction, Disp: 30 tablet, Rfl: 3  No Known Allergies  Vitals:    05/17/23 0845   BP: 140/80   Pulse: 66   Resp: 20   Temp: (!) 97 1 °F (36 2 °C)   SpO2: 98%       Physical Exam  Vitals reviewed  Constitutional:       General: He is not in acute distress  Appearance: Normal appearance  He is normal weight  He is not ill-appearing or toxic-appearing  HENT:      Head: Normocephalic and atraumatic  Nose: Nose normal       Mouth/Throat:      Mouth: Mucous membranes are moist    Eyes:      General: No scleral icterus  Extraocular Movements: Extraocular movements intact  Conjunctiva/sclera: Conjunctivae normal       Pupils: Pupils are equal, round, and reactive to light  Cardiovascular:      Rate and Rhythm: Normal rate  Pulmonary:      Effort: Pulmonary effort is normal       Breath sounds: Normal breath sounds  Abdominal:      General: Abdomen is flat        Palpations: Abdomen is soft  Musculoskeletal:         General: Normal range of motion  Cervical back: Normal range of motion and neck supple  Lymphadenopathy:      Cervical: No cervical adenopathy  Upper Body:      Right upper body: No supraclavicular or axillary adenopathy  Left upper body: No supraclavicular or axillary adenopathy  Skin:     General: Skin is warm and dry  Coloration: Skin is not jaundiced  Findings: No lesion  Comments: Well-healed upper abdominal incision without evidence of nodularity or pigmentation  There are no in-transit lesions seen today  Neurological:      General: No focal deficit present  Mental Status: He is alert and oriented to person, place, and time  Psychiatric:         Mood and Affect: Mood normal          Behavior: Behavior normal          Thought Content: Thought content normal          Judgment: Judgment normal            Imaging  CT chest abdomen pelvis w contrast    Result Date: 5/10/2023  Narrative: CT CHEST, ABDOMEN AND PELVIS WITH IV CONTRAST INDICATION:   C43 9: Malignant melanoma of skin, unspecified Z85 850: Personal history of malignant neoplasm of thyroid Z85 820: Personal history of malignant melanoma of skin  COMPARISON:  None  TECHNIQUE: CT examination of the chest, abdomen and pelvis was performed  Multiplanar 2D reformatted images were created from the source data  Radiation dose length product (DLP) for this visit:  1056 mGy-cm   This examination, like all CT scans performed in the [facility], was performed utilizing techniques to minimize radiation dose exposure, including the use of iterative reconstruction and automated exposure control  IV Contrast:  100 mL of iohexol (OMNIPAQUE) Enteric Contrast: Enteric contrast was administered  FINDINGS: CHEST LUNGS:  Lungs are clear  There is no tracheal or endobronchial lesion  PLEURA:  Unremarkable   HEART/GREAT VESSELS: Heart is unremarkable for patient's age   No thoracic aortic aneurysm  MEDIASTINUM AND JG:  Unremarkable  CHEST WALL AND LOWER NECK: Left thyroid lobe not visualized  Otherwise unremarkable  ABDOMEN LIVER/BILIARY TREE: Unchanged medial left hepatic lobe cyst and a few scattered unchanged subcentimeter hypodensities, too small to characterize, probably additional cysts  Unchanged faint patchy hypodensities in the left hepatic lobe image 89 series 2 image 107 series 2, likely minimal focal steatosis  Otherwise unremarkable  GALLBLADDER:  No calcified gallstones  No pericholecystic inflammatory change  SPLEEN:  Unremarkable  PANCREAS:  Unremarkable  ADRENAL GLANDS:  Unremarkable  KIDNEYS/URETERS:  Unremarkable  No hydronephrosis  Redemonstrated are 2 small subcentimeter too small to characterize hypodensities in the right kidney, likely cysts  STOMACH AND BOWEL:  Unremarkable  Very small supraumbilical fat-containing hernia  APPENDIX:  No findings to suggest appendicitis  ABDOMINOPELVIC CAVITY:  No ascites  No pneumoperitoneum  No lymphadenopathy  VESSELS:  Unremarkable for patient's age  PELVIS REPRODUCTIVE ORGANS: Mild hypertrophy of the median lobe of the prostate  URINARY BLADDER:  Unremarkable  ABDOMINAL WALL/INGUINAL REGIONS:  Unremarkable  OSSEOUS STRUCTURES:  No acute fracture or destructive osseous lesion  Impression: No findings to suspect metastatic disease in the chest, abdomen and pelvis  Chronic findings, as per the body of the report  US head neck lymph node mapping  03/01/2023  Narrative & Impression   NECK ULTRASOUND     INDICATION:  C73: Malignant neoplasm of thyroid gland      COMPARISON:  Neck ultrasound 2/2/2022     FINDINGS:     Ultrasound of the thyroidectomy bed and cervical lymph node chains was performed with a high frequency linear transducer       There is no suspicion of recurrent mass in the left thyroidectomy bed      Again small right thyroid gland noted with hypoechoic nodule measuring 1 0 x 0 5 x 1 1 cm, previously 0 9 x 0 5 x 0 8 cm, not significantly changed accounting for differences in measurement technique  This was previously biopsied on 1/14/2021 with   benign result        Lymph nodes maintain normal morphologic contour, echogenicity and short axis dimensions of less than 0 7 cm  No evidence for microcalcification or focal nodularity      IMPRESSION:     No evidence of recurrent or metastatic mya disease      Stable right thyroid nodule previously biopsied with benign results           US extremity soft tissue  03/01/2023  Narrative & Impression   RIGHT AXILLA ULTRASOUND     INDICATION:   Z85 820: Personal history of malignant melanoma of skin  D48 1: Neoplasm of uncertain behavior of connective and other soft tissue      COMPARISON:  Correlation is made with the prior CT study dated 10/12/2022      TECHNIQUE:   Real-time ultrasound of the right axilla was performed with a linear transducer with both volumetric sweeps and still imaging techniques      FINDINGS:  There are 2 small lymph nodes identified with one measuring 1 6 x 0 6 x 1 5 cm and the other measuring 1 4 x 0 8 x 1 4 cm      IMPRESSION:     2 small lymph nodes in the right axilla not significantly enlarged             I reviewed the above  imaging data  Discussion/Summary: This is a pleasant 68-year-old male who presents today for continued melanoma and thyroid cancer surveillance  At this time there is no evidence of disease recurrence by imaging or physical exam   I have recommended repeat ultrasound of the head and neck as well as the right axilla in 6 months, as well as continued follow-up with a surgical oncologist, and lifelong follow-up with a dermatologist   There is a tiny subcutaneous nodule present in the right forehead with no associated skin lesion  This is likely a cyst   I have advised the patient to watch this closely and to follow-up if he notices this getting larger   Advised patient to obtain all medical records as well as discs of all imaging that's been performed to take along to [place]  He will call the office with any further needs

## 2023-05-23 ENCOUNTER — APPOINTMENT (EMERGENCY)
Dept: RADIOLOGY | Facility: HOSPITAL | Age: 73
End: 2023-05-23

## 2023-05-23 ENCOUNTER — APPOINTMENT (EMERGENCY)
Dept: VASCULAR ULTRASOUND | Facility: HOSPITAL | Age: 73
End: 2023-05-23

## 2023-05-23 ENCOUNTER — HOSPITAL ENCOUNTER (EMERGENCY)
Facility: HOSPITAL | Age: 73
Discharge: HOME | End: 2023-05-23
Attending: EMERGENCY MEDICINE

## 2023-05-23 VITALS
HEIGHT: 70 IN | WEIGHT: 212 LBS | BODY MASS INDEX: 30.35 KG/M2 | HEART RATE: 68 BPM | RESPIRATION RATE: 18 BRPM | TEMPERATURE: 98 F | SYSTOLIC BLOOD PRESSURE: 159 MMHG | OXYGEN SATURATION: 100 % | DIASTOLIC BLOOD PRESSURE: 80 MMHG

## 2023-05-23 DIAGNOSIS — L03.116 CELLULITIS OF LEFT LEG: Primary | ICD-10-CM

## 2023-05-23 RX ORDER — CEPHALEXIN 500 MG/1
500 CAPSULE ORAL 4 TIMES DAILY
Qty: 28 CAPSULE | Refills: 0 | Status: SHIPPED | OUTPATIENT
Start: 2023-05-23 | End: 2023-05-30

## 2023-05-23 RX ORDER — CEPHALEXIN 250 MG/1
500 CAPSULE ORAL ONCE
Status: COMPLETED | OUTPATIENT
Start: 2023-05-23 | End: 2023-05-23

## 2023-05-23 RX ADMIN — CEPHALEXIN 500 MG: 250 CAPSULE ORAL at 21:07

## 2023-05-24 NOTE — ED PROVIDER NOTES
History  Chief Complaint   Patient presents with   • Leg Pain     Patient reports stepping up onto their trailer 5 days ago hitting their left shin against their trailer hitch, since then patient reports increasing redness and swelling to anterior left shin, and bruising to medial left foot  Patient denies any loss of sensation to foot or leg, reports only pain at site of impact  Patient ambulated into triage, gait slow but stable  81-year-old male with left lower leg pain and swelling   5 days ago he was loading up a trailer when he slipped on the hitch and his leg was bent backwards  He has had pain since then but in the past 24 hours started noticing redness and swelling to the anterior shin  He is also got new bruising in his ankle  He has no ankle pain  Denies any calf pain  No recent travels traumas or surgeries  No history of VTE  Prior to Admission Medications   Prescriptions Last Dose Informant Patient Reported? Taking?    Calcium-Magnesium-Vitamin D (CALCIUM 1200+D3 PO)   Yes No   aspirin 81 MG tablet   Yes No   Sig: Take 81 mg by mouth daily    atenolol (TENORMIN) 50 mg tablet   No No   Sig: TAKE ONE TABLET BY MOUTH EVERY DAY   cholecalciferol (VITAMIN D3) 1,000 units tablet   Yes No   Sig: Take 1,000 Units by mouth daily   levothyroxine 175 mcg tablet   No No   Sig: Take 1 tablet orally on an empty stomach 6 days of the week   multivitamin (THERAGRAN) TABS   Yes No   Sig: Take 1 tablet by mouth daily at bedtime    omeprazole (PriLOSEC) 20 mg delayed release capsule   No No   Sig: Take 1 capsule (20 mg total) by mouth daily before breakfast   rosuvastatin (CRESTOR) 10 MG tablet   No No   Sig: TAKE ONE TABLET BY MOUTH EVERY DAY   sildenafil (VIAGRA) 50 MG tablet   No No   Sig: Take 1 tablet (50 mg total) by mouth daily as needed for erectile dysfunction      Facility-Administered Medications: None       Past Medical History:   Diagnosis Date   • Arthritis    • Cancer ([facility] )     melanoma abdomen, thyroid   • Disease of thyroid gland    • GERD (gastroesophageal reflux disease)     diet controlled   • Hyperlipidemia     Last Assessed:10/20/14   • Hypertension    • PONV (postoperative nausea and vomiting)        Past Surgical History:   Procedure Laterality Date   • COLONOSCOPY     • HERNIA REPAIR Right     Last Assessed:10/20/14 inguinal    • LYMPH NODE BIOPSY N/A 12/14/2018    Procedure: SENTINEL LYMPH NODE BIOPSY; LYMPHOSCINTIGRAPHY; INTRAOPERATIVE LYMPHATIC MAPPING (INJECT AT 1100); Surgeon: Robert Pérez MD;  [facility];  Service: Surgical Oncology   • SKIN LESION EXCISION N/A 12/14/2018    Procedure: MEDIAL UPPER ABDOMEN MELANOMA WIDE EXCISION; BILATERAL AXILLA SLN;  Surgeon: Robert Pérez MD;  [facility];  Service: Surgical Oncology   • THYROID LOBECTOMY Left 04/24/2019    Procedure: HEMITHYROIDECTOMY ISTHMUSECTOMY;  Surgeon: Robert Pérez MD;  [facility];  Service: Surgical Oncology   • TONSILLECTOMY     • UMBILICAL HERNIA REPAIR LAPAROSCOPIC  05/04/2022    laparoscopic hernia repair w/mesh - Dr Jessie Hinojosa   • [address] N/A 5/4/2022    Procedure: HERNIA REPAIR UMBILICAL LAPAROSCOPIC WITH MESH;  Surgeon: Loanne Brunner, MD;  [facility];  Service: General   • US GUIDED THYROID BIOPSY  02/07/2019   • US GUIDED THYROID BIOPSY  01/14/2021   • WISDOM TOOTH EXTRACTION         Family History   Problem Relation Age of Onset   • Hypertension Mother    • Hypothyroidism Mother    • Hypertension Father    • Skin cancer Father    • Colon cancer Maternal Aunt    • Colon cancer Paternal Aunt         62s   • Skin cancer Paternal Uncle      I have reviewed and agree with the history as documented      E-Cigarette/Vaping   • E-Cigarette Use Never User      E-Cigarette/Vaping Substances   • Nicotine No    • THC No    • CBD No    • Flavoring No    • Other No    • Unknown No      Social History     Tobacco Use   • Smoking status: Never   • Smokeless tobacco: Never   Vaping Use   • Vaping Use: Never used   Substance Use Topics   • Alcohol use: Yes     Alcohol/week: 2 0 standard drinks     Types: 1 Glasses of wine, 1 Cans of beer per week     Comment: Occasionally   • Drug use: Never       Review of Systems   Constitutional: Negative for chills and fever  HENT: Negative for ear pain and sore throat  Eyes: Negative for pain and visual disturbance  Respiratory: Negative for cough and shortness of breath  Cardiovascular: Negative for chest pain and palpitations  Gastrointestinal: Negative for abdominal pain and vomiting  Genitourinary: Negative for dysuria and hematuria  Musculoskeletal: Negative for arthralgias and back pain  Skin: Negative for color change and rash  Neurological: Negative for seizures and syncope  All other systems reviewed and are negative  Physical Exam  Physical Exam  Vitals and nursing note reviewed  Constitutional:       General: He is not in acute distress  Appearance: He is well-developed  HENT:      Head: Normocephalic and atraumatic  Eyes:      Conjunctiva/sclera: Conjunctivae normal    Cardiovascular:      Rate and Rhythm: Normal rate and regular rhythm  Heart sounds: No murmur heard  Pulmonary:      Effort: Pulmonary effort is normal  No respiratory distress  Breath sounds: Normal breath sounds  Abdominal:      Palpations: Abdomen is soft  Tenderness: There is no abdominal tenderness  Musculoskeletal:         General: No swelling  Cervical back: Neck supple  Legs:    Skin:     General: Skin is warm and dry  Capillary Refill: Capillary refill takes less than 2 seconds  Neurological:      Mental Status: He is alert     Psychiatric:         Mood and Affect: Mood normal          Vital Signs  ED Triage Vitals [05/23/23 2014]   Temperature Pulse Respirations Blood Pressure SpO2   98 °F (36 7 °C) 68 18 159/80 100 %      Temp Source Heart Rate Source Patient Position - Orthostatic VS BP Location FiO2 (%)   Tympanic Monitor Sitting Left arm --      Pain Score       --           Vitals:    05/23/23 2014   BP: 159/80   Pulse: 68   Patient Position - Orthostatic VS: Sitting         Visual Acuity      ED Medications  Medications   cephalexin (KEFLEX) capsule 500 mg (500 mg Oral Given 5/23/23 2107)       Diagnostic Studies  Results Reviewed     None                 VAS lower limb venous duplex study, unilateral/limited    (Results Pending)   XR tibia fibula 2 views LEFT    (Results Pending)              Procedures  Procedures         ED Course                               SBIRT 20yo+    Flowsheet Row Most Recent Value   Initial Alcohol Screen: US AUDIT-C     1  How often do you have a drink containing alcohol? 0 Filed at: 05/23/2023 2104   2  How many drinks containing alcohol do you have on a typical day you are drinking? 1 Filed at: 05/23/2023 2104   3b  FEMALE Any Age, or MALE 65+: How often do you have 4 or more drinks on one occassion? 0 Filed at: 05/23/2023 2104   Audit-C Score 1 Filed at: 05/23/2023 2104   MARCOS: How many times in the past year have you    Used an illegal drug or used a prescription medication for non-medical reasons? Never Filed at: 05/23/2023 2104                    Medical Decision Making  ddx includes but is not limited to cellulitis, abscess, dvt  Plan: DVT study x-ray tib-fib  Suspect cellulitis  Start antibiotics  MDM: 72year-old with leg pain and swelling  Consistent with cellulitis  DVT study negative  X-ray unremarkable  Recommended Tylenol Motrin and antibiotics  Follow-up PCP  Return parameters provided  Patient understands and agrees with this plan  Amount and/or Complexity of Data Reviewed  Radiology: ordered  Risk  Prescription drug management            Disposition  Final diagnoses:   Cellulitis of left leg     Time reflects when diagnosis was documented in both MDM as applicable and the Disposition within this note Time User Action Codes Description Comment    5/23/2023  9:07 PM Janusz Farris Add [J89 [id number]] Cellulitis of left leg       ED Disposition     ED Disposition   Discharge    Condition   Stable    Date/Time   Tue May 23, 2023  9:07 PM    [address] [facility] discharge to home/self care  Follow-up Information     Follow up With Specialties Details Why Contact Info    Ne Saba MD Internal Medicine, [facility], Palliative Care   [address]  [phone]            Discharge Medication List as of 5/23/2023  9:07 PM      START taking these medications    Details   cephalexin (Keflex) 500 mg capsule Take 1 capsule (500 mg total) by mouth 4 (four) times a day for 7 days, Starting Tue 5/23/2023, Until Tue 5/30/2023, Print         CONTINUE these medications which have NOT CHANGED    Details   aspirin 81 MG tablet Take 81 mg by mouth daily , Historical Med      atenolol (TENORMIN) 50 mg tablet TAKE ONE TABLET BY MOUTH EVERY DAY, Normal      Calcium-Magnesium-Vitamin D (CALCIUM 1200+D3 PO) Historical Med      cholecalciferol (VITAMIN D3) 1,000 units tablet Take 1,000 Units by mouth daily, Historical Med      levothyroxine 175 mcg tablet Take 1 tablet orally on an empty stomach 6 days of the week, Normal      multivitamin (THERAGRAN) TABS Take 1 tablet by mouth daily at bedtime , Historical Med      omeprazole (PriLOSEC) 20 mg delayed release capsule Take 1 capsule (20 mg total) by mouth daily before breakfast, Starting Tue 1/17/2023, Normal      rosuvastatin (CRESTOR) 10 MG tablet TAKE ONE TABLET BY MOUTH EVERY DAY, Normal      sildenafil (VIAGRA) 50 MG tablet Take 1 tablet (50 mg total) by mouth daily as needed for erectile dysfunction, Starting Tue 1/17/2023, Normal             No discharge procedures on file      PDMP Review     None          ED Provider  Electronically Signed by           Johny Waldron PA-C  05/23/23 2230

## 2023-06-01 ENCOUNTER — HOSPITAL ENCOUNTER (EMERGENCY)
Facility: HOSPITAL | Age: 73
Discharge: HOME | End: 2023-06-01
Attending: EMERGENCY MEDICINE | Admitting: EMERGENCY MEDICINE
Payer: MEDICARE

## 2023-06-01 VITALS
SYSTOLIC BLOOD PRESSURE: 183 MMHG | TEMPERATURE: 97.6 F | RESPIRATION RATE: 18 BRPM | DIASTOLIC BLOOD PRESSURE: 87 MMHG | HEART RATE: 67 BPM | OXYGEN SATURATION: 97 %

## 2023-06-01 DIAGNOSIS — S01.542A: Primary | ICD-10-CM

## 2023-06-01 PROCEDURE — 99283 EMERGENCY DEPT VISIT LOW MDM: CPT

## 2023-06-01 NOTE — ED NOTES
ED physician visualizing and removed singular bristle from grill brush from soft palate at bedside     Jenna Baker RN  06/01/23 1949

## 2023-06-01 NOTE — ED PROVIDER NOTES
Pt Name: Mario Pabon  MRN: [id number]  [place] 1950  Age/Sex: 72 y o  male  Date of evaluation: 6/1/2023  PCP: Duane Retort, MD    CHIEF COMPLAINT    Chief Complaint   Patient presents with   • Oral Pain     Pt was eating a burger at a friends house and got stinging pain in roof of mouth           HPI    72 y o  male presenting with pain in his mouth  Patient states he was eating a burger at his friend's house and getting pain in his mouth  He thinks that there may be something stuck in the roof of the mouth  The pain is sharp, stabbing, moderate to severe, worse with chewing or swallowing and better at rest   He denies any other pain or injuries  HPI      Past Medical and Surgical History    Past Medical History:   Diagnosis Date   • Arthritis    • Cancer ([address] )     melanoma abdomen, thyroid   • Disease of thyroid gland    • GERD (gastroesophageal reflux disease)     diet controlled   • Hyperlipidemia     Last Assessed:10/20/14   • Hypertension    • PONV (postoperative nausea and vomiting)        Past Surgical History:   Procedure Laterality Date   • COLONOSCOPY     • HERNIA REPAIR Right     Last Assessed:10/20/14 inguinal    • LYMPH NODE BIOPSY N/A 12/14/2018    Procedure: SENTINEL LYMPH NODE BIOPSY; LYMPHOSCINTIGRAPHY; INTRAOPERATIVE LYMPHATIC MAPPING (INJECT AT 1100);   Surgeon: Mahamed Clarke MD;  [facility];  Service: Surgical Oncology   • SKIN LESION EXCISION N/A 12/14/2018    Procedure: MEDIAL UPPER ABDOMEN MELANOMA WIDE EXCISION; BILATERAL AXILLA SLN;  Surgeon: Mahamed Clarke MD;  [facility];  Service: Surgical Oncology   • THYROID LOBECTOMY Left 04/24/2019    Procedure: HEMITHYROIDECTOMY ISTHMUSECTOMY;  Surgeon: Mahamed Clarke MD;  [facility];  Service: Surgical Oncology   • TONSILLECTOMY     • UMBILICAL HERNIA REPAIR LAPAROSCOPIC  05/04/2022    laparoscopic hernia repair w/mesh - Dr Ernie Ash   • [address] N/A 5/4/2022    Procedure: HERNIA REPAIR UMBILICAL LAPAROSCOPIC WITH MESH;  Surgeon: John Cha MD;  [facility];  Service: General   • US GUIDED THYROID BIOPSY  02/07/2019   • US GUIDED THYROID BIOPSY  01/14/2021   • WISDOM TOOTH EXTRACTION         Family History   Problem Relation Age of Onset   • Hypertension Mother    • Hypothyroidism Mother    • Hypertension Father    • Skin cancer Father    • Colon cancer Maternal Aunt    • Colon cancer Paternal Aunt         62s   • Skin cancer Paternal Uncle        Social History     Tobacco Use   • Smoking status: Never   • Smokeless tobacco: Never   Vaping Use   • Vaping Use: Never used   Substance Use Topics   • Alcohol use: Yes     Alcohol/week: 2 0 standard drinks of alcohol     Types: 1 Glasses of wine, 1 Cans of beer per week     Comment: Occasionally   • Drug use: Never           Allergies    No Known Allergies    Home Medications    Prior to Admission medications    Medication Sig Start Date End Date Taking?  Authorizing Provider   aspirin 81 MG tablet Take 81 mg by mouth daily     Historical Provider, MD   atenolol (TENORMIN) 50 mg tablet TAKE ONE TABLET BY MOUTH EVERY DAY 3/31/23   Salome Brower MD   Calcium-Magnesium-Vitamin D (CALCIUM 1200+D3 PO)     Historical Provider, MD   cholecalciferol (VITAMIN D3) 1,000 units tablet Take 1,000 Units by mouth daily    Historical Provider, MD   levothyroxine 175 mcg tablet Take 1 tablet orally on an empty stomach 6 days of the week 7/21/22   Alexis Pizarro MD   multivitamin [facility]) TABS Take 1 tablet by mouth daily at bedtime     Historical Provider, MD   omeprazole (PriLOSEC) 20 mg delayed release capsule Take 1 capsule (20 mg total) by mouth daily before breakfast 1/17/23   Salome Brower MD   rosuvastatin (CRESTOR) 10 MG tablet TAKE ONE TABLET BY MOUTH EVERY DAY 4/29/23   Salome Brower MD   sildenafil (VIAGRA) 50 MG tablet Take 1 tablet (50 mg total) by mouth daily as needed for erectile dysfunction 1/17/23   Salome Brower MD Review of Systems    Review of Systems   Constitutional: Negative for appetite change, chills and diaphoresis  HENT: Negative for drooling, facial swelling, trouble swallowing and voice change  Respiratory: Negative for apnea, shortness of breath and wheezing  Cardiovascular: Negative for chest pain and leg swelling  Gastrointestinal: Negative for abdominal distention, abdominal pain, diarrhea, nausea and vomiting  Genitourinary: Negative for dysuria and urgency  Musculoskeletal: Negative for arthralgias, back pain, gait problem and neck pain  Skin: Negative for color change, rash and wound  Neurological: Negative for seizures, speech difficulty, weakness and headaches  Psychiatric/Behavioral: Negative for agitation, behavioral problems and dysphoric mood  The patient is not nervous/anxious  All other systems reviewed and negative  Physical Exam      ED Triage Vitals [06/01/23 1936]   Temperature Pulse Respirations Blood Pressure SpO2   97 6 °F (36 4 °C) 67 18 (!) 183/87 97 %      Temp Source Heart Rate Source Patient Position - Orthostatic VS BP Location FiO2 (%)   Temporal Monitor Sitting Left arm --      Pain Score       --               Physical Exam  Vitals and nursing note reviewed  Constitutional:       General: He is not in acute distress  Appearance: He is well-developed  He is not ill-appearing, toxic-appearing or diaphoretic  HENT:      Head: Normocephalic and atraumatic  Right Ear: External ear normal       Left Ear: External ear normal       Nose: Nose normal  No congestion or rhinorrhea  Mouth/Throat:      Comments: Oropharynx moist, in the posterior aspect of the soft palate but anterior to the uvula there is a single metallic foreign object consistent with a grill brush bristle embedded   Eyes:      Conjunctiva/sclera: Conjunctivae normal       Pupils: Pupils are equal, round, and reactive to light     Neck:      Trachea: No tracheal deviation  Cardiovascular:      Rate and Rhythm: Normal rate and regular rhythm  Pulses: Normal pulses  Heart sounds: Normal heart sounds  No murmur heard  Pulmonary:      Effort: Pulmonary effort is normal  No respiratory distress  Breath sounds: Normal breath sounds  No stridor  No wheezing or rales  Abdominal:      General: There is no distension  Palpations: Abdomen is soft  Tenderness: There is no abdominal tenderness  There is no guarding or rebound  Musculoskeletal:         General: No deformity  Normal range of motion  Cervical back: Normal range of motion and neck supple  Right lower leg: No edema  Left lower leg: No edema  Skin:     General: Skin is warm and dry  Capillary Refill: Capillary refill takes less than 2 seconds  Findings: No rash  Neurological:      Mental Status: He is alert and oriented to person, place, and time  Motor: No weakness  Psychiatric:         Behavior: Behavior normal          Thought Content: Thought content normal          Judgment: Judgment normal               Diagnostic Results      Labs:    Results Reviewed     None          All labs reviewed and utilized in the medical decision making process    Radiology:    No orders to display       All radiology studies independently viewed by me and interpreted by the radiologist     Procedure    Foreign Body - Embedded    Date/Time: 6/1/2023 9:45 PM    Performed by: Ken Goodson MD  Authorized by: Ken Goodson MD    Patient location:  ED  Consent:     Consent obtained:  Verbal    Consent given by:  Patient    Risks discussed:  Incomplete removal, bleeding, worsening of condition and pain    Alternatives discussed:  Alternative treatment  Universal protocol:     Patient identity confirmed:  Provided demographic data  Location:     Location:  Mouth    Mouth location:  Soft palate    Depth:  Intramucosal   Pre-procedure details:     Imaging:  None Neurovascular status: intact    Anesthesia (see MAR for exact dosages): Anesthesia method:  None  Procedure details:     Removal mechanism:  Hemostat    Removal Method:  Open    Procedure complexity:  Simple    Foreign bodies recovered:  1    Description:  2 5 cm steel wire    Intact foreign body removal: yes    Post-procedure details:     Neurovascular status: intact      Confirmation:  No additional foreign bodies on visualization    Skin closure:  None    Dressing:  Open (no dressing)    Patient tolerance of procedure: Tolerated well, no immediate complications            ED Course of Care and Re-Assessments      Foreign body sensation resolved after removal of foreign body, swallowing without difficulty, handling secretions    Medications - No data to display        FINAL IMPRESSION    Final diagnoses:   Puncture wound of oral cavity with foreign body, initial encounter         DISPOSITION/PLAN    Presentation as above with foreign object embedded in the soft palate consistent with bristle from a grill brush  Vital signs reassuring with hypertension likely situational, examination as above  Symptoms resolved with removal of foreign object  Counseled regarding possibility of further foreign objects, offered further work-up with imaging the patient declined at this time  Discharged with strict return precautions, follow-up primary care doctor  Time reflects when diagnosis was documented in both MDM as applicable and the Disposition within this note     Time User Action Codes Description Comment    6/1/2023  7:50 PM Ovidio Sorensen Add [S01 542A] Puncture wound of oral cavity with foreign body, initial encounter       ED Disposition     ED Disposition   Discharge    Condition   Stable    Date/Time   Thu Jun 1, 2023  7:49 PM    [address] [email or web address] discharge to home/self care                 Follow-up Information     Follow up With Specialties Details Why Contact Info Additional Information    [facility] Emergency Department Emergency Medicine Go to  If symptoms worsen [address] [facility] [address] [facility] Emergency Department, [address] [facility], [place], [place], [address] Janes Tesfaye MD Internal Medicine, Hospice Services, Palliative Care Call  As needed [address] [facility] [address]  [phone]               PATIENT REFERRED TO:    [address] [facility] Emergency Department  [address] [facility] [address] [phone]  Go to   If symptoms worsen    Davey Jackson MD  [address] [facility] [address]  [phone]    Call   As needed      DISCHARGE MEDICATIONS:    Discharge Medication List as of 6/1/2023  7:50 PM      CONTINUE these medications which have NOT CHANGED    Details   aspirin 81 MG tablet Take 81 mg by mouth daily , Historical Med      atenolol (TENORMIN) 50 mg tablet TAKE ONE TABLET BY MOUTH EVERY DAY, Normal      Calcium-Magnesium-Vitamin D (CALCIUM 1200+D3 PO) Historical Med      cholecalciferol (VITAMIN D3) 1,000 units tablet Take 1,000 Units by mouth daily, Historical Med      levothyroxine 175 mcg tablet Take 1 tablet orally on an empty stomach 6 days of the week, Normal      multivitamin (THERAGRAN) TABS Take 1 tablet by mouth daily at bedtime , Historical Med      omeprazole (PriLOSEC) 20 mg delayed release capsule Take 1 capsule (20 mg total) by mouth daily before breakfast, Starting Tue 1/17/2023, Normal      rosuvastatin (CRESTOR) 10 MG tablet TAKE ONE TABLET BY MOUTH EVERY DAY, Normal      sildenafil (VIAGRA) 50 MG tablet Take 1 tablet (50 mg total) by mouth daily as needed for erectile dysfunction, Starting Tue 1/17/2023, Normal             No discharge procedures on file  Jani Gilford, MD    Portions of the record may have been created with voice recognition software    Occasional wrong word "or \"sound alike\" substitutions may have occurred due to the inherent limitations of voice recognition software    Please read the chart carefully and recognize, using context, where substitutions have occurred     Annika Marshall MD  06/01/23 0290    "

## 2023-06-11 DIAGNOSIS — E89.0 POSTOPERATIVE HYPOTHYROIDISM: ICD-10-CM

## 2023-06-11 DIAGNOSIS — C73 PAPILLARY THYROID CARCINOMA (HCC): ICD-10-CM

## 2023-06-12 RX ORDER — LEVOTHYROXINE SODIUM 175 UG/1
TABLET ORAL
Qty: 90 TABLET | Refills: 1 | Status: SHIPPED | OUTPATIENT
Start: 2023-06-12

## 2023-07-14 ENCOUNTER — OFFICE VISIT (OUTPATIENT)
Age: 73
End: 2023-07-14
Payer: MEDICARE

## 2023-07-14 VITALS
OXYGEN SATURATION: 97 % | HEIGHT: 70 IN | RESPIRATION RATE: 18 BRPM | SYSTOLIC BLOOD PRESSURE: 132 MMHG | BODY MASS INDEX: 30.58 KG/M2 | WEIGHT: 213.6 LBS | HEART RATE: 52 BPM | DIASTOLIC BLOOD PRESSURE: 72 MMHG

## 2023-07-14 DIAGNOSIS — M62.08 DIASTASIS RECTI: ICD-10-CM

## 2023-07-14 DIAGNOSIS — Z76.89 ENCOUNTER TO ESTABLISH CARE: Primary | ICD-10-CM

## 2023-07-14 DIAGNOSIS — E78.2 MIXED HYPERLIPIDEMIA: ICD-10-CM

## 2023-07-14 DIAGNOSIS — Z12.11 ENCOUNTER FOR SCREENING COLONOSCOPY: ICD-10-CM

## 2023-07-14 DIAGNOSIS — Z85.820 HISTORY OF MALIGNANT MELANOMA: ICD-10-CM

## 2023-07-14 DIAGNOSIS — I10 PRIMARY HYPERTENSION: ICD-10-CM

## 2023-07-14 DIAGNOSIS — C73 FOLLICULAR THYROID CANCER (HCC): ICD-10-CM

## 2023-07-14 PROCEDURE — 3075F SYST BP GE 130 - 139MM HG: CPT

## 2023-07-14 PROCEDURE — G8427 DOCREV CUR MEDS BY ELIG CLIN: HCPCS

## 2023-07-14 PROCEDURE — G8417 CALC BMI ABV UP PARAM F/U: HCPCS

## 2023-07-14 PROCEDURE — 1036F TOBACCO NON-USER: CPT

## 2023-07-14 PROCEDURE — 3017F COLORECTAL CA SCREEN DOC REV: CPT

## 2023-07-14 PROCEDURE — 3078F DIAST BP <80 MM HG: CPT

## 2023-07-14 PROCEDURE — 99204 OFFICE O/P NEW MOD 45 MIN: CPT

## 2023-07-14 PROCEDURE — 1123F ACP DISCUSS/DSCN MKR DOCD: CPT

## 2023-07-14 RX ORDER — ROSUVASTATIN CALCIUM 10 MG/1
10 TABLET, COATED ORAL DAILY
COMMUNITY

## 2023-07-14 RX ORDER — ATENOLOL 50 MG/1
50 TABLET ORAL DAILY
COMMUNITY

## 2023-07-14 RX ORDER — LEVOTHYROXINE SODIUM 175 UG/1
175 TABLET ORAL DAILY
COMMUNITY

## 2023-07-14 RX ORDER — M-VIT,TX,IRON,MINS/CALC/FOLIC 27MG-0.4MG
1 TABLET ORAL DAILY
COMMUNITY

## 2023-07-14 RX ORDER — OMEPRAZOLE 20 MG/1
20 CAPSULE, DELAYED RELEASE ORAL DAILY
COMMUNITY

## 2023-07-14 RX ORDER — IBUPROFEN 200 MG
1 CAPSULE ORAL DAILY
COMMUNITY

## 2023-07-14 RX ORDER — ASPIRIN 81 MG/1
81 TABLET ORAL DAILY
COMMUNITY

## 2023-07-14 RX ORDER — SILDENAFIL 50 MG/1
50 TABLET, FILM COATED ORAL PRN
COMMUNITY

## 2023-07-14 SDOH — ECONOMIC STABILITY: FOOD INSECURITY: WITHIN THE PAST 12 MONTHS, YOU WORRIED THAT YOUR FOOD WOULD RUN OUT BEFORE YOU GOT MONEY TO BUY MORE.: NEVER TRUE

## 2023-07-14 SDOH — ECONOMIC STABILITY: HOUSING INSECURITY
IN THE LAST 12 MONTHS, WAS THERE A TIME WHEN YOU DID NOT HAVE A STEADY PLACE TO SLEEP OR SLEPT IN A SHELTER (INCLUDING NOW)?: NO

## 2023-07-14 SDOH — ECONOMIC STABILITY: INCOME INSECURITY: HOW HARD IS IT FOR YOU TO PAY FOR THE VERY BASICS LIKE FOOD, HOUSING, MEDICAL CARE, AND HEATING?: NOT HARD AT ALL

## 2023-07-14 SDOH — ECONOMIC STABILITY: FOOD INSECURITY: WITHIN THE PAST 12 MONTHS, THE FOOD YOU BOUGHT JUST DIDN'T LAST AND YOU DIDN'T HAVE MONEY TO GET MORE.: NEVER TRUE

## 2023-07-14 ASSESSMENT — PATIENT HEALTH QUESTIONNAIRE - PHQ9
SUM OF ALL RESPONSES TO PHQ QUESTIONS 1-9: 0
1. LITTLE INTEREST OR PLEASURE IN DOING THINGS: 0
2. FEELING DOWN, DEPRESSED OR HOPELESS: 0
SUM OF ALL RESPONSES TO PHQ QUESTIONS 1-9: 0
SUM OF ALL RESPONSES TO PHQ9 QUESTIONS 1 & 2: 0

## 2023-07-14 ASSESSMENT — ENCOUNTER SYMPTOMS
BLOOD IN STOOL: 0
ALLERGIC/IMMUNOLOGIC NEGATIVE: 1
CONSTIPATION: 0
DIARRHEA: 0
SHORTNESS OF BREATH: 0
ABDOMINAL DISTENTION: 1
RESPIRATORY NEGATIVE: 1
COUGH: 0
EYES NEGATIVE: 1
WHEEZING: 0

## 2023-07-14 NOTE — PROGRESS NOTES
Chief Complaint   Patient presents with    New Patient     Concerned about possible hernia    Insect Bite     Bee sting caused swelling in legs        HPI:    Angel Zhang is a 72 y.o. male who presents to [facility] care. He is  to Liban Lira and together they have four adult sons and four grandsons. HTN:  Well-controlled on atenolol; he does not check BP at home. HLD:  Compliant with rosuvastatin. Derm:  History of melanoma of abdominal wall; treated with excision, but did not require chemo or radiation therapy. He continues to follow with dermatology every 6 months for screenings. 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 Thyroid U/S in March 2023 showed \"Again small right thyroid gland noted with hypoechoic nodule measuring 1.0 x 0.5 x 1.1 cm, previously 0.9 x 0.5 x 0.8 cm, not significantly changed accounting for differences in measurement technique. This was previously biopsied on 1/14/2021 with benign result. Lymph nodes maintain normal morphologic contour, echogenicity and short axis dimensions of less than 0.7 cm. No evidence for microcalcification or focal nodularity. \"    New issues:  He notes a coning of his abdominal wall when lifting or when sitting up from a lying position; there is no pain associated with this.   He endorses a history of umbilical hernia which was repair in the remote past.      No Known Allergies    Current Outpatient Medications   Medication Sig Dispense Refill    aspirin 81 MG EC tablet Take 1 tablet by mouth daily      atenolol (TENORMIN) 50 MG tablet Take 1 tablet by mouth daily      calcium carbonate (OYSTER SHELL CALCIUM 500 MG) 1250 (500 Ca) MG tablet Take 1 tablet by mouth daily      vitamin D (CHOLECALCIFEROL) 25 MCG (1000 UT) TABS tablet Take 1 tablet by mouth daily      levothyroxine (SYNTHROID) 175 MCG tablet Take 1 tablet by mouth Daily      Multiple Vitamins-Minerals

## 2023-08-25 RX ORDER — OMEPRAZOLE 20 MG/1
CAPSULE, DELAYED RELEASE ORAL
Qty: 90 CAPSULE | Refills: 1 | Status: SHIPPED | OUTPATIENT
Start: 2023-08-25

## 2023-09-05 ENCOUNTER — TELEPHONE (OUTPATIENT)
Age: 73
End: 2023-09-05

## 2023-09-05 NOTE — TELEPHONE ENCOUNTER
Medication Refill Request    Bc Siddiqui is requesting a refill of the following medication(s):   levothyroxine (SYNTHROID) 175 MCG tablet  Please send refill to:     Jorgito Farias [address] [place] [address],[address]  [facility] [address]  Phone: [phone] Fax: [phone]

## 2023-09-12 NOTE — TELEPHONE ENCOUNTER
Medication Refill Request    Esperanza Baldwin is requesting a refill of the following medication(s):   levothyroxine (SYNTHROID) 175 MCG tablet  Please send refill to:     [address]  [place] [address]  Phone: [phone] Fax: [phone]

## 2023-09-13 RX ORDER — LEVOTHYROXINE SODIUM 175 UG/1
175 TABLET ORAL DAILY
Qty: 90 TABLET | Refills: 0 | Status: SHIPPED | OUTPATIENT
Start: 2023-09-13

## 2023-10-03 DIAGNOSIS — I10 ESSENTIAL HYPERTENSION: ICD-10-CM

## 2023-10-03 RX ORDER — ATENOLOL 50 MG/1
50 TABLET ORAL DAILY
Qty: 90 TABLET | Refills: 1 | Status: SHIPPED | OUTPATIENT
Start: 2023-10-03

## 2023-10-06 ENCOUNTER — OFFICE VISIT (OUTPATIENT)
Age: 73
End: 2023-10-06
Payer: MEDICARE

## 2023-10-06 VITALS
WEIGHT: 211.8 LBS | OXYGEN SATURATION: 99 % | BODY MASS INDEX: 30.32 KG/M2 | HEIGHT: 70 IN | HEART RATE: 73 BPM | DIASTOLIC BLOOD PRESSURE: 80 MMHG | SYSTOLIC BLOOD PRESSURE: 128 MMHG | RESPIRATION RATE: 20 BRPM | TEMPERATURE: 97.5 F

## 2023-10-06 DIAGNOSIS — R05.1 ACUTE COUGH: ICD-10-CM

## 2023-10-06 DIAGNOSIS — M25.561 ACUTE PAIN OF BOTH KNEES: Primary | ICD-10-CM

## 2023-10-06 DIAGNOSIS — M25.562 ACUTE PAIN OF BOTH KNEES: Primary | ICD-10-CM

## 2023-10-06 PROCEDURE — G8484 FLU IMMUNIZE NO ADMIN: HCPCS

## 2023-10-06 PROCEDURE — 1123F ACP DISCUSS/DSCN MKR DOCD: CPT

## 2023-10-06 PROCEDURE — 99213 OFFICE O/P EST LOW 20 MIN: CPT

## 2023-10-06 PROCEDURE — G8417 CALC BMI ABV UP PARAM F/U: HCPCS

## 2023-10-06 PROCEDURE — 3017F COLORECTAL CA SCREEN DOC REV: CPT

## 2023-10-06 PROCEDURE — G8428 CUR MEDS NOT DOCUMENT: HCPCS

## 2023-10-06 PROCEDURE — 1036F TOBACCO NON-USER: CPT

## 2023-10-06 RX ORDER — NAPROXEN 500 MG/1
500 TABLET ORAL 2 TIMES DAILY WITH MEALS
Qty: 30 TABLET | Refills: 0 | Status: SHIPPED | OUTPATIENT
Start: 2023-10-06 | End: 2023-10-21

## 2023-10-06 ASSESSMENT — PATIENT HEALTH QUESTIONNAIRE - PHQ9
SUM OF ALL RESPONSES TO PHQ QUESTIONS 1-9: 0
2. FEELING DOWN, DEPRESSED OR HOPELESS: 0
SUM OF ALL RESPONSES TO PHQ QUESTIONS 1-9: 0
1. LITTLE INTEREST OR PLEASURE IN DOING THINGS: 0
SUM OF ALL RESPONSES TO PHQ9 QUESTIONS 1 & 2: 0

## 2023-10-06 ASSESSMENT — ENCOUNTER SYMPTOMS
CONSTIPATION: 0
COUGH: 1
BLOOD IN STOOL: 0
DIARRHEA: 0
ALLERGIC/IMMUNOLOGIC NEGATIVE: 1
EYES NEGATIVE: 1
SHORTNESS OF BREATH: 0
WHEEZING: 0

## 2023-10-06 NOTE — PROGRESS NOTES
Chief Complaint   Patient presents with    Cough     Dry    Fatigue    Knee Pain      More stiffness, both, left swollen and both elbows and sometimes can't close both hands       HPI:    Jana Gomez is a 72 y.o. male who presents for an acute visit. He endorses bilateral knee pain L>R that began about a week ago; joints feel stiff. He has been installing cabinets in his kitchen and having to kneel and crawl on his knees; the pain and stiffness has been too great over the past couple of days to do so. He tried taking a couple of doses of ibuprofen but did not notice much difference. He also notes some stiffness in his elbows and hands, but these are not particularly painful. He has had a cough for the past week, worse first thing in the morning and better during the day. He denies fever, chills, SOB, CP, palpitations. He has not been taking anything for his symptoms. No Known Allergies    Current Outpatient Medications   Medication Sig Dispense Refill    naproxen (NAPROSYN) 500 MG tablet Take 1 tablet by mouth 2 times daily (with meals) for 15 days 30 tablet 0    levothyroxine (SYNTHROID) 175 MCG tablet Take 1 tablet by mouth Daily 90 tablet 0    omeprazole (PRILOSEC) 20 MG delayed release capsule TAKE 1 CAPSULE BY MOUTH BEFORE BREAKFAST 90 capsule 1    aspirin 81 MG EC tablet Take 1 tablet by mouth daily      atenolol (TENORMIN) 50 MG tablet Take 1 tablet by mouth daily      calcium carbonate (OYSTER SHELL CALCIUM 500 MG) 1250 (500 Ca) MG tablet Take 1 tablet by mouth daily      vitamin D (CHOLECALCIFEROL) 25 MCG (1000 UT) TABS tablet Take 1 tablet by mouth daily      Multiple Vitamins-Minerals (THERAPEUTIC MULTIVITAMIN-MINERALS) tablet Take 1 tablet by mouth daily      rosuvastatin (CRESTOR) 10 MG tablet Take 1 tablet by mouth daily      sildenafil (VIAGRA) 50 MG tablet Take 1 tablet by mouth as needed for Erectile Dysfunction       No current facility-administered medications for this visit.

## 2023-10-13 ENCOUNTER — OFFICE VISIT (OUTPATIENT)
Age: 73
End: 2023-10-13
Payer: MEDICARE

## 2023-10-13 VITALS
DIASTOLIC BLOOD PRESSURE: 62 MMHG | SYSTOLIC BLOOD PRESSURE: 108 MMHG | RESPIRATION RATE: 18 BRPM | BODY MASS INDEX: 30.81 KG/M2 | WEIGHT: 215.2 LBS | OXYGEN SATURATION: 99 % | HEART RATE: 75 BPM | HEIGHT: 70 IN

## 2023-10-13 DIAGNOSIS — C73 FOLLICULAR THYROID CANCER (HCC): ICD-10-CM

## 2023-10-13 DIAGNOSIS — Z85.820 HISTORY OF MALIGNANT MELANOMA: ICD-10-CM

## 2023-10-13 DIAGNOSIS — M13.0 POLYARTHRITIS: Primary | ICD-10-CM

## 2023-10-13 DIAGNOSIS — R53.81 MALAISE: ICD-10-CM

## 2023-10-13 PROCEDURE — G8484 FLU IMMUNIZE NO ADMIN: HCPCS

## 2023-10-13 PROCEDURE — 3017F COLORECTAL CA SCREEN DOC REV: CPT

## 2023-10-13 PROCEDURE — G8427 DOCREV CUR MEDS BY ELIG CLIN: HCPCS

## 2023-10-13 PROCEDURE — G8417 CALC BMI ABV UP PARAM F/U: HCPCS

## 2023-10-13 PROCEDURE — 1123F ACP DISCUSS/DSCN MKR DOCD: CPT

## 2023-10-13 PROCEDURE — 99214 OFFICE O/P EST MOD 30 MIN: CPT

## 2023-10-13 PROCEDURE — 36415 COLL VENOUS BLD VENIPUNCTURE: CPT

## 2023-10-13 PROCEDURE — 1036F TOBACCO NON-USER: CPT

## 2023-10-13 RX ORDER — METHYLPREDNISOLONE 4 MG/1
TABLET ORAL
Qty: 1 KIT | Refills: 0 | Status: SHIPPED | OUTPATIENT
Start: 2023-10-13 | End: 2023-10-19

## 2023-10-13 ASSESSMENT — ENCOUNTER SYMPTOMS
WHEEZING: 0
BLOOD IN STOOL: 0
EYES NEGATIVE: 1
CONSTIPATION: 0
DIARRHEA: 0
SHORTNESS OF BREATH: 0
COUGH: 1
ALLERGIC/IMMUNOLOGIC NEGATIVE: 1

## 2023-10-14 LAB
ALBUMIN SERPL-MCNC: 3.3 G/DL (ref 3.5–5)
ALBUMIN/GLOB SERPL: 1.1 (ref 1.1–2.2)
ALP SERPL-CCNC: 69 U/L (ref 45–117)
ALT SERPL-CCNC: 13 U/L (ref 12–78)
ANION GAP SERPL CALC-SCNC: 7 MMOL/L (ref 5–15)
AST SERPL-CCNC: 10 U/L (ref 15–37)
BASOPHILS # BLD: 0.1 K/UL (ref 0–0.1)
BASOPHILS NFR BLD: 1 % (ref 0–1)
BILIRUB SERPL-MCNC: 0.6 MG/DL (ref 0.2–1)
BUN SERPL-MCNC: 21 MG/DL (ref 6–20)
BUN/CREAT SERPL: 21 (ref 12–20)
CALCIUM SERPL-MCNC: 9.3 MG/DL (ref 8.5–10.1)
CHLORIDE SERPL-SCNC: 105 MMOL/L (ref 97–108)
CO2 SERPL-SCNC: 29 MMOL/L (ref 21–32)
CREAT SERPL-MCNC: 1.01 MG/DL (ref 0.7–1.3)
CRP SERPL HS-MCNC: >9.5 MG/L
DIFFERENTIAL METHOD BLD: ABNORMAL
EOSINOPHIL # BLD: 0.5 K/UL (ref 0–0.4)
EOSINOPHIL NFR BLD: 5 % (ref 0–7)
ERYTHROCYTE [DISTWIDTH] IN BLOOD BY AUTOMATED COUNT: 12.8 % (ref 11.5–14.5)
GLOBULIN SER CALC-MCNC: 3 G/DL (ref 2–4)
GLUCOSE SERPL-MCNC: 142 MG/DL (ref 65–100)
HCT VFR BLD AUTO: 44.1 % (ref 36.6–50.3)
HGB BLD-MCNC: 14 G/DL (ref 12.1–17)
IMM GRANULOCYTES # BLD AUTO: 0 K/UL (ref 0–0.04)
IMM GRANULOCYTES NFR BLD AUTO: 0 % (ref 0–0.5)
LYMPHOCYTES # BLD: 1.2 K/UL (ref 0.8–3.5)
LYMPHOCYTES NFR BLD: 14 % (ref 12–49)
MCH RBC QN AUTO: 28.9 PG (ref 26–34)
MCHC RBC AUTO-ENTMCNC: 31.7 G/DL (ref 30–36.5)
MCV RBC AUTO: 90.9 FL (ref 80–99)
MONOCYTES # BLD: 0.7 K/UL (ref 0–1)
MONOCYTES NFR BLD: 8 % (ref 5–13)
NEUTS SEG # BLD: 6 K/UL (ref 1.8–8)
NEUTS SEG NFR BLD: 72 % (ref 32–75)
NRBC # BLD: 0 K/UL (ref 0–0.01)
NRBC BLD-RTO: 0 PER 100 WBC
PLATELET # BLD AUTO: 296 K/UL (ref 150–400)
PMV BLD AUTO: 10.7 FL (ref 8.9–12.9)
POTASSIUM SERPL-SCNC: 4.3 MMOL/L (ref 3.5–5.1)
PROT SERPL-MCNC: 6.3 G/DL (ref 6.4–8.2)
RBC # BLD AUTO: 4.85 M/UL (ref 4.1–5.7)
SODIUM SERPL-SCNC: 141 MMOL/L (ref 136–145)
WBC # BLD AUTO: 8.4 K/UL (ref 4.1–11.1)

## 2023-10-16 ENCOUNTER — OFFICE VISIT (OUTPATIENT)
Age: 73
End: 2023-10-16

## 2023-10-16 VITALS
WEIGHT: 214.4 LBS | DIASTOLIC BLOOD PRESSURE: 85 MMHG | HEIGHT: 70 IN | SYSTOLIC BLOOD PRESSURE: 143 MMHG | HEART RATE: 82 BPM | TEMPERATURE: 98.4 F | OXYGEN SATURATION: 97 % | RESPIRATION RATE: 18 BRPM | BODY MASS INDEX: 30.69 KG/M2

## 2023-10-16 DIAGNOSIS — Z12.11 COLON CANCER SCREENING: Primary | ICD-10-CM

## 2023-10-16 LAB — LYME ANTIBODY: NEGATIVE

## 2023-10-16 ASSESSMENT — PATIENT HEALTH QUESTIONNAIRE - PHQ9
SUM OF ALL RESPONSES TO PHQ QUESTIONS 1-9: 0
SUM OF ALL RESPONSES TO PHQ9 QUESTIONS 1 & 2: 0
SUM OF ALL RESPONSES TO PHQ QUESTIONS 1-9: 0
2. FEELING DOWN, DEPRESSED OR HOPELESS: 0
SUM OF ALL RESPONSES TO PHQ QUESTIONS 1-9: 0
SUM OF ALL RESPONSES TO PHQ QUESTIONS 1-9: 0
1. LITTLE INTEREST OR PLEASURE IN DOING THINGS: 0

## 2023-10-16 NOTE — PATIENT INSTRUCTIONS
Colon Cancer Screening: Care Instructions  Overview     Colorectal cancer occurs in the colon or rectum. That's the lower part of your digestive system. It often starts in small growths called polyps in the colon or rectum. Polyps are usually found with screening tests. Depending on the type of test, any polyps found may be removed during the tests. Colorectal cancer usually does not cause symptoms at first. But regular tests can help find it early, before it spreads and becomes harder to treat. Your risk for colorectal cancer gets higher as you get older. Experts recommend starting screening at age 39 for people who are at average risk. Talk with your doctor about your risk and when to start and stop screening. You may have one of several tests. Follow-up care is a key part of your treatment and safety. Be sure to make and go to all appointments, and call your doctor if you are having problems. It's also a good idea to know your test results and keep a list of the medicines you take. What are the main screening tests for colon cancer? The screening tests are:  Stool tests. These include the guaiac fecal occult blood test (gFOBT), the fecal immunochemical test (FIT), and the combined fecal immunochemical test and stool DNA test (FIT-DNA). These tests check stool samples for signs of cancer. If your test is positive, you will need to have a colonoscopy. Sigmoidoscopy. This test lets your doctor look at the lining of your rectum and the lowest part of your colon. Your doctor uses a lighted tube called a sigmoidoscope. This test can't find cancers or polyps in the upper part of your colon. In some cases, polyps that are found can be removed. But if your doctor finds polyps, you will need to have a colonoscopy to check the upper part of your colon. Colonoscopy. This test lets your doctor look at the lining of your rectum and your entire colon. The doctor uses a thin, flexible tool called a colonoscope.  It

## 2023-10-26 ENCOUNTER — TELEPHONE (OUTPATIENT)
Age: 73
End: 2023-10-26

## 2023-10-26 DIAGNOSIS — M13.0 POLYARTHRITIS: Primary | ICD-10-CM

## 2023-10-27 ENCOUNTER — TELEMEDICINE (OUTPATIENT)
Age: 73
End: 2023-10-27
Payer: MEDICARE

## 2023-10-27 DIAGNOSIS — J18.9 PNEUMONIA OF LEFT LOWER LOBE DUE TO INFECTIOUS ORGANISM: ICD-10-CM

## 2023-10-27 DIAGNOSIS — R10.11 RUQ PAIN: ICD-10-CM

## 2023-10-27 DIAGNOSIS — H57.12 PAIN OF LEFT EYE: ICD-10-CM

## 2023-10-27 DIAGNOSIS — M25.40 JOINT SWELLING: ICD-10-CM

## 2023-10-27 DIAGNOSIS — R05.2 SUBACUTE COUGH: Primary | ICD-10-CM

## 2023-10-27 PROCEDURE — G8427 DOCREV CUR MEDS BY ELIG CLIN: HCPCS

## 2023-10-27 PROCEDURE — 1123F ACP DISCUSS/DSCN MKR DOCD: CPT

## 2023-10-27 PROCEDURE — 3017F COLORECTAL CA SCREEN DOC REV: CPT

## 2023-10-27 ASSESSMENT — PATIENT HEALTH QUESTIONNAIRE - PHQ9
SUM OF ALL RESPONSES TO PHQ QUESTIONS 1-9: 0
1. LITTLE INTEREST OR PLEASURE IN DOING THINGS: 0
SUM OF ALL RESPONSES TO PHQ QUESTIONS 1-9: 0
SUM OF ALL RESPONSES TO PHQ QUESTIONS 1-9: 0
2. FEELING DOWN, DEPRESSED OR HOPELESS: 0
SUM OF ALL RESPONSES TO PHQ QUESTIONS 1-9: 0
SUM OF ALL RESPONSES TO PHQ9 QUESTIONS 1 & 2: 0

## 2023-10-27 NOTE — PROGRESS NOTES
[facility], was evaluated through a synchronous (real-time) audio-video encounter. The patient (or guardian if applicable) is aware that this is a billable service, which includes applicable co-pays. This Virtual Visit was conducted with patient's (and/or legal guardian's) consent. Patient identification was verified, and a caregiver was present when appropriate. The patient was located at Home: [address] [place] [id number]  Provider was located at [facility] (Appt Dept): [facility],  [address] [facility] (:  1950) is a Established patient, presenting virtually for evaluation of the following:    Assessment & Plan   Below is the assessment and plan developed based on review of pertinent history, physical exam, labs, studies, and medications. 1. Subacute cough  -     XR CHEST (2 VIEWS); Future  2. RUQ pain  -     US GALLBLADDER RUQ; Future  3. Joint swelling  4. Pain of left eye    Painless joint swelling improved following course of steroids, but now returning; refer to ortho. Will obtain imaging of chest and RUQ; needs to schedule in-person visit for exam.      Return in about 2 weeks (around 11/10/2023). Subjective   [facility] has a variety of symptoms, including intermittent joint swelling that started about four weeks ago, cough that began about six weeks ago, and fatigue that has gradually worsened over the past two weeks; he notes RUQ pain that began a few days ago, with occasional stabbing pain not associated with any particular activity, as well as sharp shooting pain behind his left eye for the past two days that occurs only in the evenings. He has a history of malignant melanoma and follicular thyroid cancer and is worried that these symptoms could indicate a return of his cancer; he had a negative screening CT chest/abdomen/pelvis in May 2023 and is scheduled next week to establish with Dr. Sarah Myers for surveillance.     Review

## 2023-10-31 ASSESSMENT — ENCOUNTER SYMPTOMS
ABDOMINAL PAIN: 1
NAUSEA: 0
COUGH: 1
SHORTNESS OF BREATH: 0
WHEEZING: 0
CONSTIPATION: 0
EYE PAIN: 1
BLOOD IN STOOL: 0
ABDOMINAL DISTENTION: 0
CHEST TIGHTNESS: 0
VOMITING: 0
DIARRHEA: 0

## 2023-11-03 ENCOUNTER — HOSPITAL ENCOUNTER (OUTPATIENT)
Facility: HOSPITAL | Age: 73
End: 2023-11-03
Payer: MEDICARE

## 2023-11-03 DIAGNOSIS — R05.2 SUBACUTE COUGH: ICD-10-CM

## 2023-11-03 DIAGNOSIS — R10.11 RUQ PAIN: ICD-10-CM

## 2023-11-03 PROCEDURE — 76705 ECHO EXAM OF ABDOMEN: CPT

## 2023-11-03 PROCEDURE — 71046 X-RAY EXAM CHEST 2 VIEWS: CPT

## 2023-11-03 RX ORDER — AZITHROMYCIN 250 MG/1
250 TABLET, FILM COATED ORAL SEE ADMIN INSTRUCTIONS
Qty: 6 TABLET | Refills: 0 | Status: SHIPPED | OUTPATIENT
Start: 2023-11-03 | End: 2023-11-08

## 2023-11-03 RX ORDER — AMOXICILLIN AND CLAVULANATE POTASSIUM 875; 125 MG/1; MG/1
1 TABLET, FILM COATED ORAL 2 TIMES DAILY
Qty: 20 TABLET | Refills: 0 | Status: SHIPPED | OUTPATIENT
Start: 2023-11-03 | End: 2023-11-13

## 2023-11-06 ENCOUNTER — TELEPHONE (OUTPATIENT)
Age: 73
End: 2023-11-06

## 2023-11-06 ENCOUNTER — TELEMEDICINE (OUTPATIENT)
Age: 73
End: 2023-11-06

## 2023-11-06 DIAGNOSIS — C43.9 MALIGNANT MELANOMA, UNSPECIFIED SITE (HCC): Primary | ICD-10-CM

## 2023-11-06 RX ORDER — ROSUVASTATIN CALCIUM 10 MG/1
10 TABLET, COATED ORAL DAILY
Qty: 90 TABLET | Refills: 1 | Status: SHIPPED | OUTPATIENT
Start: 2023-11-06

## 2023-11-06 NOTE — PROGRESS NOTES
Gigi Castillo is a 72 y.o. male, patient has been sick for 5 weeks. Cough, joint swelling (all the joints) pain with [place], washing when bending elbows. He completed a steroid taper, about a week ago. He is taking ibuprofen this is helping with the swelling and wrist pain. Joints are not closing all the way. ABD pain, feels like it is in the Lower back, bilateral, the pain is sharp at times when he coughs or takes a deep breath. Other time he does not feel it at all. Pain is worse at night. Patient has a \"mole\" on his abdomen, it was present possibly for a long time. He noted it was not looking \"okay\"   He had a wide area excision, and a sentinal node biopsy. The nodes under his arm pits were removed. It was discovered after this nodules in his thyroid, he had the nodule removed the pathology showed cancer. He was being monitored at [facility] in [place].

## 2023-11-06 NOTE — RESULT ENCOUNTER NOTE
Patient verified stating name and date of birth. Patient informed of xray and US results and states understanding.

## 2023-11-07 ENCOUNTER — OFFICE VISIT (OUTPATIENT)
Age: 73
End: 2023-11-07
Payer: MEDICARE

## 2023-11-07 VITALS
SYSTOLIC BLOOD PRESSURE: 121 MMHG | DIASTOLIC BLOOD PRESSURE: 75 MMHG | HEIGHT: 68 IN | WEIGHT: 210.2 LBS | RESPIRATION RATE: 18 BRPM | HEART RATE: 78 BPM | OXYGEN SATURATION: 96 % | TEMPERATURE: 97.9 F | BODY MASS INDEX: 31.86 KG/M2

## 2023-11-07 DIAGNOSIS — C43.59 MALIGNANT MELANOMA OF TORSO EXCLUDING BREAST (HCC): Primary | ICD-10-CM

## 2023-11-07 PROCEDURE — 99205 OFFICE O/P NEW HI 60 MIN: CPT | Performed by: INTERNAL MEDICINE

## 2023-11-07 PROCEDURE — 1123F ACP DISCUSS/DSCN MKR DOCD: CPT | Performed by: INTERNAL MEDICINE

## 2023-11-07 NOTE — PROGRESS NOTES
Gabrielle Smith is a 72 y.o. male here for follow up for joint swelling, hands, fingers, knees, ankles, and shoulders. Patient states the swelling in his joints is restrictive and in his hands painful at times.

## 2023-11-07 NOTE — PROGRESS NOTES
[facility] [employer]  Medical Oncology at [place]  [phone]    Hematology / Oncology Consult    Reason for Visit:   Khadar Vang is a 72 y.o. male who is seen in consultation at the request of primary care for oncology follow up. History of Present Illness:   Dear colleagues,       It was my great pleasure to see Mr. Lashell Manley in consultation on the medical oncology and hematology service on 7 November 2023. As you recall this kind 79-year-old male carries a past medical history noteworthy for a mixed follicular and papillary thyroid cancer. Patient is status post subtotal thyroidectomy greater than 5 years ago. He was diagnosed at that time with a T3 aNX M0 or stage II disease. The diagnosis was established on the basis of an incidental finding while the patient was undergoing an extent of disease work-up for a synchronous stage IIIa or T2 aN1 aM0 malignant melanoma involving the anterior chest wall. The patient was not offered adjuvant therapy for either his thyroid cancer or his melanoma he has been free of disease since that time. Recent imaging in early 2023 revealed no evidence of persistent or progressive disease. His last thyroglobulin level was actually read as undetectable. The history of the more recent present illness dates back about 1 month with the patient developing what appeared to be a viral syndrome. He developed a cough that was minimally productive there were no complaints of hemoptysis. However subsequent to developing the cough the patient developed significant joint swelling without discrete arthralgia. He was noted to have an elevated CRP. Chest x-ray revealed bilateral small pleural effusions and some mild atelectatic change. No evidence of consolidation was identified. The patient's cough has improved. He does continue to complain of grade 1-2 asthenia.   His joint swelling has yet to improve although the symptoms have been ameliorated by the

## 2023-12-12 NOTE — LETTER
March 2, 2022     Gale Ramos MD  [address],  [address] [address]    Patient: Tay Lucero   YOB: 1950   Date of Visit: 3/2/2022       Dear Dr Leigh Vann: Thank you for referring Tay Lucero to me for evaluation  Below are my notes for this consultation  If you have questions, please do not hesitate to call me  I look forward to following your patient along with you  Sincerely,        Dixie Holter, MD        CC: Enrico Pascal, MD Starleen Client, MD Jacqlyn Decant, MD Dixie Holter, MD  3/2/2022 11:27 PM  Sign when Signing Visit  [address] [place] [address] 3  [address] [place] [address] 58  [phone]  [phone]     Date of Visit: 3/2/2022  Name: Tay Lucero   YOB: 1950        Subjective    VISIT DIAGNOSIS:  Diagnoses and all orders for this visit:    Personal history of malignant melanoma    Malignant melanoma, unspecified site [facility])  -     MRI brain w wo contrast; Future  -     CBC and differential; Future  -     Comprehensive metabolic panel; Future  -     LD,Blood; Future    History of thyroid cancer        Oncology History   Personal history of malignant melanoma   10/16/2018 Initial Diagnosis    Malignant melanoma of skin of abdomen (HCC)  1 1 mm Breslow thickness, no ulceration, mitotic rate 1 per mm squared     12/14/2018 Surgery    Wide excision of abdomen with sentinel lymph node biopsies (right and left axillae)  No residual melanoma  He did have 1/4 lymph nodes positive with 2 mm of focal invasion into the lymph node  No extracapsular extension  Positive lymph node was in right axilla      BRAF [id number] +     12/14/2018 -  Cancer Staged    Staging form: Melanoma of the Skin, AJCC 8th Edition  - Pathologic stage from 12/14/2018: Stage IIIA (pT2a, pN1a, cM0) - Signed by Dixie Holter, MD on 3/2/2022       History of thyroid cancer   4/24/2019 Initial Diagnosis Papillary and follicular carcinoma ([facility] [address] )     4/24/2019 Surgery    Thyroid, left and isthmus, hemithyroidectomy:  -  Papillary carcinoma, follicular variant, infiltrative - [id number]  -  Follicular carcinoma, minimally invasive - [id number]     4/24/2019 -  Cancer Staged    Staging form: Thyroid - Differentiated and Anaplastic, AJCC 8th Edition  - Pathologic stage from 4/24/2019: Stage II (pT3a, pNX, cM0, Age at diagnosis: >= 55 years) - Signed by Celeste Pardo MD on 3/2/2022  Laterality: Left  Solitary (s) or multifocal (m) tumors in the primary site: Solitary  Lymph node metastasis: Unknown          Cancer Staging  History of thyroid cancer  Staging form: Thyroid - Differentiated and Anaplastic, AJCC 8th Edition  - Pathologic stage from 4/24/2019: Stage II (pT3a, pNX, cM0, Age at diagnosis: >= 54 years) - Signed by Celeste Pardo MD on 3/2/2022    Personal history of malignant melanoma  Staging form: Melanoma of the Skin, AJCC 8th Edition  - Pathologic stage from 12/14/2018: Stage IIIA (pT2a, pN1a, cM0) - Signed by Celeste Pardo MD on 3/2/2022     [No treatment plan]     HISTORY OF PRESENT ILLNESS: Myrtle Alexander is a 81 y o  male  who is seen as a transfer of care from Dr Mily Mas for a diagnosis of melanoma and thyroid cancer  The history is obtained from the patient and review of records  He first noted a little black spot in his mid abdomen  It started to get bigger over time about the size of a pencil eraser and borders become irregular  Denies itching, bleeding, or pain of the lesion  Nothing made it worse or better  He had a biopsy on 10/16/18 by dermatology and pathology demonstrated melanoma 1 1mm Breslow thickness, negative for ulceration and 1 mitoses/mm2  He then had a WLE and SLNBx with Dr Samuel Wilkins on 12/14/2018  Pathology demonstrated  no residual melanoma and 1 of 4 left axillary lymph nodes positive for melanoma with the largest tumor deposit measuring 2 mm    The positive lymph node was in the right axilla  He is BRAF V600E positive  He has undergone surveillance since his diagnosis and scans have been negative for recurrence or metastatic melanoma  On 2/7/2019, he was diagnosed with thyroid cancer, both papillary thyroid carcinoma, follicular variant  He is s/p left hemithyroidectomy  He continues to follow with Endocrinology for this as well who is adjusting his levothyroxine  He had occasional dermatology exams prior, but now follows with them regularly  He is doing well and feels good  He denies any other new, changing, or concerning lesions at this time  No new lymphadenopathy  Did have scans in January which demonstrated no evidence of melanoma recurrence or metastasis  He denies a previous history of melanoma and BCC/SCC  He describes having average sun exposure when younger  He describes having few number of sunburns  He describes having no blistering sunburns  He denies tanning bed use  He had dark brown hair when he was younger, has brie eyes, and  his ancestry is [facility]  He has a family history of colon cancer in a paternal aunt  Denies family history of melanoma, breast, ovarian, or pancreatic cancer  He has 4 sons, mid 20s-44s  He is up to date on colonoscopy and PSA checks  Denies smoking, occasional alcohol, and no drug use  Rare cigar smoker    He recently bought a house in [place], [place]  Close to [facility]  REVIEW OF SYSTEMS:  Review of Systems   Constitutional: Positive for fatigue (improved with levothyroxine,but still not back to baseline)  Negative for appetite change, fever and unexpected weight change  HENT:   Negative for lump/mass  Eyes: Negative for icterus  Respiratory: Negative for cough, shortness of breath and wheezing  Cardiovascular: Negative for leg swelling  Gastrointestinal: Negative for abdominal pain, constipation, diarrhea, nausea and vomiting     Genitourinary: Negative for difficulty Detail Level: Zone urinating and hematuria  Musculoskeletal: Negative for arthralgias, gait problem and myalgias  Skin: Negative for itching and rash  No new, changing, or concerning lesions  Neurological: Negative for extremity weakness, gait problem, headaches, light-headedness and numbness  Hematological: Negative for adenopathy          MEDICATIONS:    Current Outpatient Medications:     aspirin 81 MG tablet, Take 81 mg by mouth daily , Disp: , Rfl:     atenolol (TENORMIN) 50 mg tablet, TAKE ONE TABLET BY MOUTH EVERY DAY, Disp: 90 tablet, Rfl: 1    Calcium-Magnesium-Vitamin D (CALCIUM 1200+D3 PO), , Disp: , Rfl:     cholecalciferol (VITAMIN D3) 1,000 units tablet, Take 1,000 Units by mouth daily, Disp: , Rfl:     levothyroxine 175 mcg tablet, Take one tablet (175 mcg) daily on an empty stomach, Disp: 90 tablet, Rfl: 2    lovastatin (MEVACOR) 40 MG tablet, TAKE ONE TABLET BY MOUTH EVERY DAY AT BEDTIME, Disp: 90 tablet, Rfl: 2    multivitamin (THERAGRAN) TABS, Take 1 tablet by mouth daily at bedtime , Disp: , Rfl:     omeprazole (PriLOSEC) 20 mg delayed release capsule, Take 1 capsule (20 mg total) by mouth daily before breakfast (Patient taking differently: Take 20 mg by mouth as needed ), Disp: 30 capsule, Rfl: 5    sildenafil (VIAGRA) 50 MG tablet, TAKE ONE TABLET BY MOUTH EVERY DAY AS NEEDED FOR ERECTILE DYSFUNCTION, Disp: 30 tablet, Rfl: 3     ALLERGIES:  No Known Allergies     ACTIVE PROBLEMS:  Patient Active Problem List   Diagnosis    Actinic keratosis    Benign essential hypertension    BPH (benign prostatic hyperplasia)    Erectile dysfunction    GERD without esophagitis    Hyperlipidemia    Impaired fasting glucose    Personal history of malignant melanoma    Liver mass    Other specified soft tissue disorders    Multiple thyroid nodules    Encounter for follow-up examination after completed treatment for malignant neoplasm    Hoarseness of voice    Vocal cord paralysis    Fatigue  Vitamin D deficiency    Postoperative hypothyroidism    History of thyroid cancer    Hernia of abdominal cavity          PAST MEDICAL HISTORY:   Past Medical History:   Diagnosis Date    Arthritis     Cancer ([address] )     melanoma abdomen    GERD (gastroesophageal reflux disease)     diet controlled    Hyperlipidemia     Last Assessed:10/20/14    Hypertension     PONV (postoperative nausea and vomiting)         PAST SURGICAL HISTORY:  Past Surgical History:   Procedure Laterality Date    COLONOSCOPY      HERNIA REPAIR Right     Last Assessed:10/20/14 inguinal     LYMPH NODE BIOPSY N/A 12/14/2018    Procedure: SENTINEL LYMPH NODE BIOPSY; LYMPHOSCINTIGRAPHY; INTRAOPERATIVE LYMPHATIC MAPPING (INJECT AT 1100); Surgeon: Mickey Nunez MD;  [facility];  Service: Surgical Oncology    SKIN LESION EXCISION N/A 12/14/2018    Procedure: MEDIAL UPPER ABDOMEN MELANOMA WIDE EXCISION; BILATERAL AXILLA SLN;  Surgeon: Mickey Nunez MD;  [facility];  Service: Surgical Oncology    THYROID LOBECTOMY Left 4/24/2019    Procedure: HEMITHYROIDECTOMY ISTHMUSECTOMY;  Surgeon: Mickey Nunez MD;  [facility];  Service: Surgical Oncology    TONSILLECTOMY      US GUIDED THYROID BIOPSY  2/7/2019    US GUIDED THYROID BIOPSY  1/14/2021    WISDOM TOOTH EXTRACTION          SOCIAL HISTORY:  Social History     Socioeconomic History    Marital status: /Civil Union     Spouse name: None    Number of children: None    Years of education: None    Highest education level: None   Occupational History    None   Tobacco Use    Smoking status: Never Smoker    Smokeless tobacco: Never Used   Vaping Use    Vaping Use: Never used   Substance and Sexual Activity    Alcohol use:  Yes     Alcohol/week: 2 0 standard drinks     Types: 1 Glasses of wine, 1 Cans of beer per week     Comment: rarely    Drug use: No    Sexual activity: Yes     Partners: Female   Other Topics Concern    None   Social History Narrative    No advance directives     Social Determinants of Health     Financial Resource Strain: Not on file   Food Insecurity: Not on file   Transportation Needs: Not on file   Physical Activity: Not on file   Stress: Not on file   Social Connections: Not on file   Intimate Partner Violence: Not on file   Housing Stability: Not on file        FAMILY HISTORY:  Family History   Problem Relation Age of Onset    Hypertension Mother     Hypothyroidism Mother     Hypertension Father     Skin cancer Father     Colon cancer Maternal Aunt     Colon cancer Paternal Aunt         62s    Skin cancer Paternal Uncle         Objective    PHYSICAL EXAMINATION:   Blood pressure 118/70, temperature 97 7 °F (36 5 °C), temperature source Tympanic, height 5' 11" (1 803 m), weight 97 5 kg (215 lb)  ECOG Performance Status      Most Recent Value   ECOG Performance Status 0 - Fully active, able to carry on all pre-disease performance without restriction             Physical Exam  Constitutional:       General: He is not in acute distress  Appearance: Normal appearance  He is not toxic-appearing  HENT:      Mouth/Throat:      Mouth: Mucous membranes are moist       Pharynx: Oropharynx is clear  Eyes:      General: No scleral icterus  Cardiovascular:      Rate and Rhythm: Normal rate and regular rhythm  Pulses: Normal pulses  Heart sounds: No murmur heard  No friction rub  No gallop  Pulmonary:      Effort: Pulmonary effort is normal  No respiratory distress  Breath sounds: Normal breath sounds  No wheezing or rales  Chest:   Breasts:      Right: No axillary adenopathy or supraclavicular adenopathy  Left: No axillary adenopathy or supraclavicular adenopathy  Abdominal:      General: There is no distension  Palpations: There is no mass  Tenderness: There is no abdominal tenderness  There is no rebound  Musculoskeletal:         General: No swelling or tenderness        Right lower leg: No edema  Left lower leg: No edema  Lymphadenopathy:      Head:      Right side of head: No submandibular, preauricular or posterior auricular adenopathy  Left side of head: No submandibular, preauricular or posterior auricular adenopathy  Cervical: No cervical adenopathy  Right cervical: No superficial or posterior cervical adenopathy  Left cervical: No superficial or posterior cervical adenopathy  Upper Body:      Right upper body: No supraclavicular or axillary adenopathy  Left upper body: No supraclavicular or axillary adenopathy  Lower Body: No right inguinal adenopathy  No left inguinal adenopathy  Skin:     Findings: No rash  Comments: Well healed surgical scar  No evidence of recurrence at primary site  Neurological:      General: No focal deficit present  Mental Status: He is alert and oriented to person, place, and time  Psychiatric:         Mood and Affect: Mood normal          Behavior: Behavior normal          Thought Content: Thought content normal          Judgment: Judgment normal          I reviewed lab data in the chart      WBC   Date Value Ref Range Status   01/31/2022 5 93 4 31 - 10 16 Thousand/uL Final   08/17/2021 6 81 4 31 - 10 16 Thousand/uL Final   06/21/2021 6 62 4 31 - 10 16 Thousand/uL Final   05/29/2017 6 3 3 4 - 10 8 x10E3/uL Final   11/11/2016 7 7 3 4 - 10 8 x10E3/uL Final   04/28/2016 9 4 3 4 - 10 8 x10E3/uL Final     Hemoglobin   Date Value Ref Range Status   01/31/2022 15 1 12 0 - 17 0 g/dL Final   08/17/2021 16 3 12 0 - 17 0 g/dL Final   06/21/2021 16 1 12 0 - 17 0 g/dL Final   05/29/2017 15 9 g/dL Final     Comment:     Result Comment: No patient age and/or gender provided                              Age                Male          Female                           0 -  7 days       10 7 - 20 5    10 7 - 20 5                           8 - 30 days       10 5 - 18 7    10 5 - 18 7                          31 - 90 days        8 8 - 14 3     8 8 - 14 3                     91 days - 11 months     10 4 - 14 1    10 4 - 14 1                           1 -  7 years      10 9 - 14 8    10 9 - 14 8                           8 - 12 years      11 7 - 15 7    11 7 - 15 7                              >12 years      12 6 - 17 7    11 1 - 15 9     11/11/2016 16 0 12 6 - 17 7 g/dL Final   04/28/2016 17 0 12 6 - 17 7 g/dL Final     Platelets   Date Value Ref Range Status   01/31/2022 191 149 - 390 Thousands/uL Final   08/17/2021 179 149 - 390 Thousands/uL Final   06/21/2021 181 149 - 390 Thousands/uL Final   05/29/2017 168 150 - 379 x10E3/uL Final   11/11/2016 187 150 - 379 x10E3/uL Final   04/28/2016 186 150 - 379 x10E3/uL Final     MCV   Date Value Ref Range Status   01/31/2022 90 82 - 98 fL Final   08/17/2021 88 82 - 98 fL Final   06/21/2021 88 82 - 98 fL Final   05/29/2017 87 fL Final     Comment:     Result Comment: No patient age and/or gender provided                              Age                Male          Female                           0 -  7 days         78 - 110       78 - 110                           8 - 30 days         80 - 109       80 - 109                          32 - 90 days         80 -  97       81 -  97                     91 days - 11 months       73 -  87       73 -  87                           1 -  7 years        75 -  89       75 -  89                           8 - 12 years        68 -  91       77 -  91                              >12 years        78 -  97       79 -  97     11/11/2016 88 79 - 97 fL Final   04/28/2016 87 79 - 97 fL Final      Sodium   Date Value Ref Range Status   05/29/2017 144 134 - 144 mmol/L Final   11/11/2016 145 (H) 136 - 144 mmol/L Final   04/28/2016 141 134 - 144 mmol/L Final     Potassium   Date Value Ref Range Status   01/31/2022 4 3 3 5 - 5 3 mmol/L Final   08/17/2021 3 7 3 5 - 5 3 mmol/L Final   06/21/2021 3 7 3 5 - 5 3 mmol/L Final   05/29/2017 4 3 3 5 - 5 2 mmol/L Final 11/11/2016 4 5 3 5 - 5 2 mmol/L Final   04/28/2016 4 1 3 5 - 5 2 mmol/L Final     Chloride   Date Value Ref Range Status   01/31/2022 105 100 - 108 mmol/L Final   08/17/2021 102 100 - 108 mmol/L Final   06/21/2021 103 100 - 108 mmol/L Final   05/29/2017 103 96 - 106 mmol/L Final   11/11/2016 103 97 - 106 mmol/L Final   04/28/2016 99 97 - 108 mmol/L Final     CO2   Date Value Ref Range Status   01/31/2022 32 21 - 32 mmol/L Final   08/17/2021 31 21 - 32 mmol/L Final   06/21/2021 32 21 - 32 mmol/L Final   05/29/2017 23 18 - 29 mmol/L Final   11/11/2016 28 18 - 29 mmol/L Final   04/28/2016 28 18 - 29 mmol/L Final     BUN   Date Value Ref Range Status   01/31/2022 16 5 - 25 mg/dL Final   08/17/2021 18 5 - 25 mg/dL Final   06/21/2021 19 5 - 25 mg/dL Final   05/29/2017 20 mg/dL Final     Comment:     Result Comment: No patient age and/or gender provided                              Age                Male          Female                           0 - 11 months        3 - 25         3 - 25                           1 - 17 years         5 - 25         5 - 25                          18 - 44 years         6 - 21         6 - 21                          42 - 61 years         10 - 25         6 - 24                          61 - 80 years         8 - 32         8 - 32                              >89 years        10 - 39        10 - 36     11/11/2016 19 8 - 27 mg/dL Final   04/28/2016 17 8 - 27 mg/dL Final     Creatinine   Date Value Ref Range Status   01/31/2022 1 00 0 60 - 1 30 mg/dL Final     Comment:     Standardized to IDMS reference method   08/17/2021 1 02 0 60 - 1 30 mg/dL Final     Comment:     Standardized to IDMS reference method   06/21/2021 1 11 0 60 - 1 30 mg/dL Final     Comment:     Standardized to IDMS reference method   05/29/2017 1 10 mg/dL Final     Comment:     Result Comment: No patient age and/or gender provided                              Age                Male          Female                           0 - 60 days          44 - 1 19      44 - 1 19                     61 days - 11 months        17 - 1 18      17 - 1 18                           1 -  2 years         19 -   42      19 -   42                           3 -  4 years         26 -   51      26 -   51                           5 -  6 years         30 -   59      30 -   59                           7 -  8 years         37 -   62      37 -   62                           9 - 10 years         39 -   70      39 -   70                          11 - 12 years         42 -   75      42 -   75                          13 - 14 years         49 -   90      49 -   90                              >14 years         76 - 1 27      57 - 1 00     11/11/2016 1 12 0 76 - 1 27 mg/dL Final   04/28/2016 1 15 0 76 - 1 27 mg/dL Final     Glucose   Date Value Ref Range Status   01/31/2022 93 65 - 140 mg/dL Final     Comment:     If the patient is fasting, the ADA then defines impaired fasting glucose as > 100 mg/dL and diabetes as > or equal to 123 mg/dL  Specimen collection should occur prior to Sulfasalazine administration due to the potential for falsely depressed results  Specimen collection should occur prior to Sulfapyridine administration due to the potential for falsely elevated results  06/17/2020 114 65 - 140 mg/dL Final     Comment:       If the patient is fasting, the ADA then defines impaired fasting glucose as > 100 mg/dL and diabetes as > or equal to 123 mg/dL  Specimen collection should occur prior to Sulfasalazine administration due to the potential for falsely depressed results  Specimen collection should occur prior to Sulfapyridine administration due to the potential for falsely elevated results  01/10/2020 117 65 - 140 mg/dL Final     Comment:       If the patient is fasting, the ADA then defines impaired fasting glucose as > 100 mg/dL and diabetes as > or equal to 123 mg/dL    Specimen collection should occur prior to Sulfasalazine administration due to the potential for falsely depressed results  Specimen collection should occur prior to Sulfapyridine administration due to the potential for falsely elevated results       Calcium   Date Value Ref Range Status   01/31/2022 9 2 8 3 - 10 1 mg/dL Final   08/17/2021 9 0 8 3 - 10 1 mg/dL Final   06/21/2021 9 2 8 3 - 10 1 mg/dL Final   05/29/2017 9 4 mg/dL Final     Comment:     Result Comment: No patient age and/or gender provided                              Age                Male          Female                           0 - 10 days        8 6 - 10 4     8 6 - 10 4                     11 days -  1 year        9 2 - 11 0     9 2 - 11 0                           2 - 11 years       9 1 - 10 5     9 1 - 10 5                          12 - 17 years       8 9 - 10 4     8 9 - 10 4                          18 - 59 years       8 7 - 10 2     8 7 - 10 2                              >59 years       8 6 - 10 2     8 7 - 10 3     11/11/2016 9 1 8 6 - 10 2 mg/dL Final   04/28/2016 9 5 8 6 - 10 2 mg/dL Final     Total Protein   Date Value Ref Range Status   05/29/2017 6 3 6 0 - 8 5 g/dL Final   11/11/2016 6 4 6 0 - 8 5 g/dL Final   04/28/2016 6 4 6 0 - 8 5 g/dL Final     Albumin   Date Value Ref Range Status   01/31/2022 3 5 3 5 - 5 0 g/dL Final   08/17/2021 3 6 3 5 - 5 0 g/dL Final   06/21/2021 3 7 3 5 - 5 0 g/dL Final   05/29/2017 4 2 g/dL Final     Comment:     Result Comment: No patient age and/or gender provided                              Age                Male          Female                           0 -  2 years       3 4 - 4 2      3 4 - 4 2                           3 - 59 years       3 5 - 5 5      3 5 - 5 5                          60 - 69 years       3 6 - 4 8      3 6 - 4 8                          70 - 79 years       3 5 - 4 8      3 5 - 4 8                          80 - 89 years       3 5 - 4 7      3 5 - 4 7                              >89 years       3 2 - 4 6      3 2 - 4 6     11/11/2016 4 2 3 6 - 4 8 g/dL Final 04/28/2016 4 4 3 6 - 4 8 g/dL Final     Total Bilirubin   Date Value Ref Range Status   01/31/2022 0 64 0 20 - 1 00 mg/dL Final     Comment:     Use of this assay is not recommended for patients undergoing treatment with eltrombopag due to the potential for falsely elevated results  08/17/2021 0 90 0 20 - 1 00 mg/dL Final     Comment:     Use of this assay is not recommended for patients undergoing treatment with eltrombopag due to the potential for falsely elevated results  06/21/2021 0 78 0 20 - 1 00 mg/dL Final     Comment:     Use of this assay is not recommended for patients undergoing treatment with eltrombopag due to the potential for falsely elevated results       Alkaline Phosphatase   Date Value Ref Range Status   01/31/2022 87 46 - 116 U/L Final   08/17/2021 69 46 - 116 U/L Final   06/21/2021 82 46 - 116 U/L Final   05/29/2017 62 IU/L Final     Comment:     Result Comment: No patient age and/or gender provided                              Age                Male          Female                           0 -  1 day          45 - 111       45 - 111                           2 -  5 days         55 - 119       55 - 119                           6 - 10 days         50 - 229       50 - 229                          11 - 30 days         61 - 414       61 - 414                           1 -  6 months       91 - 445       91 - 445                           7 - 12 months      124 - 341      124 - 341                           1 -  3 years       130 - 317      130 - 317                           4 -  6 years       133 - 309      133 - 309                           7 - 12 years       134 - 349      134 - 349                               13 years       143 - 396       76 - 209                               14 years       80 - 340       58 - 149                               15 years        80 - 254       47 - 121                               12 years        70 - 186       52 - 108 17 years        64 - 146       45 - 101                               18 years        64 - 127       37 - 101                              >18 years        44 - 117       44 - 117     11/11/2016 65 39 - 117 IU/L Final   04/28/2016 65 39 - 117 IU/L Final     AST   Date Value Ref Range Status   01/31/2022 19 5 - 45 U/L Final     Comment:     Specimen collection should occur prior to Sulfasalazine administration due to the potential for falsely depressed results  08/17/2021 19 5 - 45 U/L Final     Comment:     Specimen collection should occur prior to Sulfasalazine administration due to the potential for falsely depressed results  06/21/2021 20 5 - 45 U/L Final     Comment:     Specimen collection should occur prior to Sulfasalazine administration due to the potential for falsely depressed results  05/29/2017 23 0 - 40 IU/L Final   11/11/2016 18 0 - 40 IU/L Final   04/28/2016 16 0 - 40 IU/L Final     ALT   Date Value Ref Range Status   01/31/2022 27 12 - 78 U/L Final     Comment:     Specimen collection should occur prior to Sulfasalazine administration due to the potential for falsely depressed results  08/17/2021 28 12 - 78 U/L Final     Comment:     Specimen collection should occur prior to Sulfasalazine administration due to the potential for falsely depressed results  06/21/2021 31 12 - 78 U/L Final     Comment:     Specimen collection should occur prior to Sulfasalazine administration due to the potential for falsely depressed results      05/29/2017 21 IU/L Final     Comment:     Result Comment: No patient age and/or gender provided                              Age                Male          Female                           0 - 11 years         0 - 34         0 - 28                          17 - 17 years         0 - 27         0 - 24                              >17 years         0 - 40         0 - 28  Performed at: [facility] , , [place], [place], [id number], [id number]  MD:  [address] [facility] [id number]     11/11/2016 18 0 - 44 IU/L Final     Comment:     Performed at: [facility], , [place], [place], [id number], [id number]  MD:  R[place] Montanez  [address] [facility] [id number]     04/28/2016 16 0 - 44 IU/L Final     Comment:     Performed at: [facility], , [place], [place], [id number], [id number]  MD:  [id number] [facility] [phone]        LD   Date Value Ref Range Status   01/31/2022 154 81 - 234 U/L Final   06/21/2021 125 81 - 234 U/L Final   06/17/2020 129 81 - 234 U/L Final     No results found for: TSH  No results found for: L1OIKZC   Free T4   Date Value Ref Range Status   08/17/2021 1 30 0 76 - 1 46 ng/dL Final     Comment:     Specimen collection should occur prior to Sulfasalazine administration due to the potential for falsely elevated results  08/17/2021 1 33 0 76 - 1 46 ng/dL Final     Comment:     Specimen collection should occur prior to Sulfasalazine administration due to the potential for falsely elevated results  03/29/2021 1 61 (H) 0 76 - 1 46 ng/dL Final     Comment:     Specimen collection should occur prior to Sulfasalazine administration due to the potential for falsely elevated results  RECENT IMAGING:  Procedure: CT chest abdomen pelvis w contrast    Result Date: 2/3/2022  Narrative: CT CHEST, ABDOMEN AND PELVIS WITH IV CONTRAST INDICATION:   Z85 850: Personal history of malignant neoplasm of thyroid Z85 820: Personal history of malignant melanoma of skin  History of thyroid cancer and melanoma COMPARISON:  6/21/2021; 12/21/2020; 6/19/2020; 5/9/2019 TECHNIQUE: CT examination of the chest, abdomen and pelvis was performed  Axial, sagittal, and coronal 2D reformatted images were created from the source data and submitted for interpretation  Radiation dose length product (DLP) for this visit:  1706 mGy-cm     This examination, like all CT scans performed in the [facility], was performed utilizing techniques to minimize radiation dose exposure, including the use of iterative reconstruction and automated exposure control  IV Contrast:  100 mL of iohexol (OMNIPAQUE) Enteric Contrast: Enteric contrast was administered  FINDINGS: CHEST LUNGS:  Tiny 2 mm left apical nodule, image 9, series 5, stable  There is no tracheal or endobronchial lesion  PLEURA:  Unremarkable  HEART/GREAT VESSELS: Heart is unremarkable for patient's age  No thoracic aortic aneurysm  MEDIASTINUM AND JG:  Unremarkable  CHEST WALL AND LOWER NECK:   Unremarkable  ABDOMEN LIVER/BILIARY TREE:  Circumscribed hepatic hypodensity is stable in the left lobe as compared to Nikki although somewhat larger than the study of 2019  Currently the lesion measures 1 3 cm, 1 2 cm in Nikki and approximately 0 9 cm in 2019 and measures fluid attenuation  One or 2 other tiny hypodensities are also well-circumscribed and stable  GALLBLADDER:  No calcified gallstones  No pericholecystic inflammatory change  SPLEEN:  Unremarkable  PANCREAS:  Unremarkable  ADRENAL GLANDS:  Unremarkable  KIDNEYS/URETERS:  No hydronephrosis  A few circumscribed hypodensities in the kidneys probably represent cysts although some are too small to characterize  STOMACH AND BOWEL:  Unremarkable  APPENDIX:  No findings to suggest appendicitis  ABDOMINOPELVIC CAVITY:  No ascites  No pneumoperitoneum  No lymphadenopathy  A few small periceliac nodes are stable  VESSELS:  Unremarkable for patient's age  PELVIS REPRODUCTIVE ORGANS:  The prostate is prominent  URINARY BLADDER:  Unremarkable  ABDOMINAL WALL/INGUINAL REGIONS:  Periumbilical hernia of fat is identified  Left inguinal hernia of fat is identified  OSSEOUS STRUCTURES:  No acute fracture or destructive osseous lesion  Stable sclerotic lesion in the posterior acetabulum on the right is likely represents a bone island  Impression: No evidence of metastatic disease/adenopathy   Workstation performed: [id number] Procedure: US head neck lymph node mapping    Result Date: 2/3/2022  Narrative: NECK ULTRASOUND INDICATION:     C73: Malignant neoplasm of thyroid gland  History of thyroid carcinoma with left hemithyroidectomy 3 years ago; history of negative biopsy on 1/14/2021 COMPARISON:  1/14/2021; 11/27/2020; 7/24/2020 FINDINGS: Ultrasound of the thyroidectomy bed and cervical lymph node chains was performed with a high frequency linear transducer  Small thyroid gland is noted on the right  There is a nodule within the right thyroid which was previously biopsied which measures 0 9 x 0 5 x 0 8 cm ,  Previously 1 1 x 0 6 x 0 8 cm  In the left thyroid bed there is a level 3 node with an echogenic center present  There is no suspicion of recurrent mass in the thyroidectomy bed on the left  Lymph nodes maintain normal morphologic contour, echogenicity and short axis dimensions of less than 0 7 cm  No evidence for microcalcification or focal nodularity  Impression: No evidence of recurrent or metastatic disease  Suggest continued surveillance as per oncologic protocol  Workstation performed: [id number]     Procedure: US extremity soft tissue    Result Date: 2/3/2022  Narrative: RIGHT AXILLA ULTRASOUND INDICATION:   Z85 820: Personal history of malignant melanoma of skin M79 89: Other specified soft tissue disorders  History of malignant melanoma COMPARISON:  Ultrasound from 7/20/2021 TECHNIQUE:   Real-time ultrasound of the right axilla was performed with a linear transducer with both volumetric sweeps and still imaging techniques  FINDINGS: There is a normal morphologic lymph node in the right axilla measuring 1 7 x 0 8 x 2 1 cm, previously 1 4 x 0 9 x 2 cm without significant change in size or morphology  Comparison left axilla shows a similar node which has a similar appearance  to the study of 7/20/2021       Impression: Normal axillary node on the right without significant change Workstation performed: [id number] Assessment/Plan  Mr Niki Luna is a 81 yr male with Stage IIIA melanoma on surveillance here for transfer of care an follow wp  1  Personal history of malignant melanoma  2  Malignant melanoma, unspecified site ([facility] [address] )  1  Melanoma: I had an extensive discussion with the patient regarding his  diagnosis, prognosis, and recommendations for further management  We reviewed his melanoma history, current findings, pathology and imaging tests  We discussed that he has stage IIIA melanoma and has been followed with close surveillance  He is almost 4 years out from his diagnosis  We discussed that he will require ongoing monitoring and surveillance, both for disease recurrence as well as a new primary melanoma as well as other nonmelanoma skin cancers  This includes physical exams  and labs, including a comprehensive metabolic panel, CBC with differential and LDH; periodic imaging studies with either CT chest, abdomen and pelvis or PET/CT scans every 6 months until 5 years our from diagnosis  After that, he will have yearly physical exams and blood work, but no additional scans unless clinically indicated  He has not had recent brain imaging so we will get a MRI brain at this time  Orders placed and scripts provided  We discussed the importance of regular cutaneous self examinations and reviewed the features of lesions that could be concerning for a primary melanoma  I did explain that he  is at risk for developing another primary melanoma as well  We also discussed avoidance of unnecessary sun exposure and use of sun protective clothing and sunscreen  I also recommended routine follow up and skin checks with dermatology  Family Screening: his  first-degree relatives, namely his siblings, parents, and children, have an increased risk of developing a melanoma over the general population given his  diagnosis of melanoma, and should have annual dermatologic screening      He will follow-up in 6 months with repeat imaging and blood work  All orders placed in scripts provided  - MRI brain w wo contrast; Future  - CBC and differential; Future  - Comprehensive metabolic panel; Future  - LD,Blood; Future    3  History of thyroid cancer  He will continue to follow with endocrinology  Has periodically imaging  Surveillance imaging for melanoma will also monitor thyroid cancer recurrence or metastasis    Cheyanne Anguiano had all his questions and concerns addressed and he knows to call with any additional issues  Thank you for allowing me to participate in Mr Susana Calhoun MD, PhD

## 2023-12-13 ENCOUNTER — OFFICE VISIT (OUTPATIENT)
Age: 73
End: 2023-12-13
Payer: MEDICARE

## 2023-12-13 VITALS
SYSTOLIC BLOOD PRESSURE: 116 MMHG | DIASTOLIC BLOOD PRESSURE: 76 MMHG | WEIGHT: 212.8 LBS | HEART RATE: 78 BPM | BODY MASS INDEX: 32.25 KG/M2 | HEIGHT: 68 IN

## 2023-12-13 DIAGNOSIS — C73 THYROID CANCER (HCC): Primary | ICD-10-CM

## 2023-12-13 DIAGNOSIS — E04.1 THYROID NODULE: ICD-10-CM

## 2023-12-13 DIAGNOSIS — E89.0 POSTOPERATIVE HYPOTHYROIDISM: ICD-10-CM

## 2023-12-13 PROCEDURE — 99204 OFFICE O/P NEW MOD 45 MIN: CPT | Performed by: GENERAL ACUTE CARE HOSPITAL

## 2023-12-13 PROCEDURE — 1123F ACP DISCUSS/DSCN MKR DOCD: CPT | Performed by: GENERAL ACUTE CARE HOSPITAL

## 2023-12-13 PROCEDURE — G8417 CALC BMI ABV UP PARAM F/U: HCPCS | Performed by: GENERAL ACUTE CARE HOSPITAL

## 2023-12-13 PROCEDURE — G8427 DOCREV CUR MEDS BY ELIG CLIN: HCPCS | Performed by: GENERAL ACUTE CARE HOSPITAL

## 2023-12-13 PROCEDURE — 3017F COLORECTAL CA SCREEN DOC REV: CPT | Performed by: GENERAL ACUTE CARE HOSPITAL

## 2023-12-13 PROCEDURE — 1036F TOBACCO NON-USER: CPT | Performed by: GENERAL ACUTE CARE HOSPITAL

## 2023-12-13 PROCEDURE — G8484 FLU IMMUNIZE NO ADMIN: HCPCS | Performed by: GENERAL ACUTE CARE HOSPITAL

## 2023-12-13 RX ORDER — LEVOTHYROXINE SODIUM 175 UG/1
TABLET ORAL
Qty: 90 TABLET | Refills: 3 | Status: SHIPPED | OUTPATIENT
Start: 2023-12-13

## 2023-12-13 RX ORDER — ACETAMINOPHEN 160 MG
TABLET,DISINTEGRATING ORAL
COMMUNITY

## 2023-12-13 NOTE — PATIENT INSTRUCTIONS
Please continue levothyroxine 175mcg daily for 6 times per week.  Obtain labs at your earliest convenience.   Based on the results will decide on the next steps in management.    Levothyroxine medication instructions:  Please take levothyroxine with a glass of water only, on an empty stomach each morning, 1 hour prior to ingesting any other medications (including vitamins), food, coffee, tea, juice or any other beverages.   If you use antacids, medicine to treat high cholesterol (including cholestyramine, colesevelam, colestipol), orlistat, sevelamer, sucralfate, stomach medicine (including lansoprazole, omeprazole, pantoprazole), or any medicine that contains calcium or iron, please take it at least 4 hours before or 4 hours after you take levothyroxine.  Cottonseed meal, dietary fiber, soybean flour or walnuts may decrease the absorption of this medicine.  All of these can reduce the absorption of thyroid hormone tablets and result in fluctuating levels in the blood and on labs, affecting also how one feels.  Please do not eat grapefruit or drink grapefruit juice while you are using this medicine.  If you miss a dose you can take it as soon as you remember. However, if it is almost time for your next dose, wait until then and take a regular dose. Do not take extra medicine to make up for a missed dose.  Store the medicine in a closed container at room temperature, away from heat, moisture, and direct light. Please keep out of children's reach.  You may have to take this medicine for 6 to 8 weeks before your symptoms start to get better.

## 2023-12-13 NOTE — PROGRESS NOTES
CONSULTATION REQUESTED BY: Dorcas Gallo APRN - NP     REASON FOR CONSULT: Thyroid cancer, post operative hypothyroidism    CHIEF COMPLAINT: Evaluation of thyroid cancer     HISTORY OF PRESENT ILLNESS:   Harmeet Beckman is a 73 y.o. male with a PMHx as noted below who was referred to our endocrinology clinic for evaluation of thyroid cancer and post-operative hypothyroidism.     Seen previously in [facility]    Thyroid history:    In 2019, Incidentally found thyroid nodules during workup and management of malignant melanoma.    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  Multinodular hyperplasia.  Immuno stains positive for HBME-1 and CK19     Completion thyroidectomy was held off given complications of vocal cord paralysis and concerns of the risk of contralateral nerve being damaged if he were to go for total thyroidectomy\"      FNA right mid pole thyroid nodule 1/14/2021-benign  US neck 11/2020 - no evidence of recurrent or metastatic disease \"      Ultrasound neck 02/2022-no evidence of recurrent or metastatic disease.   Stable right-sided thyroid nodule 0.9 X 0.5 X 0.8 cm   Left thyroid bed-level 3 node with an echogenic center present\"     Levothyroxine to 175 mcg orally on 6 days of the week    Complaints of:  Fatigue  Exercise intolerance , no chest pain or shortness of breath   Weight stable  No shakes/ tremors/ palpitations   Occasional loose stools, not more often   No anxiety  Sleep is ok     Taking D3 2000 IU daily      Patient denies any family history of thyroid cancers.   Patient denies any dysphagia or dyspnea.    Thyroglobulin (03/01/2023 9:48 AM EST)  Lab Results - Thyroglobulin (03/01/2023 9:48 AM

## 2024-01-10 ENCOUNTER — OFFICE VISIT (OUTPATIENT)
Age: 74
End: 2024-01-10
Payer: MEDICARE

## 2024-01-10 ENCOUNTER — HOSPITAL ENCOUNTER (OUTPATIENT)
Facility: HOSPITAL | Age: 74
Discharge: HOME | End: 2024-01-13

## 2024-01-10 VITALS
RESPIRATION RATE: 18 BRPM | WEIGHT: 208.4 LBS | SYSTOLIC BLOOD PRESSURE: 123 MMHG | DIASTOLIC BLOOD PRESSURE: 75 MMHG | OXYGEN SATURATION: 98 % | BODY MASS INDEX: 31.58 KG/M2 | HEIGHT: 68 IN | TEMPERATURE: 97.7 F | HEART RATE: 77 BPM

## 2024-01-10 DIAGNOSIS — C43.59 MALIGNANT MELANOMA OF TORSO EXCLUDING BREAST (HCC): Primary | ICD-10-CM

## 2024-01-10 PROCEDURE — G8427 DOCREV CUR MEDS BY ELIG CLIN: HCPCS | Performed by: INTERNAL MEDICINE

## 2024-01-10 PROCEDURE — 99214 OFFICE O/P EST MOD 30 MIN: CPT | Performed by: INTERNAL MEDICINE

## 2024-01-10 PROCEDURE — 3017F COLORECTAL CA SCREEN DOC REV: CPT | Performed by: INTERNAL MEDICINE

## 2024-01-10 PROCEDURE — G8484 FLU IMMUNIZE NO ADMIN: HCPCS | Performed by: INTERNAL MEDICINE

## 2024-01-10 PROCEDURE — 1036F TOBACCO NON-USER: CPT | Performed by: INTERNAL MEDICINE

## 2024-01-10 PROCEDURE — G8417 CALC BMI ABV UP PARAM F/U: HCPCS | Performed by: INTERNAL MEDICINE

## 2024-01-10 PROCEDURE — 1123F ACP DISCUSS/DSCN MKR DOCD: CPT | Performed by: INTERNAL MEDICINE

## 2024-01-10 ASSESSMENT — PATIENT HEALTH QUESTIONNAIRE - PHQ9
SUM OF ALL RESPONSES TO PHQ QUESTIONS 1-9: 0
SUM OF ALL RESPONSES TO PHQ QUESTIONS 1-9: 0
1. LITTLE INTEREST OR PLEASURE IN DOING THINGS: 0
2. FEELING DOWN, DEPRESSED OR HOPELESS: 0
SUM OF ALL RESPONSES TO PHQ QUESTIONS 1-9: 0
SUM OF ALL RESPONSES TO PHQ9 QUESTIONS 1 & 2: 0
SUM OF ALL RESPONSES TO PHQ QUESTIONS 1-9: 0

## 2024-01-10 NOTE — PROGRESS NOTES
Harmeet Beckman is a 73 y.o. male here for follow up for Hx of Malignant melanoma, and follicular thyroid cancer.        
lobe of the prostate.    URINARY BLADDER:  Unremarkable.    ABDOMINAL WALL/INGUINAL REGIONS:  Unremarkable.    OSSEOUS STRUCTURES:  No acute fracture or destructive osseous lesion.    Impression  No findings to suspect metastatic disease in the chest, abdomen and pelvis.  Chronic findings, as per the body of the report.        Workstation performed: [id number]    Xray Result (most recent):  XR CHEST STANDARD TWO VW 11/03/2023    Narrative  INDICATION:   Subacute cough    COMPARISON: None    FINDINGS:    Frontal and lateral views of the chest demonstrate a normal cardiomediastinal  silhouette. The lungs are adequately expanded.  Small bilateral pleural effusion  and left basilar atelectasis. No pulmonary edema or focal consolidation. No  pneumothorax. The osseous structures are unremarkable.    Impression  Small bilateral pleural effusions and left basilar atelectasis.    [id number]  US GALLBLADDER RUQ  Narrative: Clinical indication: Right upper quadrant pain.    Right upper quadrant ultrasound is performed.    The gallbladder appears normal. Common bile duct is 4 mm, normal.  Liver is normal in size with no mass. Portal vein flow is hepatopedal, main  portal vein down to 1 cm velocity 26 cm/s no free fluid.  Doppler examination of right and left portal veins right mid and left hepatic  veins hepatic artery within normal limits.  Pancreatic head poorly visualized due to gas.  Right kidney appears normal.   Impression: No significant abnormalities.  XR CHEST (2 VW)  Narrative: INDICATION:   Subacute cough    COMPARISON: None    FINDINGS:    Frontal and lateral views of the chest demonstrate a normal cardiomediastinal  silhouette. The lungs are adequately expanded.  Small bilateral pleural effusion  and left basilar atelectasis. No pulmonary edema or focal consolidation. No  pneumothorax. The osseous structures are unremarkable.  Impression: Small bilateral pleural effusions and left basilar

## 2024-01-14 LAB
THYROGLOB AB SERPL-ACNC: <1 IU/ML (ref 0–0.9)
THYROGLOBULIN: 1 NG/ML (ref 1.4–29.2)

## 2024-01-15 ENCOUNTER — OFFICE VISIT (OUTPATIENT)
Age: 74
End: 2024-01-15
Payer: MEDICARE

## 2024-01-15 VITALS
RESPIRATION RATE: 18 BRPM | DIASTOLIC BLOOD PRESSURE: 78 MMHG | HEART RATE: 79 BPM | BODY MASS INDEX: 31.69 KG/M2 | HEIGHT: 68 IN | SYSTOLIC BLOOD PRESSURE: 132 MMHG | TEMPERATURE: 97.3 F

## 2024-01-15 DIAGNOSIS — E55.9 VITAMIN D DEFICIENCY: ICD-10-CM

## 2024-01-15 DIAGNOSIS — Z23 NEEDS FLU SHOT: ICD-10-CM

## 2024-01-15 DIAGNOSIS — Z85.820 HISTORY OF MALIGNANT MELANOMA: ICD-10-CM

## 2024-01-15 DIAGNOSIS — I10 PRIMARY HYPERTENSION: ICD-10-CM

## 2024-01-15 DIAGNOSIS — Z00.00 MEDICARE ANNUAL WELLNESS VISIT, SUBSEQUENT: Primary | ICD-10-CM

## 2024-01-15 DIAGNOSIS — N52.9 ERECTILE DYSFUNCTION, UNSPECIFIED ERECTILE DYSFUNCTION TYPE: ICD-10-CM

## 2024-01-15 DIAGNOSIS — Z85.850 HX OF PAPILLARY THYROID CARCINOMA: ICD-10-CM

## 2024-01-15 DIAGNOSIS — Z12.5 ENCOUNTER FOR SCREENING FOR MALIGNANT NEOPLASM OF PROSTATE: ICD-10-CM

## 2024-01-15 DIAGNOSIS — R07.81 PLEURITIC CHEST PAIN: ICD-10-CM

## 2024-01-15 PROCEDURE — 3075F SYST BP GE 130 - 139MM HG: CPT

## 2024-01-15 PROCEDURE — G0439 PPPS, SUBSEQ VISIT: HCPCS

## 2024-01-15 PROCEDURE — G8484 FLU IMMUNIZE NO ADMIN: HCPCS

## 2024-01-15 PROCEDURE — 90694 VACC AIIV4 NO PRSRV 0.5ML IM: CPT

## 2024-01-15 PROCEDURE — 3017F COLORECTAL CA SCREEN DOC REV: CPT

## 2024-01-15 PROCEDURE — 1123F ACP DISCUSS/DSCN MKR DOCD: CPT

## 2024-01-15 PROCEDURE — 36415 COLL VENOUS BLD VENIPUNCTURE: CPT

## 2024-01-15 PROCEDURE — 3078F DIAST BP <80 MM HG: CPT

## 2024-01-15 PROCEDURE — G0008 ADMIN INFLUENZA VIRUS VAC: HCPCS

## 2024-01-15 PROCEDURE — G8417 CALC BMI ABV UP PARAM F/U: HCPCS

## 2024-01-15 PROCEDURE — 99213 OFFICE O/P EST LOW 20 MIN: CPT

## 2024-01-15 PROCEDURE — 1036F TOBACCO NON-USER: CPT

## 2024-01-15 PROCEDURE — G8427 DOCREV CUR MEDS BY ELIG CLIN: HCPCS

## 2024-01-15 RX ORDER — SILDENAFIL 50 MG/1
50 TABLET, FILM COATED ORAL PRN
Qty: 30 TABLET | Refills: 5 | Status: SHIPPED | OUTPATIENT
Start: 2024-01-15

## 2024-01-15 SDOH — ECONOMIC STABILITY: FOOD INSECURITY: WITHIN THE PAST 12 MONTHS, THE FOOD YOU BOUGHT JUST DIDN'T LAST AND YOU DIDN'T HAVE MONEY TO GET MORE.: NEVER TRUE

## 2024-01-15 SDOH — ECONOMIC STABILITY: FOOD INSECURITY: WITHIN THE PAST 12 MONTHS, YOU WORRIED THAT YOUR FOOD WOULD RUN OUT BEFORE YOU GOT MONEY TO BUY MORE.: NEVER TRUE

## 2024-01-15 SDOH — ECONOMIC STABILITY: INCOME INSECURITY: HOW HARD IS IT FOR YOU TO PAY FOR THE VERY BASICS LIKE FOOD, HOUSING, MEDICAL CARE, AND HEATING?: NOT HARD AT ALL

## 2024-01-15 ASSESSMENT — PATIENT HEALTH QUESTIONNAIRE - PHQ9
SUM OF ALL RESPONSES TO PHQ9 QUESTIONS 1 & 2: 0
SUM OF ALL RESPONSES TO PHQ QUESTIONS 1-9: 0
2. FEELING DOWN, DEPRESSED OR HOPELESS: 0
1. LITTLE INTEREST OR PLEASURE IN DOING THINGS: 0
SUM OF ALL RESPONSES TO PHQ QUESTIONS 1-9: 0

## 2024-01-15 ASSESSMENT — LIFESTYLE VARIABLES
HOW OFTEN DO YOU HAVE A DRINK CONTAINING ALCOHOL: MONTHLY OR LESS
HOW MANY STANDARD DRINKS CONTAINING ALCOHOL DO YOU HAVE ON A TYPICAL DAY: 1 OR 2

## 2024-01-15 NOTE — PROGRESS NOTES
Medicare Annual Wellness Visit    Harmeet Beckman is here for Medicare AWV    Assessment & Plan   Medicare annual wellness visit, subsequent  Primary hypertension  Erectile dysfunction, unspecified erectile dysfunction type  -     sildenafil (VIAGRA) 50 MG tablet; Take 1 tablet by mouth as needed for Erectile Dysfunction, Disp-30 tablet, R-5Normal  Encounter for screening for malignant neoplasm of prostate  -     IA COLLECTION VENOUS BLOOD VENIPUNCTURE  -     PSA Screening; Future  Vitamin D deficiency  -     IA COLLECTION VENOUS BLOOD VENIPUNCTURE  -     Vitamin D 25 Hydroxy; Future  Hx of papillary thyroid carcinoma  -     US HEAD NECK SOFT TISSUE THYROID  History of malignant melanoma  Needs flu shot  -     Influenza, FLUAD, (age 65 y+), IM, PF, 0.5 mL  Pleuritic chest pain    Normotensive today in office; continue current regimen without changes.    Expect pleuritic CP to resolve as cough resolves.  Consider imaging if not improving.    Recommendations for Preventive Services Due: see orders and patient instructions/AVS.  Recommended screening schedule for the next 5-10 years is provided to the patient in written form: see Patient Instructions/AVS.     No follow-ups on file.     Subjective   The following acute and/or chronic problems were also addressed today:    HTN:  Well-controlled on atenolol; he does not check BP at home.     HLD:  Compliant with rosuvastatin.     CA:  History of melanoma of abdominal wall; treated with excision, but did not require chemo or radiation therapy.  He continues to follow with dermatology every 6 months for screenings and saw Dr. Ricketts last month with expected f/u annually.      S/P hemithyroidectomy for follicular variant papillary thyroid CA; remains on hormone replacement therapy and follows with Dr. Pena.  Thyroid U/S in March 2023 showed \"Again small right thyroid gland noted with hypoechoic nodule measuring 1.0 x 0.5 x 1.1 cm, previously 0.9 x 0.5 x 0.8 cm, not

## 2024-01-15 NOTE — PROGRESS NOTES
\"Have you been to the ER, urgent care clinic since your last visit?  Hospitalized since your last visit?\"    NO    “Have you seen or consulted any other health care providers outside of [facility] since your last visit?”    Endocrinology and oncology     “Have you had a colorectal cancer screening such as a colonoscopy/FIT/Cologuard?    Done 7 yrs agoi

## 2024-01-15 NOTE — PATIENT INSTRUCTIONS
questions about a medical condition or this instruction, always ask your healthcare professional. [employer], [employer] disclaims any warranty or liability for your use of this information.      Personalized Preventive Plan for Harmeet Beckmna - 1/15/2024  Medicare offers a range of preventive health benefits. Some of the tests and screenings are paid in full while other may be subject to a deductible, co-insurance, and/or copay.    Some of these benefits include a comprehensive review of your medical history including lifestyle, illnesses that may run in your family, and various assessments and screenings as appropriate.    After reviewing your medical record and screening and assessments performed today your provider may have ordered immunizations, labs, imaging, and/or referrals for you.  A list of these orders (if applicable) as well as your Preventive Care list are included within your After Visit Summary for your review.    Other Preventive Recommendations:    A preventive eye exam performed by an eye specialist is recommended every 1-2 years to screen for glaucoma; cataracts, macular degeneration, and other eye disorders.  A preventive dental visit is recommended every 6 months.  Try to get at least 150 minutes of exercise per week or 10,000 steps per day on a pedometer .  Order or download the FREE \"Exercise & Physical Activity: Your Everyday Guide\" from [employer]. Call 1-419.703.6413 or search [employer] online.  You need 8461-8350 mg of calcium and 2496-0129 IU of vitamin D per day. It is possible to meet your calcium requirement with diet alone, but a vitamin D supplement is usually necessary to meet this goal.  When exposed to the sun, use a sunscreen that protects against both UVA and UVB radiation with an SPF of 30 or greater. Reapply every 2 to 3 hours or after sweating, drying off with a towel, or swimming.  Always wear a seat belt when traveling in a

## 2024-01-16 PROBLEM — C73 THYROID CANCER (HCC): Status: ACTIVE | Noted: 2024-01-16

## 2024-01-16 PROBLEM — E04.1 THYROID NODULE: Status: ACTIVE | Noted: 2024-01-16

## 2024-01-16 PROBLEM — E89.0 POSTOPERATIVE HYPOTHYROIDISM: Status: ACTIVE | Noted: 2024-01-16

## 2024-01-16 LAB
25(OH)D3 SERPL-MCNC: 41 NG/ML (ref 30–100)
COMMENT:: NORMAL
PSA SERPL-MCNC: 0.5 NG/ML (ref 0.01–4)
SPECIMEN HOLD: NORMAL

## 2024-01-17 ENCOUNTER — TELEPHONE (OUTPATIENT)
Age: 74
End: 2024-01-17

## 2024-01-17 NOTE — TELEPHONE ENCOUNTER
PC YING STAPLETON, her dept does not do the PA for pt's insurance,  transferred to [facility] PA Dept   YING Bender V.  This was a very lengthy call to process. 4:54 - 5:23pm.  The medication has been rejected by the plan.  They only have the 20 mg on the formulary, the 50 mg they do not have there.  She will send a form over to be completed if provider does not want to do the 20 mg.

## 2024-01-23 NOTE — TELEPHONE ENCOUNTER
Please do the PA for his sildenafil 50 mg. It looks like he's been on this in the past, not a new med.

## 2024-01-25 NOTE — TELEPHONE ENCOUNTER
FREDY HE  Cashay to start sildenafil 50 mg PA refill per Dorcas, transferred to the PCA team, [phone] YING recording, not able to SW anyone, call 26 minutes long, will try again on Monday if another nurse is not able to follow up on Friday.

## 2024-01-28 DIAGNOSIS — I10 ESSENTIAL HYPERTENSION: ICD-10-CM

## 2024-01-29 ENCOUNTER — OFFICE VISIT (OUTPATIENT)
Age: 74
End: 2024-01-29
Payer: MEDICARE

## 2024-01-29 VITALS
DIASTOLIC BLOOD PRESSURE: 70 MMHG | TEMPERATURE: 97.6 F | SYSTOLIC BLOOD PRESSURE: 118 MMHG | HEIGHT: 68 IN | HEART RATE: 89 BPM | OXYGEN SATURATION: 98 % | RESPIRATION RATE: 18 BRPM | WEIGHT: 208 LBS | BODY MASS INDEX: 31.52 KG/M2

## 2024-01-29 DIAGNOSIS — R10.11 RIGHT UPPER QUADRANT ABDOMINAL PAIN: Primary | ICD-10-CM

## 2024-01-29 PROCEDURE — 36415 COLL VENOUS BLD VENIPUNCTURE: CPT

## 2024-01-29 PROCEDURE — G8484 FLU IMMUNIZE NO ADMIN: HCPCS

## 2024-01-29 PROCEDURE — 1036F TOBACCO NON-USER: CPT

## 2024-01-29 PROCEDURE — G8417 CALC BMI ABV UP PARAM F/U: HCPCS

## 2024-01-29 PROCEDURE — 1123F ACP DISCUSS/DSCN MKR DOCD: CPT

## 2024-01-29 PROCEDURE — 99214 OFFICE O/P EST MOD 30 MIN: CPT

## 2024-01-29 PROCEDURE — G8427 DOCREV CUR MEDS BY ELIG CLIN: HCPCS

## 2024-01-29 PROCEDURE — 3017F COLORECTAL CA SCREEN DOC REV: CPT

## 2024-01-29 RX ORDER — ATENOLOL 50 MG/1
50 TABLET ORAL DAILY
Qty: 90 TABLET | Refills: 1 | Status: SHIPPED | OUTPATIENT
Start: 2024-01-29

## 2024-01-29 ASSESSMENT — ENCOUNTER SYMPTOMS
COUGH: 0
ALLERGIC/IMMUNOLOGIC NEGATIVE: 1
ABDOMINAL DISTENTION: 1
DIARRHEA: 0
NAUSEA: 0
ABDOMINAL PAIN: 1
RECTAL PAIN: 0
BLOOD IN STOOL: 0
WHEEZING: 0
CONSTIPATION: 0
RESPIRATORY NEGATIVE: 1
VOMITING: 0
SHORTNESS OF BREATH: 0
EYES NEGATIVE: 1

## 2024-01-29 ASSESSMENT — PATIENT HEALTH QUESTIONNAIRE - PHQ9
SUM OF ALL RESPONSES TO PHQ QUESTIONS 1-9: 0
SUM OF ALL RESPONSES TO PHQ QUESTIONS 1-9: 0
2. FEELING DOWN, DEPRESSED OR HOPELESS: 0
SUM OF ALL RESPONSES TO PHQ9 QUESTIONS 1 & 2: 0
SUM OF ALL RESPONSES TO PHQ QUESTIONS 1-9: 0
SUM OF ALL RESPONSES TO PHQ QUESTIONS 1-9: 0
1. LITTLE INTEREST OR PLEASURE IN DOING THINGS: 0

## 2024-01-29 NOTE — PROGRESS NOTES
Chief Complaint   Patient presents with    Abdominal Pain     Swollen/bloated x 3 days   Denies nausea/vomiting/diarrhea  Bowel movements have been soft and much lighter in color x 3 days        HPI:     is a 74 y.o. male who presents for an acute visit.      He endorses upper abdominal discomfort, bloating, and stool changes (loose and tan) that began three days ago.  He denies fever, chills, changes in appetite, nausea, vomiting.      No Known Allergies    Current Outpatient Medications   Medication Sig Dispense Refill    sildenafil (VIAGRA) 50 MG tablet Take 1 tablet by mouth as needed for Erectile Dysfunction 30 tablet 5    Calcium-Magnesium-Vitamin D (CALCIUM 1200+D3 PO) Take 1 tablet by mouth daily      Cholecalciferol (VITAMIN D3) 50 MCG (2000 UT) CAPS Take by mouth      levothyroxine (SYNTHROID) 175 MCG tablet Take 1 tab daily for 6 times per week (skip Saturday) 90 tablet 3    rosuvastatin (CRESTOR) 10 MG tablet TAKE 1 TABLET BY MOUTH EVERY DAY 90 tablet 1    omeprazole (PRILOSEC) 20 MG delayed release capsule TAKE 1 CAPSULE BY MOUTH BEFORE BREAKFAST 90 capsule 1    aspirin 81 MG EC tablet Take 1 tablet by mouth daily      atenolol (TENORMIN) 50 MG tablet Take 1 tablet by mouth daily      Multiple Vitamins-Minerals (THERAPEUTIC MULTIVITAMIN-MINERALS) tablet Take 1 tablet by mouth daily       No current facility-administered medications for this visit.       Past Medical History:   Diagnosis Date    Abnormal thyroid biopsy     Cancer (HCC)     Hypertension        Family History   Problem Relation Age of Onset    Hypertension Father     Colon Cancer Paternal Aunt        Review of Systems   Constitutional:  Negative for appetite change, chills, fatigue, fever and unexpected weight change.   HENT: Negative.     Eyes: Negative.    Respiratory: Negative.  Negative for cough, shortness of breath and wheezing.    Cardiovascular:  Negative for chest pain, palpitations and leg swelling.   Gastrointestinal:

## 2024-01-29 NOTE — PROGRESS NOTES
\"Have you been to the ER, urgent care clinic since your last visit?  Hospitalized since your last visit?\"    NO    “Have you seen or consulted any other health care providers outside of [facility] since your last visit?”    NO    “Have you had a colorectal cancer screening such as a colonoscopy/FIT/Cologuard?    YES - Type: Colonoscopy - 7 yrs ago [place]        Chief Complaint   Patient presents with    Abdominal Pain     Swollen/bloated x 3 days   Denies nausea/vomiting/diarrhea  Bowel movements have been soft and much lighter in color x 3 days        Vitals:    01/29/24 1310   BP: 118/70   Pulse: 89   Resp: 18   Temp: 97.6 °F (36.4 °C)   SpO2: 98%

## 2024-01-29 NOTE — TELEPHONE ENCOUNTER
YING pt doing his OV on today, informed of still working on the sildenafil PA, OK of understanding and thanks.  He did go ahead and pay for out of pocket, but can continue to work on for the future refills.

## 2024-01-30 ENCOUNTER — HOSPITAL ENCOUNTER (OUTPATIENT)
Facility: HOSPITAL | Age: 74
Discharge: HOME | End: 2024-02-02
Payer: MEDICARE

## 2024-01-30 DIAGNOSIS — R10.11 RIGHT UPPER QUADRANT ABDOMINAL PAIN: ICD-10-CM

## 2024-01-30 LAB
ALBUMIN SERPL-MCNC: 3.5 G/DL (ref 3.5–5)
ALBUMIN/GLOB SERPL: 1.1 (ref 1.1–2.2)
ALP SERPL-CCNC: 109 U/L (ref 45–117)
ALT SERPL-CCNC: 23 U/L (ref 12–78)
ANION GAP SERPL CALC-SCNC: 6 MMOL/L (ref 5–15)
AST SERPL-CCNC: 15 U/L (ref 15–37)
BASOPHILS # BLD: 0.1 K/UL (ref 0–0.1)
BASOPHILS NFR BLD: 1 % (ref 0–1)
BILIRUB SERPL-MCNC: 0.4 MG/DL (ref 0.2–1)
BUN SERPL-MCNC: 20 MG/DL (ref 6–20)
BUN/CREAT SERPL: 19 (ref 12–20)
CALCIUM SERPL-MCNC: 9.2 MG/DL (ref 8.5–10.1)
CHLORIDE SERPL-SCNC: 108 MMOL/L (ref 97–108)
CO2 SERPL-SCNC: 29 MMOL/L (ref 21–32)
COMMENT:: NORMAL
CREAT SERPL-MCNC: 1.04 MG/DL (ref 0.7–1.3)
DIFFERENTIAL METHOD BLD: NORMAL
EOSINOPHIL # BLD: 0.2 K/UL (ref 0–0.4)
EOSINOPHIL NFR BLD: 3 % (ref 0–7)
ERYTHROCYTE [DISTWIDTH] IN BLOOD BY AUTOMATED COUNT: 13.8 % (ref 11.5–14.5)
GLOBULIN SER CALC-MCNC: 3.3 G/DL (ref 2–4)
GLUCOSE SERPL-MCNC: 118 MG/DL (ref 65–100)
HCT VFR BLD AUTO: 46.8 % (ref 36.6–50.3)
HGB BLD-MCNC: 15.4 G/DL (ref 12.1–17)
IMM GRANULOCYTES # BLD AUTO: 0 K/UL (ref 0–0.04)
IMM GRANULOCYTES NFR BLD AUTO: 0 % (ref 0–0.5)
LIPASE SERPL-CCNC: 28 U/L (ref 13–75)
LYMPHOCYTES # BLD: 1.2 K/UL (ref 0.8–3.5)
LYMPHOCYTES NFR BLD: 19 % (ref 12–49)
MCH RBC QN AUTO: 29.3 PG (ref 26–34)
MCHC RBC AUTO-ENTMCNC: 32.9 G/DL (ref 30–36.5)
MCV RBC AUTO: 89 FL (ref 80–99)
MONOCYTES # BLD: 0.6 K/UL (ref 0–1)
MONOCYTES NFR BLD: 10 % (ref 5–13)
NEUTS SEG # BLD: 4.3 K/UL (ref 1.8–8)
NEUTS SEG NFR BLD: 67 % (ref 32–75)
NRBC # BLD: 0 K/UL (ref 0–0.01)
NRBC BLD-RTO: 0 PER 100 WBC
PLATELET # BLD AUTO: 199 K/UL (ref 150–400)
PMV BLD AUTO: 11.2 FL (ref 8.9–12.9)
POTASSIUM SERPL-SCNC: 4.3 MMOL/L (ref 3.5–5.1)
PROT SERPL-MCNC: 6.8 G/DL (ref 6.4–8.2)
RBC # BLD AUTO: 5.26 M/UL (ref 4.1–5.7)
SODIUM SERPL-SCNC: 143 MMOL/L (ref 136–145)
SPECIMEN HOLD: NORMAL
WBC # BLD AUTO: 6.4 K/UL (ref 4.1–11.1)

## 2024-01-30 PROCEDURE — 74177 CT ABD & PELVIS W/CONTRAST: CPT

## 2024-01-30 PROCEDURE — 6360000004 HC RX CONTRAST MEDICATION

## 2024-01-30 RX ADMIN — IOHEXOL 50 ML: 240 INJECTION, SOLUTION INTRATHECAL; INTRAVASCULAR; INTRAVENOUS; ORAL at 08:14

## 2024-01-30 RX ADMIN — IOPAMIDOL 100 ML: 612 INJECTION, SOLUTION INTRAVENOUS at 09:37

## 2024-02-02 NOTE — TELEPHONE ENCOUNTER
FREDY SOFIA, transferred to [facility], given different phone # [phone], unsuccessfully transfer, lost call, will try later.

## 2024-02-05 NOTE — TELEPHONE ENCOUNTER
FREDY HARRIS PA Representative, will need to be transferred to the Medicare PA Dept, YING JUNE, has to be done via pt's insurance company itself.  I did inform her, I have been transferred and given a couple other #'s.  She stated, there is not a # to give, but can be transferred, was on a very very  lengthy  hold time before speaking with Dalila LIU.  Per Dalila, pt's insurance group does not cover the insurance and a PA cannot be done for this medication.  I did question about a lesser strength, stated it does mention 25 mg strength, but not if whether a PA is needed or not.

## 2024-02-13 ENCOUNTER — TELEPHONE (OUTPATIENT)
Dept: SURGICAL ONCOLOGY | Facility: CLINIC | Age: 74
End: 2024-02-13

## 2024-02-13 NOTE — TELEPHONE ENCOUNTER
Called patient, confirmed last name and , regarding U/S head neck and U/S extremity ordered on 23 by JAMES Ramos. Patient had not gotten these done yet and I asked if he would like to schedule them, however he states that he has since moved to [place] and has been following an Oncologist down there. Patient does state that he is planning on getting this testing done down there as well.

## 2024-02-26 NOTE — TELEPHONE ENCOUNTER
Patient requesting refill on     Requested Prescriptions     Pending Prescriptions Disp Refills    omeprazole (PRILOSEC) 20 MG delayed release capsule [Pharmacy Med Name: OMEPRAZOLE 20MG CAPSULES] 90 capsule 1     Sig: TAKE 1 CAPSULE BY MOUTH BEFORE BREAKFAST        Last OV 1/29/2024

## 2024-02-27 RX ORDER — OMEPRAZOLE 20 MG/1
CAPSULE, DELAYED RELEASE ORAL
Qty: 90 CAPSULE | Refills: 1 | Status: SHIPPED | OUTPATIENT
Start: 2024-02-27

## 2024-03-11 ENCOUNTER — PATIENT MESSAGE (OUTPATIENT)
Age: 74
End: 2024-03-11

## 2024-03-11 DIAGNOSIS — Z85.820 HISTORY OF MALIGNANT MELANOMA: Primary | ICD-10-CM

## 2024-03-21 ENCOUNTER — HOSPITAL ENCOUNTER (OUTPATIENT)
Facility: HOSPITAL | Age: 74
Discharge: HOME | End: 2024-03-21
Attending: GENERAL ACUTE CARE HOSPITAL
Payer: MEDICARE

## 2024-03-21 PROCEDURE — 76536 US EXAM OF HEAD AND NECK: CPT

## 2024-04-23 RX ORDER — ROSUVASTATIN CALCIUM 10 MG/1
10 TABLET, COATED ORAL DAILY
Qty: 90 TABLET | Refills: 1 | Status: SHIPPED | OUTPATIENT
Start: 2024-04-23

## 2024-04-23 NOTE — TELEPHONE ENCOUNTER
Patient requesting refill on     Requested Prescriptions     Pending Prescriptions Disp Refills    rosuvastatin (CRESTOR) 10 MG tablet [Pharmacy Med Name: ROSUVASTATIN 10MG TABLETS] 90 tablet 1     Sig: TAKE 1 TABLET BY MOUTH EVERY DAY        Last OV 1/29/2024

## 2024-05-10 ENCOUNTER — OFFICE VISIT (OUTPATIENT)
Age: 74
End: 2024-05-10
Payer: MEDICARE

## 2024-05-10 VITALS
TEMPERATURE: 98.6 F | DIASTOLIC BLOOD PRESSURE: 70 MMHG | BODY MASS INDEX: 31.52 KG/M2 | HEART RATE: 70 BPM | WEIGHT: 208 LBS | SYSTOLIC BLOOD PRESSURE: 110 MMHG | OXYGEN SATURATION: 97 % | HEIGHT: 68 IN | RESPIRATION RATE: 18 BRPM

## 2024-05-10 DIAGNOSIS — J40 BRONCHITIS: Primary | ICD-10-CM

## 2024-05-10 PROCEDURE — 99214 OFFICE O/P EST MOD 30 MIN: CPT

## 2024-05-10 PROCEDURE — 3017F COLORECTAL CA SCREEN DOC REV: CPT

## 2024-05-10 PROCEDURE — 1036F TOBACCO NON-USER: CPT

## 2024-05-10 PROCEDURE — G8427 DOCREV CUR MEDS BY ELIG CLIN: HCPCS

## 2024-05-10 PROCEDURE — G8417 CALC BMI ABV UP PARAM F/U: HCPCS

## 2024-05-10 PROCEDURE — 1123F ACP DISCUSS/DSCN MKR DOCD: CPT

## 2024-05-10 RX ORDER — ALBUTEROL SULFATE 90 UG/1
2 AEROSOL, METERED RESPIRATORY (INHALATION) EVERY 6 HOURS PRN
Qty: 18 G | Refills: 3 | Status: SHIPPED | OUTPATIENT
Start: 2024-05-10

## 2024-05-10 RX ORDER — DOXYCYCLINE HYCLATE 100 MG
100 TABLET ORAL 2 TIMES DAILY
Qty: 28 TABLET | Refills: 0 | Status: SHIPPED | OUTPATIENT
Start: 2024-05-10 | End: 2024-05-24

## 2024-05-10 RX ORDER — BENZONATATE 100 MG/1
100 CAPSULE ORAL 3 TIMES DAILY PRN
Qty: 30 CAPSULE | Refills: 0 | Status: SHIPPED | OUTPATIENT
Start: 2024-05-10 | End: 2024-05-20

## 2024-05-10 RX ORDER — METHYLPREDNISOLONE 4 MG/1
TABLET ORAL
Qty: 1 KIT | Refills: 0 | Status: SHIPPED | OUTPATIENT
Start: 2024-05-10 | End: 2024-05-16

## 2024-05-10 ASSESSMENT — PATIENT HEALTH QUESTIONNAIRE - PHQ9
SUM OF ALL RESPONSES TO PHQ QUESTIONS 1-9: 0
2. FEELING DOWN, DEPRESSED OR HOPELESS: NOT AT ALL
SUM OF ALL RESPONSES TO PHQ9 QUESTIONS 1 & 2: 0
SUM OF ALL RESPONSES TO PHQ QUESTIONS 1-9: 0
SUM OF ALL RESPONSES TO PHQ QUESTIONS 1-9: 0
1. LITTLE INTEREST OR PLEASURE IN DOING THINGS: NOT AT ALL
SUM OF ALL RESPONSES TO PHQ QUESTIONS 1-9: 0

## 2024-05-10 NOTE — PROGRESS NOTES
\"Have you been to the ER, urgent care clinic since your last visit?  Hospitalized since your last visit?\"    NO    “Have you seen or consulted any other health care providers outside of [facility] since your last visit?”    NO    “Have you had a colorectal cancer screening such as a colonoscopy/FIT/Cologuard?    NO      Chief Complaint   Patient presents with    Cough     Chest congestion x 1 wk        Vitals:    05/10/24 1404   BP: 110/70   Pulse: 70   Resp: 18   Temp: 98.6 °F (37 °C)   SpO2: 97%

## 2024-07-03 ENCOUNTER — TELEPHONE (OUTPATIENT)
Age: 74
End: 2024-07-03

## 2024-07-03 DIAGNOSIS — C73 THYROID CANCER (HCC): Primary | ICD-10-CM

## 2024-07-03 DIAGNOSIS — E89.0 POSTOPERATIVE HYPOTHYROIDISM: ICD-10-CM

## 2024-07-08 NOTE — TELEPHONE ENCOUNTER
Contacted patient and labs in to be completed, patient indicates understanding and agrees with plan.

## 2024-07-10 ENCOUNTER — TELEPHONE (OUTPATIENT)
Age: 74
End: 2024-07-10

## 2024-07-10 NOTE — TELEPHONE ENCOUNTER
Patient left voice mail on 7/10/2024  stating that the labs orders can be sent to [facility] at [phone]

## 2024-07-12 ENCOUNTER — HOSPITAL ENCOUNTER (OUTPATIENT)
Facility: HOSPITAL | Age: 74
End: 2024-07-12
Payer: MEDICARE

## 2024-07-12 LAB
T4 FREE SERPL-MCNC: 1.5 NG/DL (ref 0.8–1.5)
TSH SERPL DL<=0.05 MIU/L-ACNC: 0.02 UIU/ML (ref 0.36–3.74)

## 2024-07-12 PROCEDURE — 36415 COLL VENOUS BLD VENIPUNCTURE: CPT

## 2024-07-12 PROCEDURE — 86800 THYROGLOBULIN ANTIBODY: CPT

## 2024-07-12 PROCEDURE — 84443 ASSAY THYROID STIM HORMONE: CPT

## 2024-07-12 PROCEDURE — 84432 ASSAY OF THYROGLOBULIN: CPT

## 2024-07-12 PROCEDURE — 84439 ASSAY OF FREE THYROXINE: CPT

## 2024-07-14 LAB
THYROGLOB AB SERPL-ACNC: <1 IU/ML (ref 0–0.9)
THYROGLOBULIN: 0.8 NG/ML (ref 1.4–29.2)

## 2024-07-15 DIAGNOSIS — E89.0 POSTOPERATIVE HYPOTHYROIDISM: Primary | ICD-10-CM

## 2024-07-15 RX ORDER — LEVOTHYROXINE SODIUM 0.15 MG/1
150 TABLET ORAL DAILY
Qty: 90 TABLET | Refills: 1 | Status: SHIPPED | OUTPATIENT
Start: 2024-07-15

## 2024-07-19 ENCOUNTER — OFFICE VISIT (OUTPATIENT)
Age: 74
End: 2024-07-19
Payer: MEDICARE

## 2024-07-19 VITALS — HEIGHT: 68 IN | WEIGHT: 213.8 LBS | BODY MASS INDEX: 32.4 KG/M2

## 2024-07-19 DIAGNOSIS — E04.1 THYROID NODULE: ICD-10-CM

## 2024-07-19 DIAGNOSIS — E89.0 POSTOPERATIVE HYPOTHYROIDISM: ICD-10-CM

## 2024-07-19 DIAGNOSIS — C73 THYROID CANCER (HCC): Primary | ICD-10-CM

## 2024-07-19 PROCEDURE — 99214 OFFICE O/P EST MOD 30 MIN: CPT | Performed by: GENERAL ACUTE CARE HOSPITAL

## 2024-07-19 PROCEDURE — 1036F TOBACCO NON-USER: CPT | Performed by: GENERAL ACUTE CARE HOSPITAL

## 2024-07-19 PROCEDURE — 1123F ACP DISCUSS/DSCN MKR DOCD: CPT | Performed by: GENERAL ACUTE CARE HOSPITAL

## 2024-07-19 PROCEDURE — G8417 CALC BMI ABV UP PARAM F/U: HCPCS | Performed by: GENERAL ACUTE CARE HOSPITAL

## 2024-07-19 PROCEDURE — 3017F COLORECTAL CA SCREEN DOC REV: CPT | Performed by: GENERAL ACUTE CARE HOSPITAL

## 2024-07-19 PROCEDURE — G8427 DOCREV CUR MEDS BY ELIG CLIN: HCPCS | Performed by: GENERAL ACUTE CARE HOSPITAL

## 2024-07-19 PROCEDURE — G2211 COMPLEX E/M VISIT ADD ON: HCPCS | Performed by: GENERAL ACUTE CARE HOSPITAL

## 2024-07-19 NOTE — PROGRESS NOTES
[facility]  DR GUILLERMO EDUARDO    ASSESSMENT AND PLAN:   Harmeet Beckman is a 74 y.o. male with a PMHx as noted above who was referred to our endocrinology clinic for evaluation of papillary thyroid cancer s/p surgery with post-operative hypothyroidism.    Papillary thyroid carcinoma, follicular variant infiltrative, follicular carcinoma, s/p left hemithyroidectomy 2019  Postoperative Hypothyroidism  Right Thyroid nodule    Final pathology result of left hemithyroidectomy 04/2019  1.8 cm papillary carcinoma, follicular variant, infiltrative  4.2 cm follicular carcinoma, minimally invasive  No lymph nodes submitted or found.  Multinodular hyperplasia.  Immuno stains positive for HBME-1 and CK19    Today we spent time discussing the nature of thyroid cancers and the typical treatment for this condition. We spent time discussing the role of thyroid hormone suppression to reduce the risk of extrathyroidal cellular growth of any thyroid cells outside of the area of the thyroid bed.  Furthermore we discussed the use of whole body scans and thyroid ultrasounds as needed in the future to aid with surveillance and monitoring whenever indicated, patient indicates understanding and agrees with plan.     Please STOP levothyroxine 175 mcg dose and START levothyroxine 150 mcg daily  Obtain labs in 8 weeks  Based on the results will decide on the next steps in management.  Surveillance neck ultrasound, to follow thyroid nodule - Next neck ultrasound is in 03/2025    History of vitamin-D deficiency-continue supplementation       ---------------------------------------------------------------------------------------------------------------------------------     HISTORY OF PRESENT ILLNESS:   Harmeet Beckman is a 74 y.o. male with a PMHx as noted below who was referred to our endocrinology clinic for evaluation of thyroid cancer and post-operative hypothyroidism.     Seen previously in [facility]

## 2024-07-19 NOTE — PATIENT INSTRUCTIONS
Please STOP levothyroxine 175 mcg dose and START levothyroxine 150 mcg daily  Obtain labs in 8 weeks  Based on the results will decide on the next steps in management.  Next neck ultrasound is in 03/2025     If you develop any of the following symptoms please reach out to our clinic by calling [phone]:  -difficulty swallowing  -hoarseness  -shortness of breath when lying down      Levothyroxine medication instructions:  Please take levothyroxine with a glass of water only, on an empty stomach each morning, 1 hour prior to ingesting any other medications (including vitamins), food, coffee, tea, juice or any other beverages.   If you use antacids, medicine to treat high cholesterol (including cholestyramine, colesevelam, colestipol), orlistat, sevelamer, sucralfate, stomach medicine (including lansoprazole, omeprazole, pantoprazole), or any medicine that contains calcium or iron, please take it at least 4 hours before or 4 hours after you take levothyroxine.  Cottonseed meal, dietary fiber, soybean flour or walnuts may decrease the absorption of this medicine.  All of these can reduce the absorption of thyroid hormone tablets and result in fluctuating levels in the blood and on labs, affecting also how one feels.  Please do not eat grapefruit or drink grapefruit juice while you are using this medicine.  If you miss a dose you can take it as soon as you remember. However, if it is almost time for your next dose, wait until then and take a regular dose. Do not take extra medicine to make up for a missed dose.  Store the medicine in a closed container at room temperature, away from heat, moisture, and direct light. Please keep out of children's reach.  You may have to take this medicine for 6 to 8 weeks before your symptoms start to get better.

## 2024-07-22 RX ORDER — OMEPRAZOLE 20 MG/1
CAPSULE, DELAYED RELEASE ORAL
Qty: 90 CAPSULE | Refills: 1 | Status: SHIPPED | OUTPATIENT
Start: 2024-07-22

## 2024-07-22 NOTE — TELEPHONE ENCOUNTER
Patient requesting refill on     Requested Prescriptions     Pending Prescriptions Disp Refills    omeprazole (PRILOSEC) 20 MG delayed release capsule [Pharmacy Med Name: OMEPRAZOLE 20MG CAPSULES] 90 capsule 1     Sig: TAKE 1 CAPSULE BY MOUTH BEFORE BREAKFAST        Last OV 5/10/2024   
yes

## 2024-08-23 RX ORDER — ATENOLOL 50 MG/1
TABLET ORAL
Qty: 90 TABLET | Refills: 0 | Status: SHIPPED | OUTPATIENT
Start: 2024-08-23

## 2024-09-30 ENCOUNTER — PATIENT MESSAGE (OUTPATIENT)
Age: 74
End: 2024-09-30

## 2024-10-03 ENCOUNTER — HOSPITAL ENCOUNTER (OUTPATIENT)
Facility: HOSPITAL | Age: 74
Discharge: HOME | End: 2024-10-06
Payer: MEDICARE

## 2024-10-03 LAB
T4 FREE SERPL-MCNC: 1.5 NG/DL (ref 0.8–1.5)
TSH SERPL DL<=0.05 MIU/L-ACNC: 0.05 UIU/ML (ref 0.36–3.74)

## 2024-10-03 PROCEDURE — 84443 ASSAY THYROID STIM HORMONE: CPT

## 2024-10-03 PROCEDURE — 84439 ASSAY OF FREE THYROXINE: CPT

## 2024-10-03 PROCEDURE — 36415 COLL VENOUS BLD VENIPUNCTURE: CPT

## 2024-10-15 RX ORDER — ROSUVASTATIN CALCIUM 10 MG/1
10 TABLET, COATED ORAL DAILY
Qty: 90 TABLET | Refills: 0 | Status: SHIPPED | OUTPATIENT
Start: 2024-10-15

## 2024-10-15 NOTE — TELEPHONE ENCOUNTER
Patient requesting refill on     Requested Prescriptions     Pending Prescriptions Disp Refills    rosuvastatin (CRESTOR) 10 MG tablet [Pharmacy Med Name: ROSUVASTATIN 10MG TABLETS] 90 tablet 1     Sig: TAKE 1 TABLET BY MOUTH EVERY DAY        Last OV  5/10/24  Next Appt 1/10/25

## 2024-10-21 RX ORDER — ATENOLOL 50 MG/1
TABLET ORAL
Qty: 90 TABLET | Refills: 0 | Status: SHIPPED | OUTPATIENT
Start: 2024-10-21

## 2024-12-29 NOTE — TELEPHONE ENCOUNTER
Jimena Sanchez says he isn't getting any better and nothing seems to help for the pain. Swelling is back. Wants to know if Dorcas and put in a referral or if she would need to see him first. Please advise.
Previously Declined (within the last year)

## 2025-01-09 ENCOUNTER — PATIENT MESSAGE (OUTPATIENT)
Age: 75
End: 2025-01-09

## 2025-01-09 DIAGNOSIS — E04.1 THYROID NODULE: ICD-10-CM

## 2025-01-09 DIAGNOSIS — C73 THYROID CANCER (HCC): Primary | ICD-10-CM

## 2025-01-09 DIAGNOSIS — E89.0 POSTOPERATIVE HYPOTHYROIDISM: ICD-10-CM

## 2025-01-09 RX ORDER — LEVOTHYROXINE SODIUM 150 UG/1
150 TABLET ORAL DAILY
Qty: 90 TABLET | Refills: 1 | Status: SHIPPED | OUTPATIENT
Start: 2025-01-09

## 2025-01-14 ENCOUNTER — PATIENT MESSAGE (OUTPATIENT)
Age: 75
End: 2025-01-14

## 2025-01-16 ENCOUNTER — HOSPITAL ENCOUNTER (OUTPATIENT)
Facility: HOSPITAL | Age: 75
Discharge: HOME | End: 2025-01-19
Payer: MEDICARE

## 2025-01-16 ENCOUNTER — OFFICE VISIT (OUTPATIENT)
Age: 75
End: 2025-01-16
Payer: MEDICARE

## 2025-01-16 VITALS
SYSTOLIC BLOOD PRESSURE: 150 MMHG | OXYGEN SATURATION: 98 % | TEMPERATURE: 98.4 F | BODY MASS INDEX: 32.86 KG/M2 | DIASTOLIC BLOOD PRESSURE: 75 MMHG | HEART RATE: 72 BPM | WEIGHT: 216.8 LBS | HEIGHT: 68 IN | RESPIRATION RATE: 16 BRPM

## 2025-01-16 DIAGNOSIS — C43.59 MALIGNANT MELANOMA OF TORSO EXCLUDING BREAST (HCC): Primary | ICD-10-CM

## 2025-01-16 DIAGNOSIS — C73 THYROID CANCER (HCC): ICD-10-CM

## 2025-01-16 LAB — TSH SERPL DL<=0.05 MIU/L-ACNC: 0.09 UIU/ML (ref 0.36–3.74)

## 2025-01-16 PROCEDURE — 84443 ASSAY THYROID STIM HORMONE: CPT

## 2025-01-16 PROCEDURE — G8427 DOCREV CUR MEDS BY ELIG CLIN: HCPCS | Performed by: STUDENT IN AN ORGANIZED HEALTH CARE EDUCATION/TRAINING PROGRAM

## 2025-01-16 PROCEDURE — G8417 CALC BMI ABV UP PARAM F/U: HCPCS | Performed by: STUDENT IN AN ORGANIZED HEALTH CARE EDUCATION/TRAINING PROGRAM

## 2025-01-16 PROCEDURE — 3017F COLORECTAL CA SCREEN DOC REV: CPT | Performed by: STUDENT IN AN ORGANIZED HEALTH CARE EDUCATION/TRAINING PROGRAM

## 2025-01-16 PROCEDURE — 1159F MED LIST DOCD IN RCRD: CPT | Performed by: STUDENT IN AN ORGANIZED HEALTH CARE EDUCATION/TRAINING PROGRAM

## 2025-01-16 PROCEDURE — 36415 COLL VENOUS BLD VENIPUNCTURE: CPT

## 2025-01-16 PROCEDURE — 1126F AMNT PAIN NOTED NONE PRSNT: CPT | Performed by: STUDENT IN AN ORGANIZED HEALTH CARE EDUCATION/TRAINING PROGRAM

## 2025-01-16 PROCEDURE — 84439 ASSAY OF FREE THYROXINE: CPT

## 2025-01-16 PROCEDURE — 1036F TOBACCO NON-USER: CPT | Performed by: STUDENT IN AN ORGANIZED HEALTH CARE EDUCATION/TRAINING PROGRAM

## 2025-01-16 PROCEDURE — 1123F ACP DISCUSS/DSCN MKR DOCD: CPT | Performed by: STUDENT IN AN ORGANIZED HEALTH CARE EDUCATION/TRAINING PROGRAM

## 2025-01-16 PROCEDURE — 99214 OFFICE O/P EST MOD 30 MIN: CPT | Performed by: STUDENT IN AN ORGANIZED HEALTH CARE EDUCATION/TRAINING PROGRAM

## 2025-01-16 ASSESSMENT — PATIENT HEALTH QUESTIONNAIRE - PHQ9
SUM OF ALL RESPONSES TO PHQ QUESTIONS 1-9: 0
1. LITTLE INTEREST OR PLEASURE IN DOING THINGS: NOT AT ALL
2. FEELING DOWN, DEPRESSED OR HOPELESS: NOT AT ALL
SUM OF ALL RESPONSES TO PHQ9 QUESTIONS 1 & 2: 0
SUM OF ALL RESPONSES TO PHQ QUESTIONS 1-9: 0

## 2025-01-16 NOTE — PROGRESS NOTES
Haremet Beckman is a 74 y.o. male  Chief Complaint   Patient presents with    Other     Malignant melanoma of torso     1. Have you been to the ER, urgent care clinic since your last visit?  Hospitalized since your last visit?No    2. Have you seen or consulted any other health care providers outside of the [facility] since your last visit?  Include any pap smears or colon screening. No    
01/29/2024 01:30 PM     Lab Results   Component Value Date/Time    ALT 23 01/29/2024 01:30 PM    GLOB 3.3 01/29/2024 01:30 PM       IMAGING  All available pre-existing imaging available to us was reviewed prior to patient visit.  Specific studies copied below.    PATHOLOGY  All available pre-existing imaging available to us was reviewed prior to patient visit.  Specific pathology copied below.    ASSESSMENT AND PLAN:   Mr. Beckman is a 73 yo w/ pmhx of hld, htn, GERD presenting for follow up of history of thyroid carcinoma and cutaneous melanoma.    #Papillary Thyroid Carcinoma, Follicular Variant Infiltrative  Patient noted to have a L thyroid nodule prompting further workup in 2019.  He ultimately underwent L hemithyroidectomy 4/2019  where he was noted to have 1.8 cm papillary carcinoma w/ follicular variant, 4.2 cm follicular carcinoma that was minimally invasive, No submitted LN's, stains positive for HBME1 and CK19, pT3 Nx.  He did not receive radioactive iodine.  He has followed with endocrinology for thyroid suppressive treatment.  Patient has had no evidence of recurrent disease  to date.    1/16/25, met with patient and reviewed their clinical course to date.  Patient with no overt clinical findings concerning for disease progression.  Patient will continue follow up with endocrinology for thyroid suppression and serologic monitoring.  Appreciate their assistance.  -continue synthroid per endocrinology, next due to see 1/20/25    #Cutaneous Melanoma, pT2 pN1 M0   Patient identified to have an abdominal wall melanoma 12/2018.  He underwent WLE + SLNBx at that time.  He had resected 1.1 mm depth of invasion of melanoma w/ 1/4 SLN involved.  He did not receive adjuvant therapy.  Staging scans noted no signs of distant disease.  He has remained on clinical surveillance with no evidence of disease recurrence.    1/16/25, met with patient and reviewed their clinical course to date.  Patient's exam and history

## 2025-01-17 LAB — T4 FREE SERPL-MCNC: 1.5 NG/DL (ref 0.8–1.5)

## 2025-01-20 ENCOUNTER — OFFICE VISIT (OUTPATIENT)
Age: 75
End: 2025-01-20
Payer: MEDICARE

## 2025-01-20 VITALS
BODY MASS INDEX: 33.16 KG/M2 | HEIGHT: 68 IN | WEIGHT: 218.8 LBS | SYSTOLIC BLOOD PRESSURE: 146 MMHG | DIASTOLIC BLOOD PRESSURE: 83 MMHG | HEART RATE: 63 BPM

## 2025-01-20 DIAGNOSIS — C73 THYROID CANCER (HCC): Primary | ICD-10-CM

## 2025-01-20 DIAGNOSIS — E89.0 POSTOPERATIVE HYPOTHYROIDISM: ICD-10-CM

## 2025-01-20 PROCEDURE — 1159F MED LIST DOCD IN RCRD: CPT | Performed by: GENERAL ACUTE CARE HOSPITAL

## 2025-01-20 PROCEDURE — 1125F AMNT PAIN NOTED PAIN PRSNT: CPT | Performed by: GENERAL ACUTE CARE HOSPITAL

## 2025-01-20 PROCEDURE — 1123F ACP DISCUSS/DSCN MKR DOCD: CPT | Performed by: GENERAL ACUTE CARE HOSPITAL

## 2025-01-20 PROCEDURE — G8427 DOCREV CUR MEDS BY ELIG CLIN: HCPCS | Performed by: GENERAL ACUTE CARE HOSPITAL

## 2025-01-20 PROCEDURE — 1160F RVW MEDS BY RX/DR IN RCRD: CPT | Performed by: GENERAL ACUTE CARE HOSPITAL

## 2025-01-20 PROCEDURE — G8417 CALC BMI ABV UP PARAM F/U: HCPCS | Performed by: GENERAL ACUTE CARE HOSPITAL

## 2025-01-20 PROCEDURE — 1036F TOBACCO NON-USER: CPT | Performed by: GENERAL ACUTE CARE HOSPITAL

## 2025-01-20 PROCEDURE — 99214 OFFICE O/P EST MOD 30 MIN: CPT | Performed by: GENERAL ACUTE CARE HOSPITAL

## 2025-01-20 PROCEDURE — 3017F COLORECTAL CA SCREEN DOC REV: CPT | Performed by: GENERAL ACUTE CARE HOSPITAL

## 2025-01-20 PROCEDURE — G2211 COMPLEX E/M VISIT ADD ON: HCPCS | Performed by: GENERAL ACUTE CARE HOSPITAL

## 2025-01-20 RX ORDER — LEVOTHYROXINE SODIUM 137 UG/1
TABLET ORAL
Qty: 60 TABLET | Refills: 1 | Status: SHIPPED | OUTPATIENT
Start: 2025-01-20

## 2025-01-20 RX ORDER — LEVOTHYROXINE SODIUM 150 UG/1
TABLET ORAL
Qty: 30 TABLET | Refills: 1 | Status: SHIPPED | OUTPATIENT
Start: 2025-01-20

## 2025-01-20 NOTE — PROGRESS NOTES
T4, free (03/01/2023 9:48 AM EST)  Lab Results - T4, free (03/01/2023 9:48 AM EST)  Component Value Ref Range Test Method Analysis Time Performed At Pathologist [facility]   Free T4 1.19 0.76 - 1.46 ng/dL   03/01/2023 9:05 PM EST [facility]       TSH, 3rd generation (03/01/2023 9:48 AM EST)  Lab Results - TSH, 3rd generation (03/01/2023 9:48 AM EST)  Component Value Ref Range Test Method Analysis Time Performed At Pathologist [facility] [place]   TSH 3RD GENERATON 0.452 0.450 - 4.500 uIU/mL   03/01/2023 11:45 AM EST [facility]           PAST MEDICAL/SURGICAL HISTORY:   Past Medical History:   Diagnosis Date    Abnormal thyroid biopsy     Cancer (HCC)     Hypertension      Past Surgical History:   Procedure Laterality Date    HERNIA REPAIR      LYMPHADENECTOMY         ALLERGIES:   No Known Allergies    MEDICATIONS ON ADMISSION:     Current Outpatient Medications:     levothyroxine (SYNTHROID) 150 MCG tablet, Take on Saturday and Sunday only (from Monday to Friday take 137mcg), Disp: 30 tablet, Rfl: 1    levothyroxine (SYNTHROID) 137 MCG tablet, Take from Monday to Friday, do NOT take on Saturday or Sunday (take 150 mcg on Saturday and Sunday), Disp: 60 tablet, Rfl: 1    atenolol (TENORMIN) 50 MG tablet, TAKE 1 TABLET(50 MG) BY MOUTH DAILY, Disp: 90 tablet, Rfl: 0    rosuvastatin (CRESTOR) 10 MG tablet, TAKE 1 TABLET BY MOUTH EVERY DAY, Disp: 90 tablet, Rfl: 0    omeprazole (PRILOSEC) 20 MG delayed release capsule, TAKE 1 CAPSULE BY MOUTH BEFORE BREAKFAST, Disp: 90 capsule, Rfl: 1    sildenafil (VIAGRA) 50 MG tablet, Take 1 tablet by mouth as needed for Erectile Dysfunction, Disp: 30 tablet, Rfl: 5    Calcium-Magnesium-Vitamin D (CALCIUM 1200+D3 PO), Take 1 tablet by mouth daily, Disp: , Rfl:     Cholecalciferol (VITAMIN D3) 50 MCG (2000 UT) CAPS, Take by mouth, Disp: , Rfl:     aspirin 81 MG EC tablet, Take 1 tablet by mouth daily, Disp: , Rfl:     Multiple Vitamins-Minerals (THERAPEUTIC MULTIVITAMIN-MINERALS) tablet,

## 2025-01-20 NOTE — PATIENT INSTRUCTIONS
Take levothyroxine as follows:  -Levothyroxine 137 mcg daily from Monday to Friday  -Levothyroxine 150 mcg daily from Saturday and Sunday     Obtain labs in 8 weeks  Based on the results will decide on the next steps in management.    Next neck ultrasound is in 03/2025     If you develop any of the following symptoms please reach out to our clinic by calling [phone]:  -difficulty swallowing  -hoarseness  -shortness of breath when lying down      Levothyroxine medication instructions:  Please take levothyroxine with a glass of water only, on an empty stomach each morning, 1 hour prior to ingesting any other medications (including vitamins), food, coffee, tea, juice or any other beverages.   If you use antacids, medicine to treat high cholesterol (including cholestyramine, colesevelam, colestipol), orlistat, sevelamer, sucralfate, stomach medicine (including lansoprazole, omeprazole, pantoprazole), or any medicine that contains calcium or iron, please take it at least 4 hours before or 4 hours after you take levothyroxine.  Cottonseed meal, dietary fiber, soybean flour or walnuts may decrease the absorption of this medicine.  All of these can reduce the absorption of thyroid hormone tablets and result in fluctuating levels in the blood and on labs, affecting also how one feels.  Please do not eat grapefruit or drink grapefruit juice while you are using this medicine.  If you miss a dose you can take it as soon as you remember. However, if it is almost time for your next dose, wait until then and take a regular dose. Do not take extra medicine to make up for a missed dose.  Store the medicine in a closed container at room temperature, away from heat, moisture, and direct light. Please keep out of children's reach.  You may have to take this medicine for 6 to 8 weeks before your symptoms start to get better.

## 2025-01-27 RX ORDER — ROSUVASTATIN CALCIUM 10 MG/1
10 TABLET, COATED ORAL DAILY
Qty: 90 TABLET | Refills: 0 | OUTPATIENT
Start: 2025-01-27

## 2025-01-27 RX ORDER — OMEPRAZOLE 20 MG/1
CAPSULE, DELAYED RELEASE ORAL
Qty: 90 CAPSULE | Refills: 1 | OUTPATIENT
Start: 2025-01-27

## 2025-02-10 ENCOUNTER — OFFICE VISIT (OUTPATIENT)
Age: 75
End: 2025-02-10
Payer: MEDICARE

## 2025-02-10 VITALS
OXYGEN SATURATION: 94 % | HEART RATE: 57 BPM | RESPIRATION RATE: 18 BRPM | DIASTOLIC BLOOD PRESSURE: 70 MMHG | TEMPERATURE: 97.5 F | SYSTOLIC BLOOD PRESSURE: 122 MMHG | WEIGHT: 219 LBS | BODY MASS INDEX: 33.3 KG/M2

## 2025-02-10 DIAGNOSIS — E78.00 HYPERCHOLESTEREMIA: ICD-10-CM

## 2025-02-10 DIAGNOSIS — Z12.5 PROSTATE CANCER SCREENING: ICD-10-CM

## 2025-02-10 DIAGNOSIS — N52.9 ERECTILE DYSFUNCTION, UNSPECIFIED ERECTILE DYSFUNCTION TYPE: ICD-10-CM

## 2025-02-10 DIAGNOSIS — Z00.00 MEDICARE ANNUAL WELLNESS VISIT, SUBSEQUENT: Primary | ICD-10-CM

## 2025-02-10 DIAGNOSIS — I10 PRIMARY HYPERTENSION: ICD-10-CM

## 2025-02-10 DIAGNOSIS — Z11.59 ENCOUNTER FOR HEPATITIS C SCREENING TEST FOR LOW RISK PATIENT: ICD-10-CM

## 2025-02-10 DIAGNOSIS — E55.9 VITAMIN D DEFICIENCY: ICD-10-CM

## 2025-02-10 DIAGNOSIS — K21.9 GASTROESOPHAGEAL REFLUX DISEASE, UNSPECIFIED WHETHER ESOPHAGITIS PRESENT: ICD-10-CM

## 2025-02-10 PROCEDURE — 1126F AMNT PAIN NOTED NONE PRSNT: CPT

## 2025-02-10 PROCEDURE — 36415 COLL VENOUS BLD VENIPUNCTURE: CPT

## 2025-02-10 PROCEDURE — 3017F COLORECTAL CA SCREEN DOC REV: CPT

## 2025-02-10 PROCEDURE — 1159F MED LIST DOCD IN RCRD: CPT

## 2025-02-10 PROCEDURE — 3078F DIAST BP <80 MM HG: CPT

## 2025-02-10 PROCEDURE — 1123F ACP DISCUSS/DSCN MKR DOCD: CPT

## 2025-02-10 PROCEDURE — G0439 PPPS, SUBSEQ VISIT: HCPCS

## 2025-02-10 PROCEDURE — 1160F RVW MEDS BY RX/DR IN RCRD: CPT

## 2025-02-10 PROCEDURE — 3074F SYST BP LT 130 MM HG: CPT

## 2025-02-10 RX ORDER — ROSUVASTATIN CALCIUM 10 MG/1
10 TABLET, COATED ORAL DAILY
Qty: 90 TABLET | Refills: 3 | Status: SHIPPED | OUTPATIENT
Start: 2025-02-10

## 2025-02-10 SDOH — ECONOMIC STABILITY: FOOD INSECURITY: WITHIN THE PAST 12 MONTHS, YOU WORRIED THAT YOUR FOOD WOULD RUN OUT BEFORE YOU GOT MONEY TO BUY MORE.: NEVER TRUE

## 2025-02-10 SDOH — ECONOMIC STABILITY: INCOME INSECURITY: IN THE LAST 12 MONTHS, WAS THERE A TIME WHEN YOU WERE NOT ABLE TO PAY THE MORTGAGE OR RENT ON TIME?: NO

## 2025-02-10 SDOH — HEALTH STABILITY: PHYSICAL HEALTH
ON AVERAGE, HOW MANY DAYS PER WEEK DO YOU ENGAGE IN MODERATE TO STRENUOUS EXERCISE (LIKE A BRISK WALK)?: PATIENT DECLINED

## 2025-02-10 SDOH — ECONOMIC STABILITY: TRANSPORTATION INSECURITY
IN THE PAST 12 MONTHS, HAS LACK OF TRANSPORTATION KEPT YOU FROM MEETINGS, WORK, OR FROM GETTING THINGS NEEDED FOR DAILY LIVING?: NO

## 2025-02-10 SDOH — ECONOMIC STABILITY: FOOD INSECURITY: WITHIN THE PAST 12 MONTHS, THE FOOD YOU BOUGHT JUST DIDN'T LAST AND YOU DIDN'T HAVE MONEY TO GET MORE.: NEVER TRUE

## 2025-02-10 SDOH — ECONOMIC STABILITY: TRANSPORTATION INSECURITY
IN THE PAST 12 MONTHS, HAS THE LACK OF TRANSPORTATION KEPT YOU FROM MEDICAL APPOINTMENTS OR FROM GETTING MEDICATIONS?: NO

## 2025-02-10 ASSESSMENT — PATIENT HEALTH QUESTIONNAIRE - PHQ9
2. FEELING DOWN, DEPRESSED OR HOPELESS: NOT AT ALL
SUM OF ALL RESPONSES TO PHQ QUESTIONS 1-9: 0
SUM OF ALL RESPONSES TO PHQ9 QUESTIONS 1 & 2: 0
SUM OF ALL RESPONSES TO PHQ QUESTIONS 1-9: 0
1. LITTLE INTEREST OR PLEASURE IN DOING THINGS: NOT AT ALL

## 2025-02-10 ASSESSMENT — LIFESTYLE VARIABLES
HOW MANY STANDARD DRINKS CONTAINING ALCOHOL DO YOU HAVE ON A TYPICAL DAY: 1
HOW MANY STANDARD DRINKS CONTAINING ALCOHOL DO YOU HAVE ON A TYPICAL DAY: 1 OR 2
HOW OFTEN DO YOU HAVE A DRINK CONTAINING ALCOHOL: 2-4 TIMES A MONTH
HOW OFTEN DO YOU HAVE A DRINK CONTAINING ALCOHOL: 3
HOW OFTEN DO YOU HAVE SIX OR MORE DRINKS ON ONE OCCASION: 1

## 2025-02-10 NOTE — PROGRESS NOTES
\"Have you been to the ER, urgent care clinic since your last visit?  Hospitalized since your last visit?\"    NO    “Have you seen or consulted any other health care providers outside our system since your last visit?”          “Have you had a colorectal cancer screening such as a colonoscopy/FIT/Cologuard?    NO    No colonoscopy on file  No cologuard on file  No FIT/FOBT on file   No flexible sigmoidoscopy on file              Chief Complaint   Patient presents with    Medicare AWV       Vitals:    02/10/25 0812   BP: 122/70   Pulse: 57   Resp: 18   Temp: 97.5 °F (36.4 °C)   SpO2: 94%       Labs drawn from left per Dorcas aGllo NP's orders. Patient tolerated well.  
Head: Normocephalic and atraumatic.   Eyes:      Extraocular Movements: Extraocular movements intact.      Conjunctiva/sclera: Conjunctivae normal.      Pupils: Pupils are equal, round, and reactive to light.   Neck:      Vascular: No carotid bruit.   Cardiovascular:      Rate and Rhythm: Normal rate and regular rhythm.      Pulses: Normal pulses.      Heart sounds: Normal heart sounds. No murmur heard.     No friction rub. No gallop.   Pulmonary:      Effort: Pulmonary effort is normal.      Breath sounds: Normal breath sounds. No wheezing, rhonchi or rales.   Abdominal:      General: Bowel sounds are normal.      Palpations: Abdomen is soft.   Musculoskeletal:         General: Normal range of motion.      Cervical back: Normal range of motion and neck supple.   Lymphadenopathy:      Cervical: No cervical adenopathy.   Skin:     General: Skin is warm and dry.   Neurological:      General: No focal deficit present.      Mental Status: He is alert and oriented to person, place, and time.   Psychiatric:         Mood and Affect: Mood normal.         Behavior: Behavior normal.         Thought Content: Thought content normal.         Judgment: Judgment normal.               No Known Allergies  Prior to Visit Medications    Medication Sig Taking? Authorizing Provider   rosuvastatin (CRESTOR) 10 MG tablet Take 1 tablet by mouth daily Yes Dorcas Gallo APRN - NP   omeprazole (PRILOSEC) 20 MG delayed release capsule Take 1 capsule by mouth Daily Yes Dorcas Gallo APRN - NP   levothyroxine (SYNTHROID) 150 MCG tablet Take on Saturday and Sunday only (from Monday to Friday take 137mcg) Yes Liseth Pena MD   levothyroxine (SYNTHROID) 137 MCG tablet Take from Monday to Friday, do NOT take on Saturday or Sunday (take 150 mcg on Saturday and Sunday) Yes Liseth Pena MD   atenolol (TENORMIN) 50 MG tablet TAKE 1 TABLET(50 MG) BY MOUTH DAILY Yes Dorcas Gallo APRN - NP   sildenafil (VIAGRA) 50 MG tablet Take 1 tablet by

## 2025-02-10 NOTE — PATIENT INSTRUCTIONS
only a little salt when you think you need it. With time, your taste buds will adjust to less salt.     Eat fewer snack items, fast foods, canned soups, and other high-salt, high-fat, processed foods.     Read food labels and try to avoid saturated and trans fats. They increase your risk of heart disease by raising cholesterol levels.     Limit the amount of solid fat--butter, margarine, and shortening--you eat. Use olive, peanut, or canola oil when you cook. Bake, broil, and steam foods instead of frying them.     Eat a variety of fruit and vegetables every day. Dark green, deep orange, red, or yellow fruits and vegetables are especially good for you. Examples include spinach, carrots, peaches, and berries.     Foods high in fiber can reduce your cholesterol and provide important vitamins and minerals. High-fiber foods include whole-grain cereals and breads, oatmeal, beans, brown rice, citrus fruits, and apples.     Eat lean proteins. Heart-healthy proteins include seafood, lean meats and poultry, eggs, beans, peas, nuts, seeds, and soy products.     Limit drinks and foods with added sugar. These include candy, desserts, and soda pop.   Heart-healthy lifestyle    If your doctor recommends it, get more exercise. For many people, walking is a good choice. Or you may want to swim, bike, or do other activities. Bit by bit, increase the time you're active every day. Try for at least 30 minutes on most days of the week.     Try to quit or cut back on using tobacco and other nicotine products. This includes smoking and vaping. If you need help quitting, talk to your doctor about stop-smoking programs and medicines. These can increase your chances of quitting for good. Quitting is one of the most important things you can do to protect your heart. It is never too late to quit. Try to avoid secondhand smoke too.     Stay at a weight that's healthy for you. Talk to your doctor if you need help losing weight.     Try to get 7

## 2025-02-10 NOTE — ACP (ADVANCE CARE PLANNING)
Discussed importance of advanced medical directives with patient.  Patient is capable of making decisions.    Discussed advanced directives 2/10/2025. [place] advance directive form discussed with pt. Pt will consider options and give us written form back for inclusion in chart.    ANTONIA Guillen - NP

## 2025-02-11 LAB
25(OH)D3 SERPL-MCNC: 40.9 NG/ML (ref 30–100)
ALBUMIN SERPL-MCNC: 3.8 G/DL (ref 3.5–5)
ALBUMIN/GLOB SERPL: 1.4 (ref 1.1–2.2)
ALP SERPL-CCNC: 91 U/L (ref 45–117)
ALT SERPL-CCNC: 25 U/L (ref 12–78)
ANION GAP SERPL CALC-SCNC: 6 MMOL/L (ref 2–12)
AST SERPL-CCNC: 20 U/L (ref 15–37)
BASOPHILS # BLD: 0.04 K/UL (ref 0–0.1)
BASOPHILS NFR BLD: 0.7 % (ref 0–1)
BILIRUB SERPL-MCNC: 1.1 MG/DL (ref 0.2–1)
BUN SERPL-MCNC: 18 MG/DL (ref 6–20)
BUN/CREAT SERPL: 16 (ref 12–20)
CALCIUM SERPL-MCNC: 9.6 MG/DL (ref 8.5–10.1)
CHLORIDE SERPL-SCNC: 106 MMOL/L (ref 97–108)
CHOLEST SERPL-MCNC: 139 MG/DL
CO2 SERPL-SCNC: 28 MMOL/L (ref 21–32)
CREAT SERPL-MCNC: 1.16 MG/DL (ref 0.7–1.3)
DIFFERENTIAL METHOD BLD: NORMAL
EOSINOPHIL # BLD: 0.08 K/UL (ref 0–0.4)
EOSINOPHIL NFR BLD: 1.3 % (ref 0–7)
ERYTHROCYTE [DISTWIDTH] IN BLOOD BY AUTOMATED COUNT: 13.5 % (ref 11.5–14.5)
GLOBULIN SER CALC-MCNC: 2.8 G/DL (ref 2–4)
GLUCOSE SERPL-MCNC: 143 MG/DL (ref 65–100)
HCT VFR BLD AUTO: 48.9 % (ref 36.6–50.3)
HCV AB SER IA-ACNC: 2.68 INDEX
HCV AB SERPL QL IA: REACTIVE
HDLC SERPL-MCNC: 44 MG/DL
HDLC SERPL: 3.2 (ref 0–5)
HGB BLD-MCNC: 15.6 G/DL (ref 12.1–17)
IMM GRANULOCYTES # BLD AUTO: 0.01 K/UL (ref 0–0.04)
IMM GRANULOCYTES NFR BLD AUTO: 0.2 % (ref 0–0.5)
LDLC SERPL CALC-MCNC: 75 MG/DL (ref 0–100)
LYMPHOCYTES # BLD: 1.41 K/UL (ref 0.8–3.5)
LYMPHOCYTES NFR BLD: 22.9 % (ref 12–49)
MCH RBC QN AUTO: 29.9 PG (ref 26–34)
MCHC RBC AUTO-ENTMCNC: 31.9 G/DL (ref 30–36.5)
MCV RBC AUTO: 93.7 FL (ref 80–99)
MONOCYTES # BLD: 0.47 K/UL (ref 0–1)
MONOCYTES NFR BLD: 7.6 % (ref 5–13)
NEUTS SEG # BLD: 4.14 K/UL (ref 1.8–8)
NEUTS SEG NFR BLD: 67.3 % (ref 32–75)
NRBC # BLD: 0 K/UL (ref 0–0.01)
NRBC BLD-RTO: 0 PER 100 WBC
PLATELET # BLD AUTO: 191 K/UL (ref 150–400)
PMV BLD AUTO: 11.4 FL (ref 8.9–12.9)
POTASSIUM SERPL-SCNC: 4.6 MMOL/L (ref 3.5–5.1)
PROT SERPL-MCNC: 6.6 G/DL (ref 6.4–8.2)
PSA SERPL-MCNC: 0.4 NG/ML (ref 0.01–4)
RBC # BLD AUTO: 5.22 M/UL (ref 4.1–5.7)
SODIUM SERPL-SCNC: 140 MMOL/L (ref 136–145)
TRIGL SERPL-MCNC: 100 MG/DL
VLDLC SERPL CALC-MCNC: 20 MG/DL
WBC # BLD AUTO: 6.2 K/UL (ref 4.1–11.1)

## 2025-02-14 LAB
HCV GENTYP SERPL NAA+PROBE: NORMAL
HCV RNA SERPL NAA+PROBE-ACNC: NORMAL IU/ML
HCV RNA SERPL NAA+PROBE-LOG IU: NORMAL LOG10 IU/ML
LABORATORY COMMENT REPORT: NORMAL

## 2025-02-19 ENCOUNTER — HOSPITAL ENCOUNTER (OUTPATIENT)
Facility: HOSPITAL | Age: 75
Discharge: HOME | End: 2025-02-22
Payer: MEDICARE

## 2025-02-19 PROCEDURE — 76536 US EXAM OF HEAD AND NECK: CPT

## 2025-02-25 RX ORDER — ATENOLOL 50 MG/1
TABLET ORAL
Qty: 90 TABLET | Refills: 3 | Status: SHIPPED | OUTPATIENT
Start: 2025-02-25

## 2025-03-07 ENCOUNTER — PATIENT MESSAGE (OUTPATIENT)
Age: 75
End: 2025-03-07

## 2025-04-08 ENCOUNTER — HOSPITAL ENCOUNTER (OUTPATIENT)
Facility: HOSPITAL | Age: 75
Discharge: HOME | End: 2025-04-11
Payer: MEDICARE

## 2025-04-08 LAB
T4 FREE SERPL-MCNC: 1.4 NG/DL (ref 0.8–1.5)
TSH SERPL DL<=0.05 MIU/L-ACNC: 0.06 UIU/ML (ref 0.36–3.74)

## 2025-04-08 PROCEDURE — 84439 ASSAY OF FREE THYROXINE: CPT

## 2025-04-08 PROCEDURE — 86800 THYROGLOBULIN ANTIBODY: CPT

## 2025-04-08 PROCEDURE — 84443 ASSAY THYROID STIM HORMONE: CPT

## 2025-04-08 PROCEDURE — 36415 COLL VENOUS BLD VENIPUNCTURE: CPT

## 2025-04-08 PROCEDURE — 84432 ASSAY OF THYROGLOBULIN: CPT

## 2025-04-10 LAB
THYROGLOB AB SERPL-ACNC: <1 IU/ML (ref 0–0.9)
THYROGLOBULIN: 0.7 NG/ML (ref 1.4–29.2)

## 2025-04-29 DIAGNOSIS — N52.9 ERECTILE DYSFUNCTION, UNSPECIFIED ERECTILE DYSFUNCTION TYPE: ICD-10-CM

## 2025-04-29 RX ORDER — SILDENAFIL 50 MG/1
50 TABLET, FILM COATED ORAL PRN
Qty: 30 TABLET | Refills: 5 | Status: SHIPPED | OUTPATIENT
Start: 2025-04-29

## 2025-05-27 ENCOUNTER — OFFICE VISIT (OUTPATIENT)
Age: 75
End: 2025-05-27
Payer: MEDICARE

## 2025-05-27 VITALS
RESPIRATION RATE: 16 BRPM | WEIGHT: 229.1 LBS | DIASTOLIC BLOOD PRESSURE: 69 MMHG | BODY MASS INDEX: 34.72 KG/M2 | HEIGHT: 68 IN | OXYGEN SATURATION: 96 % | SYSTOLIC BLOOD PRESSURE: 132 MMHG | HEART RATE: 56 BPM

## 2025-05-27 DIAGNOSIS — E89.0 POSTOPERATIVE HYPOTHYROIDISM: ICD-10-CM

## 2025-05-27 DIAGNOSIS — E04.1 THYROID NODULE: ICD-10-CM

## 2025-05-27 DIAGNOSIS — C73 THYROID CANCER (HCC): Primary | ICD-10-CM

## 2025-05-27 PROCEDURE — 99214 OFFICE O/P EST MOD 30 MIN: CPT | Performed by: GENERAL ACUTE CARE HOSPITAL

## 2025-05-27 PROCEDURE — 1036F TOBACCO NON-USER: CPT | Performed by: GENERAL ACUTE CARE HOSPITAL

## 2025-05-27 PROCEDURE — G8417 CALC BMI ABV UP PARAM F/U: HCPCS | Performed by: GENERAL ACUTE CARE HOSPITAL

## 2025-05-27 PROCEDURE — 1159F MED LIST DOCD IN RCRD: CPT | Performed by: GENERAL ACUTE CARE HOSPITAL

## 2025-05-27 PROCEDURE — 3017F COLORECTAL CA SCREEN DOC REV: CPT | Performed by: GENERAL ACUTE CARE HOSPITAL

## 2025-05-27 PROCEDURE — 1123F ACP DISCUSS/DSCN MKR DOCD: CPT | Performed by: GENERAL ACUTE CARE HOSPITAL

## 2025-05-27 PROCEDURE — 1160F RVW MEDS BY RX/DR IN RCRD: CPT | Performed by: GENERAL ACUTE CARE HOSPITAL

## 2025-05-27 PROCEDURE — G8427 DOCREV CUR MEDS BY ELIG CLIN: HCPCS | Performed by: GENERAL ACUTE CARE HOSPITAL

## 2025-05-27 RX ORDER — LEVOTHYROXINE SODIUM 137 UG/1
TABLET ORAL
Qty: 24 TABLET | Refills: 3 | Status: SHIPPED | OUTPATIENT
Start: 2025-05-27

## 2025-05-27 RX ORDER — LEVOTHYROXINE SODIUM 125 UG/1
TABLET ORAL
Qty: 80 TABLET | Refills: 3 | Status: SHIPPED | OUTPATIENT
Start: 2025-05-27

## 2025-05-27 RX ORDER — LEVOTHYROXINE SODIUM 150 UG/1
TABLET ORAL
Qty: 30 TABLET | Status: CANCELLED | OUTPATIENT
Start: 2025-05-27

## 2025-05-27 NOTE — PROGRESS NOTES
[facility]  DR GUILLERMO EDUARDO      The patient (or guardian, if applicable) and other individuals in attendance with the patient were advised that Artificial Intelligence will be utilized during this visit to record, process the conversation to generate a clinical note, and support improvement of the AI technology. The patient (or guardian, if applicable) and other individuals in attendance at the appointment consented to the use of AI, including the recording.      ASSESSMENT AND PLAN:   Harmeet Beckman is a 74 y.o. male with a PMHx as noted above who was referred to our endocrinology clinic for evaluation of papillary thyroid cancer s/p surgery with post-operative hypothyroidism.    Papillary thyroid carcinoma, follicular variant infiltrative, follicular carcinoma, s/p left hemithyroidectomy 2019  Postoperative Hypothyroidism  Right Thyroid nodule    Final pathology result of left hemithyroidectomy 04/2019  1.8 cm papillary carcinoma, follicular variant, infiltrative  4.2 cm follicular carcinoma, minimally invasive  No lymph nodes submitted or found.  Multinodular hyperplasia.  Immuno stains positive for HBME-1 and CK19    Today we spent time discussing the nature of thyroid cancers and the typical treatment for this condition. We spent time discussing the role of thyroid hormone suppression to reduce the risk of extrathyroidal cellular growth of any thyroid ca cells. Furthermore we discussed the use of whole body scans and thyroid ultrasounds as needed in the future to aid with surveillance and monitoring whenever indicated, patient indicates understanding and agrees with plan.     - Ultrasound results indicate stability on the left side and no change on the right side.  - TSH level is 0.06 (slightly suppressed); T4 level decreased from 1.5 to 1.4.  - Annual monitoring of the small nodule recommended:    - Next neck ultrasound scheduled for 03/2026.    - Annual check of

## 2025-05-27 NOTE — PATIENT INSTRUCTIONS
Take levothyroxine as follows:  -Levothyroxine 125 mcg daily from Monday to Friday  -Levothyroxine 137 mcg daily from Saturday and Sunday     Obtain labs in 6 to 8 weeks with PCP     Next neck ultrasound for right thyroid nodule in 03/2026  Thyroglobulin and Thyroglobulin Ab levels checked annually      If you develop any of the following symptoms please reach out to our clinic by calling [phone]:  -difficulty swallowing  -hoarseness  -shortness of breath when lying down      Levothyroxine medication instructions:  Please take levothyroxine with a glass of water only, on an empty stomach each morning, 1 hour prior to ingesting any other medications (including vitamins), food, coffee, tea, juice or any other beverages.   If you use antacids, medicine to treat high cholesterol (including cholestyramine, colesevelam, colestipol), orlistat, sevelamer, sucralfate, stomach medicine (including lansoprazole, omeprazole, pantoprazole), or any medicine that contains calcium or iron, please take it at least 4 hours before or 4 hours after you take levothyroxine.  Cottonseed meal, dietary fiber, soybean flour or walnuts may decrease the absorption of this medicine.  All of these can reduce the absorption of thyroid hormone tablets and result in fluctuating levels in the blood and on labs, affecting also how one feels.  Please do not eat grapefruit or drink grapefruit juice while you are using this medicine.  If you miss a dose you can take it as soon as you remember. However, if it is almost time for your next dose, wait until then and take a regular dose. Do not take extra medicine to make up for a missed dose.  Store the medicine in a closed container at room temperature, away from heat, moisture, and direct light. Please keep out of children's reach.  You may have to take this medicine for 6 to 8 weeks before your symptoms start to get better.

## 2025-07-29 ENCOUNTER — TELEPHONE (OUTPATIENT)
Age: 75
End: 2025-07-29

## 2025-07-29 DIAGNOSIS — E89.0 POSTOPERATIVE HYPOTHYROIDISM: Primary | ICD-10-CM

## 2025-07-29 DIAGNOSIS — C73 THYROID CANCER (HCC): ICD-10-CM

## 2025-07-29 NOTE — TELEPHONE ENCOUNTER
RAFAELI: spoke with patient. He states that  changed his dosage of thyroid medication. No labs were ordered to recheck his level.  Advised will order TSH and FT4 to have drawn. He uses [facility]  He is aware that  has left the practice. He will check with his PCP to see if she will take over his thyroid problem or find a new endo. He states that he did get the letter and list of other endocrinologists.

## 2025-07-29 NOTE — TELEPHONE ENCOUNTER
7/29/2025  11:05 AM    Casie from [facility] called on behalf of  the patient.The patient  is there to get labs done and they don't have any on file.The patient is there waiting.    Pt#[phone]    Fax#[phone]    Thanks,  Casie Hardy

## 2025-07-31 ENCOUNTER — HOSPITAL ENCOUNTER (OUTPATIENT)
Facility: HOSPITAL | Age: 75
Discharge: HOME | End: 2025-07-31
Payer: MEDICARE

## 2025-07-31 LAB
T4 FREE SERPL-MCNC: 1.4 NG/DL (ref 0.9–1.6)
TSH SERPL DL<=0.05 MIU/L-ACNC: 0.53 UIU/ML (ref 0.36–3.74)

## 2025-07-31 PROCEDURE — 84443 ASSAY THYROID STIM HORMONE: CPT

## 2025-07-31 PROCEDURE — 36415 COLL VENOUS BLD VENIPUNCTURE: CPT

## 2025-07-31 PROCEDURE — 84439 ASSAY OF FREE THYROXINE: CPT

## 2025-08-05 ENCOUNTER — OFFICE VISIT (OUTPATIENT)
Age: 75
End: 2025-08-05
Payer: MEDICARE

## 2025-08-05 VITALS
OXYGEN SATURATION: 98 % | DIASTOLIC BLOOD PRESSURE: 80 MMHG | HEART RATE: 60 BPM | SYSTOLIC BLOOD PRESSURE: 122 MMHG | TEMPERATURE: 98.8 F | RESPIRATION RATE: 16 BRPM | BODY MASS INDEX: 33.66 KG/M2 | WEIGHT: 221.4 LBS

## 2025-08-05 DIAGNOSIS — E89.0 POSTOPERATIVE HYPOTHYROIDISM: ICD-10-CM

## 2025-08-05 DIAGNOSIS — I10 PRIMARY HYPERTENSION: Primary | ICD-10-CM

## 2025-08-05 PROCEDURE — 3074F SYST BP LT 130 MM HG: CPT

## 2025-08-05 PROCEDURE — 99214 OFFICE O/P EST MOD 30 MIN: CPT

## 2025-08-05 PROCEDURE — 1159F MED LIST DOCD IN RCRD: CPT

## 2025-08-05 PROCEDURE — G8427 DOCREV CUR MEDS BY ELIG CLIN: HCPCS

## 2025-08-05 PROCEDURE — 1036F TOBACCO NON-USER: CPT

## 2025-08-05 PROCEDURE — 1123F ACP DISCUSS/DSCN MKR DOCD: CPT

## 2025-08-05 PROCEDURE — 36415 COLL VENOUS BLD VENIPUNCTURE: CPT

## 2025-08-05 PROCEDURE — 3079F DIAST BP 80-89 MM HG: CPT

## 2025-08-05 PROCEDURE — G8417 CALC BMI ABV UP PARAM F/U: HCPCS

## 2025-08-05 PROCEDURE — 3017F COLORECTAL CA SCREEN DOC REV: CPT

## 2025-08-05 PROCEDURE — 1126F AMNT PAIN NOTED NONE PRSNT: CPT

## 2025-08-05 PROCEDURE — G2211 COMPLEX E/M VISIT ADD ON: HCPCS

## 2025-08-05 RX ORDER — LEVOTHYROXINE SODIUM 137 UG/1
TABLET ORAL
Qty: 24 TABLET | Refills: 3 | Status: SHIPPED | OUTPATIENT
Start: 2025-08-05

## 2025-08-05 RX ORDER — LEVOTHYROXINE SODIUM 125 UG/1
TABLET ORAL
Qty: 80 TABLET | Refills: 3 | Status: SHIPPED | OUTPATIENT
Start: 2025-08-05

## 2025-08-05 SDOH — ECONOMIC STABILITY: FOOD INSECURITY: WITHIN THE PAST 12 MONTHS, THE FOOD YOU BOUGHT JUST DIDN'T LAST AND YOU DIDN'T HAVE MONEY TO GET MORE.: NEVER TRUE

## 2025-08-05 SDOH — ECONOMIC STABILITY: FOOD INSECURITY: WITHIN THE PAST 12 MONTHS, YOU WORRIED THAT YOUR FOOD WOULD RUN OUT BEFORE YOU GOT MONEY TO BUY MORE.: NEVER TRUE

## 2025-08-05 ASSESSMENT — ENCOUNTER SYMPTOMS
RESPIRATORY NEGATIVE: 1
ALLERGIC/IMMUNOLOGIC NEGATIVE: 1
WHEEZING: 0
COUGH: 0
DIARRHEA: 0
BLOOD IN STOOL: 0
SHORTNESS OF BREATH: 0
EYES NEGATIVE: 1
CONSTIPATION: 0

## 2025-08-05 ASSESSMENT — PATIENT HEALTH QUESTIONNAIRE - PHQ9
2. FEELING DOWN, DEPRESSED OR HOPELESS: NOT AT ALL
1. LITTLE INTEREST OR PLEASURE IN DOING THINGS: NOT AT ALL
SUM OF ALL RESPONSES TO PHQ QUESTIONS 1-9: 0

## 2025-08-06 LAB
ANION GAP SERPL CALC-SCNC: 8 MMOL/L (ref 2–14)
BUN SERPL-MCNC: 16 MG/DL (ref 8–23)
BUN/CREAT SERPL: 13 (ref 12–20)
CALCIUM SERPL-MCNC: 9.9 MG/DL (ref 8.8–10.2)
CHLORIDE SERPL-SCNC: 101 MMOL/L (ref 98–107)
CO2 SERPL-SCNC: 31 MMOL/L (ref 20–29)
CREAT SERPL-MCNC: 1.19 MG/DL (ref 0.7–1.2)
GLUCOSE SERPL-MCNC: 88 MG/DL (ref 65–100)
POTASSIUM SERPL-SCNC: 5.4 MMOL/L (ref 3.5–5.1)
SODIUM SERPL-SCNC: 140 MMOL/L (ref 136–145)

## (undated) DEVICE — GAUZE SPONGES,16 PLY: Brand: CURITY

## (undated) DEVICE — TROCAR: Brand: KII FIOS FIRST ENTRY

## (undated) DEVICE — PAD GROUNDING ADULT

## (undated) DEVICE — GLOVE SRG BIOGEL ECLIPSE 8

## (undated) DEVICE — TUBING SUCTION 5MM X 12 FT

## (undated) DEVICE — SCD SEQUENTIAL COMPRESSION COMFORT SLEEVE MEDIUM KNEE LENGTH: Brand: KENDALL SCD

## (undated) DEVICE — SUT MONOCRYL 4-0 PS-2 27 IN Y426H

## (undated) DEVICE — HYDROPHILIC WOUND DRESSING WITH ZINC PLUS VITAMINS A AND B6.: Brand: DERMAGRAN®-B

## (undated) DEVICE — 3M™ TEGADERM™ TRANSPARENT FILM DRESSING FRAME STYLE, 1626W, 4 IN X 4-3/4 IN (10 CM X 12 CM), 50/CT 4CT/CASE: Brand: 3M™ TEGADERM™

## (undated) DEVICE — GLOVE SRG BIOGEL ECLIPSE 7.5

## (undated) DEVICE — CLOSURE DEVICE ENDO CLOSE

## (undated) DEVICE — NERVE STIMULATOR HAND HELD

## (undated) DEVICE — LIGHT HANDLE COVER SLEEVE DISP BLUE STELLAR

## (undated) DEVICE — 3M™ STERI-STRIP™ REINFORCED ADHESIVE SKIN CLOSURES, R1541, 1/4 IN X 3 IN (6 MM X 75 MM), 3 STRIPS/ENVELOPE: Brand: 3M™ STERI-STRIP™

## (undated) DEVICE — CHLORAPREP HI-LITE 26ML ORANGE

## (undated) DEVICE — ABSORBABLE WOUND CLOSURE DEVICE: Brand: V-LOC 90

## (undated) DEVICE — 3M™ STERI-STRIP™ REINFORCED ADHESIVE SKIN CLOSURES, R1547, 1/2 IN X 4 IN (12 MM X 100 MM), 6 STRIPS/ENVELOPE: Brand: 3M™ STERI-STRIP™

## (undated) DEVICE — 3M™ TEGADERM™ TRANSPARENT FILM DRESSING FRAME STYLE, 1624W, 2-3/8 IN X 2-3/4 IN (6 CM X 7 CM), 100/CT 4CT/CASE: Brand: 3M™ TEGADERM™

## (undated) DEVICE — SPONGE 4 X 4 XRAY 16 PLY STRL LF RFD

## (undated) DEVICE — LIGACLIP MCA MULTIPLE CLIP APPLIERS, 20 SMALL CLIPS: Brand: LIGACLIP

## (undated) DEVICE — SUT VICRYL 2-0 18 IN J911T

## (undated) DEVICE — ELECTRODE LAP L WIRE E-Z CLEAN 33CM -0100

## (undated) DEVICE — LIGACLIP MCA MULTIPLE CLIP APPLIERS, 20 MEDIUM CLIPS: Brand: LIGACLIP

## (undated) DEVICE — MEDI-VAC YANK SUCT HNDL W/TPRD BULBOUS TIP: Brand: CARDINAL HEALTH

## (undated) DEVICE — SUT VICRYL 4-0 SH 27 IN J415H

## (undated) DEVICE — VIAL DECANTER

## (undated) DEVICE — ALLENTOWN LAP CHOLE APP PACK: Brand: CARDINAL HEALTH

## (undated) DEVICE — THYROID SHEET: Brand: CONVERTORS

## (undated) DEVICE — SUT VICRYL 2-0 SH 27 IN UNDYED J417H

## (undated) DEVICE — ROSEBUD DISSECTORS: Brand: DEROYAL

## (undated) DEVICE — [HIGH FLOW INSUFFLATOR,  DO NOT USE IF PACKAGE IS DAMAGED,  KEEP DRY,  KEEP AWAY FROM SUNLIGHT,  PROTECT FROM HEAT AND RADIOACTIVE SOURCES.]: Brand: PNEUMOSURE

## (undated) DEVICE — SYRINGE 1ML TB 26G X 3/8 SAFETY

## (undated) DEVICE — SUT MONOCRYL PLUS 4-0 PS-2 18 IN MCP496G

## (undated) DEVICE — BETHLEHEM UNIVERSAL MINOR GEN: Brand: CARDINAL HEALTH

## (undated) DEVICE — GLOVE INDICATOR PI UNDERGLOVE SZ 8 BLUE

## (undated) DEVICE — DRAPE PROBE NEO-PROBE/ULTRASOUND

## (undated) DEVICE — GAUZE SPONGES,8 PLY: Brand: CURITY

## (undated) DEVICE — PENCIL ELECTROSURG E-Z CLEAN -0035H

## (undated) DEVICE — SUT VICRYL 4-0 PS-2 27 IN J426H

## (undated) DEVICE — TROCAR: Brand: KII® SLEEVE

## (undated) DEVICE — HARMONIC FOCUS SHEARS 9CM LENGTH + ADAPTIVE TISSUE TECHNOLOGY FOR USE WITH BLUE HAND PIECE ONLY: Brand: HARMONIC FOCUS

## (undated) DEVICE — SURGICEL 4 X 8

## (undated) DEVICE — SUT SILK 2-0 SH 30 IN K833H

## (undated) DEVICE — ENDOPATH PNEUMONEEDLE INSUFFLATION NEEDLES WITH LUER LOCK CONNECTORS 120MM: Brand: ENDOPATH

## (undated) DEVICE — SUT SILK 2-0 18 IN A185H

## (undated) DEVICE — INTENDED FOR TISSUE SEPARATION, AND OTHER PROCEDURES THAT REQUIRE A SHARP SURGICAL BLADE TO PUNCTURE OR CUT.: Brand: BARD-PARKER SAFETY BLADES SIZE 15, STERILE

## (undated) DEVICE — PROXIMATE SKIN STAPLERS (35 WIDE) CONTAINS 35 STAINLESS STEEL STAPLES (FIXED HEAD): Brand: PROXIMATE

## (undated) DEVICE — DRAPE ADOLESCENT LAPAROTOMY

## (undated) DEVICE — SUT VICRYL 3-0 SH 27 IN J416H

## (undated) DEVICE — SUT VICRYL 2-0 REEL 54 IN J286G

## (undated) DEVICE — ENDOPATH 5MM CURVED SCISSORS WITH MONOPOLAR CAUTERY: Brand: ENDOPATH

## (undated) DEVICE — SUT SILK 2-0 SH CR/8 18 IN C012D

## (undated) DEVICE — GAUZE SPONGES,USP TYPE VII GAUZE, 12 PLY: Brand: CURITY

## (undated) DEVICE — SUT NUROLON 0 CTX C551D

## (undated) DEVICE — GLOVE INDICATOR PI UNDERGLOVE SZ 7.5 BLUE

## (undated) DEVICE — DRAPE EQUIPMENT RF WAND

## (undated) DEVICE — TELFA NON-ADHERENT ABSORBENT DRESSING: Brand: TELFA

## (undated) DEVICE — PACK UNIVERSAL NECK

## (undated) DEVICE — BULB SYRINGE,IRRIGATION WITH PROTECTIVE CAP: Brand: DOVER

## (undated) DEVICE — ADHESIVE SKN CLSR HISTOACRYL FLEX 0.5ML LF

## (undated) DEVICE — 3M™ STERI-STRIP™ REINFORCED ADHESIVE SKIN CLOSURES, R1546, 1/4 IN X 4 IN (6 MM X 100 MM), 10 STRIPS/ENVELOPE: Brand: 3M™ STERI-STRIP™

## (undated) DEVICE — TUBING SMOKE EVAC W/FILTRATION DEVICE PLUMEPORT ACTIV

## (undated) DEVICE — SKIN MARKER DUAL TIP WITH RULER CAP, FLEXIBLE RULER AND LABELS: Brand: DEVON

## (undated) DEVICE — PLUMEPEN PRO 10FT